# Patient Record
Sex: FEMALE | Race: WHITE | NOT HISPANIC OR LATINO
[De-identification: names, ages, dates, MRNs, and addresses within clinical notes are randomized per-mention and may not be internally consistent; named-entity substitution may affect disease eponyms.]

---

## 2018-04-02 PROBLEM — Z00.00 ENCOUNTER FOR PREVENTIVE HEALTH EXAMINATION: Status: ACTIVE | Noted: 2018-04-02

## 2018-04-27 ENCOUNTER — APPOINTMENT (OUTPATIENT)
Dept: OPHTHALMOLOGY | Facility: CLINIC | Age: 83
End: 2018-04-27

## 2018-06-25 ENCOUNTER — APPOINTMENT (OUTPATIENT)
Dept: OTOLARYNGOLOGY | Facility: CLINIC | Age: 83
End: 2018-06-25
Payer: MEDICARE

## 2018-06-25 VITALS — BODY MASS INDEX: 19.63 KG/M2 | WEIGHT: 100 LBS | HEIGHT: 60 IN

## 2018-06-25 DIAGNOSIS — H65.23 CHRONIC SEROUS OTITIS MEDIA, BILATERAL: ICD-10-CM

## 2018-06-25 PROCEDURE — 92567 TYMPANOMETRY: CPT

## 2018-06-25 PROCEDURE — 99204 OFFICE O/P NEW MOD 45 MIN: CPT

## 2018-07-02 ENCOUNTER — TRANSCRIPTION ENCOUNTER (OUTPATIENT)
Age: 83
End: 2018-07-02

## 2019-05-03 ENCOUNTER — TRANSCRIPTION ENCOUNTER (OUTPATIENT)
Age: 84
End: 2019-05-03

## 2019-12-09 ENCOUNTER — APPOINTMENT (OUTPATIENT)
Dept: UROGYNECOLOGY | Facility: CLINIC | Age: 84
End: 2019-12-09

## 2020-01-15 ENCOUNTER — OUTPATIENT (OUTPATIENT)
Dept: OUTPATIENT SERVICES | Facility: HOSPITAL | Age: 85
LOS: 1 days | Discharge: HOME | End: 2020-01-15

## 2020-04-02 ENCOUNTER — APPOINTMENT (OUTPATIENT)
Dept: OPHTHALMOLOGY | Facility: CLINIC | Age: 85
End: 2020-04-02

## 2020-08-20 ENCOUNTER — APPOINTMENT (OUTPATIENT)
Dept: OPHTHALMOLOGY | Facility: CLINIC | Age: 85
End: 2020-08-20
Payer: MEDICARE

## 2020-08-20 ENCOUNTER — NON-APPOINTMENT (OUTPATIENT)
Age: 85
End: 2020-08-20

## 2020-08-20 PROCEDURE — 92004 COMPRE OPH EXAM NEW PT 1/>: CPT

## 2020-12-16 ENCOUNTER — OUTPATIENT (OUTPATIENT)
Dept: OUTPATIENT SERVICES | Facility: HOSPITAL | Age: 85
LOS: 1 days | Discharge: HOME | End: 2020-12-16

## 2020-12-16 DIAGNOSIS — D50.9 IRON DEFICIENCY ANEMIA, UNSPECIFIED: ICD-10-CM

## 2020-12-17 ENCOUNTER — APPOINTMENT (OUTPATIENT)
Dept: HEMATOLOGY ONCOLOGY | Facility: CLINIC | Age: 85
End: 2020-12-17
Payer: MEDICARE

## 2020-12-17 PROCEDURE — 99205 OFFICE O/P NEW HI 60 MIN: CPT | Mod: 95

## 2020-12-17 RX ORDER — PREDNISONE 10 MG/1
10 TABLET ORAL TWICE DAILY
Qty: 15 | Refills: 1 | Status: DISCONTINUED | COMMUNITY
Start: 2018-06-25 | End: 2020-12-17

## 2020-12-17 NOTE — CONSULT LETTER
[Dear  ___] : Dear  [unfilled], [Consult Letter:] : I had the pleasure of evaluating your patient, [unfilled]. [Please see my note below.] : Please see my note below. [Consult Closing:] : Thank you very much for allowing me to participate in the care of this patient.  If you have any questions, please do not hesitate to contact me. [Sincerely,] : Sincerely, [FreeTextEntry2] : Colin Bell M.D. [FreeTextEntry3] : Yohan Canela M.D., [facility]\par

## 2020-12-17 NOTE — REVIEW OF SYSTEMS
[Fever] : no fever [Chills] : no chills [Night Sweats] : no night sweats [Fatigue] : fatigue [Recent Change In Weight] : ~T no recent weight change [Dysphagia] : no dysphagia [Loss of Hearing] : loss of hearing [Odynophagia] : no odynophagia [Shortness Of Breath] : no shortness of breath [Wheezing] : no wheezing [Cough] : no cough [SOB on Exertion] : shortness of breath during exertion [Easy Bleeding] : no tendency for easy bleeding [Easy Bruising] : no tendency for easy bruising [Swollen Glands] : no swollen glands [Negative] : Allergic/Immunologic [FreeTextEntry3] : h/o bilat cataract surgery [de-identified] : as above

## 2020-12-17 NOTE — HISTORY OF PRESENT ILLNESS
[Disease:__________________________] : Disease: [unfilled] [Home] : at home, [unfilled] , at the time of the visit. [Other Location: e.g. Home (Enter Location, City,State)___] : at [unfilled] [Family Member] : family member [Verbal consent obtained from patient] : the patient, [unfilled] [de-identified] : Ms. Culver is a 89 yo woman with CLL referred for evaluation of anemia.  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  I don't have those records.  She was observed \par and did well until recently, when her Hgb started to fall from 11.4 (12/19) to 8.7 (11/20/20).  WBC has ranged between 39.7 and 48.5, plt count and RBC indices have been normal.  Renal and liver function have been normal.\par She complains of significant fatigue and out of the hemoglobin has fallen she has developed more in the way of exertional dyspnea.  She has had no fevers, night sweats or weight loss.  She has no significant GI complaints other than those related to longstanding gastroparesis and has no complaints to suggest GI blood loss.  By history, she did have GI bleeding in 2016 while in [place] which led to a transfusion of 3 units of packed cells and at least an upper endoscopy.  I do not have these results.  At that time, she also was treated with steroids for indications which are not clear to me.\par She has yet to require transfusional support at this time.  Neither she or her daughter will call whether or not there is past imaging that may be reviewed.\par There is a past history of C. difficile infection, osteoporosis and bilateral hearing loss.  Lately, her balance has become more unsteady and she uses a walker.  She has had no falls.\par She takes no medications.\par  \par had blood in stool, gastroparesis, no colonoscopy \par balance, walker, no fallsinsomina\par h/o polio at age 28 yo recovered no sequelae\par  [de-identified] : ? 0 when dx'd [de-identified] : initial visit

## 2020-12-17 NOTE — ASSESSMENT
[FreeTextEntry1] : 89-year-old woman with history of CLL not requiring treatment to date.  I do not have records to review at this time including initial path or flow results.  I do not have information regarding prognostic factors.  Her hemoglobin has dropped nearly 3 g over the course of almost a year without complaints to suggest GI blood loss.  She does have a past history of GI bleeding 3 years ago while in [place] which required transfusional support and regarding which I have no further details.\par I will try to obtain records as available.  For now, baseline blood work will be obtained to include CMP, LDH, beta-2 microglobulin, immunoprotein studies, anemia work-up, retic count, CLL FISH panel, IGVH hypermutation assay and viral serologies.\par Based on the available blood work it can be determined whether or not it is likely that progressive CLL is the cause of the anemia.  He does not have a rapid WBC doubling time, her platelet count is normal and her ANC has been preserved.  She has not had frequent respiratory infections.\par Once this information is obtained, we will decide what direction to go next.  This may include imaging, particularly to assess for liver and spleen size as well as possibly a bone marrow aspirate and biopsy.\par We will be in touch again as soon as the initial tests are available for review [Palliative Care Plan] : not applicable at this time

## 2020-12-19 ENCOUNTER — LABORATORY RESULT (OUTPATIENT)
Age: 85
End: 2020-12-19

## 2020-12-21 LAB
B2 MICROGLOB SERPL-MCNC: 6 MG/L
FERRITIN SERPL-MCNC: 14 NG/ML
HAPTOGLOB SERPL-MCNC: 215 MG/DL
HBV CORE IGG+IGM SER QL: NONREACTIVE
HBV SURFACE AB SER QL: NONREACTIVE
HBV SURFACE AG SER QL: NONREACTIVE
HCV AB SER QL: NONREACTIVE
HCV S/CO RATIO: 0.07 S/CO
HIV1+2 AB SPEC QL IA.RAPID: NONREACTIVE
IRON SATN MFR SERPL: 9 %
IRON SERPL-MCNC: 29 UG/DL
TIBC SERPL-MCNC: 313 UG/DL
UIBC SERPL-MCNC: 284 UG/DL
VIT B12 SERPL-MCNC: 321 PG/ML

## 2021-01-02 ENCOUNTER — LABORATORY RESULT (OUTPATIENT)
Age: 86
End: 2021-01-02

## 2021-01-02 ENCOUNTER — NON-APPOINTMENT (OUTPATIENT)
Age: 86
End: 2021-01-02

## 2021-01-13 ENCOUNTER — APPOINTMENT (OUTPATIENT)
Dept: INFUSION THERAPY | Facility: HOSPITAL | Age: 86
End: 2021-01-13

## 2021-01-13 ENCOUNTER — LABORATORY RESULT (OUTPATIENT)
Age: 86
End: 2021-01-13

## 2021-01-14 ENCOUNTER — NON-APPOINTMENT (OUTPATIENT)
Age: 86
End: 2021-01-14

## 2021-01-15 ENCOUNTER — APPOINTMENT (OUTPATIENT)
Dept: INFUSION THERAPY | Facility: HOSPITAL | Age: 86
End: 2021-01-15

## 2021-01-15 ENCOUNTER — RESULT REVIEW (OUTPATIENT)
Age: 86
End: 2021-01-15

## 2021-01-15 ENCOUNTER — OUTPATIENT (OUTPATIENT)
Dept: OUTPATIENT SERVICES | Facility: HOSPITAL | Age: 86
LOS: 1 days | End: 2021-01-15
Payer: MEDICARE

## 2021-01-15 DIAGNOSIS — D72.829 ELEVATED WHITE BLOOD CELL COUNT, UNSPECIFIED: ICD-10-CM

## 2021-01-15 PROCEDURE — 86850 RBC ANTIBODY SCREEN: CPT

## 2021-01-15 PROCEDURE — 86923 COMPATIBILITY TEST ELECTRIC: CPT

## 2021-01-15 PROCEDURE — 86900 BLOOD TYPING SEROLOGIC ABO: CPT

## 2021-01-15 PROCEDURE — 86901 BLOOD TYPING SEROLOGIC RH(D): CPT

## 2021-01-16 ENCOUNTER — APPOINTMENT (OUTPATIENT)
Dept: INFUSION THERAPY | Facility: HOSPITAL | Age: 86
End: 2021-01-16

## 2021-01-19 ENCOUNTER — NON-APPOINTMENT (OUTPATIENT)
Age: 86
End: 2021-01-19

## 2021-01-19 DIAGNOSIS — Z51.89 ENCOUNTER FOR OTHER SPECIFIED AFTERCARE: ICD-10-CM

## 2021-03-03 ENCOUNTER — LABORATORY RESULT (OUTPATIENT)
Age: 86
End: 2021-03-03

## 2021-03-28 ENCOUNTER — OUTPATIENT (OUTPATIENT)
Dept: OUTPATIENT SERVICES | Facility: HOSPITAL | Age: 86
LOS: 1 days | Discharge: HOME | End: 2021-03-28

## 2021-03-28 DIAGNOSIS — D72.829 ELEVATED WHITE BLOOD CELL COUNT, UNSPECIFIED: ICD-10-CM

## 2021-04-01 ENCOUNTER — RESULT REVIEW (OUTPATIENT)
Age: 86
End: 2021-04-01

## 2021-04-01 ENCOUNTER — APPOINTMENT (OUTPATIENT)
Dept: HEMATOLOGY ONCOLOGY | Facility: CLINIC | Age: 86
End: 2021-04-01
Payer: MEDICARE

## 2021-04-01 VITALS
BODY MASS INDEX: 17.26 KG/M2 | HEIGHT: 60.24 IN | RESPIRATION RATE: 14 BRPM | WEIGHT: 89.07 LBS | SYSTOLIC BLOOD PRESSURE: 101 MMHG | DIASTOLIC BLOOD PRESSURE: 62 MMHG | HEART RATE: 84 BPM | TEMPERATURE: 97.3 F | OXYGEN SATURATION: 99 %

## 2021-04-01 DIAGNOSIS — Z63.4 DISAPPEARANCE AND DEATH OF FAMILY MEMBER: ICD-10-CM

## 2021-04-01 DIAGNOSIS — H90.3 SENSORINEURAL HEARING LOSS, BILATERAL: ICD-10-CM

## 2021-04-01 DIAGNOSIS — A80.9 ACUTE POLIOMYELITIS, UNSPECIFIED: ICD-10-CM

## 2021-04-01 DIAGNOSIS — Z86.19 PERSONAL HISTORY OF OTHER INFECTIOUS AND PARASITIC DISEASES: ICD-10-CM

## 2021-04-01 DIAGNOSIS — M81.0 AGE-RELATED OSTEOPOROSIS W/OUT CURRENT PATHOLOGICAL FRACTURE: ICD-10-CM

## 2021-04-01 DIAGNOSIS — Z78.9 OTHER SPECIFIED HEALTH STATUS: ICD-10-CM

## 2021-04-01 LAB
BASOPHILS # BLD AUTO: 0 K/UL — SIGNIFICANT CHANGE UP (ref 0–0.2)
BASOPHILS NFR BLD AUTO: 0 % — SIGNIFICANT CHANGE UP (ref 0–2)
EOSINOPHIL # BLD AUTO: 0.5 K/UL — SIGNIFICANT CHANGE UP (ref 0–0.5)
EOSINOPHIL NFR BLD AUTO: 1 % — SIGNIFICANT CHANGE UP (ref 0–6)
HCT VFR BLD CALC: 35.1 % — SIGNIFICANT CHANGE UP (ref 34.5–45)
HGB BLD-MCNC: 10.7 G/DL — LOW (ref 11.5–15.5)
LYMPHOCYTES # BLD AUTO: 41.43 K/UL — HIGH (ref 1–3.3)
LYMPHOCYTES # BLD AUTO: 83 % — HIGH (ref 13–44)
MCHC RBC-ENTMCNC: 28.8 PG — SIGNIFICANT CHANGE UP (ref 27–34)
MCHC RBC-ENTMCNC: 30.5 G/DL — LOW (ref 32–36)
MCV RBC AUTO: 94.4 FL — SIGNIFICANT CHANGE UP (ref 80–100)
MONOCYTES # BLD AUTO: 1.5 K/UL — HIGH (ref 0–0.9)
MONOCYTES NFR BLD AUTO: 3 % — SIGNIFICANT CHANGE UP (ref 2–14)
NEUTROPHILS # BLD AUTO: 6.49 K/UL — SIGNIFICANT CHANGE UP (ref 1.8–7.4)
NEUTROPHILS NFR BLD AUTO: 13 % — LOW (ref 43–77)
NRBC # BLD: 0 /100 — SIGNIFICANT CHANGE UP (ref 0–0)
NRBC # BLD: SIGNIFICANT CHANGE UP /100 WBCS (ref 0–0)
PLAT MORPH BLD: NORMAL — SIGNIFICANT CHANGE UP
PLATELET # BLD AUTO: 286 K/UL — SIGNIFICANT CHANGE UP (ref 150–400)
RBC # BLD: 3.72 M/UL — LOW (ref 3.8–5.2)
RBC # FLD: 18.6 % — HIGH (ref 10.3–14.5)
RBC BLD AUTO: SIGNIFICANT CHANGE UP
SMUDGE CELLS # BLD: PRESENT — SIGNIFICANT CHANGE UP
WBC # BLD: 49.92 K/UL — CRITICAL HIGH (ref 3.8–10.5)
WBC # FLD AUTO: 49.92 K/UL — CRITICAL HIGH (ref 3.8–10.5)

## 2021-04-01 PROCEDURE — 99214 OFFICE O/P EST MOD 30 MIN: CPT

## 2021-04-01 SDOH — SOCIAL STABILITY - SOCIAL INSECURITY: DISSAPEARANCE AND DEATH OF FAMILY MEMBER: Z63.4

## 2021-04-04 PROBLEM — Z86.19 HISTORY OF CLOSTRIDIUM DIFFICILE INFECTION: Status: RESOLVED | Noted: 2021-04-04 | Resolved: 2021-04-04

## 2021-04-04 PROBLEM — H90.3 SENSORINEURAL HEARING LOSS (SNHL) OF BOTH EARS: Status: ACTIVE | Noted: 2018-06-25

## 2021-04-04 PROBLEM — M81.0 OSTEOPOROSIS, POST-MENOPAUSAL: Status: ACTIVE | Noted: 2021-04-04

## 2021-04-04 PROBLEM — A80.9 POLIO: Status: RESOLVED | Noted: 2021-04-04 | Resolved: 2021-04-04

## 2021-04-04 NOTE — CONSULT LETTER
[Please see my note below.] : Please see my note below. [Sincerely,] : Sincerely, [Dear  ___] : Dear  [unfilled], [Courtesy Letter:] : I had the pleasure of seeing your patient, [unfilled], in my office today. [FreeTextEntry2] : Colin Bell M.D. [FreeTextEntry3] : Yohan Canela M.D., [facility]\par

## 2021-04-04 NOTE — PHYSICAL EXAM
[Restricted in physically strenuous activity but ambulatory and able to carry out work of a light or sedentary nature] : Status 1- Restricted in physically strenuous activity but ambulatory and able to carry out work of a light or sedentary nature, e.g., light house work, office work [Normal] : normal appearance, no rash, nodules, vesicles, ulcers, erythema [de-identified] : no CVAT

## 2021-04-04 NOTE — REVIEW OF SYSTEMS
[Fatigue] : fatigue [Loss of Hearing] : loss of hearing [SOB on Exertion] : shortness of breath during exertion [Negative] : Allergic/Immunologic [Fever] : no fever [Chills] : no chills [Night Sweats] : no night sweats [Recent Change In Weight] : ~T no recent weight change [Dysphagia] : no dysphagia [Odynophagia] : no odynophagia [Shortness Of Breath] : no shortness of breath [Wheezing] : no wheezing [Cough] : no cough [Easy Bleeding] : no tendency for easy bleeding [Easy Bruising] : no tendency for easy bruising [Swollen Glands] : no swollen glands [FreeTextEntry3] : h/o bilat cataract surgery [de-identified] : as above

## 2021-04-04 NOTE — ASSESSMENT
[Palliative Care Plan] : not applicable at this time [FreeTextEntry1] : 89-year-old woman with history of CLL not requiring treatment to date.   Her hemoglobin dropped nearly 3 g over the course of almost a year without complaints to suggest GI blood loss.  She does have a past history of GI bleeding 3 years ago while in [place] which required transfusional support and regarding which I have no further details.\par She has not had recurrence of GI bleeding.\par Flow cytometry showed typical CLL phenotype except for dim expression of CD20 and dim/partial FMC–7.  CD38 was negative.  IGVH was unmutated.  FISH showed del(13q).\par CBC today shows a rise in the Hgb to 10.7.  WBC was 42.73, ANC 4.41\par and ALC 36.98.  PLT was 279K.  Total IgG was normal while IgA and IgM were low.  Vitamin B12 was low normal.  Immunoelectrophoresis was negative.  SANDEE was negative.  LDH was normal.  Beta-2 microglobulin was 6.0.  Pretransfusion/iron infusion ferritin was 14 and transferrin saturation was 9%.  Viral serologies were negative.  Uric acid was at the upper limit of normal.\par WBC is in the same general range as in 10/2020.\par He was started on ferrous sulfate 325 mg p.o. twice daily with vitamin C with each dose.  \par She is to continue this.\par Iron studies will be repeated today along with CMP.\par Pending results, she should return in follow-up in about 3 months.\par If the hemoglobin does not entirely normalized especially if the WBC rises significantly over a short period of time, consideration will be given to initiating treatment of the CLL with a BTK inhibitor. [Palliative] : Goals of care discussed with patient: Palliative

## 2021-04-04 NOTE — HISTORY OF PRESENT ILLNESS
[Disease:__________________________] : Disease: [unfilled] [Other Location: e.g. Home (Enter Location, City,State)___] : at [unfilled] [Family Member] : family member [de-identified] : Ms. Culver is a 89 yo woman with CLL referred for evaluation of anemia.  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  I don't have those records.  She was observed \par and did well until recently, when her Hgb started to fall from 11.4 (12/19) to 8.7 (11/20/20).  WBC has ranged between 39.7 and 48.5, plt count and RBC indices have been normal.  Renal and liver function have been normal.\par She complains of significant fatigue and out of the hemoglobin has fallen she has developed more in the way of exertional dyspnea.  She has had no fevers, night sweats or weight loss.  She has no significant GI complaints other than those related to longstanding gastroparesis and has no complaints to suggest GI blood loss.  By history, she did have GI bleeding in 2016 while in [place] which led to a transfusion of 3 units of packed cells and at least an upper endoscopy.  I do not have these results.  At that time, she also was treated with steroids for indications which are not clear to me.\par She has yet to require transfusional support at this time.  Neither she or her daughter will call whether or not there is past imaging that may be reviewed.\par There is a past history of C. difficile infection, osteoporosis and bilateral hearing loss.  Lately, her balance has become more unsteady and she uses a walker.  She has had no falls.\par Ms. Culver is a 89 yo woman with CLL referred for evaluation of anemia. 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 I don't have those records. She was observed \par and did well until recently, when her Hgb started to fall from 11.4 (12/19) to 8.7 (11/20/20). WBC has ranged between 39.7 and 48.5, plt count and RBC indices have been normal. Renal and liver function have been normal.\par She complains of significant fatigue and out of the hemoglobin has fallen she has developed more in the way of exertional dyspnea. She has had no fevers, night sweats or weight loss. She has no significant GI complaints other than those related to longstanding gastroparesis and has no complaints to suggest GI blood loss. By history, she did have GI bleeding in 2016 while in [place] which led to a transfusion of 3 units of packed cells and at least an upper endoscopy. I do not have these results. At that time, she also was treated with steroids for indications which are not clear to me.\par She has yet to require transfusional support at this time. Neither she or her daughter will call whether or not there is past imaging that may be reviewed.\par There is a past history of C. difficile infection, osteoporosis and bilateral hearing loss. Lately, her balance has become more unsteady and she uses a walker. She has had no falls.\par \par  \par  \par \par  \par \par \par  [de-identified] : ? 0 when dx'd [de-identified] : After initial visit via [employer] [facility] [place] to receive one unit of PRBC and one 510 mg infusion of ferraheme -\par Marcel. 15, 16 2021.\par Felt better post rx, but still significantly tired, does have periodic PENA athough not physically active.\par Walks unsteadily with walker.\par No recent falls.\par No fevers, other constitutional c/o, resp. infections.

## 2021-05-13 ENCOUNTER — LABORATORY RESULT (OUTPATIENT)
Age: 86
End: 2021-05-13

## 2021-05-14 LAB
ALBUMIN SERPL ELPH-MCNC: 3.8 G/DL
ALP BLD-CCNC: 119 U/L
ALT SERPL-CCNC: 7 U/L
ANION GAP SERPL CALC-SCNC: 14 MMOL/L
AST SERPL-CCNC: 16 U/L
B2 MICROGLOB SERPL-MCNC: 6.7 MG/L
BASOPHILS # BLD AUTO: 0.1 K/UL
BASOPHILS NFR BLD AUTO: 0.2 %
BILIRUB SERPL-MCNC: 0.4 MG/DL
BUN SERPL-MCNC: 34 MG/DL
CALCIUM SERPL-MCNC: 9.8 MG/DL
CHLORIDE SERPL-SCNC: 102 MMOL/L
CO2 SERPL-SCNC: 25 MMOL/L
CREAT SERPL-MCNC: 1.07 MG/DL
DEPRECATED KAPPA LC FREE/LAMBDA SER: 3.15 RATIO
EOSINOPHIL # BLD AUTO: 0.2 K/UL
EOSINOPHIL NFR BLD AUTO: 0.4 %
FERRITIN SERPL-MCNC: 123 NG/ML
GLUCOSE SERPL-MCNC: 108 MG/DL
HCT VFR BLD CALC: 32.2 %
HGB BLD-MCNC: 9.4 G/DL
IGA SER QL IEP: 36 MG/DL
IGG SER QL IEP: 1275 MG/DL
IGM SER QL IEP: 31 MG/DL
IMM GRANULOCYTES NFR BLD AUTO: 0.2 %
IRON SATN MFR SERPL: 14 %
IRON SERPL-MCNC: 36 UG/DL
KAPPA LC CSF-MCNC: 2.18 MG/DL
KAPPA LC SERPL-MCNC: 6.87 MG/DL
LDH SERPL-CCNC: 185 U/L
LYMPHOCYTES # BLD AUTO: 38.08 K/UL
LYMPHOCYTES NFR BLD AUTO: 84.7 %
MAN DIFF?: NORMAL
MCHC RBC-ENTMCNC: 28.1 PG
MCHC RBC-ENTMCNC: 29.2 GM/DL
MCV RBC AUTO: 96.1 FL
MONOCYTES # BLD AUTO: 2.08 K/UL
MONOCYTES NFR BLD AUTO: 4.6 %
NEUTROPHILS # BLD AUTO: 4.38 K/UL
NEUTROPHILS NFR BLD AUTO: 9.9 %
PLATELET # BLD AUTO: 285 K/UL
POTASSIUM SERPL-SCNC: 4.7 MMOL/L
PROT SERPL-MCNC: 6.5 G/DL
RBC # BLD: 3.35 M/UL
RBC # FLD: 16.1 %
SODIUM SERPL-SCNC: 140 MMOL/L
TIBC SERPL-MCNC: 264 UG/DL
UIBC SERPL-MCNC: 228 UG/DL
URATE SERPL-MCNC: 6 MG/DL
WBC # FLD AUTO: 44.94 K/UL

## 2021-06-28 ENCOUNTER — OUTPATIENT (OUTPATIENT)
Dept: OUTPATIENT SERVICES | Facility: HOSPITAL | Age: 86
LOS: 1 days | Discharge: HOME | End: 2021-06-28

## 2021-06-28 DIAGNOSIS — D72.829 ELEVATED WHITE BLOOD CELL COUNT, UNSPECIFIED: ICD-10-CM

## 2021-07-01 ENCOUNTER — RESULT REVIEW (OUTPATIENT)
Age: 86
End: 2021-07-01

## 2021-07-01 ENCOUNTER — APPOINTMENT (OUTPATIENT)
Dept: HEMATOLOGY ONCOLOGY | Facility: CLINIC | Age: 86
End: 2021-07-01
Payer: MEDICARE

## 2021-07-01 VITALS
BODY MASS INDEX: 16.74 KG/M2 | RESPIRATION RATE: 16 BRPM | HEIGHT: 60.67 IN | DIASTOLIC BLOOD PRESSURE: 63 MMHG | WEIGHT: 87.5 LBS | TEMPERATURE: 97.6 F | SYSTOLIC BLOOD PRESSURE: 108 MMHG | OXYGEN SATURATION: 98 % | HEART RATE: 57 BPM

## 2021-07-01 DIAGNOSIS — Z87.19 PERSONAL HISTORY OF OTHER DISEASES OF THE DIGESTIVE SYSTEM: ICD-10-CM

## 2021-07-01 LAB
ANISOCYTOSIS BLD QL: SLIGHT — SIGNIFICANT CHANGE UP
BASOPHILS # BLD AUTO: 0 K/UL — SIGNIFICANT CHANGE UP (ref 0–0.2)
BASOPHILS NFR BLD AUTO: 0 % — SIGNIFICANT CHANGE UP (ref 0–2)
DACRYOCYTES BLD QL SMEAR: SLIGHT — SIGNIFICANT CHANGE UP
ELLIPTOCYTES BLD QL SMEAR: SLIGHT — SIGNIFICANT CHANGE UP
EOSINOPHIL # BLD AUTO: 0 K/UL — SIGNIFICANT CHANGE UP (ref 0–0.5)
EOSINOPHIL NFR BLD AUTO: 0 % — SIGNIFICANT CHANGE UP (ref 0–6)
HCT VFR BLD CALC: 31.3 % — LOW (ref 34.5–45)
HGB BLD-MCNC: 9.3 G/DL — LOW (ref 11.5–15.5)
LYMPHOCYTES # BLD AUTO: 39.12 K/UL — HIGH (ref 1–3.3)
LYMPHOCYTES # BLD AUTO: 86 % — HIGH (ref 13–44)
LYMPHOCYTES # SPEC AUTO: 3 % — HIGH (ref 0–0)
MCHC RBC-ENTMCNC: 27.4 PG — SIGNIFICANT CHANGE UP (ref 27–34)
MCHC RBC-ENTMCNC: 29.7 G/DL — LOW (ref 32–36)
MCV RBC AUTO: 92.1 FL — SIGNIFICANT CHANGE UP (ref 80–100)
MONOCYTES # BLD AUTO: 1.36 K/UL — HIGH (ref 0–0.9)
MONOCYTES NFR BLD AUTO: 3 % — SIGNIFICANT CHANGE UP (ref 2–14)
NEUTROPHILS # BLD AUTO: 3.57 K/UL — SIGNIFICANT CHANGE UP (ref 1.8–7.4)
NEUTROPHILS NFR BLD AUTO: 8 % — LOW (ref 43–77)
NRBC # BLD: 0 /100 — SIGNIFICANT CHANGE UP (ref 0–0)
NRBC # BLD: SIGNIFICANT CHANGE UP /100 WBCS (ref 0–0)
PLAT MORPH BLD: NORMAL — SIGNIFICANT CHANGE UP
PLATELET # BLD AUTO: 284 K/UL — SIGNIFICANT CHANGE UP (ref 150–400)
POIKILOCYTOSIS BLD QL AUTO: SLIGHT — SIGNIFICANT CHANGE UP
RBC # BLD: 3.4 M/UL — LOW (ref 3.8–5.2)
RBC # FLD: 16.2 % — HIGH (ref 10.3–14.5)
RBC BLD AUTO: ABNORMAL
SMUDGE CELLS # BLD: PRESENT — SIGNIFICANT CHANGE UP
WBC # BLD: 45.49 K/UL — CRITICAL HIGH (ref 3.8–10.5)
WBC # FLD AUTO: 45.49 K/UL — CRITICAL HIGH (ref 3.8–10.5)

## 2021-07-01 PROCEDURE — 99214 OFFICE O/P EST MOD 30 MIN: CPT

## 2021-07-01 RX ORDER — CHLORHEXIDINE GLUCONATE 4 %
325 (65 FE) LIQUID (ML) TOPICAL
Qty: 180 | Refills: 1 | Status: DISCONTINUED | COMMUNITY
Start: 2021-04-04 | End: 2021-07-01

## 2021-07-01 NOTE — CONSULT LETTER
[Dear  ___] : Dear  [unfilled], [Courtesy Letter:] : I had the pleasure of seeing your patient, [unfilled], in my office today. [Please see my note below.] : Please see my note below. [Sincerely,] : Sincerely, [FreeTextEntry2] : Colin Bell M.D. [FreeTextEntry3] : Yohan Canela M.D., [facility]\par

## 2021-07-01 NOTE — PHYSICAL EXAM
[Restricted in physically strenuous activity but ambulatory and able to carry out work of a light or sedentary nature] : Status 1- Restricted in physically strenuous activity but ambulatory and able to carry out work of a light or sedentary nature, e.g., light house work, office work [Normal] : affect appropriate [Thin] : thin [de-identified] : no CVAT

## 2021-07-01 NOTE — HISTORY OF PRESENT ILLNESS
[Disease:__________________________] : Disease: [unfilled] [Other Location: e.g. Home (Enter Location, City,State)___] : at [unfilled] [Family Member] : family member [de-identified] : Ms. Culver is a 89 yo woman with CLL referred for evaluation of anemia.  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  I don't have those records.  She was observed \par and did well until recently, when her Hgb started to fall from 11.4 (12/19) to 8.7 (11/20/20).  WBC has ranged between 39.7 and 48.5, plt count and RBC indices have been normal.  Renal and liver function have been normal.\par She complains of significant fatigue and out of the hemoglobin has fallen she has developed more in the way of exertional dyspnea.  She has had no fevers, night sweats or weight loss.  She has no significant GI complaints other than those related to longstanding gastroparesis and has no complaints to suggest GI blood loss.  By history, she did have GI bleeding in 2016 while in [place] which led to a transfusion of 3 units of packed cells and at least an upper endoscopy.  I do not have these results.  At that time, she also was treated with steroids for indications which are not clear to me.\par She has yet to require transfusional support at this time.  Neither she or her daughter will call whether or not there is past imaging that may be reviewed.\par There is a past history of C. difficile infection, osteoporosis and bilateral hearing loss.  Lately, her balance has become more unsteady and she uses a walker.  She has had no falls.\par Ms. Culver is a 89 yo woman with CLL referred for evaluation of anemia. 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 I don't have those records. She was observed \par and did well until recently, when her Hgb started to fall from 11.4 (12/19) to 8.7 (11/20/20). WBC has ranged between 39.7 and 48.5, plt count and RBC indices have been normal. Renal and liver function have been normal.\par She complains of significant fatigue and out of the hemoglobin has fallen she has developed more in the way of exertional dyspnea. She has had no fevers, night sweats or weight loss. She has no significant GI complaints other than those related to longstanding gastroparesis and has no complaints to suggest GI blood loss. By history, she did have GI bleeding in 2016 while in [place] which led to a transfusion of 3 units of packed cells and at least an upper endoscopy. I do not have these results. At that time, she also was treated with steroids for indications which are not clear to me.\par She has yet to require transfusional support at this time. Neither she or her daughter will call whether or not there is past imaging that may be reviewed.\par There is a past history of C. difficile infection, osteoporosis and bilateral hearing loss. Lately, her balance has become more unsteady and she uses a walker. She has had no falls.\par \par  \par  \par \par  \par \par \par  [de-identified] : ? 0 when dx'd [de-identified] : CLL, adverse risk features, anemia\par  halfway on successive days to receive one unit of PRBC and one 510 mg infusion of ferraheme -\par Marcel. 15, 16 2021.\par Felt better post rx, but still significantly tired, does have periodic PENA athough not physically active.\par Walks unsteadily with walker.\par No recent falls.\par No fevers, other constitutional c/o, resp. infections.\par Ambulatory

## 2021-07-01 NOTE — REVIEW OF SYSTEMS
[Fatigue] : fatigue [Loss of Hearing] : loss of hearing [SOB on Exertion] : shortness of breath during exertion [Negative] : Allergic/Immunologic [Fever] : no fever [Chills] : no chills [Night Sweats] : no night sweats [Recent Change In Weight] : ~T no recent weight change [Dysphagia] : no dysphagia [Odynophagia] : no odynophagia [Shortness Of Breath] : no shortness of breath [Wheezing] : no wheezing [Cough] : no cough [Easy Bleeding] : no tendency for easy bleeding [Easy Bruising] : no tendency for easy bruising [Swollen Glands] : no swollen glands [FreeTextEntry3] : h/o bilat cataract surgery [de-identified] : as above

## 2021-07-01 NOTE — ASSESSMENT
[Palliative] : Goals of care discussed with patient: Palliative [Palliative Care Plan] : not applicable at this time [FreeTextEntry1] : 89-year-old woman with history of CLL not requiring treatment to date.   \par She has not had recurrence of GI bleeding.\par Flow cytometry showed typical CLL phenotype except for dim expression of CD20 and dim/partial FMC–7.  CD38 was negative.  IGVH was unmutated.  FISH showed del(11q) and del(13q)\par CBC results reviewed and d/w pt\par WBC 44.94, Hgb 9.4, Plt 285K, ANC 4.38, MCV 96.1\par Ferritin, transferrin sat low pre PRBC tx and ferraheme.  Sat still low at 13% 5/13/21 though ferritin 80\par intolerant of oral Fe\par    \par Iron studies will be repeated today along with CMP.\par Pending results, she should return in follow-up in about 3 months.\par Recheck CBC in local lab since Hgb dropped in about 6 wks  - they will call for results\par d/w pt and daughter that if Hgb doesn't increase so that is sustained in the 11+ range and edith in WBC cont to rise significantly  over short time pd, need to consider starting treatment with a BTK inhib, most likely acalabrutinib.

## 2021-07-02 LAB
MANUAL DIF COMMENT BLD-IMP: SIGNIFICANT CHANGE UP

## 2021-07-12 ENCOUNTER — APPOINTMENT (OUTPATIENT)
Dept: INFUSION THERAPY | Facility: HOSPITAL | Age: 86
End: 2021-07-12

## 2021-07-12 DIAGNOSIS — Z51.11 ENCOUNTER FOR ANTINEOPLASTIC CHEMOTHERAPY: ICD-10-CM

## 2021-07-12 DIAGNOSIS — R11.2 NAUSEA WITH VOMITING, UNSPECIFIED: ICD-10-CM

## 2021-07-19 ENCOUNTER — APPOINTMENT (OUTPATIENT)
Dept: INFUSION THERAPY | Facility: HOSPITAL | Age: 86
End: 2021-07-19

## 2021-08-16 LAB
ALBUMIN SERPL ELPH-MCNC: 3.8 G/DL
ALP BLD-CCNC: 114 U/L
ALT SERPL-CCNC: 7 U/L
ANION GAP SERPL CALC-SCNC: 14 MMOL/L
AST SERPL-CCNC: 19 U/L
B2 MICROGLOB SERPL-MCNC: 6.8 MG/L
BILIRUB SERPL-MCNC: 0.4 MG/DL
BUN SERPL-MCNC: 30 MG/DL
CALCIUM SERPL-MCNC: 9.6 MG/DL
CHLORIDE SERPL-SCNC: 105 MMOL/L
CO2 SERPL-SCNC: 22 MMOL/L
CREAT SERPL-MCNC: 1.08 MG/DL
DEPRECATED KAPPA LC FREE/LAMBDA SER: 3.41 RATIO
FERRITIN SERPL-MCNC: 60 NG/ML
GLUCOSE SERPL-MCNC: 101 MG/DL
IGA SER QL IEP: 36 MG/DL
IGG SER QL IEP: 1207 MG/DL
IGM SER QL IEP: 28 MG/DL
IRON SATN MFR SERPL: 11 %
IRON SERPL-MCNC: 29 UG/DL
KAPPA LC CSF-MCNC: 1.89 MG/DL
KAPPA LC SERPL-MCNC: 6.45 MG/DL
LDH SERPL-CCNC: 232 U/L
POTASSIUM SERPL-SCNC: 5.2 MMOL/L
PROT SERPL-MCNC: 6.4 G/DL
SODIUM SERPL-SCNC: 140 MMOL/L
TIBC SERPL-MCNC: 255 UG/DL
UIBC SERPL-MCNC: 227 UG/DL
URATE SERPL-MCNC: 6.3 MG/DL

## 2021-08-17 ENCOUNTER — LABORATORY RESULT (OUTPATIENT)
Age: 86
End: 2021-08-17

## 2021-08-20 ENCOUNTER — APPOINTMENT (OUTPATIENT)
Dept: OPHTHALMOLOGY | Facility: CLINIC | Age: 86
End: 2021-08-20

## 2021-08-29 ENCOUNTER — OUTPATIENT (OUTPATIENT)
Dept: OUTPATIENT SERVICES | Facility: HOSPITAL | Age: 86
LOS: 1 days | Discharge: HOME | End: 2021-08-29

## 2021-08-29 DIAGNOSIS — D72.829 ELEVATED WHITE BLOOD CELL COUNT, UNSPECIFIED: ICD-10-CM

## 2021-08-31 ENCOUNTER — APPOINTMENT (OUTPATIENT)
Dept: INFUSION THERAPY | Facility: HOSPITAL | Age: 86
End: 2021-08-31

## 2021-09-08 ENCOUNTER — APPOINTMENT (OUTPATIENT)
Dept: INFUSION THERAPY | Facility: HOSPITAL | Age: 86
End: 2021-09-08

## 2021-09-30 ENCOUNTER — OUTPATIENT (OUTPATIENT)
Dept: OUTPATIENT SERVICES | Facility: HOSPITAL | Age: 86
LOS: 1 days | Discharge: HOME | End: 2021-09-30

## 2021-09-30 DIAGNOSIS — D72.829 ELEVATED WHITE BLOOD CELL COUNT, UNSPECIFIED: ICD-10-CM

## 2021-10-04 ENCOUNTER — RESULT REVIEW (OUTPATIENT)
Age: 86
End: 2021-10-04

## 2021-10-04 ENCOUNTER — LABORATORY RESULT (OUTPATIENT)
Age: 86
End: 2021-10-04

## 2021-10-04 ENCOUNTER — APPOINTMENT (OUTPATIENT)
Dept: HEMATOLOGY ONCOLOGY | Facility: CLINIC | Age: 86
End: 2021-10-04
Payer: MEDICARE

## 2021-10-04 VITALS
TEMPERATURE: 97.2 F | DIASTOLIC BLOOD PRESSURE: 62 MMHG | BODY MASS INDEX: 16.89 KG/M2 | WEIGHT: 83.77 LBS | OXYGEN SATURATION: 97 % | RESPIRATION RATE: 16 BRPM | HEIGHT: 59.13 IN | HEART RATE: 62 BPM | SYSTOLIC BLOOD PRESSURE: 118 MMHG

## 2021-10-04 DIAGNOSIS — Z80.3 FAMILY HISTORY OF MALIGNANT NEOPLASM OF BREAST: ICD-10-CM

## 2021-10-04 DIAGNOSIS — E61.1 IRON DEFICIENCY: ICD-10-CM

## 2021-10-04 LAB
ANISOCYTOSIS BLD QL: SLIGHT — SIGNIFICANT CHANGE UP
BASOPHILS # BLD AUTO: 0 K/UL — SIGNIFICANT CHANGE UP (ref 0–0.2)
BASOPHILS NFR BLD AUTO: 0 % — SIGNIFICANT CHANGE UP (ref 0–2)
DACRYOCYTES BLD QL SMEAR: SLIGHT — SIGNIFICANT CHANGE UP
ELLIPTOCYTES BLD QL SMEAR: SLIGHT — SIGNIFICANT CHANGE UP
EOSINOPHIL # BLD AUTO: 0.46 K/UL — SIGNIFICANT CHANGE UP (ref 0–0.5)
EOSINOPHIL NFR BLD AUTO: 1 % — SIGNIFICANT CHANGE UP (ref 0–6)
HCT VFR BLD CALC: 30.1 % — LOW (ref 34.5–45)
HGB BLD-MCNC: 9.1 G/DL — LOW (ref 11.5–15.5)
LYMPHOCYTES # BLD AUTO: 37.08 K/UL — HIGH (ref 1–3.3)
LYMPHOCYTES # BLD AUTO: 80 % — HIGH (ref 13–44)
LYMPHOCYTES # SPEC AUTO: 1 % — HIGH (ref 0–0)
MCHC RBC-ENTMCNC: 29 PG — SIGNIFICANT CHANGE UP (ref 27–34)
MCHC RBC-ENTMCNC: 30.2 G/DL — LOW (ref 32–36)
MCV RBC AUTO: 95.9 FL — SIGNIFICANT CHANGE UP (ref 80–100)
MONOCYTES # BLD AUTO: 0.46 K/UL — SIGNIFICANT CHANGE UP (ref 0–0.9)
MONOCYTES NFR BLD AUTO: 1 % — LOW (ref 2–14)
NEUTROPHILS # BLD AUTO: 7.88 K/UL — HIGH (ref 1.8–7.4)
NEUTROPHILS NFR BLD AUTO: 17 % — LOW (ref 43–77)
NRBC # BLD: 0 /100 — SIGNIFICANT CHANGE UP (ref 0–0)
NRBC # BLD: SIGNIFICANT CHANGE UP /100 WBCS (ref 0–0)
PLAT MORPH BLD: NORMAL — SIGNIFICANT CHANGE UP
PLATELET # BLD AUTO: 255 K/UL — SIGNIFICANT CHANGE UP (ref 150–400)
POIKILOCYTOSIS BLD QL AUTO: SLIGHT — SIGNIFICANT CHANGE UP
RBC # BLD: 3.14 M/UL — LOW (ref 3.8–5.2)
RBC # FLD: 17.5 % — HIGH (ref 10.3–14.5)
RBC BLD AUTO: ABNORMAL
RETICS #: 58.6 K/UL — SIGNIFICANT CHANGE UP (ref 25–125)
RETICS/RBC NFR: 1.8 % — SIGNIFICANT CHANGE UP (ref 0.5–2.5)
SMUDGE CELLS # BLD: PRESENT — SIGNIFICANT CHANGE UP
WBC # BLD: 46.35 K/UL — CRITICAL HIGH (ref 3.8–10.5)
WBC # FLD AUTO: 46.35 K/UL — CRITICAL HIGH (ref 3.8–10.5)

## 2021-10-04 PROCEDURE — 99214 OFFICE O/P EST MOD 30 MIN: CPT

## 2021-10-04 NOTE — CONSULT LETTER
[Dear  ___] : Dear  [unfilled], [Courtesy Letter:] : I had the pleasure of seeing your patient, [unfilled], in my office today. [Please see my note below.] : Please see my note below. [Sincerely,] : Sincerely, [FreeTextEntry2] : Colin Bell M.D. [FreeTextEntry3] : Yohan Canela M.D., [facility]\par

## 2021-10-04 NOTE — ASSESSMENT
[Palliative] : Goals of care discussed with patient: Palliative [Palliative Care Plan] : not applicable at this time [FreeTextEntry1] : 89-year-old woman with history of CLL not requiring treatment to date.   \par She has not had recurrence of GI bleeding.\par Flow cytometry showed typical CLL phenotype except for dim expression of CD20 and dim/partial FMC–7.  CD38 was negative.  IGVH was unmutated.  FISH showed del(11q) and del(13q)\par CBC results reviewed and d/w pt\par WBC 46.35, Hgb 9.1, Plt 255K, ANC 7.88, ALC 37.08  \par Ferritin, transferrin sat low pre PRBC tx and ferraheme.  \par Sat still low at 13% 5/13/21 though ferritin 80\par Repeat Fe/TIBC, ferritin cont to rise; that and Hgb previously > 10, plans for repeat ferritin canceled\par intolerant of oral Fe\par    \par Iron studies will be repeated today along with CMP.\par Pending results, she should return in follow-up in about 3 months.\par Recheck CBC in local lab since Hgb dropped in about 6 wks  - they will call for results\par again d/w pt and daughter that if Hgb doesn't increase so that is sustained in the 11+ range and edith if WBC cont to rise significantly over short time pd, need to consider starting treatment with a BTK inhib, most likely acalabrutinib.   Again  reviewed rationale, risks, benefits of BTK therapy.

## 2021-10-04 NOTE — REVIEW OF SYSTEMS
[Fatigue] : fatigue [Loss of Hearing] : loss of hearing [SOB on Exertion] : shortness of breath during exertion [Negative] : Allergic/Immunologic [Fever] : no fever [Chills] : no chills [Night Sweats] : no night sweats [Recent Change In Weight] : ~T no recent weight change [Dysphagia] : no dysphagia [Odynophagia] : no odynophagia [Shortness Of Breath] : no shortness of breath [Wheezing] : no wheezing [Cough] : no cough [Easy Bleeding] : no tendency for easy bleeding [Easy Bruising] : no tendency for easy bruising [Swollen Glands] : no swollen glands [FreeTextEntry3] : h/o bilat cataract surgery

## 2021-10-04 NOTE — HISTORY OF PRESENT ILLNESS
[Disease:__________________________] : Disease: [unfilled] [Other Location: e.g. Home (Enter Location, City,State)___] : at [unfilled] [Family Member] : family member [de-identified] : Ms. Culver is a 89 yo woman with CLL referred for evaluation of anemia.  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  I don't have those records.  She was observed \par and did well until recently, when her Hgb started to fall from 11.4 (12/19) to 8.7 (11/20/20).  WBC has ranged between 39.7 and 48.5, plt count and RBC indices have been normal.  Renal and liver function have been normal.\par She complains of significant fatigue and out of the hemoglobin has fallen she has developed more in the way of exertional dyspnea.  She has had no fevers, night sweats or weight loss.  She has no significant GI complaints other than those related to longstanding gastroparesis and has no complaints to suggest GI blood loss.  By history, she did have GI bleeding in 2016 while in [place] which led to a transfusion of 3 units of packed cells and at least an upper endoscopy.  I do not have these results.  At that time, she also was treated with steroids for indications which are not clear to me.\par She has yet to require transfusional support at this time.  Neither she or her daughter will call whether or not there is past imaging that may be reviewed.\par There is a past history of C. difficile infection, osteoporosis and bilateral hearing loss.  Lately, her balance has become more unsteady and she uses a walker.  She has had no falls.\par Ms. Culver is a 89 yo woman with CLL referred for evaluation of anemia. 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 I don't have those records. She was observed \par and did well until recently, when her Hgb started to fall from 11.4 (12/19) to 8.7 (11/20/20). WBC has ranged between 39.7 and 48.5, plt count and RBC indices have been normal. Renal and liver function have been normal.\par She complains of significant fatigue and out of the hemoglobin has fallen she has developed more in the way of exertional dyspnea. She has had no fevers, night sweats or weight loss. She has no significant GI complaints other than those related to longstanding gastroparesis and has no complaints to suggest GI blood loss. By history, she did have GI bleeding in 2016 while in [place] which led to a transfusion of 3 units of packed cells and at least an upper endoscopy. I do not have these results. At that time, she also was treated with steroids for indications which are not clear to me.\par She has yet to require transfusional support at this time. Neither she or her daughter will call whether or not there is past imaging that may be reviewed.\par There is a past history of C. difficile infection, osteoporosis and bilateral hearing loss. Lately, her balance has become more unsteady and she uses a walker. She has had no falls.\par \par  \par  \par \par  \par \par \par  [de-identified] : ? 0 when dx'd [de-identified] : CLL, adverse risk features, anemia\par came [place] [facility] [place] to receive one unit of PRBC and one 510 mg infusion of ferraheme -\par Marcel. 15, 16 2021.\par No longer c/o SOB\par c/o persistent fatigue\par No recent falls.\par No fevers, other constitutional c/o, resp. infections.\par Ambulatory

## 2021-10-08 LAB
ALBUMIN SERPL ELPH-MCNC: 3.5 G/DL
ALP BLD-CCNC: 104 U/L
ALT SERPL-CCNC: 6 U/L
ANION GAP SERPL CALC-SCNC: 13 MMOL/L
AST SERPL-CCNC: 13 U/L
B2 MICROGLOB SERPL-MCNC: 6.6 MG/L
BILIRUB SERPL-MCNC: 0.3 MG/DL
BUN SERPL-MCNC: 33 MG/DL
CALCIUM SERPL-MCNC: 9 MG/DL
CHLORIDE SERPL-SCNC: 104 MMOL/L
CO2 SERPL-SCNC: 24 MMOL/L
CREAT SERPL-MCNC: 1.03 MG/DL
DEPRECATED KAPPA LC FREE/LAMBDA SER: 2.69 RATIO
FERRITIN SERPL-MCNC: 192 NG/ML
GLUCOSE SERPL-MCNC: 95 MG/DL
IGA SER QL IEP: 39 MG/DL
IGG SER QL IEP: 1176 MG/DL
IGM SER QL IEP: 30 MG/DL
IRON SATN MFR SERPL: 22 %
IRON SERPL-MCNC: 45 UG/DL
KAPPA LC CSF-MCNC: 2.2 MG/DL
KAPPA LC SERPL-MCNC: 5.91 MG/DL
LDH SERPL-CCNC: 220 U/L
POTASSIUM SERPL-SCNC: 4.3 MMOL/L
PROT SERPL-MCNC: 6 G/DL
SODIUM SERPL-SCNC: 141 MMOL/L
TIBC SERPL-MCNC: 204 UG/DL
UIBC SERPL-MCNC: 159 UG/DL
URATE SERPL-MCNC: 6.5 MG/DL

## 2022-03-14 ENCOUNTER — APPOINTMENT (OUTPATIENT)
Dept: DERMATOLOGY | Facility: CLINIC | Age: 87
End: 2022-03-14

## 2022-07-18 ENCOUNTER — INPATIENT (INPATIENT)
Facility: HOSPITAL | Age: 87
LOS: 2 days | Discharge: HOME HEALTH | DRG: 193 | End: 2022-07-21
Attending: FAMILY MEDICINE | Admitting: HOSPITALIST
Payer: MEDICARE

## 2022-07-18 VITALS — WEIGHT: 70.11 LBS | HEIGHT: 59.84 IN

## 2022-07-18 DIAGNOSIS — J18.9 PNEUMONIA, UNSPECIFIED ORGANISM: ICD-10-CM

## 2022-07-18 LAB
ALBUMIN SERPL ELPH-MCNC: 2.6 G/DL — LOW (ref 3.3–5)
ALP SERPL-CCNC: 117 U/L — SIGNIFICANT CHANGE UP (ref 40–120)
ALT FLD-CCNC: 16 U/L — SIGNIFICANT CHANGE UP (ref 12–78)
ANION GAP SERPL CALC-SCNC: 3 MMOL/L — LOW (ref 5–17)
AST SERPL-CCNC: 23 U/L — SIGNIFICANT CHANGE UP (ref 15–37)
BASOPHILS # BLD AUTO: 0 K/UL — SIGNIFICANT CHANGE UP (ref 0–0.2)
BASOPHILS NFR BLD AUTO: 0 % — SIGNIFICANT CHANGE UP (ref 0–2)
BILIRUB SERPL-MCNC: 0.4 MG/DL — SIGNIFICANT CHANGE UP (ref 0.2–1.2)
BUN SERPL-MCNC: 30 MG/DL — HIGH (ref 7–23)
CALCIUM SERPL-MCNC: 9.4 MG/DL — SIGNIFICANT CHANGE UP (ref 8.5–10.1)
CHLORIDE SERPL-SCNC: 107 MMOL/L — SIGNIFICANT CHANGE UP (ref 96–108)
CO2 SERPL-SCNC: 29 MMOL/L — SIGNIFICANT CHANGE UP (ref 22–31)
CREAT SERPL-MCNC: 0.88 MG/DL — SIGNIFICANT CHANGE UP (ref 0.5–1.3)
EGFR: 60 ML/MIN/1.73M2 — SIGNIFICANT CHANGE UP
EOSINOPHIL # BLD AUTO: 0 K/UL — SIGNIFICANT CHANGE UP (ref 0–0.5)
EOSINOPHIL NFR BLD AUTO: 0 % — SIGNIFICANT CHANGE UP (ref 0–6)
GLUCOSE SERPL-MCNC: 119 MG/DL — HIGH (ref 70–99)
HCT VFR BLD CALC: 29 % — LOW (ref 34.5–45)
HGB BLD-MCNC: 8.6 G/DL — LOW (ref 11.5–15.5)
LACTATE SERPL-SCNC: 0.9 MMOL/L — SIGNIFICANT CHANGE UP (ref 0.7–2)
LYMPHOCYTES # BLD AUTO: 61.62 K/UL — HIGH (ref 1–3.3)
LYMPHOCYTES # BLD AUTO: 90 % — HIGH (ref 13–44)
MCHC RBC-ENTMCNC: 29.7 GM/DL — LOW (ref 32–36)
MCHC RBC-ENTMCNC: 30.4 PG — SIGNIFICANT CHANGE UP (ref 27–34)
MCV RBC AUTO: 102.5 FL — HIGH (ref 80–100)
MONOCYTES # BLD AUTO: 0 K/UL — SIGNIFICANT CHANGE UP (ref 0–0.9)
MONOCYTES NFR BLD AUTO: 0 % — LOW (ref 2–14)
NEUTROPHILS # BLD AUTO: 6.85 K/UL — SIGNIFICANT CHANGE UP (ref 1.8–7.4)
NEUTROPHILS NFR BLD AUTO: 9 % — LOW (ref 43–77)
NRBC # BLD: SIGNIFICANT CHANGE UP /100 WBCS (ref 0–0)
NT-PROBNP SERPL-SCNC: 1101 PG/ML — HIGH (ref 0–450)
PLATELET # BLD AUTO: 218 K/UL — SIGNIFICANT CHANGE UP (ref 150–400)
POTASSIUM SERPL-MCNC: 4.8 MMOL/L — SIGNIFICANT CHANGE UP (ref 3.5–5.3)
POTASSIUM SERPL-SCNC: 4.8 MMOL/L — SIGNIFICANT CHANGE UP (ref 3.5–5.3)
PROT SERPL-MCNC: 6.8 GM/DL — SIGNIFICANT CHANGE UP (ref 6–8.3)
RBC # BLD: 2.83 M/UL — LOW (ref 3.8–5.2)
RBC # FLD: 16.7 % — HIGH (ref 10.3–14.5)
SODIUM SERPL-SCNC: 139 MMOL/L — SIGNIFICANT CHANGE UP (ref 135–145)
TROPONIN I, HIGH SENSITIVITY RESULT: 57.18 NG/L — HIGH
TROPONIN I, HIGH SENSITIVITY RESULT: 65.31 NG/L — HIGH
WBC # BLD: 68.47 K/UL — CRITICAL HIGH (ref 3.8–10.5)
WBC # FLD AUTO: 68.47 K/UL — CRITICAL HIGH (ref 3.8–10.5)

## 2022-07-18 PROCEDURE — 71045 X-RAY EXAM CHEST 1 VIEW: CPT | Mod: 26

## 2022-07-18 PROCEDURE — 85014 HEMATOCRIT: CPT

## 2022-07-18 PROCEDURE — 86850 RBC ANTIBODY SCREEN: CPT

## 2022-07-18 PROCEDURE — 97530 THERAPEUTIC ACTIVITIES: CPT | Mod: GP

## 2022-07-18 PROCEDURE — 93005 ELECTROCARDIOGRAM TRACING: CPT

## 2022-07-18 PROCEDURE — 99285 EMERGENCY DEPT VISIT HI MDM: CPT | Mod: FS

## 2022-07-18 PROCEDURE — 93306 TTE W/DOPPLER COMPLETE: CPT

## 2022-07-18 PROCEDURE — 84481 FREE ASSAY (FT-3): CPT

## 2022-07-18 PROCEDURE — 99222 1ST HOSP IP/OBS MODERATE 55: CPT

## 2022-07-18 PROCEDURE — 85018 HEMOGLOBIN: CPT

## 2022-07-18 PROCEDURE — 86901 BLOOD TYPING SEROLOGIC RH(D): CPT

## 2022-07-18 PROCEDURE — 85025 COMPLETE CBC W/AUTO DIFF WBC: CPT

## 2022-07-18 PROCEDURE — 93010 ELECTROCARDIOGRAM REPORT: CPT

## 2022-07-18 PROCEDURE — 85610 PROTHROMBIN TIME: CPT

## 2022-07-18 PROCEDURE — 36415 COLL VENOUS BLD VENIPUNCTURE: CPT

## 2022-07-18 PROCEDURE — 84100 ASSAY OF PHOSPHORUS: CPT

## 2022-07-18 PROCEDURE — 71045 X-RAY EXAM CHEST 1 VIEW: CPT

## 2022-07-18 PROCEDURE — 80048 BASIC METABOLIC PNL TOTAL CA: CPT

## 2022-07-18 PROCEDURE — 80053 COMPREHEN METABOLIC PANEL: CPT

## 2022-07-18 PROCEDURE — 97162 PT EVAL MOD COMPLEX 30 MIN: CPT | Mod: GP

## 2022-07-18 PROCEDURE — 86900 BLOOD TYPING SEROLOGIC ABO: CPT

## 2022-07-18 PROCEDURE — 97116 GAIT TRAINING THERAPY: CPT | Mod: GP

## 2022-07-18 PROCEDURE — 83735 ASSAY OF MAGNESIUM: CPT

## 2022-07-18 PROCEDURE — 85027 COMPLETE CBC AUTOMATED: CPT

## 2022-07-18 PROCEDURE — 84443 ASSAY THYROID STIM HORMONE: CPT

## 2022-07-18 PROCEDURE — 84484 ASSAY OF TROPONIN QUANT: CPT

## 2022-07-18 RX ORDER — AZITHROMYCIN 500 MG/1
500 TABLET, FILM COATED ORAL ONCE
Refills: 0 | Status: COMPLETED | OUTPATIENT
Start: 2022-07-18 | End: 2022-07-18

## 2022-07-18 RX ORDER — SODIUM CHLORIDE 9 MG/ML
1000 INJECTION INTRAMUSCULAR; INTRAVENOUS; SUBCUTANEOUS ONCE
Refills: 0 | Status: COMPLETED | OUTPATIENT
Start: 2022-07-18 | End: 2022-07-18

## 2022-07-18 RX ORDER — CEFTRIAXONE 500 MG/1
1000 INJECTION, POWDER, FOR SOLUTION INTRAMUSCULAR; INTRAVENOUS ONCE
Refills: 0 | Status: COMPLETED | OUTPATIENT
Start: 2022-07-18 | End: 2022-07-18

## 2022-07-18 RX ORDER — ACETAMINOPHEN 500 MG
650 TABLET ORAL ONCE
Refills: 0 | Status: DISCONTINUED | OUTPATIENT
Start: 2022-07-18 | End: 2022-07-21

## 2022-07-18 RX ORDER — CHOLECALCIFEROL (VITAMIN D3) 125 MCG
1000 CAPSULE ORAL DAILY
Refills: 0 | Status: DISCONTINUED | OUTPATIENT
Start: 2022-07-18 | End: 2022-07-21

## 2022-07-18 RX ORDER — LANOLIN ALCOHOL/MO/W.PET/CERES
3 CREAM (GRAM) TOPICAL AT BEDTIME
Refills: 0 | Status: DISCONTINUED | OUTPATIENT
Start: 2022-07-18 | End: 2022-07-21

## 2022-07-18 RX ORDER — ONDANSETRON 8 MG/1
4 TABLET, FILM COATED ORAL EVERY 8 HOURS
Refills: 0 | Status: DISCONTINUED | OUTPATIENT
Start: 2022-07-18 | End: 2022-07-21

## 2022-07-18 RX ORDER — ASPIRIN/CALCIUM CARB/MAGNESIUM 324 MG
325 TABLET ORAL ONCE
Refills: 0 | Status: COMPLETED | OUTPATIENT
Start: 2022-07-18 | End: 2022-07-18

## 2022-07-18 RX ORDER — ALBUTEROL 90 UG/1
2 AEROSOL, METERED ORAL EVERY 6 HOURS
Refills: 0 | Status: DISCONTINUED | OUTPATIENT
Start: 2022-07-18 | End: 2022-07-21

## 2022-07-18 RX ADMIN — CEFTRIAXONE 100 MILLIGRAM(S): 500 INJECTION, POWDER, FOR SOLUTION INTRAMUSCULAR; INTRAVENOUS at 18:47

## 2022-07-18 RX ADMIN — Medication 325 MILLIGRAM(S): at 19:42

## 2022-07-18 RX ADMIN — AZITHROMYCIN 255 MILLIGRAM(S): 500 TABLET, FILM COATED ORAL at 19:37

## 2022-07-18 RX ADMIN — SODIUM CHLORIDE 1000 MILLILITER(S): 9 INJECTION INTRAMUSCULAR; INTRAVENOUS; SUBCUTANEOUS at 18:47

## 2022-07-18 NOTE — H&P ADULT - HISTORY OF PRESENT ILLNESS
89 year old female patient with a pertinent past medical history noted for CLL presented to the ED complaining of a one week history of a productive cough associated with shortness of breath. Of note, patient also with palpitations that are usually present on ambulation. Denies any chest pain or pre-syncope but endorsed swelling of lower extremity. No known sick contacts, fever or chills noted. Patient was seen and evaluated by her PCP and was later referred to the ED over concerns of a pneumonia.        In the ED patient with troponin of 57. A CXR revealed a likely right lower lobe infiltrate

## 2022-07-18 NOTE — H&P ADULT - CARDIOVASCULAR
normal/regular rate and rhythm/S1 S2 present/no gallops/no rub/no murmur/peripheral edema/pedal edema details…

## 2022-07-18 NOTE — ED PROVIDER NOTE - CLINICAL SUMMARY MEDICAL DECISION MAKING FREE TEXT BOX
89 F sent from PCP'd office over concern of pneumonia on CXR, initially went to office with palpitations. WIll r/o ACS, pneumonia. Plan for EKG, CXR, labs, reassess. 89 F with hx CLL sent from PCPs office over concern of pneumonia on CXR, initially went to office with palpitations. WIll r/o ACS, pneumonia. Plan for EKG, CXR, labs, reassess.

## 2022-07-18 NOTE — ED PROVIDER NOTE - NS ED ROS FT
GEN: Denies fever, chills, sick contacts, recent travel  HEENT: Denies dizziness, blurry vision, HA  Cardiac: +SOB Denies CP, palpitations  Lungs: +cough Denies wheezing  PV: + LE swelling  GI: Denies nausea, vomiting, diarrhea, constipation, flank pain  : Denies hematuria, dysuria, frequency, urgency  Skin: Denies rash, redness, open wound  MSK: Denies pain, decreased ROM  Neuro: Denies dizziness, difficulty speaking, difficulty swallowing, numbness/tingling in extremities, loss of bowel/bladder control, weakness in extremities

## 2022-07-18 NOTE — H&P ADULT - NSHPPHYSICALEXAM_GEN_ALL_CORE
Vital Signs Last 24 Hrs  T(C): 36.4 (18 Jul 2022 20:18), Max: 36.8 (18 Jul 2022 17:36)  T(F): 97.5 (18 Jul 2022 20:18), Max: 98.2 (18 Jul 2022 17:36)  HR: 66 (18 Jul 2022 20:18) (66 - 69)  BP: 130/74 (18 Jul 2022 20:18) (114/61 - 130/74)  BP(mean): 91 (18 Jul 2022 20:18) (91 - 91)  RR: 22 (18 Jul 2022 20:18) (22 - 22)  SpO2: 97% (18 Jul 2022 20:18) (97% - 97%)    Parameters below as of 18 Jul 2022 20:18  Patient On (Oxygen Delivery Method): room air

## 2022-07-18 NOTE — ED PROVIDER NOTE - PROGRESS NOTE DETAILS
Siobhan Ventura for attending Dr. Hernandez: 89 y/o F with PMH of CLL presents ot the ED c/o SOB in the setting of PNA x 1 week. Pt states she saw PCP today and was sent here for concern of PNA.    physical:  Lungs: rales on the R Siobhan Ventura for attending Dr. Hernandez: 89 y/o F with PMH of CLL presents ot the ED c/o SOB in the setting of PNA x 1 week. Pt states she saw PCP today and was sent here for concern of PNA.Pt In NAD. Lungs clear.    physical:  Lungs: rales on the R

## 2022-07-18 NOTE — PHARMACOTHERAPY INTERVENTION NOTE - COMMENTS
Medication history complete, reviewed medications with patient and daughter at bedside and confirmed with doctor first med hx.

## 2022-07-18 NOTE — ED PROVIDER NOTE - NS ED ATTENDING STATEMENT MOD
This was a shared visit with the GABE. I reviewed and verified the documentation and independently performed the documented:

## 2022-07-18 NOTE — ED ADULT NURSE NOTE - OBJECTIVE STATEMENT
Pt A&Ox4, presents to the ED for IV abx. Pt saw PCP today and had a chest x-ray revealing PNA. Pt was told to come to the ED for treatment. Pt endorses fatigue and SOB. Denies fevers. No distress noted. Daughter at the bedside.

## 2022-07-18 NOTE — ED PROVIDER NOTE - PHYSICAL EXAMINATION
Gen: Frail appearing. A&O x3.   HEENT: NC/AT. Dentition in good repair. No oropharyngeal erythema, tonsilar enlargement or exudates. Uvula midline. No submandibular or anterior cervical lymphadenopathy. TM well visualized bilaterally. Nares patent.  Cardiac: s1s2, RRR.  Lungs: CTAB, no chest wall tenderness.  Abdomen: NBS x4, soft, nontender. No CVAT.  MSK: No obvious deformity to extremities. No midline spinal tenderness or tenderness over bony landmarks on extremities. 5/5 upper and lower extremity strength. Full ROM in all extremities  Neuro: CN II-XII intact. Sensation intact to light touch in all extremities. 5/5 strength in all extremities.   PV: No LE edema or calf tenderness. Distal pulses 2+ in all extremities.

## 2022-07-18 NOTE — ED PROVIDER NOTE - CARE PLAN
1 Principal Discharge DX:	Right lower lobe pneumonia  Secondary Diagnosis:	ACS (acute coronary syndrome)

## 2022-07-18 NOTE — ED PROVIDER NOTE - OBJECTIVE STATEMENT
89 y/o F with PMH of CLL presents with concern for pneumonia on outpatient CXR. Pt went to see her PCP today, Dr. Rubin, for palpitations. Had labs and CXR performed, was sent to ED over concern for pneumonia. Pt denies fevers, nausea, vomiting. Reports non-productive cough, SOB worse with lying flat. Also notes swelling around bilateral ankles. +vaccinated for covid x4 and flu. No recent hospitalizations, rehab admission. Lives with family at home.

## 2022-07-18 NOTE — H&P ADULT - ASSESSMENT
89 year old female patient with acute respiratory failure and palpitations with positive troponins        -Monitor on Tele        # Acute Respiratory failure  -likely secondary to pneumonia  -give Levaquin  -tessalon prn  -albuterol prn    # Palpitation  -monitor on tele  -check mg, phos, tsh  -get morning echo  -routine Cardiology evaluation    # Elevated troponin  -likely secondary to demand ischemia  -will trend troponin  -serial EKGs  -get morning echo    # Hx of CLL  -currently in remission    # Anemia  -hb trending down  -check occult blood  -monitor hb    # Debility  -PT evaluation    # DVT ppx  -scds

## 2022-07-18 NOTE — ED ADULT NURSE NOTE - NSIMPLEMENTINTERV_GEN_ALL_ED
Implemented All Fall with Harm Risk Interventions:  Luck to call system. Call bell, personal items and telephone within reach. Instruct patient to call for assistance. Room bathroom lighting operational. Non-slip footwear when patient is off stretcher. Physically safe environment: no spills, clutter or unnecessary equipment. Stretcher in lowest position, wheels locked, appropriate side rails in place. Provide visual cue, wrist band, yellow gown, etc. Monitor gait and stability. Monitor for mental status changes and reorient to person, place, and time. Review medications for side effects contributing to fall risk. Reinforce activity limits and safety measures with patient and family. Provide visual clues: red socks.

## 2022-07-19 DIAGNOSIS — R00.2 PALPITATIONS: ICD-10-CM

## 2022-07-19 DIAGNOSIS — J18.9 PNEUMONIA, UNSPECIFIED ORGANISM: ICD-10-CM

## 2022-07-19 DIAGNOSIS — R77.8 OTHER SPECIFIED ABNORMALITIES OF PLASMA PROTEINS: ICD-10-CM

## 2022-07-19 LAB
ALBUMIN SERPL ELPH-MCNC: 2.1 G/DL — LOW (ref 3.3–5)
ALP SERPL-CCNC: 98 U/L — SIGNIFICANT CHANGE UP (ref 40–120)
ALT FLD-CCNC: 13 U/L — SIGNIFICANT CHANGE UP (ref 12–78)
ANION GAP SERPL CALC-SCNC: 5 MMOL/L — SIGNIFICANT CHANGE UP (ref 5–17)
AST SERPL-CCNC: 20 U/L — SIGNIFICANT CHANGE UP (ref 15–37)
BASOPHILS # BLD AUTO: 0.09 K/UL — SIGNIFICANT CHANGE UP (ref 0–0.2)
BASOPHILS NFR BLD AUTO: 0.2 % — SIGNIFICANT CHANGE UP (ref 0–2)
BILIRUB SERPL-MCNC: 0.3 MG/DL — SIGNIFICANT CHANGE UP (ref 0.2–1.2)
BUN SERPL-MCNC: 21 MG/DL — SIGNIFICANT CHANGE UP (ref 7–23)
CALCIUM SERPL-MCNC: 8.4 MG/DL — LOW (ref 8.5–10.1)
CHLORIDE SERPL-SCNC: 109 MMOL/L — HIGH (ref 96–108)
CO2 SERPL-SCNC: 24 MMOL/L — SIGNIFICANT CHANGE UP (ref 22–31)
CREAT SERPL-MCNC: 0.65 MG/DL — SIGNIFICANT CHANGE UP (ref 0.5–1.3)
EGFR: 81 ML/MIN/1.73M2 — SIGNIFICANT CHANGE UP
EOSINOPHIL # BLD AUTO: 0.07 K/UL — SIGNIFICANT CHANGE UP (ref 0–0.5)
EOSINOPHIL NFR BLD AUTO: 0.1 % — SIGNIFICANT CHANGE UP (ref 0–6)
GLUCOSE SERPL-MCNC: 81 MG/DL — SIGNIFICANT CHANGE UP (ref 70–99)
HCT VFR BLD CALC: 27.9 % — LOW (ref 34.5–45)
HGB BLD-MCNC: 8.1 G/DL — LOW (ref 11.5–15.5)
IMM GRANULOCYTES NFR BLD AUTO: 0.2 % — SIGNIFICANT CHANGE UP (ref 0–1.5)
INR BLD: 1.19 RATIO — HIGH (ref 0.88–1.16)
LYMPHOCYTES # BLD AUTO: 54.17 K/UL — HIGH (ref 1–3.3)
LYMPHOCYTES # BLD AUTO: 92.4 % — HIGH (ref 13–44)
MAGNESIUM SERPL-MCNC: 1.9 MG/DL — SIGNIFICANT CHANGE UP (ref 1.6–2.6)
MCHC RBC-ENTMCNC: 29 GM/DL — LOW (ref 32–36)
MCHC RBC-ENTMCNC: 30.1 PG — SIGNIFICANT CHANGE UP (ref 27–34)
MCV RBC AUTO: 103.7 FL — HIGH (ref 80–100)
MONOCYTES # BLD AUTO: 0.35 K/UL — SIGNIFICANT CHANGE UP (ref 0–0.9)
MONOCYTES NFR BLD AUTO: 0.6 % — LOW (ref 2–14)
NEUTROPHILS # BLD AUTO: 3.86 K/UL — SIGNIFICANT CHANGE UP (ref 1.8–7.4)
NEUTROPHILS NFR BLD AUTO: 6.5 % — LOW (ref 43–77)
PHOSPHATE SERPL-MCNC: 2.6 MG/DL — SIGNIFICANT CHANGE UP (ref 2.5–4.5)
PLATELET # BLD AUTO: 203 K/UL — SIGNIFICANT CHANGE UP (ref 150–400)
POTASSIUM SERPL-MCNC: 4.7 MMOL/L — SIGNIFICANT CHANGE UP (ref 3.5–5.3)
POTASSIUM SERPL-SCNC: 4.7 MMOL/L — SIGNIFICANT CHANGE UP (ref 3.5–5.3)
PROT SERPL-MCNC: 5.7 GM/DL — LOW (ref 6–8.3)
PROTHROM AB SERPL-ACNC: 13.8 SEC — HIGH (ref 10.5–13.4)
RBC # BLD: 2.69 M/UL — LOW (ref 3.8–5.2)
RBC # FLD: 17.1 % — HIGH (ref 10.3–14.5)
SODIUM SERPL-SCNC: 138 MMOL/L — SIGNIFICANT CHANGE UP (ref 135–145)
T3FREE SERPL-MCNC: 1.36 PG/ML — LOW (ref 1.8–4.6)
TROPONIN I, HIGH SENSITIVITY RESULT: 60.09 NG/L — HIGH
TSH SERPL-MCNC: 6.06 UU/ML — HIGH (ref 0.34–4.82)
WBC # BLD: 58.64 K/UL — CRITICAL HIGH (ref 3.8–10.5)
WBC # FLD AUTO: 58.64 K/UL — CRITICAL HIGH (ref 3.8–10.5)

## 2022-07-19 PROCEDURE — 93306 TTE W/DOPPLER COMPLETE: CPT | Mod: 26

## 2022-07-19 PROCEDURE — 99232 SBSQ HOSP IP/OBS MODERATE 35: CPT

## 2022-07-19 PROCEDURE — 99223 1ST HOSP IP/OBS HIGH 75: CPT

## 2022-07-19 PROCEDURE — 93010 ELECTROCARDIOGRAM REPORT: CPT

## 2022-07-19 RX ORDER — HEPARIN SODIUM 5000 [USP'U]/ML
5000 INJECTION INTRAVENOUS; SUBCUTANEOUS EVERY 12 HOURS
Refills: 0 | Status: DISCONTINUED | OUTPATIENT
Start: 2022-07-19 | End: 2022-07-21

## 2022-07-19 RX ORDER — CEFTRIAXONE 500 MG/1
1000 INJECTION, POWDER, FOR SOLUTION INTRAMUSCULAR; INTRAVENOUS EVERY 24 HOURS
Refills: 0 | Status: DISCONTINUED | OUTPATIENT
Start: 2022-07-19 | End: 2022-07-21

## 2022-07-19 RX ORDER — AZITHROMYCIN 500 MG/1
250 TABLET, FILM COATED ORAL DAILY
Refills: 0 | Status: DISCONTINUED | OUTPATIENT
Start: 2022-07-19 | End: 2022-07-21

## 2022-07-19 RX ADMIN — HEPARIN SODIUM 5000 UNIT(S): 5000 INJECTION INTRAVENOUS; SUBCUTANEOUS at 21:28

## 2022-07-19 RX ADMIN — Medication 1 TABLET(S): at 10:32

## 2022-07-19 RX ADMIN — Medication 1000 UNIT(S): at 10:33

## 2022-07-19 RX ADMIN — CEFTRIAXONE 100 MILLIGRAM(S): 500 INJECTION, POWDER, FOR SOLUTION INTRAMUSCULAR; INTRAVENOUS at 17:54

## 2022-07-19 RX ADMIN — AZITHROMYCIN 250 MILLIGRAM(S): 500 TABLET, FILM COATED ORAL at 17:54

## 2022-07-19 NOTE — PROGRESS NOTE ADULT - SUBJECTIVE AND OBJECTIVE BOX
89 year old female patient with a pertinent past medical history noted for CLL presented to the ED complaining of a one week history of a productive cough associated with shortness of breath. Of note, patient also with palpitations that are usually present on ambulation. Denies any chest pain or pre-syncope but endorsed swelling of lower extremity. No known sick contacts, fever or chills noted. Patient was seen and evaluated by her PCP and was later referred to the ED over concerns of a pneumonia.  In the ED patient with troponin of 57. A CXR revealed a likely right lower lobe infiltrate       07/19/22: Feels better today. Discussed with daughter at bed side regarding management and d/c plan.       Vital Signs Last 24 Hrs  T(C): 36.3 (19 Jul 2022 08:28), Max: 36.8 (18 Jul 2022 17:36)  T(F): 97.3 (19 Jul 2022 08:28), Max: 98.2 (18 Jul 2022 17:36)  HR: 60 (19 Jul 2022 08:28) (60 - 69)  BP: 105/55 (19 Jul 2022 08:28) (105/55 - 130/74)  BP(mean): 71 (19 Jul 2022 01:45) (71 - 91)  RR: 17 (19 Jul 2022 08:28) (17 - 22)  SpO2: 98% (19 Jul 2022 08:28) (95% - 98%)    Parameters below as of 19 Jul 2022 08:28  Patient On (Oxygen Delivery Method): room air          Physical Exam:  · Constitutional	well-groomed; no distress  · Eyes	PERRL; EOMI; conjunctiva clear  · Respiratory	no wheezes; no rales; no rhonchi; rhonchi  · Rhonchi	upper L; lower L; upper R; middle R; lower R  · Cardiovascular	regular rate and rhythm; S1 S2 present; no gallops; no rub; no murmur; peripheral edema; pedal edema  · Pedal Edema Severity	1+  · Pedal Edema Type	pitting  · Neurological	cranial nerves II-XII intact; sensation intact  · Skin	warm and dry; color normal; no rashes; no ulcers  · Lymphatic	No lymphadedenopathy  · Musculoskeletal	normal; normal gait; ROM intact; strength 5/5 bilateral upper extremities; strength 5/5 bilateral lower extremities  · Psychiatric	normal affect; alert and oriented x3; normal behavior                                8.1    58.64 )-----------( 203      ( 19 Jul 2022 07:30 )             27.9     19 Jul 2022 07:30    138    |  109    |  21     ----------------------------<  81     4.7     |  24     |  0.65     Ca    8.4        19 Jul 2022 07:30  Phos  2.6       19 Jul 2022 07:30  Mg     1.9       19 Jul 2022 07:30    TPro  5.7    /  Alb  2.1    /  TBili  0.3    /  DBili  x      /  AST  20     /  ALT  13     /  AlkPhos  98     19 Jul 2022 07:30    LIVER FUNCTIONS - ( 19 Jul 2022 07:30 )  Alb: 2.1 g/dL / Pro: 5.7 gm/dL / ALK PHOS: 98 U/L / ALT: 13 U/L / AST: 20 U/L / GGT: x           PT/INR - ( 19 Jul 2022 07:30 )   PT: 13.8 sec;   INR: 1.19 ratio           MEDICATIONS  (STANDING):  azithromycin   Tablet 250 milliGRAM(s) Oral daily  cefTRIAXone   IVPB 1000 milliGRAM(s) IV Intermittent every 24 hours  cholecalciferol 1000 Unit(s) Oral daily  heparin   Injectable 5000 Unit(s) SubCutaneous every 12 hours  multivitamin 1 Tablet(s) Oral daily    MEDICATIONS  (PRN):  acetaminophen     Tablet .. 650 milliGRAM(s) Oral once PRN Mild Pain (1 - 3)  ALBUTerol    90 MICROgram(s) HFA Inhaler 2 Puff(s) Inhalation every 6 hours PRN Shortness of Breath and/or Wheezing  aluminum hydroxide/magnesium hydroxide/simethicone Suspension 30 milliLiter(s) Oral every 4 hours PRN Dyspepsia  benzonatate 100 milliGRAM(s) Oral three times a day PRN Cough  melatonin 3 milliGRAM(s) Oral at bedtime PRN Insomnia  ondansetron Injectable 4 milliGRAM(s) IV Push every 8 hours PRN Nausea and/or Vomiting            Assessment and Plan:   · VTE Risk Assessment	VTE Assessment already completed for this visit    Assessment:  · Assessment	  89 year old female patient with acute respiratory failure and palpitations with positive troponins      # Pneumonia-CAP  Acute Respiratory failure ruled out  Change antibiotic to IV rocephin and po zithro  Follow blood cultures    # Palpitation  Cardiology eval appreciated  Monitor on tele for another 24 hour  Follow echo report    # Elevated troponin  -likely secondary to demand ischemia    # Hx of CLL  -currently in remission  -Oncology eval appreciated  Outpatient follow up    # Anemia  -monitor hb    # Debility  -PT evaluation    # DVT ppx

## 2022-07-19 NOTE — PHYSICAL THERAPY INITIAL EVALUATION ADULT - PERTINENT HX OF CURRENT PROBLEM, REHAB EVAL
89 year old female patient with a pertinent past medical history noted for CLL. Pt. s/p productive cough associated with SOB, inc Palpitations, Elevated troponin and s/p Pneumonia.

## 2022-07-19 NOTE — CONSULT NOTE ADULT - PROBLEM SELECTOR RECOMMENDATION 9
occasional exertional  palpitations  no evidence of  arrhythmia on monitor  , fragile thin built  probably due to symptomatic sinus tachycardia associated with activity ,  can not rule out SVT   , will ambulate and observe   follow up echocardiogram ,

## 2022-07-19 NOTE — PHYSICAL THERAPY INITIAL EVALUATION ADULT - GENERAL OBSERVATIONS, REHAB EVAL
Pt. received in bed supine + bed alarm agreeable to PT. Pt.'s dtrs at BS. Bed mob with Min A. Sit to stand with CG A, amb with RW CG A 20 ft.. Pt.'s R elbow skin scalp removed and started to bleeding. PT make RN notify. R elbow got cleaned and applied dressing by RN.

## 2022-07-19 NOTE — CONSULT NOTE ADULT - ASSESSMENT
89 year old female patient with a pertinent past medical history noted for CLL who is followed by my colleague Dr. Brian Donis outpatient, last seen 5/27/22 presented to the ED complaining of a one week history of a productive cough associated with shortness of breath with CXR showing PNA.    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 Donis had a discussion with the patient that given her weight loss this would prompt a workup for evaluation for underlying metastatic disease- given the patient's advanced age as well as discussions that they had with the patient as well as his daughters even if the patient were noted to have advanced disease we would not proceed with any systemic chemotherapy due to her frailty.   - At this time they requested that we prioritize her pain symptoms.   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  89 year old female patient with a pertinent past medical history noted for CLL who is followed by my colleague Dr. Brian Donis outpatient, last seen 5/27/22 presented to the ED complaining of a one week history of a productive cough associated with shortness of breath with CXR showing PNA.    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 Donis had a discussion with the patient that given her weight loss this would prompt a workup for evaluation for underlying metastatic disease- given the patient's advanced age as well as discussions that they had with the patient as well as his daughters even if the patient were noted to have advanced disease we would not proceed with any systemic chemotherapy due to her frailty.   - At this time they requested that we prioritize her pain symptoms.   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     dispo: pt lives with daughter- asking to go home today as she feels better  pt to f/u with Dr. Donis on discharge in clinic     Dr. Parish Kumar  cell: [phone]  Weekends and nights please call [phone] for MD on call  [facility]  Hematology/Oncology

## 2022-07-19 NOTE — CONSULT NOTE ADULT - PROBLEM SELECTOR RECOMMENDATION 2
borderline elevated troponin in setting of pneumonia ,without chest pain ,  possible demand related ischemia , follow up echo, repeat ekg

## 2022-07-19 NOTE — CONSULT NOTE ADULT - SUBJECTIVE AND OBJECTIVE BOX
CHIEF COMPLAINT:  was cough ,     HPI:  89 year old female patient with a pertinent past medical history noted for CLL presented to the ED complaining of a one week history of a productive cough associated with shortness of breath for last few days  Of note, patient also with palpitations that are usually present on ambulation which happens occasional , feels like heart rate is increased , regular , resolves with rest , no associated chest pain , which was present even before she started having pulmonary symptoms . Denies any chest pain or pre-syncope but endorsed swelling of lower extremity on prolonged sitting . No known sick contacts, fever or chills noted. Patient was seen and evaluated by her PCP and was later referred to the ED over concerns of a pneumonia.        In the ED patient with troponin of 57. A CXR revealed a likely right lower lobe infiltrate ?    blood work showed minimal elevated troponin , minimal elevated BNP .  ekg no significant ST T changes , denies any prior cardiac history ,       PAST MEDICAL & SURGICAL HISTORY:  CLL (chronic lymphocytic leukemia)          Allergies    cefdinir (Diarrhea)    Intolerances            Social History:  Denies smoking, alcohol or drug (18 Jul 2022 21:43)      FAMILY HISTORY:  Known health problems: none        MEDICATIONS:  MEDICATIONS  (STANDING):  cholecalciferol 1000 Unit(s) Oral daily  levoFLOXacin IVPB 500 milliGRAM(s) IV Intermittent every 24 hours  multivitamin 1 Tablet(s) Oral daily    MEDICATIONS  (PRN):  acetaminophen     Tablet .. 650 milliGRAM(s) Oral once PRN Mild Pain (1 - 3)  ALBUTerol    90 MICROgram(s) HFA Inhaler 2 Puff(s) Inhalation every 6 hours PRN Shortness of Breath and/or Wheezing  aluminum hydroxide/magnesium hydroxide/simethicone Suspension 30 milliLiter(s) Oral every 4 hours PRN Dyspepsia  benzonatate 100 milliGRAM(s) Oral three times a day PRN Cough  melatonin 3 milliGRAM(s) Oral at bedtime PRN Insomnia  ondansetron Injectable 4 milliGRAM(s) IV Push every 8 hours PRN Nausea and/or Vomiting      REVIEW OF SYSTEMS:    CONSTITUTIONAL: No weakness, fevers or chills  EYES/ENT: No visual changes;  No vertigo or throat pain   NECK: No pain or stiffness  RESPIRATORY: No cough, wheezing, hemoptysis; No shortness of breath  CARDIOVASCULAR: No chest pain   positive  palpitations on activity   GASTROINTESTINAL: No abdominal or epigastric pain. No nausea, vomiting, or hematemesis; No diarrhea or constipation. No melena or hematochezia.  GENITOURINARY: No dysuria, frequency or hematuria  NEUROLOGICAL: No numbness or weakness  SKIN: No itching, burning, rashes, or lesions   All other review of systems is negative unless indicated above    Vital Signs Last 24 Hrs  T(C): 36.5 (19 Jul 2022 04:34), Max: 36.8 (18 Jul 2022 17:36)  T(F): 97.7 (19 Jul 2022 04:34), Max: 98.2 (18 Jul 2022 17:36)  HR: 62 (19 Jul 2022 04:34) (60 - 69)  BP: 110/65 (19 Jul 2022 04:34) (110/65 - 130/74)  BP(mean): 71 (19 Jul 2022 01:45) (71 - 91)  RR: 18 (19 Jul 2022 04:34) (18 - 22)  SpO2: 95% (19 Jul 2022 04:34) (95% - 97%)    Parameters below as of 19 Jul 2022 01:45  Patient On (Oxygen Delivery Method): room air        I&O's Summary      PHYSICAL EXAM:    Constitutional: NAD, awake and alert, frail   HEENT: PERR, EOMI,  No oral cyananosis.  Neck:  supple,  No JVD  Respiratory: Breath sounds are clear bilaterally, No wheezing, rales or rhonchi  Cardiovascular: S1 and S2, regular rate and rhythm, no Murmurs, gallops or rubs  Gastrointestinal: Bowel Sounds present, soft, nontender.   Extremities: No peripheral edema. No clubbing or cyanosis.  Vascular: 2+ peripheral pulses  Neurological: A/O x 3, no focal deficits  Musculoskeletal: no calf tenderness.  Skin: No rashes.      LABS: All Labs Reviewed:                        8.1    58.64 )-----------( 203      ( 19 Jul 2022 07:30 )             27.9                         8.6    68.47 )-----------( 218      ( 18 Jul 2022 18:37 )             29.0     19 Jul 2022 07:30    138    |  109    |  21     ----------------------------<  81     4.7     |  24     |  0.65   18 Jul 2022 18:37    139    |  107    |  30     ----------------------------<  119    4.8     |  29     |  0.88     Ca    8.4        19 Jul 2022 07:30  Ca    9.4        18 Jul 2022 18:37  Phos  2.6       19 Jul 2022 07:30  Mg     1.9       19 Jul 2022 07:30    TPro  5.7    /  Alb  2.1    /  TBili  0.3    /  DBili  x      /  AST  20     /  ALT  13     /  AlkPhos  98     19 Jul 2022 07:30  TPro  6.8    /  Alb  2.6    /  TBili  0.4    /  DBili  x      /  AST  23     /  ALT  16     /  AlkPhos  117    18 Jul 2022 18:37    PT/INR - ( 19 Jul 2022 07:30 )   PT: 13.8 sec;   INR: 1.19 ratio                 Blood Culture:   07-18 @ 18:37  Pro Bnp 1101    07-19 @ 07:30  TSH: 6.06      RADIOLOGY/EKG:   sinus rhythm 70 non specific t changes v1 to v3 ? old     - TroponinI hsT: <-60.09, <-65.31, <-57.18    Monitor sinus rhythm       CXR reviewed RLL infiltrate

## 2022-07-19 NOTE — PHYSICAL THERAPY INITIAL EVALUATION ADULT - IMPAIRMENTS FOUND, PT EVAL
aerobic capacity/endurance/gait, locomotion, and balance/integumentary integrity/muscle strength/neuromotor development and sensory integration

## 2022-07-19 NOTE — CONSULT NOTE ADULT - SUBJECTIVE AND OBJECTIVE BOX
HPI:  89 year old female patient with a pertinent past medical history noted for CLL who is followed by my colleague Dr. Brian Donis outpatient, last seen 5/27/22 presented to the ED complaining of a one week history of a productive cough associated with shortness of breath.   Pt was diagnosed with CLL 5/2014 and has been on surveillance but was becoming more anemia in Jan 2021. She had reeived IV iron and a transfusion on 1/16/21. Again in July, she had been receiving IV iron. Last CBC in 5/23/22, WBC 81.1, Hb 10.2, plt 235k with ALC 70.1.     Of note, patient also with palpitations that are usually present on ambulation. Denies any chest pain or pre-syncope but endorsed swelling of lower extremity. No known sick contacts, fever or chills noted. Patient was seen and evaluated by her PCP and was later referred to the ED over concerns of a pneumonia.  In the ED patient with troponin of 57. A CXR revealed a likely right lower lobe infiltrate  (18 Jul 2022 21:43)      PAST MEDICAL & SURGICAL HISTORY:  CLL (chronic lymphocytic leukemia)          Allergies    cefdinir (Diarrhea)    Intolerances        MEDICATIONS  (STANDING):  cholecalciferol 1000 Unit(s) Oral daily  levoFLOXacin IVPB 500 milliGRAM(s) IV Intermittent every 24 hours  multivitamin 1 Tablet(s) Oral daily    MEDICATIONS  (PRN):  acetaminophen     Tablet .. 650 milliGRAM(s) Oral once PRN Mild Pain (1 - 3)  ALBUTerol    90 MICROgram(s) HFA Inhaler 2 Puff(s) Inhalation every 6 hours PRN Shortness of Breath and/or Wheezing  aluminum hydroxide/magnesium hydroxide/simethicone Suspension 30 milliLiter(s) Oral every 4 hours PRN Dyspepsia  benzonatate 100 milliGRAM(s) Oral three times a day PRN Cough  melatonin 3 milliGRAM(s) Oral at bedtime PRN Insomnia  ondansetron Injectable 4 milliGRAM(s) IV Push every 8 hours PRN Nausea and/or Vomiting      FAMILY HISTORY:  Known health problems: none        SOCIAL HISTORY: No EtOH, no tobacco    REVIEW OF SYSTEMS:    CONSTITUTIONAL: No weakness, fevers or chills  EYES/ENT: No visual changes;  No vertigo or throat pain   NECK: No pain or stiffness  RESPIRATORY: No cough, wheezing, hemoptysis; No shortness of breath  CARDIOVASCULAR: No chest pain or palpitations  GASTROINTESTINAL: No abdominal or epigastric pain. No nausea, vomiting, or hematemesis; No diarrhea or constipation. No melena or hematochezia.  GENITOURINARY: No dysuria, frequency or hematuria  NEUROLOGICAL: No numbness or weakness  SKIN: No itching, burning, rashes, or lesions   All other review of systems is negative unless indicated above.    Height (cm): 152 (07-18 @ 17:02)  Weight (kg): 33.1 (07-19 @ 04:24)  BMI (kg/m2): 14.3 (07-19 @ 04:24)  BSA (m2): 1.21 (07-19 @ 04:24)    T(F): 97.7 (07-19-22 @ 04:34), Max: 98.2 (07-18-22 @ 17:36)  HR: 62 (07-19-22 @ 04:34)  BP: 110/65 (07-19-22 @ 04:34)  RR: 18 (07-19-22 @ 04:34)  SpO2: 95% (07-19-22 @ 04:34)  Wt(kg): --    GENERAL: NAD, well-developed  HEAD:  Atraumatic, Normocephalic  EYES: EOMI, PERRLA, conjunctiva and sclera clear  NECK: Supple, No JVD  CHEST/LUNG: Clear to auscultation bilaterally; No wheeze  HEART: Regular rate and rhythm; No murmurs, rubs, or gallops  ABDOMEN: Soft, Nontender, Nondistended; Bowel sounds present  EXTREMITIES:  2+ Peripheral Pulses, No clubbing, cyanosis, or edema  NEUROLOGY: non-focal  SKIN: No rashes or lesions                          8.6    68.47 )-----------( 218      ( 18 Jul 2022 18:37 )             29.0       07-18    139  |  107  |  30<H>  ----------------------------<  119<H>  4.8   |  29  |  0.88    Ca    9.4      18 Jul 2022 18:37    TPro  6.8  /  Alb  2.6<L>  /  TBili  0.4  /  DBili  x   /  AST  23  /  ALT  16  /  AlkPhos  117  07-18               HPI:  89 year old female patient with a pertinent past medical history noted for CLL who is followed by my colleague Dr. Brian Donis outpatient, last seen 5/27/22 presented to the ED complaining of a one week history of a productive cough associated with shortness of breath.   Pt was diagnosed with CLL 5/2014 and has been on surveillance but was becoming more anemia in Jan 2021. She had reeived IV iron and a transfusion on 1/16/21. Again in July, she had been receiving IV iron. Last CBC in 5/23/22, WBC 81.1, Hb 10.2, plt 235k with ALC 70.1.     Of note, patient also with palpitations that are usually present on ambulation. Denies any chest pain or pre-syncope but endorsed swelling of lower extremity. No known sick contacts, fever or chills noted. Patient was seen and evaluated by her PCP and was later referred to the ED over concerns of a pneumonia.  In the ED patient with troponin of 57. A CXR revealed a likely right lower lobe infiltrate  (18 Jul 2022 21:43)      PAST MEDICAL & SURGICAL HISTORY:  CLL (chronic lymphocytic leukemia)          Allergies    cefdinir (Diarrhea)    Intolerances        MEDICATIONS  (STANDING):  cholecalciferol 1000 Unit(s) Oral daily  levoFLOXacin IVPB 500 milliGRAM(s) IV Intermittent every 24 hours  multivitamin 1 Tablet(s) Oral daily    MEDICATIONS  (PRN):  acetaminophen     Tablet .. 650 milliGRAM(s) Oral once PRN Mild Pain (1 - 3)  ALBUTerol    90 MICROgram(s) HFA Inhaler 2 Puff(s) Inhalation every 6 hours PRN Shortness of Breath and/or Wheezing  aluminum hydroxide/magnesium hydroxide/simethicone Suspension 30 milliLiter(s) Oral every 4 hours PRN Dyspepsia  benzonatate 100 milliGRAM(s) Oral three times a day PRN Cough  melatonin 3 milliGRAM(s) Oral at bedtime PRN Insomnia  ondansetron Injectable 4 milliGRAM(s) IV Push every 8 hours PRN Nausea and/or Vomiting      FAMILY HISTORY:  Known health problems: none        SOCIAL HISTORY: No EtOH, no tobacco    REVIEW OF SYSTEMS:    CONSTITUTIONAL: No weakness, fevers or chills  EYES/ENT: No visual changes;  No vertigo or throat pain   NECK: No pain or stiffness  RESPIRATORY: + cough, no wheezing, hemoptysis; +shortness of breath  CARDIOVASCULAR: No chest pain or palpitations  GASTROINTESTINAL: No abdominal or epigastric pain. No nausea, vomiting, or hematemesis; No diarrhea or constipation. No melena or hematochezia.  GENITOURINARY: No dysuria, frequency or hematuria  NEUROLOGICAL: No numbness or weakness  SKIN: No itching, burning, rashes, or lesions   All other review of systems is negative unless indicated above.    Height (cm): 152 (07-18 @ 17:02)  Weight (kg): 33.1 (07-19 @ 04:24)  BMI (kg/m2): 14.3 (07-19 @ 04:24)  BSA (m2): 1.21 (07-19 @ 04:24)    T(F): 97.7 (07-19-22 @ 04:34), Max: 98.2 (07-18-22 @ 17:36)  HR: 62 (07-19-22 @ 04:34)  BP: 110/65 (07-19-22 @ 04:34)  RR: 18 (07-19-22 @ 04:34)  SpO2: 95% (07-19-22 @ 04:34)  Wt(kg): --    GENERAL: NAD, frail, wants to go home  HEAD:  Atraumatic, Normocephalic  EYES: EOMI,   CHEST/LUNG: Clear to auscultation bilaterally; No wheeze  HEART: Regular rate   ABDOMEN: Soft, Nontender, Nondistended; Bowel sounds present  EXTREMITIES:  2+ Peripheral Pulses, No clubbing, cyanosis, or edema  NEUROLOGY: non-focal  SKIN: No rashes or lesions                          8.6    68.47 )-----------( 218      ( 18 Jul 2022 18:37 )             29.0       07-18    139  |  107  |  30<H>  ----------------------------<  119<H>  4.8   |  29  |  0.88    Ca    9.4      18 Jul 2022 18:37    TPro  6.8  /  Alb  2.6<L>  /  TBili  0.4  /  DBili  x   /  AST  23  /  ALT  16  /  AlkPhos  117  07-18

## 2022-07-19 NOTE — PATIENT PROFILE ADULT - FALL HARM RISK - RISK INTERVENTIONS
Assistance with ambulation/Communicate Fall Risk and Risk Factors to all staff, patient, and family/Reinforce activity limits and safety measures with patient and family/Visual Cue: Yellow wristband/Bed in lowest position, wheels locked, appropriate side rails in place/Call bell, personal items and telephone in reach/Instruct patient to call for assistance before getting out of bed or chair/Non-slip footwear when patient is out of bed/[place] to call system/Physically safe environment - no spills, clutter or unnecessary equipment/Purposeful Proactive Rounding/Room/bathroom lighting operational, light cord in reach

## 2022-07-19 NOTE — PHYSICAL THERAPY INITIAL EVALUATION ADULT - PLANNED THERAPY INTERVENTIONS, PT EVAL
balance training/bed mobility training/gait training/neuromuscular re-education/strengthening/stretching/transfer training

## 2022-07-20 ENCOUNTER — TRANSCRIPTION ENCOUNTER (OUTPATIENT)
Age: 87
End: 2022-07-20

## 2022-07-20 LAB
ANION GAP SERPL CALC-SCNC: 3 MMOL/L — LOW (ref 5–17)
BUN SERPL-MCNC: 20 MG/DL — SIGNIFICANT CHANGE UP (ref 7–23)
CALCIUM SERPL-MCNC: 8.5 MG/DL — SIGNIFICANT CHANGE UP (ref 8.5–10.1)
CHLORIDE SERPL-SCNC: 111 MMOL/L — HIGH (ref 96–108)
CO2 SERPL-SCNC: 26 MMOL/L — SIGNIFICANT CHANGE UP (ref 22–31)
CREAT SERPL-MCNC: 0.82 MG/DL — SIGNIFICANT CHANGE UP (ref 0.5–1.3)
EGFR: 66 ML/MIN/1.73M2 — SIGNIFICANT CHANGE UP
GLUCOSE SERPL-MCNC: 104 MG/DL — HIGH (ref 70–99)
HCT VFR BLD CALC: 28.7 % — LOW (ref 34.5–45)
HGB BLD-MCNC: 8.6 G/DL — LOW (ref 11.5–15.5)
MCHC RBC-ENTMCNC: 30 GM/DL — LOW (ref 32–36)
MCHC RBC-ENTMCNC: 30.8 PG — SIGNIFICANT CHANGE UP (ref 27–34)
MCV RBC AUTO: 102.9 FL — HIGH (ref 80–100)
PLATELET # BLD AUTO: 244 K/UL — SIGNIFICANT CHANGE UP (ref 150–400)
POTASSIUM SERPL-MCNC: 5.2 MMOL/L — SIGNIFICANT CHANGE UP (ref 3.5–5.3)
POTASSIUM SERPL-SCNC: 5.2 MMOL/L — SIGNIFICANT CHANGE UP (ref 3.5–5.3)
RBC # BLD: 2.79 M/UL — LOW (ref 3.8–5.2)
RBC # FLD: 17.3 % — HIGH (ref 10.3–14.5)
SODIUM SERPL-SCNC: 140 MMOL/L — SIGNIFICANT CHANGE UP (ref 135–145)
WBC # BLD: 76.17 K/UL — CRITICAL HIGH (ref 3.8–10.5)
WBC # FLD AUTO: 76.17 K/UL — CRITICAL HIGH (ref 3.8–10.5)

## 2022-07-20 PROCEDURE — 93010 ELECTROCARDIOGRAM REPORT: CPT

## 2022-07-20 PROCEDURE — 99233 SBSQ HOSP IP/OBS HIGH 50: CPT

## 2022-07-20 RX ORDER — POLYETHYLENE GLYCOL 3350 17 G/17G
17 POWDER, FOR SOLUTION ORAL DAILY
Refills: 0 | Status: DISCONTINUED | OUTPATIENT
Start: 2022-07-20 | End: 2022-07-21

## 2022-07-20 RX ADMIN — CEFTRIAXONE 100 MILLIGRAM(S): 500 INJECTION, POWDER, FOR SOLUTION INTRAMUSCULAR; INTRAVENOUS at 16:36

## 2022-07-20 RX ADMIN — Medication 1 TABLET(S): at 09:30

## 2022-07-20 RX ADMIN — HEPARIN SODIUM 5000 UNIT(S): 5000 INJECTION INTRAVENOUS; SUBCUTANEOUS at 09:30

## 2022-07-20 RX ADMIN — Medication 1000 UNIT(S): at 09:30

## 2022-07-20 RX ADMIN — Medication 3 MILLIGRAM(S): at 22:14

## 2022-07-20 RX ADMIN — HEPARIN SODIUM 5000 UNIT(S): 5000 INJECTION INTRAVENOUS; SUBCUTANEOUS at 22:21

## 2022-07-20 RX ADMIN — AZITHROMYCIN 250 MILLIGRAM(S): 500 TABLET, FILM COATED ORAL at 09:30

## 2022-07-20 RX ADMIN — POLYETHYLENE GLYCOL 3350 17 GRAM(S): 17 POWDER, FOR SOLUTION ORAL at 22:20

## 2022-07-20 NOTE — DISCHARGE NOTE NURSING/CASE MANAGEMENT/SOCIAL WORK - NSDCPEFALRISK_GEN_ALL_CORE
For information on Fall & Injury Prevention, visit: [email or web address] OR  [email or web address] OR  https://www.cdc.gov/steadi/patient.html

## 2022-07-20 NOTE — PROGRESS NOTE ADULT - ASSESSMENT
89 year old female patient with acute respiratory failure and palpitations with positive troponins      # Pneumonia-CAP  Acute Respiratory failure ruled out  Change antibiotic to IV rocephin and po zithro  Follow blood cultures    # Palpitation  Cardiology eval appreciated  Monitor on tele for another 24 hour  Follow echo report    # Elevated troponin  -likely secondary to demand ischemia    # Hx of CLL  -currently in remission  -Oncology eval appreciated  Outpatient follow up    # Anemia  -monitor hb    # Debility  -PT evaluation    # DVT ppx 89 year old female patient with  past medical history noted for CLL admitted for:       # Pneumonia-CAP  Acute Respiratory failure ruled out, was not noted to be hypoxic   CT chest with multifocal PNA  Monitor Pulse ox, NC PRN   C/w  rocephin and po zithro  Cough meds  blood cultures  Pulm eval appreciated     # Palpitation with excretion   C/w tele: SR , noted to have 6 beats of NSVT  check lytes   Will further D/w cardio       # Elevated troponin  -likely secondary to demand ischemia in settings of PNA   - ECHO: preserved EF, no RWMA  - Cardio eval appreciated      # Hx of CLL  -currently in remission, baseline WBCs around 80K   -Oncology eval appreciated  Outpatient follow up    # Anemia  -monitor hb, transfuse if HB <8  - no signs of bleeding     # Debility  -PT evaluation    # DVT ppx

## 2022-07-20 NOTE — CONSULT NOTE ADULT - SUBJECTIVE AND OBJECTIVE BOX
Patient is a 89y old  Female who presents with a chief complaint of Acute Respiratory Failure  Palpitations (20 Jul 2022 06:56)      HPI:  89 year old female patient with a pertinent past medical history noted for CLL presented to the ED complaining of a one week history of a productive cough associated with shortness of breath. Of note, patient also with palpitations that are usually present on ambulation. Denies any chest pain or pre-syncope but endorsed swelling of lower extremity. No known sick contacts, fever or chills noted. Patient was seen and evaluated by her PCP and was later referred to the ED over concerns of a pneumonia.        In the ED patient with troponin of 57. A CXR revealed a likely right lower lobe infiltrate  (18 Jul 2022 21:43)  pt is seen and examined in her room  no resp distress    PAST MEDICAL & SURGICAL HISTORY:  CLL (chronic lymphocytic leukemia)          PREVIOUS DIAGNOSTIC TESTING:      MEDICATIONS  (STANDING):  azithromycin   Tablet 250 milliGRAM(s) Oral daily  cefTRIAXone   IVPB 1000 milliGRAM(s) IV Intermittent every 24 hours  cholecalciferol 1000 Unit(s) Oral daily  heparin   Injectable 5000 Unit(s) SubCutaneous every 12 hours  multivitamin 1 Tablet(s) Oral daily    MEDICATIONS  (PRN):  acetaminophen     Tablet .. 650 milliGRAM(s) Oral once PRN Mild Pain (1 - 3)  ALBUTerol    90 MICROgram(s) HFA Inhaler 2 Puff(s) Inhalation every 6 hours PRN Shortness of Breath and/or Wheezing  aluminum hydroxide/magnesium hydroxide/simethicone Suspension 30 milliLiter(s) Oral every 4 hours PRN Dyspepsia  benzonatate 100 milliGRAM(s) Oral three times a day PRN Cough  melatonin 3 milliGRAM(s) Oral at bedtime PRN Insomnia  ondansetron Injectable 4 milliGRAM(s) IV Push every 8 hours PRN Nausea and/or Vomiting      FAMILY HISTORY:  Known health problems: none        SOCIAL HISTORY:  ***    REVIEW OF SYSTEM:  Pertinent items are noted in HPI.      Vital Signs Last 24 Hrs  T(C): 36.7 (19 Jul 2022 23:10), Max: 36.7 (19 Jul 2022 23:10)  T(F): 98.1 (19 Jul 2022 23:10), Max: 98.1 (19 Jul 2022 23:10)  HR: 74 (19 Jul 2022 23:10) (60 - 87)  BP: 110/61 (19 Jul 2022 23:10) (103/57 - 110/61)  BP(mean): --  RR: 17 (19 Jul 2022 16:18) (17 - 17)  SpO2: 92% (19 Jul 2022 23:10) (92% - 98%)    Parameters below as of 19 Jul 2022 23:10  Patient On (Oxygen Delivery Method): room air        I&O's Summary    PHYSICAL EXAM  General Appearance: cooperative, no acute distress,   HEENT: PERRL, conjunctiva clear, EOM's intact, non injected pharynx, no exudate, TM   normal  Neck: Supple, , no adenopathy, thyroid: not enlarged, no carotid bruit or JVD  Back: Symmetric, no  tenderness,no soft tissue tenderness  Lungs: RLL rhonchi, no wheeze  Heart: Regular rate and rhythm, S1, S2 normal, no murmur, rub or gallop  Abdomen: Soft, non-tender, bowel sounds active , no hepatosplenomegaly  Extremities: no cyanosis or edema, no joint swelling  Skin: Skin color, texture normal, no rashes   Neurologic: Alert and oriented X3 ,    ECG:    LABS:                          8.1    58.64 )-----------( 203      ( 19 Jul 2022 07:30 )             27.9     07-19    138  |  109<H>  |  21  ----------------------------<  81  4.7   |  24  |  0.65    Ca    8.4<L>      19 Jul 2022 07:30  Phos  2.6     07-19  Mg     1.9     07-19    TPro  5.7<L>  /  Alb  2.1<L>  /  TBili  0.3  /  DBili  x   /  AST  20  /  ALT  13  /  AlkPhos  98  07-19          Pro BNP  1101 07-18 @ 18:37  D Dimer  -- 07-18 @ 18:37    PT/INR - ( 19 Jul 2022 07:30 )   PT: 13.8 sec;   INR: 1.19 ratio                   RADIOLOGY & ADDITIONAL STUDIES:  < from: Xray Chest 1 View- PORTABLE-Urgent (Xray Chest 1 View- PORTABLE-Urgent .) (07.18.22 @ 18:59) >  INTERPRETATION:  Portable chest radiograph    CLINICAL INFORMATION: Dyspnea, shortness of breath.    TECHNIQUE:  Portable  AP chest radiograph.    COMPARISON: None. .    FINDINGS:  CATHETERS AND TUBES: None    PULMONARY: RIGHT basilar multifocal airspace consolidations . RIGHT upper   zone and LEFT lung parenchyma grossly clear.  No pneumothorax.    HEART/VASCULAR: The  heart is enlarged in transverse diameter.     BONES: Visualized osseous structures are intact.    IMPRESSION:   RIGHT basilar multifocal airspace consolidations.    < end of copied text >

## 2022-07-20 NOTE — CONSULT NOTE ADULT - ASSESSMENT
89 year old female patient with a pertinent past medical history noted for CLL presented to the ED complaining of a one week history of a productive cough associated with shortness of breath. Of note, patient also with palpitations that are usually present on ambulation. Denies any chest pain or pre-syncope but endorsed swelling of lower extremity. No known sick contacts, fever or chills noted. Patient was seen and evaluated by her PCP and was later referred to the ED over concerns of a pneumonia.        In the ED patient with troponin of 57. A CXR revealed a likely right lower lobe infiltrate  (18 Jul 2022 21:43)  pt is seen and examined in her room  no resp distress    Assessment / plan;  RLL consolidation / pneumonia  R/o aspiration  CLL history  PENA due to above  Mild Pulm Htn / Moderate MR  no sign of CHF  chronic anemia    agree with antibiotic coverage  check for aspiration  mobilize OOB  elevated WBC due to CLL / heme eval in progress  DVT prophylaxis  fu cxr needed

## 2022-07-20 NOTE — CONSULT NOTE ADULT - REASON FOR ADMISSION
Acute Respiratory Failure  Palpitations

## 2022-07-20 NOTE — PROGRESS NOTE ADULT - SUBJECTIVE AND OBJECTIVE BOX
INTERVAL HPI/OVERNIGHT EVENTS:  Patient S&E at bedside. No o/n events, afebrile, vss on RA - 92% while asleep  TTE report pending  PT recommending home PT     PAST MEDICAL & SURGICAL HISTORY:  CLL (chronic lymphocytic leukemia)          FAMILY HISTORY:  Known health problems: none        VITAL SIGNS:  T(F): 98.1 (07-19-22 @ 23:10)  HR: 74 (07-19-22 @ 23:10)  BP: 110/61 (07-19-22 @ 23:10)  RR: 17 (07-19-22 @ 16:18)  SpO2: 92% (07-19-22 @ 23:10)  Wt(kg): --    PHYSICAL EXAM:    GENERAL: NAD, frail, wants to go home  CHEST/LUNG: Clear to auscultation bilaterally; No wheeze  HEART: Regular rate   ABDOMEN: Soft, Nontender, Nondistended; Bowel sounds present  EXTREMITIES:  2+ Peripheral Pulses, No clubbing, cyanosis, or edema  NEUROLOGY: non-focal  SKIN: No rashes or lesions      MEDICATIONS  (STANDING):  azithromycin   Tablet 250 milliGRAM(s) Oral daily  cefTRIAXone   IVPB 1000 milliGRAM(s) IV Intermittent every 24 hours  cholecalciferol 1000 Unit(s) Oral daily  heparin   Injectable 5000 Unit(s) SubCutaneous every 12 hours  multivitamin 1 Tablet(s) Oral daily    MEDICATIONS  (PRN):  acetaminophen     Tablet .. 650 milliGRAM(s) Oral once PRN Mild Pain (1 - 3)  ALBUTerol    90 MICROgram(s) HFA Inhaler 2 Puff(s) Inhalation every 6 hours PRN Shortness of Breath and/or Wheezing  aluminum hydroxide/magnesium hydroxide/simethicone Suspension 30 milliLiter(s) Oral every 4 hours PRN Dyspepsia  benzonatate 100 milliGRAM(s) Oral three times a day PRN Cough  melatonin 3 milliGRAM(s) Oral at bedtime PRN Insomnia  ondansetron Injectable 4 milliGRAM(s) IV Push every 8 hours PRN Nausea and/or Vomiting      Allergies    cefdinir (Diarrhea)    Intolerances        LABS:                        8.1    58.64 )-----------( 203      ( 19 Jul 2022 07:30 )             27.9     07-19    138  |  109<H>  |  21  ----------------------------<  81  4.7   |  24  |  0.65    Ca    8.4<L>      19 Jul 2022 07:30  Phos  2.6     07-19  Mg     1.9     07-19    TPro  5.7<L>  /  Alb  2.1<L>  /  TBili  0.3  /  DBili  x   /  AST  20  /  ALT  13  /  AlkPhos  98  07-19    PT/INR - ( 19 Jul 2022 07:30 )   PT: 13.8 sec;   INR: 1.19 ratio               RADIOLOGY & ADDITIONAL TESTS:  Studies reviewed.   INTERVAL HPI/OVERNIGHT EVENTS:  Patient S&E at bedside. No o/n events, afebrile, vss on RA - 92% while asleep  TTE unremarkable EF 60-65%  PT recommending home PT   WBC 76.17, Hb 8.6, plt 244    PAST MEDICAL & SURGICAL HISTORY:  CLL (chronic lymphocytic leukemia)          FAMILY HISTORY:  Known health problems: none        VITAL SIGNS:  T(F): 98.1 (07-19-22 @ 23:10)  HR: 74 (07-19-22 @ 23:10)  BP: 110/61 (07-19-22 @ 23:10)  RR: 17 (07-19-22 @ 16:18)  SpO2: 92% (07-19-22 @ 23:10)  Wt(kg): --    PHYSICAL EXAM:    GENERAL: NAD, frail, wants to go home  CHEST/LUNG: Clear to auscultation bilaterally; No wheeze  HEART: Regular rate   ABDOMEN: Soft, Nontender, Nondistended; Bowel sounds present  EXTREMITIES:  2+ Peripheral Pulses, No clubbing, cyanosis, or edema  NEUROLOGY: non-focal  SKIN: No rashes or lesions      MEDICATIONS  (STANDING):  azithromycin   Tablet 250 milliGRAM(s) Oral daily  cefTRIAXone   IVPB 1000 milliGRAM(s) IV Intermittent every 24 hours  cholecalciferol 1000 Unit(s) Oral daily  heparin   Injectable 5000 Unit(s) SubCutaneous every 12 hours  multivitamin 1 Tablet(s) Oral daily    MEDICATIONS  (PRN):  acetaminophen     Tablet .. 650 milliGRAM(s) Oral once PRN Mild Pain (1 - 3)  ALBUTerol    90 MICROgram(s) HFA Inhaler 2 Puff(s) Inhalation every 6 hours PRN Shortness of Breath and/or Wheezing  aluminum hydroxide/magnesium hydroxide/simethicone Suspension 30 milliLiter(s) Oral every 4 hours PRN Dyspepsia  benzonatate 100 milliGRAM(s) Oral three times a day PRN Cough  melatonin 3 milliGRAM(s) Oral at bedtime PRN Insomnia  ondansetron Injectable 4 milliGRAM(s) IV Push every 8 hours PRN Nausea and/or Vomiting      Allergies    cefdinir (Diarrhea)    Intolerances        LABS:                        8.1    58.64 )-----------( 203      ( 19 Jul 2022 07:30 )             27.9     07-19    138  |  109<H>  |  21  ----------------------------<  81  4.7   |  24  |  0.65    Ca    8.4<L>      19 Jul 2022 07:30  Phos  2.6     07-19  Mg     1.9     07-19    TPro  5.7<L>  /  Alb  2.1<L>  /  TBili  0.3  /  DBili  x   /  AST  20  /  ALT  13  /  AlkPhos  98  07-19    PT/INR - ( 19 Jul 2022 07:30 )   PT: 13.8 sec;   INR: 1.19 ratio               RADIOLOGY & ADDITIONAL TESTS:  Studies reviewed.

## 2022-07-20 NOTE — PROGRESS NOTE ADULT - SUBJECTIVE AND OBJECTIVE BOX
CC: Acute Respiratory Failure  Palpitations    HPI: 89 year old female patient with a pertinent past medical history noted for CLL presented to the ED complaining of a one week history of a productive cough associated with shortness of breath. Of note, patient also with palpitations that are usually present on ambulation. Denies any chest pain or pre-syncope but endorsed swelling of lower extremity. No known sick contacts, fever or chills noted. Patient was seen and evaluated by her PCP and was later referred to the ED over concerns of a pneumonia.        In the ED patient with troponin of 57. A CXR revealed a likely right lower lobe infiltrate  (18 Jul 2022 21:43)    INTERVAL HPI/OVERNIGHT EVENTS:    Vital Signs Last 24 Hrs  T(C): 36.2 (20 Jul 2022 08:34), Max: 36.7 (19 Jul 2022 23:10)  T(F): 97.2 (20 Jul 2022 08:34), Max: 98.1 (19 Jul 2022 23:10)  HR: 83 (20 Jul 2022 08:34) (74 - 87)  BP: 118/63 (20 Jul 2022 08:34) (103/57 - 118/63)  BP(mean): --  RR: 18 (20 Jul 2022 08:34) (17 - 18)  SpO2: 92% (20 Jul 2022 08:34) (92% - 93%)    Parameters below as of 20 Jul 2022 08:34  Patient On (Oxygen Delivery Method): room air      I&O's Detail    REVIEW OF SYSTEMS:    CONSTITUTIONAL: No weakness, fevers or chills  EYES/ENT: No visual changes;  No vertigo or throat pain   NECK: No pain or stiffness  RESPIRATORY: No cough, wheezing, hemoptysis; No shortness of breath  CARDIOVASCULAR: No chest pain or palpitations  GASTROINTESTINAL: No abdominal or epigastric pain. No nausea, vomiting, or hematemesis; No diarrhea or constipation. No melena or hematochezia.  GENITOURINARY: No dysuria, frequency or hematuria  NEUROLOGICAL: No numbness or weakness  SKIN: No itching, burning, rashes, or lesions   All other review of systems is negative unless indicated above.  PHYSICAL EXAM:    General: Well developed; well nourished; in no acute distress  Eyes: PERRLA, EOMI; conjunctiva and sclera clear  Head: Normocephalic; atraumatic  ENMT: No nasal discharge; airway clear  Neck: Supple; non tender; no masses  Respiratory: No wheezes, rales or rhonchi  Cardiovascular: Regular rate and rhythm. S1 and S2 Normal; No murmurs, gallops or rubs  Gastrointestinal: Soft non-tender non-distended; Normal bowel sounds  Genitourinary: No  suprapubic  tenderness  Extremities: Normal range of motion, No clubbing, cyanosis or edema  Vascular: Peripheral pulses palpable 2+ bilaterally  Neurological: Alert and oriented x4  Skin: Warm and dry. No acute rash  Lymph Nodes: No acute cervical adenopathy  Musculoskeletal: Normal muscle tone, without deformities  Psychiatric: Cooperative and appropriate                            8.6    76.17 )-----------( 244      ( 20 Jul 2022 08:36 )             28.7     20 Jul 2022 08:36    140    |  111    |  20     ----------------------------<  104    5.2     |  26     |  0.82     Ca    8.5        20 Jul 2022 08:36  Phos  2.6       19 Jul 2022 07:30  Mg     1.9       19 Jul 2022 07:30    TPro  5.7    /  Alb  2.1    /  TBili  0.3    /  DBili  x      /  AST  20     /  ALT  13     /  AlkPhos  98     19 Jul 2022 07:30    PT/INR - ( 19 Jul 2022 07:30 )   PT: 13.8 sec;   INR: 1.19 ratio           CAPILLARY BLOOD GLUCOSE        LIVER FUNCTIONS - ( 19 Jul 2022 07:30 )  Alb: 2.1 g/dL / Pro: 5.7 gm/dL / ALK PHOS: 98 U/L / ALT: 13 U/L / AST: 20 U/L / GGT: x               MEDICATIONS  (STANDING):  azithromycin   Tablet 250 milliGRAM(s) Oral daily  cefTRIAXone   IVPB 1000 milliGRAM(s) IV Intermittent every 24 hours  cholecalciferol 1000 Unit(s) Oral daily  heparin   Injectable 5000 Unit(s) SubCutaneous every 12 hours  multivitamin 1 Tablet(s) Oral daily    MEDICATIONS  (PRN):  acetaminophen     Tablet .. 650 milliGRAM(s) Oral once PRN Mild Pain (1 - 3)  ALBUTerol    90 MICROgram(s) HFA Inhaler 2 Puff(s) Inhalation every 6 hours PRN Shortness of Breath and/or Wheezing  aluminum hydroxide/magnesium hydroxide/simethicone Suspension 30 milliLiter(s) Oral every 4 hours PRN Dyspepsia  benzonatate 100 milliGRAM(s) Oral three times a day PRN Cough  melatonin 3 milliGRAM(s) Oral at bedtime PRN Insomnia  ondansetron Injectable 4 milliGRAM(s) IV Push every 8 hours PRN Nausea and/or Vomiting      RADIOLOGY & ADDITIONAL TESTS:  < from: Xray Chest 1 View- PORTABLE-Urgent (Xray Chest 1 View- PORTABLE-Urgent .) (07.18.22 @ 18:59) >  ACC: [id number] EXAM:  XR CHEST PORTABLE URGENT 1V                          PROCEDURE DATE:  07/18/2022          INTERPRETATION:  Portable chest radiograph    CLINICAL INFORMATION: Dyspnea, shortness of breath.    TECHNIQUE:  Portable  AP chest radiograph.    COMPARISON: None. .    FINDINGS:  CATHETERS AND TUBES: None    PULMONARY: RIGHT basilar multifocal airspace consolidations . RIGHT upper   zone and LEFT lung parenchyma grossly clear.  No pneumothorax.    HEART/VASCULAR: The  heart is enlarged in transverse diameter.     BONES: Visualized osseous structures are intact.    IMPRESSION:   RIGHT basilar multifocal airspace consolidations.    < end of copied text >     CC: Acute Respiratory Failure  Palpitations    HPI: 89 year old female patient with a pertinent past medical history noted for CLL presented to the ED complaining of a one week history of a productive cough associated with shortness of breath. Of note, patient also with palpitations that are usually present on ambulation. Denies any chest pain or pre-syncope but endorsed swelling of lower extremity. No known sick contacts, fever or chills noted. Patient was seen and evaluated by her PCP and was later referred to the ED over concerns of a pneumonia.        In the ED patient with troponin of 57. A CXR revealed a likely right lower lobe infiltrate  (18 Jul 2022 21:43)    INTERVAL HPI/OVERNIGHT EVENTS: chart reviewed, Pt was seen and examined, feels better, less SOB and cough improving. Daughter at bedside, POC discussed       Vital Signs Last 24 Hrs  T(C): 36.2 (20 Jul 2022 08:34), Max: 36.7 (19 Jul 2022 23:10)  T(F): 97.2 (20 Jul 2022 08:34), Max: 98.1 (19 Jul 2022 23:10)  HR: 83 (20 Jul 2022 08:34) (74 - 87)  BP: 118/63 (20 Jul 2022 08:34) (103/57 - 118/63)  RR: 18 (20 Jul 2022 08:34) (17 - 18)  SpO2: 92% (20 Jul 2022 08:34) (92% - 93%)    Parameters below as of 20 Jul 2022 08:34  Patient On (Oxygen Delivery Method): room air        REVIEW OF SYSTEMS:  All other review of systems is negative unless indicated above.  PHYSICAL EXAM:  General: Well developed;  in no acute distress  Eyes: EOMI; conjunctiva and sclera clear  Head: Normocephalic; atraumatic  ENMT: No nasal discharge; airway clear  Neck: Supple; no JVD   Respiratory:  Decreased BS at bases No wheezes, rales or rhonchi  Cardiovascular: Regular rate and rhythm. S1 and S2 Normal; No murmurs, gallops or rubs  Gastrointestinal: Soft non-tender non-distended; Normal bowel sounds  Genitourinary: No  suprapubic  tenderness  Extremities: No  edema  Vascular: Peripheral pulses palpable 2+ bilaterally  Neurological: Alert and oriented x3 non focal   Skin: Warm and dry. No acute rash  Lymph Nodes: No acute cervical adenopathy  Musculoskeletal: Normal muscle tone, without deformities  Psychiatric: Cooperative and appropriate    LABS:                         8.6    76.17 )-----------( 244      ( 20 Jul 2022 08:36 )             28.7     20 Jul 2022 08:36    140    |  111    |  20     ----------------------------<  104    5.2     |  26     |  0.82     Ca    8.5        20 Jul 2022 08:36  Phos  2.6       19 Jul 2022 07:30  Mg     1.9       19 Jul 2022 07:30    TPro  5.7    /  Alb  2.1    /  TBili  0.3    /  DBili  x      /  AST  20     /  ALT  13     /  AlkPhos  98     19 Jul 2022 07:30    PT/INR - ( 19 Jul 2022 07:30 )   PT: 13.8 sec;   INR: 1.19 ratio           CAPILLARY BLOOD GLUCOSE        LIVER FUNCTIONS - ( 19 Jul 2022 07:30 )  Alb: 2.1 g/dL / Pro: 5.7 gm/dL / ALK PHOS: 98 U/L / ALT: 13 U/L / AST: 20 U/L / GGT: x               MEDICATIONS  (STANDING):  azithromycin   Tablet 250 milliGRAM(s) Oral daily  cefTRIAXone   IVPB 1000 milliGRAM(s) IV Intermittent every 24 hours  cholecalciferol 1000 Unit(s) Oral daily  heparin   Injectable 5000 Unit(s) SubCutaneous every 12 hours  multivitamin 1 Tablet(s) Oral daily    MEDICATIONS  (PRN):  acetaminophen     Tablet .. 650 milliGRAM(s) Oral once PRN Mild Pain (1 - 3)  ALBUTerol    90 MICROgram(s) HFA Inhaler 2 Puff(s) Inhalation every 6 hours PRN Shortness of Breath and/or Wheezing  aluminum hydroxide/magnesium hydroxide/simethicone Suspension 30 milliLiter(s) Oral every 4 hours PRN Dyspepsia  benzonatate 100 milliGRAM(s) Oral three times a day PRN Cough  melatonin 3 milliGRAM(s) Oral at bedtime PRN Insomnia  ondansetron Injectable 4 milliGRAM(s) IV Push every 8 hours PRN Nausea and/or Vomiting      RADIOLOGY & ADDITIONAL TESTS:  < from: Xray Chest 1 View- PORTABLE-Urgent (Xray Chest 1 View- PORTABLE-Urgent .) (07.18.22 @ 18:59) >  ACC: [id number] EXAM:  XR CHEST PORTABLE URGENT 1V                          PROCEDURE DATE:  07/18/2022          INTERPRETATION:  Portable chest radiograph    CLINICAL INFORMATION: Dyspnea, shortness of breath.    TECHNIQUE:  Portable  AP chest radiograph.    COMPARISON: None. .    FINDINGS:  CATHETERS AND TUBES: None    PULMONARY: RIGHT basilar multifocal airspace consolidations . RIGHT upper   zone and LEFT lung parenchyma grossly clear.  No pneumothorax.    HEART/VASCULAR: The  heart is enlarged in transverse diameter.     BONES: Visualized osseous structures are intact.    IMPRESSION:   RIGHT basilar multifocal airspace consolidations.    < end of copied text >

## 2022-07-20 NOTE — PROGRESS NOTE ADULT - ASSESSMENT
89 year old female patient with a pertinent past medical history noted for CLL who is followed by my colleague Dr. Brian Donis outpatient, last seen 5/27/22 presented to the ED complaining of a one week history of a productive cough associated with shortness of breath with CXR showing PNA.    # CLL with PNA  - CBC 5/23 with WBC 81.1, Hb 10.2, plt 235 and alc 70- currently on surveillance  - In hospital, WBC 68, Hb 8.6, plt 218, alc 61.62  - transfuse to keep Hb >8- trend cbc,   - VSS on RA   - pt on levaquin for possible RLL infiltrate- will continue with antibiotics on discharge  - cardio following for elevated troponin s/p TTE pending official report    # h/o stage III colon CA KRAS wild type left­sided  - originally diagnosed with braf wild type, kras wild stype stage IIIa disease s/p colectomy- discussion held regarding adjuvant chemotherapy but plan to hold off   - Dr. Donis had a discussion with the patient that given her weight loss this would prompt a workup for evaluation for underlying metastatic disease- given the patient's advanced age as well as discussions that they had with the patient as well as his daughters even if the patient were noted to have advanced disease we would not proceed with any systemic chemotherapy due to her frailty.   - At this time they requested that we prioritize her pain symptoms.   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   - pt to f/u with Dr. Donis on discharge    dispo: pt lives with daughter- asking to go home today as she feels better  PT recommends home PT  pt to f/u with Dr. Donis on discharge in clinic     Dr. Parish Kumar  cell: [phone]  Weekends and nights please call [phone] for MD on call  [facility]  Hematology/Oncology  89 year old female patient with a pertinent past medical history noted for CLL who is followed by my colleague Dr. Brian Donis outpatient, last seen 5/27/22 presented to the ED complaining of a one week history of a productive cough associated with shortness of breath with CXR showing PNA.    # CLL with PNA  - CBC 5/23 with WBC 81.1, Hb 10.2, plt 235 and alc 70- currently on surveillance  - In hospital, WBC 68, Hb 8.6, plt 218, alc 61.62  - transfuse to keep Hb >8- trend cbc- Hb 8.6 today   - VSS on RA   - pt on levaquin for possible RLL infiltrate- will continue with antibiotics on discharge  - cardio following for elevated troponin - TTE unremarkable with EF 60-65%  - pt to f/u outpatient and will send immunoglobulins to see if benefits from IVIG therapy    # h/o stage III colon CA KRAS wild type left­sided  - originally diagnosed with braf wild type, kras wild stype stage IIIa disease s/p colectomy- discussion held regarding adjuvant chemotherapy but plan to hold off   - Dr. Donis had a discussion with the patient that given her weight loss this would prompt a workup for evaluation for underlying metastatic disease- given the patient's advanced age as well as discussions that they had with the patient as well as his daughters even if the patient were noted to have advanced disease we would not proceed with any systemic chemotherapy due to her frailty.   - At this time they requested that we prioritize her pain symptoms.   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   - pt to f/u with Dr. Donis on discharge    dispo: pt lives with daughter- asking to go home today as she feels better  PT recommends home PT  pt to f/u with Dr. Donis on discharge in clinic     Dr. Parish Kumar  cell: [phone]  Weekends and nights please call [phone] for MD on call  [facility]  Hematology/Oncology

## 2022-07-20 NOTE — DISCHARGE NOTE NURSING/CASE MANAGEMENT/SOCIAL WORK - PATIENT PORTAL LINK FT
You can access the FollowMyHealth Patient Portal offered by [facility] by registering at the following website: [email or web address]. By joining [facility]’s FollowMyHealth portal, you will also be able to view your health information using other applications (apps) compatible with our system.

## 2022-07-21 ENCOUNTER — TRANSCRIPTION ENCOUNTER (OUTPATIENT)
Age: 87
End: 2022-07-21

## 2022-07-21 VITALS
TEMPERATURE: 98 F | OXYGEN SATURATION: 92 % | DIASTOLIC BLOOD PRESSURE: 50 MMHG | HEART RATE: 73 BPM | SYSTOLIC BLOOD PRESSURE: 110 MMHG | RESPIRATION RATE: 18 BRPM

## 2022-07-21 LAB
ANION GAP SERPL CALC-SCNC: 3 MMOL/L — LOW (ref 5–17)
BUN SERPL-MCNC: 21 MG/DL — SIGNIFICANT CHANGE UP (ref 7–23)
CALCIUM SERPL-MCNC: 8.6 MG/DL — SIGNIFICANT CHANGE UP (ref 8.5–10.1)
CHLORIDE SERPL-SCNC: 112 MMOL/L — HIGH (ref 96–108)
CO2 SERPL-SCNC: 26 MMOL/L — SIGNIFICANT CHANGE UP (ref 22–31)
CREAT SERPL-MCNC: 0.71 MG/DL — SIGNIFICANT CHANGE UP (ref 0.5–1.3)
EGFR: 78 ML/MIN/1.73M2 — SIGNIFICANT CHANGE UP
GLUCOSE SERPL-MCNC: 99 MG/DL — SIGNIFICANT CHANGE UP (ref 70–99)
HCT VFR BLD CALC: 25.9 % — LOW (ref 34.5–45)
HCT VFR BLD CALC: 30.1 % — LOW (ref 34.5–45)
HGB BLD-MCNC: 7.5 G/DL — LOW (ref 11.5–15.5)
HGB BLD-MCNC: 8.9 G/DL — LOW (ref 11.5–15.5)
MAGNESIUM SERPL-MCNC: 2.1 MG/DL — SIGNIFICANT CHANGE UP (ref 1.6–2.6)
MCHC RBC-ENTMCNC: 29 GM/DL — LOW (ref 32–36)
MCHC RBC-ENTMCNC: 29.8 PG — SIGNIFICANT CHANGE UP (ref 27–34)
MCV RBC AUTO: 102.8 FL — HIGH (ref 80–100)
PHOSPHATE SERPL-MCNC: 3.2 MG/DL — SIGNIFICANT CHANGE UP (ref 2.5–4.5)
PLATELET # BLD AUTO: 201 K/UL — SIGNIFICANT CHANGE UP (ref 150–400)
POTASSIUM SERPL-MCNC: 4.3 MMOL/L — SIGNIFICANT CHANGE UP (ref 3.5–5.3)
POTASSIUM SERPL-SCNC: 4.3 MMOL/L — SIGNIFICANT CHANGE UP (ref 3.5–5.3)
RBC # BLD: 2.52 M/UL — LOW (ref 3.8–5.2)
RBC # FLD: 17.5 % — HIGH (ref 10.3–14.5)
SODIUM SERPL-SCNC: 141 MMOL/L — SIGNIFICANT CHANGE UP (ref 135–145)
TSH SERPL-MCNC: 6.37 UU/ML — HIGH (ref 0.34–4.82)
WBC # BLD: 56.68 K/UL — CRITICAL HIGH (ref 3.8–10.5)
WBC # FLD AUTO: 56.68 K/UL — CRITICAL HIGH (ref 3.8–10.5)

## 2022-07-21 PROCEDURE — 71045 X-RAY EXAM CHEST 1 VIEW: CPT | Mod: 26

## 2022-07-21 PROCEDURE — 99239 HOSP IP/OBS DSCHRG MGMT >30: CPT

## 2022-07-21 RX ORDER — CEPHALEXIN 500 MG
10 CAPSULE ORAL
Qty: 60 | Refills: 0
Start: 2022-07-21 | End: 2022-07-23

## 2022-07-21 RX ORDER — CEFUROXIME AXETIL 250 MG
1 TABLET ORAL
Qty: 6 | Refills: 0
Start: 2022-07-21 | End: 2022-07-23

## 2022-07-21 RX ORDER — POLYETHYLENE GLYCOL 3350 17 G/17G
17 POWDER, FOR SOLUTION ORAL
Qty: 0 | Refills: 0 | DISCHARGE
Start: 2022-07-21

## 2022-07-21 RX ADMIN — Medication 1 TABLET(S): at 09:31

## 2022-07-21 RX ADMIN — AZITHROMYCIN 250 MILLIGRAM(S): 500 TABLET, FILM COATED ORAL at 09:31

## 2022-07-21 RX ADMIN — CEFTRIAXONE 100 MILLIGRAM(S): 500 INJECTION, POWDER, FOR SOLUTION INTRAMUSCULAR; INTRAVENOUS at 14:44

## 2022-07-21 RX ADMIN — HEPARIN SODIUM 5000 UNIT(S): 5000 INJECTION INTRAVENOUS; SUBCUTANEOUS at 09:31

## 2022-07-21 RX ADMIN — Medication 1000 UNIT(S): at 09:31

## 2022-07-21 RX ADMIN — POLYETHYLENE GLYCOL 3350 17 GRAM(S): 17 POWDER, FOR SOLUTION ORAL at 09:32

## 2022-07-21 NOTE — CDI QUERY NOTE - NSCDIOTHERTXTBX2_GEN_ALL_CORE_FT
Please specify type of multi-focal right lower lobe Pneumonia known or suspected/probable/likely:    A) Aspiration Pneumonia  B) Gram negative Pneumonia   C) Community acquired Pneumonia organism unknown  D) Other (please specify) ______________.   E) Unable to determine     Supporting Documentation and/or Clinical Evidence:    Xray Chest 1 View- PORTABLE-Urgent (Xray Chest 1 View- PORTABLE-Urgent .) (07.18.22 @ 18:59)    IMPRESSION:   RIGHT basilar multifocal airspace consolidations.    Adult-hospitalist progress note 7/20/2022  Pneumonia-CAP  CT chest with multifocal PNA  C/w  rocephin and po zithro    Pulmonary progress note 7/21/2022  RLL consolidation / pneumonia  check for aspiration  elevated WBC due to CLL / heme eval in progress

## 2022-07-21 NOTE — DISCHARGE NOTE PROVIDER - NSDCCPCAREPLAN_GEN_ALL_CORE_FT
PRINCIPAL DISCHARGE DIAGNOSIS  Diagnosis: Pneumonia  Assessment and Plan of Treatment: C/w oral abxs x 3 days   Take mucinex as needed for cough   F/u with PCP  within 1 week   F/u with Pulm         SECONDARY DISCHARGE DIAGNOSES  Diagnosis: Elevated troponin level  Assessment and Plan of Treatment: likely related to  PNA  F/u with CArdio for further evaluation and monitoring    Diagnosis: Palpitations  Assessment and Plan of Treatment: Likely du eto PNA  if symptoms recurre f/u with cardio might need to have event monitor    Diagnosis: Elevated TSH  Assessment and Plan of Treatment: F/u with PCP to recheck labs in few weeks    Diagnosis: CLL (chronic lymphocytic leukemia)  Assessment and Plan of Treatment: F/u with Dr Donis

## 2022-07-21 NOTE — PROGRESS NOTE ADULT - ASSESSMENT
89 year old female patient with a pertinent past medical history noted for CLL presented to the ED complaining of a one week history of a productive cough associated with shortness of breath. Of note, patient also with palpitations that are usually present on ambulation. Denies any chest pain or pre-syncope but endorsed swelling of lower extremity. No known sick contacts, fever or chills noted. Patient was seen and evaluated by her PCP and was later referred to the ED over concerns of a pneumonia.        In the ED patient with troponin of 57. A CXR revealed a likely right lower lobe infiltrate  (18 Jul 2022 21:43)  pt is seen and examined in her room  no resp distress    Assessment / plan;  RLL consolidation / pneumonia  R/o aspiration  CLL history  PENA due to above  Mild Pulm Htn / Moderate MR  no sign of CHF  chronic anemia    agree with antibiotic coverage  check for aspiration  mobilize OOB  elevated WBC due to CLL / heme eval in progress  DVT prophylaxis  fu cxr needed  7/21  appears very comfortable  Not using any supplemental o2  case discussed with outpt provider Dr John  primary team discussing discharge planning on oral antibiotics  given advanced age and CLL will get fu cxr today for further assessment and decision for switch to oral antibiotics  cxr requested

## 2022-07-21 NOTE — DISCHARGE NOTE PROVIDER - HOSPITAL COURSE
89 year old female patient with  PMH of CLL, colon CA  presented to the ED complaining of a one week history of a productive cough associated with shortness of breath. Of note, patient also with palpitations that are usually present on ambulation. Denies any chest pain or pre-syncope but endorsed swelling of lower extremity. No known sick contacts, fever or chills noted. Patient was seen and evaluated by her PCP and was later referred to the ED over concerns of a pneumonia.  In the ED patient with troponin of 57. A CXR + R lower lobe multifocal PNA. Pt was started on IV Abxs and cough meds. Pt was monitored on tele, had episode of NSVT x 1, had low mag. No other arrhythmias noted, Pt to follow with cardio outPt. ALso Min elevated trops, ECHO showed preserved EF, no RWMA, mod MR, evaluated by cardio, likely demand ischemia in settings of PNA,  no intervention, Pt to f/u outPt. Pt also had  leukocytosis, likely due to CLL. Chronic anemia, am HB down to 7.5 but repeat 8.9 which is consistent with last few days  trend. No signs of acute bleeding. D/w hem/onc, Pt to follow with Dr Donis outPt. Severe Pt calorie malnutrition, due to low  Po intake, as per jennifer not new, now appetite improved. Pt supplements recommended, will follow with PVP and hem onc outPt   Today Pt was seen and examined, OOb to chair, feels well, no SOB,  cough much improved. Ambulated with no  palpitations or SOB. Wants to go home. Results, meds and outPt f/u discussed     Vital Signs Last 24 Hrs  T(C): 36.5 (21 Jul 2022 08:59), Max: 36.8 (20 Jul 2022 16:39)  T(F): 97.7 (21 Jul 2022 08:59), Max: 98.2 (20 Jul 2022 16:39)  HR: 73 (21 Jul 2022 08:59) (73 - 79)  BP: 110/50 (21 Jul 2022 08:59) (110/50 - 122/55)  BP(mean): 73 (20 Jul 2022 16:39) (73 - 73)  RR: 18 (21 Jul 2022 08:59) (18 - 18)  SpO2: 92% (21 Jul 2022 08:59) (92% - 92%)    Parameters below as of 21 Jul 2022 08:59  Patient On (Oxygen Delivery Method): room air      PHYSICAL EXAM:  General: Well developed;  in no acute distress  Eyes: EOMI; conjunctiva and sclera clear  Head: Normocephalic; atraumatic  ENMT: No nasal discharge; airway clear  Neck: Supple; no JVD   Respiratory:  Decreased BS at bases No wheezes, rales or rhonchi  Cardiovascular: Regular rate and rhythm. S1 and S2 Normal; No murmurs, gallops or rubs  Gastrointestinal: Soft non-tender non-distended; Normal bowel sounds  Genitourinary: No  suprapubic  tenderness  Extremities: No  edema  Vascular: Peripheral pulses palpable 2+ bilaterally  Neurological: Alert and oriented x3 non focal   Skin: Warm and dry. No acute rash  Lymph Nodes: No acute cervical adenopathy  Musculoskeletal: Normal muscle tone, without deformities    A/P:  89 year old female patient with  past medical history noted for CLL admitted for:     # Community Acquired  Pneumonia, unknown organism   Acute Respiratory failure ruled out, was not noted to be hypoxic   CT chest with multifocal PNA  On  Rocephin and po Azithromycin, will c/w Oral Ceftin x 3 more days outPt   Cough meds PRN  blood cultures: NGTD   Repeat CXR with improvement   D/w Dr Davidson, agrees with dc, will follow outPt     # Palpitation with excretion, resolved   C/w tele: SR , noted to have 6 beats of NSVT x 1 episode, no  other arrhythmias noted   F/u with cardio       # Elevated troponin  -likely secondary to demand ischemia in settings of PNA   - ECHO: preserved EF, no RWMA  - Cardio eval appreciated , no further intervention     # Hx of CLL  -currently in remission, baseline WBCs around 80K   -Oncology eval appreciated  Outpatient follow up      # Elevated TSH   will need to f/u with PCP to repeat TFTs  in few weeks       # Chronic Anemia  - H/H lower today, but repeat HB  8.9   - no signs of bleeding   - Pt to f/u with Dr Donis     # Debility. Severe Protein calorie malnutrition   Encourage oral intake  Protein supplements      # DVT ppx      Dispp; stable  for d/c home with HC  Fax d/c summary to PCP  Total time 46 min

## 2022-07-21 NOTE — CDI QUERY NOTE - NSCDIOTHERTXTBX_GEN_ALL_CORE_HH
The patient is noted with a BMI of 13.8 and described as being frail. Is there a corresponding medical diagnosis for the low BMI?    A) Malnutrition (please specify severity and treatment being provided)_______________.    B) Underweight  C) BMI not clinically significant  D) Other (please specify) _______________.   E) Unable to determine     Clinical indicators:     Albumin, Serum: 2.1 g/dL (07.19.22 @ 07:30)   Albumin, Serum: 2.6 g/dL (07.18.22 @ 18:37)     Body Measurements:  · Dosing Weight (KILOGRAMS) 31.8 kg  · Dosing Weight  (POUNDS) 70.1 Pound(s)  · Height (CENTIMETERS) 152 Centimeter(s)  · BMI (kG/m2) 13.8  · Ideal Body Weight(kg) 45 kg    ED Provider note 7/18/2022  Physical Examination: Gen: Frail appearing.    Adult-Heme progress note 7/20/2022  Dr. Donis had a discussion with the patient that given her weight loss this would prompt a workup for evaluation

## 2022-07-21 NOTE — PROGRESS NOTE ADULT - REASON FOR ADMISSION
Acute Respiratory Failure  Palpitations

## 2022-07-21 NOTE — PROVIDER CONTACT NOTE (CRITICAL VALUE NOTIFICATION) - ACTION/TREATMENT ORDERED:
no new intervention at this time
Dr. Almanza made aware.
wbc trending down, no intervention at this time

## 2022-07-21 NOTE — PROVIDER CONTACT NOTE (CRITICAL VALUE NOTIFICATION) - BACKGROUND
patient has history of CLL
Family at bedside.
patient has history of CLL, bring followed by heme/onc

## 2022-07-21 NOTE — DISCHARGE NOTE PROVIDER - CARE PROVIDERS DIRECT ADDRESSES
,DirectAddress_Unknown,[email or web address],DirectAddress_Unknown,DirectAddress_Unknown

## 2022-07-21 NOTE — DISCHARGE NOTE PROVIDER - PROVIDER TOKENS
PROVIDER:[TOKEN:[[id number]],FOLLOWUP:[2 weeks]],PROVIDER:[TOKEN:[[id number]],FOLLOWUP:[2 weeks]],PROVIDER:[TOKEN:[[id number]],FOLLOWUP:[2 weeks]],PROVIDER:[TOKEN:[[id number]],FOLLOWUP:[1 week]]

## 2022-07-21 NOTE — DISCHARGE NOTE PROVIDER - NSDCMRMEDTOKEN_GEN_ALL_CORE_FT
cefuroxime 500 mg oral tablet: 1 tab(s) orally 2 times a day   Mucinex 600 mg oral tablet, extended release: 1 tab(s) orally 2 times a day, As Needed -for cough   Multiple Vitamins oral tablet, chewable: 1 tab(s) orally once a day  polyethylene glycol 3350 oral powder for reconstitution: 17 gram(s) orally once a day, As Needed  Tylenol 325 mg oral tablet: 2 tab(s) orally every 4 hours, As Needed  Vitamin D3 25 mcg (1000 intl units) oral tablet: 1 tab(s) orally once a day

## 2022-07-21 NOTE — PROVIDER CONTACT NOTE (CRITICAL VALUE NOTIFICATION) - NS PROVIDER READ BACK
yes
Bexarotene Pregnancy And Lactation Text: This medication is Pregnancy Category X and should not be given to women who are pregnant or may become pregnant. This medication should not be used if you are breast feeding.

## 2022-07-21 NOTE — DISCHARGE NOTE PROVIDER - CARE PROVIDER_API CALL
Brian Donis)  Internal Medicine  [address] [place], [place] [address]  Phone: [phone]  Fax: [phone]  Follow Up Time: 2 weeks    Palla, Venugopal R (MD)  Cardiovascular Disease; Internal Medicine  [address], [place] [id number]  Phone: [phone]  Fax: [phone]  Follow Up Time: 2 weeks    HORACIO Davidson ()  Pulmonary Disease; Sleep Medicine  [address] [place] [address]  Phone: [phone]  Fax: [phone]  Follow Up Time: 2 weeks    Bora John)  Family Medicine  [address] [place] [address]  Phone: [phone]  Fax: [phone]  Follow Up Time: 1 week

## 2022-07-21 NOTE — PROGRESS NOTE ADULT - SUBJECTIVE AND OBJECTIVE BOX
Patient is a 89y old  Female who presents with a chief complaint of Acute Respiratory Failure  Palpitations (20 Jul 2022 06:56)      HPI:  89 year old female patient with a pertinent past medical history noted for CLL presented to the ED complaining of a one week history of a productive cough associated with shortness of breath. Of note, patient also with palpitations that are usually present on ambulation. Denies any chest pain or pre-syncope but endorsed swelling of lower extremity. No known sick contacts, fever or chills noted. Patient was seen and evaluated by her PCP and was later referred to the ED over concerns of a pneumonia.        In the ED patient with troponin of 57. A CXR revealed a likely right lower lobe infiltrate  (18 Jul 2022 21:43)  pt is seen and examined in her room  no resp distress      7/21  appears very comfortable  Not using any supplemental o2  case discussed with outpt provider Dr John  primary team discussing discharge planning on oral antibiotics  given advanced age and CLL will get fu cxr today for further assessment and decision for switch to oral antibiotics  PAST MEDICAL & SURGICAL HISTORY:  CLL (chronic lymphocytic leukemia)          PREVIOUS DIAGNOSTIC TESTING:      MEDICATIONS  (STANDING):  azithromycin   Tablet 250 milliGRAM(s) Oral daily  cefTRIAXone   IVPB 1000 milliGRAM(s) IV Intermittent every 24 hours  cholecalciferol 1000 Unit(s) Oral daily  heparin   Injectable 5000 Unit(s) SubCutaneous every 12 hours  multivitamin 1 Tablet(s) Oral daily    MEDICATIONS  (PRN):  acetaminophen     Tablet .. 650 milliGRAM(s) Oral once PRN Mild Pain (1 - 3)  ALBUTerol    90 MICROgram(s) HFA Inhaler 2 Puff(s) Inhalation every 6 hours PRN Shortness of Breath and/or Wheezing  aluminum hydroxide/magnesium hydroxide/simethicone Suspension 30 milliLiter(s) Oral every 4 hours PRN Dyspepsia  benzonatate 100 milliGRAM(s) Oral three times a day PRN Cough  melatonin 3 milliGRAM(s) Oral at bedtime PRN Insomnia  ondansetron Injectable 4 milliGRAM(s) IV Push every 8 hours PRN Nausea and/or Vomiting      FAMILY HISTORY:  Known health problems: none        SOCIAL HISTORY:  ***    REVIEW OF SYSTEM:  Pertinent items are noted in HPI.      Vital Signs Last 24 Hrs  T(C): 36.8 (20 Jul 2022 16:39), Max: 36.8 (20 Jul 2022 16:39)  T(F): 98.2 (20 Jul 2022 16:39), Max: 98.2 (20 Jul 2022 16:39)  HR: 73 (20 Jul 2022 20:00) (73 - 83)  BP: 122/55 (20 Jul 2022 16:39) (118/63 - 122/55)  BP(mean): 73 (20 Jul 2022 16:39) (73 - 73)  RR: 18 (20 Jul 2022 16:39) (18 - 18)  SpO2: 92% (20 Jul 2022 16:39) (92% - 92%)    Parameters below as of 20 Jul 2022 16:39  Patient On (Oxygen Delivery Method): room air        Parameters below as of 19 Jul 2022 23:10  Patient On (Oxygen Delivery Method): room air        I&O's Summary    PHYSICAL EXAM  General Appearance: cooperative, no acute distress,   HEENT: PERRL, conjunctiva clear, EOM's intact, non injected pharynx, no exudate, TM   normal  Neck: Supple, , no adenopathy, thyroid: not enlarged, no carotid bruit or JVD  Back: Symmetric, no  tenderness,no soft tissue tenderness  Lungs: RLL rhonchi, no wheeze  Heart: Regular rate and rhythm, S1, S2 normal, no murmur, rub or gallop  Abdomen: Soft, non-tender, bowel sounds active , no hepatosplenomegaly  Extremities: no cyanosis or edema, no joint swelling  Skin: Skin color, texture normal, no rashes   Neurologic: Alert and oriented X3 ,    ECG:    LABS:                          8.1    58.64 )-----------( 203      ( 19 Jul 2022 07:30 )             27.9     07-19    138  |  109<H>  |  21  ----------------------------<  81  4.7   |  24  |  0.65    Ca    8.4<L>      19 Jul 2022 07:30  Phos  2.6     07-19  Mg     1.9     07-19    TPro  5.7<L>  /  Alb  2.1<L>  /  TBili  0.3  /  DBili  x   /  AST  20  /  ALT  13  /  AlkPhos  98  07-19          Pro BNP  1101 07-18 @ 18:37  D Dimer  -- 07-18 @ 18:37    PT/INR - ( 19 Jul 2022 07:30 )   PT: 13.8 sec;   INR: 1.19 ratio                   RADIOLOGY & ADDITIONAL STUDIES:  < from: Xray Chest 1 View- PORTABLE-Urgent (Xray Chest 1 View- PORTABLE-Urgent .) (07.18.22 @ 18:59) >  INTERPRETATION:  Portable chest radiograph    CLINICAL INFORMATION: Dyspnea, shortness of breath.    TECHNIQUE:  Portable  AP chest radiograph.    COMPARISON: None. .    FINDINGS:  CATHETERS AND TUBES: None    PULMONARY: RIGHT basilar multifocal airspace consolidations . RIGHT upper   zone and LEFT lung parenchyma grossly clear.  No pneumothorax.    HEART/VASCULAR: The  heart is enlarged in transverse diameter.     BONES: Visualized osseous structures are intact.    IMPRESSION:   RIGHT basilar multifocal airspace consolidations.    < end of copied text >

## 2022-07-22 PROBLEM — C91.10 CHRONIC LYMPHOCYTIC LEUKEMIA OF B-CELL TYPE NOT HAVING ACHIEVED REMISSION: Chronic | Status: ACTIVE | Noted: 2022-07-18

## 2022-07-22 RX ORDER — CEPHALEXIN 500 MG
10 CAPSULE ORAL
Qty: 60 | Refills: 0
Start: 2022-07-22 | End: 2022-07-26

## 2022-07-24 LAB
CULTURE RESULTS: SIGNIFICANT CHANGE UP
CULTURE RESULTS: SIGNIFICANT CHANGE UP
SPECIMEN SOURCE: SIGNIFICANT CHANGE UP
SPECIMEN SOURCE: SIGNIFICANT CHANGE UP

## 2022-07-27 ENCOUNTER — INPATIENT (INPATIENT)
Facility: HOSPITAL | Age: 87
LOS: 5 days | Discharge: HOME | DRG: 177 | End: 2022-08-02
Attending: FAMILY MEDICINE | Admitting: INTERNAL MEDICINE
Payer: MEDICARE

## 2022-07-27 VITALS — HEIGHT: 59.84 IN | WEIGHT: 70.11 LBS

## 2022-07-27 DIAGNOSIS — R06.00 DYSPNEA, UNSPECIFIED: ICD-10-CM

## 2022-07-27 LAB
ALBUMIN SERPL ELPH-MCNC: 2.8 G/DL — LOW (ref 3.3–5)
ALP SERPL-CCNC: 92 U/L — SIGNIFICANT CHANGE UP (ref 40–120)
ALT FLD-CCNC: 20 U/L — SIGNIFICANT CHANGE UP (ref 12–78)
ANION GAP SERPL CALC-SCNC: 5 MMOL/L — SIGNIFICANT CHANGE UP (ref 5–17)
APPEARANCE UR: ABNORMAL
APTT BLD: 26.3 SEC — LOW (ref 27.5–35.5)
AST SERPL-CCNC: 23 U/L — SIGNIFICANT CHANGE UP (ref 15–37)
BASOPHILS # BLD AUTO: 0 K/UL — SIGNIFICANT CHANGE UP (ref 0–0.2)
BASOPHILS NFR BLD AUTO: 0 % — SIGNIFICANT CHANGE UP (ref 0–2)
BILIRUB SERPL-MCNC: 0.2 MG/DL — SIGNIFICANT CHANGE UP (ref 0.2–1.2)
BILIRUB UR-MCNC: NEGATIVE — SIGNIFICANT CHANGE UP
BUN SERPL-MCNC: 68 MG/DL — HIGH (ref 7–23)
CALCIUM SERPL-MCNC: 9.1 MG/DL — SIGNIFICANT CHANGE UP (ref 8.5–10.1)
CHLORIDE SERPL-SCNC: 104 MMOL/L — SIGNIFICANT CHANGE UP (ref 96–108)
CO2 SERPL-SCNC: 27 MMOL/L — SIGNIFICANT CHANGE UP (ref 22–31)
COLOR SPEC: YELLOW — SIGNIFICANT CHANGE UP
CREAT SERPL-MCNC: 0.91 MG/DL — SIGNIFICANT CHANGE UP (ref 0.5–1.3)
D DIMER BLD IA.RAPID-MCNC: 729 NG/ML DDU — HIGH
DIFF PNL FLD: ABNORMAL
EGFR: 58 ML/MIN/1.73M2 — LOW
EOSINOPHIL # BLD AUTO: 0 K/UL — SIGNIFICANT CHANGE UP (ref 0–0.5)
EOSINOPHIL NFR BLD AUTO: 0 % — SIGNIFICANT CHANGE UP (ref 0–6)
GLUCOSE SERPL-MCNC: 132 MG/DL — HIGH (ref 70–99)
GLUCOSE UR QL: NEGATIVE — SIGNIFICANT CHANGE UP
HCT VFR BLD CALC: 19 % — CRITICAL LOW (ref 34.5–45)
HGB BLD-MCNC: 5.7 G/DL — CRITICAL LOW (ref 11.5–15.5)
INR BLD: 1.11 RATIO — SIGNIFICANT CHANGE UP (ref 0.88–1.16)
KETONES UR-MCNC: NEGATIVE — SIGNIFICANT CHANGE UP
LACTATE SERPL-SCNC: 1.1 MMOL/L — SIGNIFICANT CHANGE UP (ref 0.7–2)
LEUKOCYTE ESTERASE UR-ACNC: ABNORMAL
LIDOCAIN IGE QN: 262 U/L — SIGNIFICANT CHANGE UP (ref 73–393)
LYMPHOCYTES # BLD AUTO: 65.6 K/UL — HIGH (ref 1–3.3)
LYMPHOCYTES # BLD AUTO: 89 % — HIGH (ref 13–44)
MAGNESIUM SERPL-MCNC: 2.2 MG/DL — SIGNIFICANT CHANGE UP (ref 1.6–2.6)
MCHC RBC-ENTMCNC: 30 GM/DL — LOW (ref 32–36)
MCHC RBC-ENTMCNC: 31.8 PG — SIGNIFICANT CHANGE UP (ref 27–34)
MCV RBC AUTO: 106.1 FL — HIGH (ref 80–100)
MONOCYTES # BLD AUTO: 2.95 K/UL — HIGH (ref 0–0.9)
MONOCYTES NFR BLD AUTO: 4 % — SIGNIFICANT CHANGE UP (ref 2–14)
NEUTROPHILS # BLD AUTO: 5.16 K/UL — SIGNIFICANT CHANGE UP (ref 1.8–7.4)
NEUTROPHILS NFR BLD AUTO: 7 % — LOW (ref 43–77)
NITRITE UR-MCNC: NEGATIVE — SIGNIFICANT CHANGE UP
NRBC # BLD: SIGNIFICANT CHANGE UP /100 WBCS (ref 0–0)
NT-PROBNP SERPL-SCNC: 532 PG/ML — HIGH (ref 0–450)
PH UR: 5 — SIGNIFICANT CHANGE UP (ref 5–8)
PLATELET # BLD AUTO: 215 K/UL — SIGNIFICANT CHANGE UP (ref 150–400)
POTASSIUM SERPL-MCNC: 5 MMOL/L — SIGNIFICANT CHANGE UP (ref 3.5–5.3)
POTASSIUM SERPL-SCNC: 5 MMOL/L — SIGNIFICANT CHANGE UP (ref 3.5–5.3)
PROT SERPL-MCNC: 6.3 GM/DL — SIGNIFICANT CHANGE UP (ref 6–8.3)
PROT UR-MCNC: 15
PROTHROM AB SERPL-ACNC: 12.9 SEC — SIGNIFICANT CHANGE UP (ref 10.5–13.4)
RBC # BLD: 1.79 M/UL — LOW (ref 3.8–5.2)
RBC # FLD: 21.2 % — HIGH (ref 10.3–14.5)
SARS-COV-2 RNA SPEC QL NAA+PROBE: SIGNIFICANT CHANGE UP
SODIUM SERPL-SCNC: 136 MMOL/L — SIGNIFICANT CHANGE UP (ref 135–145)
SP GR SPEC: 1.01 — SIGNIFICANT CHANGE UP (ref 1.01–1.02)
TROPONIN I, HIGH SENSITIVITY RESULT: 67.62 NG/L — HIGH
TROPONIN I, HIGH SENSITIVITY RESULT: 70.56 NG/L — HIGH
UROBILINOGEN FLD QL: NEGATIVE — SIGNIFICANT CHANGE UP
WBC # BLD: 73.71 K/UL — CRITICAL HIGH (ref 3.8–10.5)
WBC # FLD AUTO: 73.71 K/UL — CRITICAL HIGH (ref 3.8–10.5)

## 2022-07-27 PROCEDURE — 85045 AUTOMATED RETICULOCYTE COUNT: CPT

## 2022-07-27 PROCEDURE — 94640 AIRWAY INHALATION TREATMENT: CPT

## 2022-07-27 PROCEDURE — 86923 COMPATIBILITY TEST ELECTRIC: CPT

## 2022-07-27 PROCEDURE — 71045 X-RAY EXAM CHEST 1 VIEW: CPT

## 2022-07-27 PROCEDURE — 93010 ELECTROCARDIOGRAM REPORT: CPT

## 2022-07-27 PROCEDURE — P9016: CPT

## 2022-07-27 PROCEDURE — 83550 IRON BINDING TEST: CPT

## 2022-07-27 PROCEDURE — 85018 HEMOGLOBIN: CPT

## 2022-07-27 PROCEDURE — 82550 ASSAY OF CK (CPK): CPT

## 2022-07-27 PROCEDURE — 86140 C-REACTIVE PROTEIN: CPT

## 2022-07-27 PROCEDURE — 82746 ASSAY OF FOLIC ACID SERUM: CPT

## 2022-07-27 PROCEDURE — 84100 ASSAY OF PHOSPHORUS: CPT

## 2022-07-27 PROCEDURE — 84443 ASSAY THYROID STIM HORMONE: CPT

## 2022-07-27 PROCEDURE — 85025 COMPLETE CBC W/AUTO DIFF WBC: CPT

## 2022-07-27 PROCEDURE — 84439 ASSAY OF FREE THYROXINE: CPT

## 2022-07-27 PROCEDURE — 83880 ASSAY OF NATRIURETIC PEPTIDE: CPT

## 2022-07-27 PROCEDURE — P9040: CPT

## 2022-07-27 PROCEDURE — 71045 X-RAY EXAM CHEST 1 VIEW: CPT | Mod: 26

## 2022-07-27 PROCEDURE — 83540 ASSAY OF IRON: CPT

## 2022-07-27 PROCEDURE — 85014 HEMATOCRIT: CPT

## 2022-07-27 PROCEDURE — 82272 OCCULT BLD FECES 1-3 TESTS: CPT

## 2022-07-27 PROCEDURE — 74176 CT ABD & PELVIS W/O CONTRAST: CPT

## 2022-07-27 PROCEDURE — 80053 COMPREHEN METABOLIC PANEL: CPT

## 2022-07-27 PROCEDURE — 99285 EMERGENCY DEPT VISIT HI MDM: CPT

## 2022-07-27 PROCEDURE — 84484 ASSAY OF TROPONIN QUANT: CPT

## 2022-07-27 PROCEDURE — 83735 ASSAY OF MAGNESIUM: CPT

## 2022-07-27 PROCEDURE — 97163 PT EVAL HIGH COMPLEX 45 MIN: CPT | Mod: GP

## 2022-07-27 PROCEDURE — 82607 VITAMIN B-12: CPT

## 2022-07-27 PROCEDURE — 36415 COLL VENOUS BLD VENIPUNCTURE: CPT

## 2022-07-27 PROCEDURE — 71275 CT ANGIOGRAPHY CHEST: CPT | Mod: 26,MA

## 2022-07-27 PROCEDURE — 36430 TRANSFUSION BLD/BLD COMPNT: CPT

## 2022-07-27 PROCEDURE — 82728 ASSAY OF FERRITIN: CPT

## 2022-07-27 PROCEDURE — 85027 COMPLETE CBC AUTOMATED: CPT

## 2022-07-27 PROCEDURE — 97116 GAIT TRAINING THERAPY: CPT | Mod: GP

## 2022-07-27 RX ORDER — AZITHROMYCIN 500 MG/1
500 TABLET, FILM COATED ORAL ONCE
Refills: 0 | Status: COMPLETED | OUTPATIENT
Start: 2022-07-27 | End: 2022-07-27

## 2022-07-27 RX ORDER — CEFTRIAXONE 500 MG/1
1000 INJECTION, POWDER, FOR SOLUTION INTRAMUSCULAR; INTRAVENOUS ONCE
Refills: 0 | Status: COMPLETED | OUTPATIENT
Start: 2022-07-27 | End: 2022-07-27

## 2022-07-27 RX ORDER — ACETAMINOPHEN 500 MG
2 TABLET ORAL
Qty: 0 | Refills: 0 | DISCHARGE

## 2022-07-27 NOTE — ED PROVIDER NOTE - PROGRESS NOTE DETAILS
RECTAl EXAM: pt is dark colored stool , neg guiac. Pt consents to blood transfusion for hgb 5.7. MD LAQUITA

## 2022-07-27 NOTE — ED ADULT NURSE NOTE - OBJECTIVE STATEMENT
Pt presents to c/o SOB. Pt sent in by PCP r/t low pulse ox in office, oxygen concentration of 93%. Pt recently finished 5 day course of abx. AO x 3 oriented to baseline

## 2022-07-27 NOTE — ED ADULT TRIAGE NOTE - CHIEF COMPLAINT QUOTE
sent  in by dr. nolasco for abdimission, patient finished 5 days course of antibiotics yesterday, follow up appt patient has low O2 sat and tachypnea, hypotension, O2 sat in triage 93% on RA, patient has history of CLL, baseline WBCs 50-70s)

## 2022-07-27 NOTE — ED PROVIDER NOTE - OBJECTIVE STATEMENT
89 yo F from home with PMHx of CLL, sent in by dr nolasco for concern for PE. pt with low BP and low pox in the office when there for follow up . pt recenlty admitted for pneumonia. 89 yo F from home with PMHx of CLL, sent in by dr nolasco for concern for PE. pt with low BP and low pox in the office when there for follow up . pt recenlty admitted  to  for pneumonia. MO'ed 7/22/22. Non smoker. No cad hx. denies cp or sob. No n/v/d. 89 yo F from home with PMHx of CLL, colon cancer s/p resection december 2021, sent in by dr nolasco for concern for PE. pt with low BP and low pox in the office when there for follow up . pt recenlty admitted  to  for pneumonia. SD'ed 7/22/22. Non smoker. No cad hx. denies cp or sob. No n/v/d.

## 2022-07-27 NOTE — PHARMACOTHERAPY INTERVENTION NOTE - COMMENTS
Medication reconciliation completed.  Reviewed Medication list and confirmed med allergies with patient; confirmed with Dr. First Medrandy.

## 2022-07-27 NOTE — CONSULT NOTE ADULT - ASSESSMENT
89 year old female patient with a pertinent past medical history noted for CLL  as well as Stage III LEFT sided COLON CA  braf wild type, kras wild stype  last seen 5/27/22  recently admitted for PNA now presenting after having been sent from pulmonary office for worsening shortness of breath and symptomatic anemia productive cough associated with shortness of breath with CXR showing PNA.    # CLL   - has not had CLL directed therapy -  - would check Quiggs   - may benefit from possible IVIG as oupatient     # Anemia   - check Iron studies   - macrocytosis - normal Bili   - discussed with ED with transfusion in ED    - Hb 5.7   - recommend two units   - check LDH and Hapto  - check FOBT   - Surgeon Femi Martin   - if Guaic + will likely need Scope to assess anastomatic site.     # h/o stage III colon CA KRAS wild type left­sided  - originally diagnosed with braf wild type, kras wild stype stage IIIa disease s/p colectomy- discussion held regarding adjuvant chemotherapy but plan to hold off        Brian Donis MD   Hematology/ Oncology   [facility]   [address] [place], [place]  P:[phone]  F:[phone]  and [phone]  [address] [place], [place]   P:[phone]  F:[phone]   89 year old female patient with a pertinent past medical history noted for CLL  as well as Stage III LEFT sided COLON CA  braf wild type, kras wild stype  last seen 5/27/22  recently admitted for PNA now presenting after having been sent from pulmonary office for worsening shortness of breath and symptomatic anemia productive cough associated with shortness of breath with CXR showing PNA.    # CLL   - has not had CLL directed therapy -  - would check Quiggs   - may benefit from possible IVIG as oupatient   - WBC 73 - baseline 40-60    # Anemia   - check Iron studies   - macrocytosis - normal Bili   - discussed with ED with transfusion in ED    - Hb 5.7   - recommend two units   - check LDH and Hapto  - check FOBT   - Surgeon Femi Martin   - if Guaic + will likely need Scope to assess anastomatic site.     # h/o stage III colon CA KRAS wild type left­sided  - originally diagnosed with braf wild type, kras wild stype stage IIIa disease s/p colectomy- discussion held regarding adjuvant chemotherapy but plan to hold off        Brian Donis MD   Hematology/ Oncology   [facility]   [address] [place], [place]  P:[phone]  F:[phone]  and [phone]  [address] [place], [place]   P:[phone]  F:[phone]

## 2022-07-27 NOTE — CONSULT NOTE ADULT - SUBJECTIVE AND OBJECTIVE BOX
HPI:    89 year old female patient with a pertinent past medical history noted for CLL  as well as Stage III LEFT sided COLON CA  braf wild type, kras wild stype  last seen 5/27/22  recently admitted for PNA now presenting after having been sent from pulmonary office for worsening shortness of breath and symptomatic anemia productive cough associated with shortness of breath with CXR showing PNA. Patient notes that she has been feeling weak, overall denies any new abdominal pain     PAST MEDICAL & SURGICAL HISTORY:  CLL (chronic lymphocytic leukemia)          Allergies    cefdinir (Diarrhea)    Intolerances        MEDICATIONS  (STANDING):    MEDICATIONS  (PRN):      FAMILY HISTORY:  Known health problems: none        SOCIAL HISTORY: No EtOH, no tobacco    REVIEW OF SYSTEMS:    CONSTITUTIONAL: No weakness, fevers or chills  EYES/ENT: No visual changes;  No vertigo or throat pain   NECK: No pain or stiffness  RESPIRATORY: No cough, wheezing, hemoptysis; No shortness of breath  CARDIOVASCULAR: No chest pain or palpitations  GASTROINTESTINAL: No abdominal or epigastric pain. No nausea, vomiting, or hematemesis; No diarrhea or constipation. No melena or hematochezia.  GENITOURINARY: No dysuria, frequency or hematuria  NEUROLOGICAL: No numbness or weakness  SKIN: No itching, burning, rashes, or lesions   All other review of systems is negative unless indicated above.    Height (cm): 152 (07-27 @ 16:59)  Weight (kg): 31.8 (07-27 @ 16:59)  BMI (kg/m2): 13.8 (07-27 @ 16:59)  BSA (m2): 1.19 (07-27 @ 16:59)    T(F): 97.9 (07-27-22 @ 22:40), Max: 97.9 (07-27-22 @ 22:40)  HR: 64 (07-27-22 @ 22:40)  BP: 106/56 (07-27-22 @ 22:40)  RR: 18 (07-27-22 @ 22:40)  SpO2: 97% (07-27-22 @ 22:40)  Wt(kg): --    GENERAL: NAD, well-developed  HEAD:  Atraumatic, Normocephalic  EYES: EOMI, PERRLA, conjunctiva and sclera clear  NECK: Supple, No JVD  CHEST/LUNG: Clear to auscultation bilaterally; No wheeze  HEART: Regular rate and rhythm; No murmurs, rubs, or gallops  ABDOMEN: Soft, Nontender, Nondistended; Bowel sounds present  EXTREMITIES:  2+ Peripheral Pulses, No clubbing, cyanosis, or edema  NEUROLOGY: non-focal  SKIN: No rashes or lesions                          5.7    73.71 )-----------( 215      ( 27 Jul 2022 17:39 )             19.0       07-27    136  |  104  |  68<H>  ----------------------------<  132<H>  5.0   |  27  |  0.91    Ca    9.1      27 Jul 2022 17:39  Mg     2.2     07-27    TPro  6.3  /  Alb  2.8<L>  /  TBili  0.2  /  DBili  x   /  AST  23  /  ALT  20  /  AlkPhos  92  07-27      Magnesium, Serum: 2.2 mg/dL (07-27 @ 17:39)      PT/INR - ( 27 Jul 2022 17:39 )   PT: 12.9 sec;   INR: 1.11 ratio         PTT - ( 27 Jul 2022 17:39 )  PTT:26.3 sec    .Blood None  07-18 @ 18:37   No Growth Final  --  --

## 2022-07-27 NOTE — ED ADULT TRIAGE NOTE - WEIGHT IN LBS
70.1 O-L Flap Text: The defect edges were debeveled with a #15 scalpel blade.  Given the location of the defect, shape of the defect and the proximity to free margins an O-L flap was deemed most appropriate.  Using a sterile surgical marker, an appropriate advancement flap was drawn incorporating the defect and placing the expected incisions within the relaxed skin tension lines where possible.    The area thus outlined was incised deep to adipose tissue with a #15 scalpel blade.  The skin margins were undermined to an appropriate distance in all directions utilizing iris scissors.

## 2022-07-27 NOTE — ED PROVIDER NOTE - PHYSICAL EXAMINATION
Constitutional: NAD AOx3 thin chornically ill appearing, pale  Eyes: PERRL EOMI  Head: Normocephalic atraumatic  Mouth: dry MM  Cardiac: regular rate and rhythm  Resp: Lungs CTAB  GI: Abd s/nd/nt  Neuro: CN2-12 grossly intact, DHILLON x 4  Skin: No visible rashes

## 2022-07-28 DIAGNOSIS — C18.9 MALIGNANT NEOPLASM OF COLON, UNSPECIFIED: ICD-10-CM

## 2022-07-28 DIAGNOSIS — C91.10 CHRONIC LYMPHOCYTIC LEUKEMIA OF B-CELL TYPE NOT HAVING ACHIEVED REMISSION: ICD-10-CM

## 2022-07-28 DIAGNOSIS — S22.49XA MULTIPLE FRACTURES OF RIBS, UNSPECIFIED SIDE, INITIAL ENCOUNTER FOR CLOSED FRACTURE: ICD-10-CM

## 2022-07-28 DIAGNOSIS — D64.9 ANEMIA, UNSPECIFIED: ICD-10-CM

## 2022-07-28 DIAGNOSIS — I95.9 HYPOTENSION, UNSPECIFIED: ICD-10-CM

## 2022-07-28 DIAGNOSIS — R62.7 ADULT FAILURE TO THRIVE: ICD-10-CM

## 2022-07-28 DIAGNOSIS — J15.6 PNEUMONIA DUE TO OTHER GRAM-NEGATIVE BACTERIA: ICD-10-CM

## 2022-07-28 LAB
CULTURE RESULTS: NO GROWTH — SIGNIFICANT CHANGE UP
HCT VFR BLD CALC: 24.5 % — LOW (ref 34.5–45)
HCT VFR BLD CALC: 28.5 % — LOW (ref 34.5–45)
HGB BLD-MCNC: 7.6 G/DL — LOW (ref 11.5–15.5)
HGB BLD-MCNC: 9 G/DL — LOW (ref 11.5–15.5)
MCHC RBC-ENTMCNC: 30.1 PG — SIGNIFICANT CHANGE UP (ref 27–34)
MCHC RBC-ENTMCNC: 31 GM/DL — LOW (ref 32–36)
MCHC RBC-ENTMCNC: 31 PG — SIGNIFICANT CHANGE UP (ref 27–34)
MCHC RBC-ENTMCNC: 31.6 GM/DL — LOW (ref 32–36)
MCV RBC AUTO: 100 FL — SIGNIFICANT CHANGE UP (ref 80–100)
MCV RBC AUTO: 95.3 FL — SIGNIFICANT CHANGE UP (ref 80–100)
PLATELET # BLD AUTO: 194 K/UL — SIGNIFICANT CHANGE UP (ref 150–400)
PLATELET # BLD AUTO: 195 K/UL — SIGNIFICANT CHANGE UP (ref 150–400)
RBC # BLD: 2.45 M/UL — LOW (ref 3.8–5.2)
RBC # BLD: 2.99 M/UL — LOW (ref 3.8–5.2)
RBC # FLD: 20.6 % — HIGH (ref 10.3–14.5)
RBC # FLD: 22.9 % — HIGH (ref 10.3–14.5)
SPECIMEN SOURCE: SIGNIFICANT CHANGE UP
WBC # BLD: 69.38 K/UL — CRITICAL HIGH (ref 3.8–10.5)
WBC # BLD: 74.35 K/UL — CRITICAL HIGH (ref 3.8–10.5)
WBC # FLD AUTO: 69.38 K/UL — CRITICAL HIGH (ref 3.8–10.5)
WBC # FLD AUTO: 74.35 K/UL — CRITICAL HIGH (ref 3.8–10.5)

## 2022-07-28 PROCEDURE — 99223 1ST HOSP IP/OBS HIGH 75: CPT

## 2022-07-28 PROCEDURE — 99221 1ST HOSP IP/OBS SF/LOW 40: CPT

## 2022-07-28 PROCEDURE — 74176 CT ABD & PELVIS W/O CONTRAST: CPT | Mod: 26

## 2022-07-28 RX ORDER — ONDANSETRON 8 MG/1
4 TABLET, FILM COATED ORAL EVERY 8 HOURS
Refills: 0 | Status: DISCONTINUED | OUTPATIENT
Start: 2022-07-28 | End: 2022-08-02

## 2022-07-28 RX ORDER — CEFEPIME 1 G/1
INJECTION, POWDER, FOR SOLUTION INTRAMUSCULAR; INTRAVENOUS
Refills: 0 | Status: DISCONTINUED | OUTPATIENT
Start: 2022-07-28 | End: 2022-07-28

## 2022-07-28 RX ORDER — LANOLIN ALCOHOL/MO/W.PET/CERES
3 CREAM (GRAM) TOPICAL AT BEDTIME
Refills: 0 | Status: DISCONTINUED | OUTPATIENT
Start: 2022-07-28 | End: 2022-08-02

## 2022-07-28 RX ORDER — CEFTRIAXONE 500 MG/1
1000 INJECTION, POWDER, FOR SOLUTION INTRAMUSCULAR; INTRAVENOUS EVERY 24 HOURS
Refills: 0 | Status: DISCONTINUED | OUTPATIENT
Start: 2022-07-28 | End: 2022-08-01

## 2022-07-28 RX ORDER — AZITHROMYCIN 500 MG/1
500 TABLET, FILM COATED ORAL EVERY 24 HOURS
Refills: 0 | Status: DISCONTINUED | OUTPATIENT
Start: 2022-07-29 | End: 2022-08-01

## 2022-07-28 RX ORDER — ACETAMINOPHEN 500 MG
650 TABLET ORAL EVERY 6 HOURS
Refills: 0 | Status: DISCONTINUED | OUTPATIENT
Start: 2022-07-28 | End: 2022-08-02

## 2022-07-28 RX ORDER — AZITHROMYCIN 500 MG/1
TABLET, FILM COATED ORAL
Refills: 0 | Status: DISCONTINUED | OUTPATIENT
Start: 2022-07-28 | End: 2022-08-01

## 2022-07-28 RX ORDER — AZITHROMYCIN 500 MG/1
500 TABLET, FILM COATED ORAL ONCE
Refills: 0 | Status: COMPLETED | OUTPATIENT
Start: 2022-07-28 | End: 2022-07-28

## 2022-07-28 RX ADMIN — AZITHROMYCIN 255 MILLIGRAM(S): 500 TABLET, FILM COATED ORAL at 03:02

## 2022-07-28 RX ADMIN — CEFTRIAXONE 100 MILLIGRAM(S): 500 INJECTION, POWDER, FOR SOLUTION INTRAMUSCULAR; INTRAVENOUS at 01:52

## 2022-07-28 RX ADMIN — AZITHROMYCIN 255 MILLIGRAM(S): 500 TABLET, FILM COATED ORAL at 11:42

## 2022-07-28 NOTE — CONSULT NOTE ADULT - SUBJECTIVE AND OBJECTIVE BOX
Patient is a 89y old  Female who presents with a chief complaint of SOB    HPI:  89 y/o elderly frail female with CLL, recent hospitalization for pneumonia was admitted on  for increased weakness, increased HRs / low Blood pressures and shortness of breath. Pt is had rercent admission to  with RLL pneumonia and was treated with course of antibiotics and discharged on oral Rx. In the ED -  patient was noted to be hypotensive. Workup suggested patient being very anemic with HH 5.7. Patient was transfused 2 units of PRBCs with great response to transfusions. She received ceftriaxone and azithromycin.       PMH: as above  PSH: as above  Meds: per reconciliation sheet, noted below  MEDICATIONS  (STANDING):  azithromycin  IVPB      cefTRIAXone   IVPB 1000 milliGRAM(s) IV Intermittent every 24 hours    MEDICATIONS  (PRN):  acetaminophen     Tablet .. 650 milliGRAM(s) Oral every 6 hours PRN Temp greater or equal to 38C (100.4F), Mild Pain (1 - 3)  aluminum hydroxide/magnesium hydroxide/simethicone Suspension 30 milliLiter(s) Oral every 4 hours PRN Dyspepsia  melatonin 3 milliGRAM(s) Oral at bedtime PRN Insomnia  ondansetron Injectable 4 milliGRAM(s) IV Push every 8 hours PRN Nausea and/or Vomiting    Allergies    cefdinir (Diarrhea)    Intolerances      Social: no smoking, no alcohol, no illegal drugs; no recent travel, no exposure to TB  FAMILY HISTORY:  Known health problems: none      no history of premature cardiovascular disease in first degree relatives    ROS: the patient denies fever, no chills, no HA, no seizures, no dizziness, no sore throat, no nasal congestion, no blurry vision, no CP, no palpitations, has SOB, no cough, no abdominal pain, no diarrhea, no N/V, no dysuria, no leg pain, no claudication, no rash, no joint aches, no rectal pain or bleeding, no night sweats; has increased weakness  All other systems reviewed and are negative    Vital Signs Last 24 Hrs  T(C): 36.7 (2022 16:45), Max: 36.7 (2022 23:55)  T(F): 98 (2022 16:45), Max: 98.1 (2022 23:55)  HR: 62 (2022 16:45) (59 - 85)  BP: 116/57 (2022 16:45) (105/52 - 116/59)  BP(mean): 81 (2022 10:47) (66 - 81)  RR: 17 (2022 16:45) (16 - 19)  SpO2: 97% (2022 16:45) (96% - 98%)    Parameters below as of 2022 16:45  Patient On (Oxygen Delivery Method): room air      Daily     Daily     PE:    Constitutional:  No acute distress  HEENT: NC/AT, EOMI, PERRLA, conjunctivae clear; ears and nose atraumatic; pharynx benign  Neck: supple; thyroid not palpable  Back: no tenderness  Respiratory: respiratory effort normal; crackles at bases  Cardiovascular: S1S2 regular, no murmurs  Abdomen: soft, not tender, not distended, positive BS; no liver or spleen organomegaly  Genitourinary: no suprapubic tenderness  Lymphatic: no LN palpable  Musculoskeletal: no muscle tenderness, no joint swelling or tenderness  Extremities: no pedal edema  Neurological/ Psychiatric: AxOx3, judgement and insight normal; moving all extremities  Skin: no rashes; no palpable lesions    Labs: all available labs reviewed                        7.6    69.38 )-----------( 194      ( 2022 12:45 )             24.5     07-27    136  |  104  |  68<H>  ----------------------------<  132<H>  5.0   |  27  |  0.91    Ca    9.1      2022 17:39  Mg     2.2     -    TPro  6.3  /  Alb  2.8<L>  /  TBili  0.2  /  DBili  x   /  AST  23  /  ALT  20  /  AlkPhos  92  -     LIVER FUNCTIONS - ( 2022 17:39 )  Alb: 2.8 g/dL / Pro: 6.3 gm/dL / ALK PHOS: 92 U/L / ALT: 20 U/L / AST: 23 U/L / GGT: x           Urinalysis Basic - ( 2022 17:56 )    Color: Yellow / Appearance: Slightly Turbid / S.010 / pH: x  Gluc: x / Ketone: Negative  / Bili: Negative / Urobili: Negative   Blood: x / Protein: 15 / Nitrite: Negative   Leuk Esterase: Trace / RBC: 0-2 /HPF / WBC 0-2   Sq Epi: x / Non Sq Epi: Many / Bacteria: Moderate      COVID-19 PCR: NotDetec (22 @ 17:39)    Radiology: all available radiological tests reviewed    < from: CT Abdomen and Pelvis No Cont (22 @ 09:58) >  No retroperitoneal hemorrhage.  Bibasilar bronchiectasis and bibasilar opacities which could be on the   basis of chronic lung disease and fibrosis versus acute pneumonia.  < end of copied text >      Advanced directives addressed: full resuscitation

## 2022-07-28 NOTE — CONSULT NOTE ADULT - SUBJECTIVE AND OBJECTIVE BOX
HPI: Pt is a 89y old Female with hx of       PAIN: ( )Yes   ( )No  Level:  Location:  Intensity:    /10  Quality:  Aggravating Factors:  Alleviating Factors:  Radiation:  Duration/Timing:  Impact on ADLs:    DYSPNEA: ( ) Yes  ( ) No  Level:    PAST MEDICAL & SURGICAL HISTORY:  CLL (chronic lymphocytic leukemia)          SOCIAL HX:    Hx opiate tolerance ( )YES  ( )NO    Baseline ADLs  (Prior to Admission)  ( ) Independent   ( )Dependent    FAMILY HISTORY:  Known health problems: none        Review of Systems:    Anxiety-  Depression-  Physical Discomfort-  Dyspnea-  Constipation-  Diarrhea-  Nausea-  Vomiting-  Anorexia-  Weight Loss-   Cough-  Secretions-  Fatigue-  Weakness-  Delirium-    All other systems reviewed and negative  Unable to obtain/Limited due to:      PHYSICAL EXAM:    Vital Signs Last 24 Hrs  T(C): 36.6 (28 Jul 2022 15:06), Max: 36.7 (27 Jul 2022 23:55)  T(F): 97.8 (28 Jul 2022 15:06), Max: 98.1 (27 Jul 2022 23:55)  HR: 64 (28 Jul 2022 15:06) (59 - 85)  BP: 109/54 (28 Jul 2022 15:06) (97/60 - 116/59)  BP(mean): 81 (28 Jul 2022 10:47) (66 - 81)  RR: 18 (28 Jul 2022 15:06) (16 - 19)  SpO2: 98% (28 Jul 2022 15:06) (96% - 98%)    Parameters below as of 28 Jul 2022 15:06  Patient On (Oxygen Delivery Method): room air      Daily Height in cm: 152 (27 Jul 2022 16:59)    Daily     PPSV2:   %  FAST:    General:  Mental Status:  HEENT:  Lungs:  Cardiac:  GI:  :  Ext:  Neuro:      LABS:                        7.6    69.38 )-----------( 194      ( 28 Jul 2022 12:45 )             24.5     07-27    136  |  104  |  68<H>  ----------------------------<  132<H>  5.0   |  27  |  0.91    Ca    9.1      27 Jul 2022 17:39  Mg     2.2     07-27    TPro  6.3  /  Alb  2.8<L>  /  TBili  0.2  /  DBili  x   /  AST  23  /  ALT  20  /  AlkPhos  92  07-27    PT/INR - ( 27 Jul 2022 17:39 )   PT: 12.9 sec;   INR: 1.11 ratio         PTT - ( 27 Jul 2022 17:39 )  PTT:26.3 sec  Albumin: Albumin, Serum: 2.8 g/dL (07-27 @ 17:39)      Allergies    cefdinir (Diarrhea)    Intolerances      MEDICATIONS  (STANDING):  azithromycin  IVPB      cefTRIAXone   IVPB 1000 milliGRAM(s) IV Intermittent every 24 hours    MEDICATIONS  (PRN):  acetaminophen     Tablet .. 650 milliGRAM(s) Oral every 6 hours PRN Temp greater or equal to 38C (100.4F), Mild Pain (1 - 3)  aluminum hydroxide/magnesium hydroxide/simethicone Suspension 30 milliLiter(s) Oral every 4 hours PRN Dyspepsia  melatonin 3 milliGRAM(s) Oral at bedtime PRN Insomnia  ondansetron Injectable 4 milliGRAM(s) IV Push every 8 hours PRN Nausea and/or Vomiting      RADIOLOGY/ADDITIONAL STUDIES: HPI: Pt is a 89y old Female with hx of CLL and Stage III colon cancer  that was surgically removed - admitted in the hospital with increased HRs /  low Blood pressures and shortness of breath. Pt is s/p recent admission to  with RLL pneumonia. She was treated with course of antibiotics and discharged on oral Rx. In the ED -  patient was noted to be hypotensive. Patient went to pulmonologist office and was sent to ED for Workup which  suggested patient being very anemic with HH 5.7. Patient was transfused 2 units of PRBCs with great response to transfusions. Palliative medicine consulted to help establish GOC and advance care planning       PAIN: ( )Yes   (x )No  DYSPNEA: ( ) Yes  (x ) No  Level:    PAST MEDICAL & SURGICAL HISTORY:  CLL (chronic lymphocytic leukemia)    SOCIAL HX:    Hx opiate tolerance ( )YES  (x )NO    Baseline ADLs  (Prior to Admission)  ( ) Independent   (x )Dependent    FAMILY HISTORY:  Known health problems: none    Review of Systems:    Anorexia- yes  Weight Loss-   Fatigue- yes  Weakness- yes    All other systems reviewed and negative    PHYSICAL EXAM:    Vital Signs Last 24 Hrs  T(C): 36.6 (28 Jul 2022 15:06), Max: 36.7 (27 Jul 2022 23:55)  T(F): 97.8 (28 Jul 2022 15:06), Max: 98.1 (27 Jul 2022 23:55)  HR: 64 (28 Jul 2022 15:06) (59 - 85)  BP: 109/54 (28 Jul 2022 15:06) (97/60 - 116/59)  BP(mean): 81 (28 Jul 2022 10:47) (66 - 81)  RR: 18 (28 Jul 2022 15:06) (16 - 19)  SpO2: 98% (28 Jul 2022 15:06) (96% - 98%)    Parameters below as of 28 Jul 2022 15:06  Patient On (Oxygen Delivery Method): room air  Daily Height in cm: 152 (27 Jul 2022 16:59)    Daily     PPSV2: 30  %    General: elderly frail pleasant female in bed, NAD   Mental Status: alert and oriented but forgetful at times   HEENT: mmm, +temporal wasting   Lungs: diminished breath sounds b/l   Cardiac: s1s2 +   GI: non tender, non distended, +BS   : no suprapubic tenderness   Ext: multiple ecchymotic areas and skin tears, +1 edema b/l ankles, + moves all extremities   Neuro: intact       LABS:                        7.6    69.38 )-----------( 194      ( 28 Jul 2022 12:45 )             24.5     07-27    136  |  104  |  68<H>  ----------------------------<  132<H>  5.0   |  27  |  0.91    Ca    9.1      27 Jul 2022 17:39  Mg     2.2     07-27    TPro  6.3  /  Alb  2.8<L>  /  TBili  0.2  /  DBili  x   /  AST  23  /  ALT  20  /  AlkPhos  92  07-27    PT/INR - ( 27 Jul 2022 17:39 )   PT: 12.9 sec;   INR: 1.11 ratio         PTT - ( 27 Jul 2022 17:39 )  PTT:26.3 sec  Albumin: Albumin, Serum: 2.8 g/dL (07-27 @ 17:39)      Allergies    cefdinir (Diarrhea)    Intolerances      MEDICATIONS  (STANDING):  azithromycin  IVPB      cefTRIAXone   IVPB 1000 milliGRAM(s) IV Intermittent every 24 hours    MEDICATIONS  (PRN):  acetaminophen     Tablet .. 650 milliGRAM(s) Oral every 6 hours PRN Temp greater or equal to 38C (100.4F), Mild Pain (1 - 3)  aluminum hydroxide/magnesium hydroxide/simethicone Suspension 30 milliLiter(s) Oral every 4 hours PRN Dyspepsia  melatonin 3 milliGRAM(s) Oral at bedtime PRN Insomnia  ondansetron Injectable 4 milliGRAM(s) IV Push every 8 hours PRN Nausea and/or Vomiting      RADIOLOGY/ADDITIONAL STUDIES:    ACC: [id number] EXAM:  CT ABDOMEN AND PELVIS                        PROCEDURE DATE:  07/28/2022    INTERPRETATION:  CLINICAL INFORMATION: Rule out retroperitoneal bleed  COMPARISON: None.  CONTRAST/COMPLICATIONS:  IV Contrast: NONE  Oral Contrast: NONE  Complications: None reported at time of study completion  PROCEDURE:  CT of the Abdomen and Pelvis was performed.  Sagittal and coronal reformats were performed.    FINDINGS:  LOWER CHEST: Coronary artery calcifications. Small right and trace left   pleural effusions. Bronchiectasis, mucoid impacted bronchi and irregular   airspace opacities in the right middle lobe, lower lobe and lingula.    LIVER: Within normal limits.  BILE DUCTS: Normal caliber.  GALLBLADDER: Within normal limits.  SPLEEN: Within normal limits.  PANCREAS: Within normal limits.  ADRENALS: Within normal limits.  KIDNEYS/URETERS: Within normal limits.  BLADDER: Within normal limits.  REPRODUCTIVE ORGANS: Uterus and adnexa within normal limits.  BOWEL: No bowel obstruction. Right lower quadrant bowel anastomosis noted.  PERITONEUM: No ascites.  VESSELS: Atherosclerotic changes.  RETROPERITONEUM/LYMPH NODES: No lymphadenopathy or hemorrhage.  ABDOMINAL WALL: Within normal limits.  BONES: No acute findings. Generalized bony demineralization. Severe T10   and T12 compression fractures unchanged since chest CT July 27, 2022.    IMPRESSION:  No retroperitoneal hemorrhage.  Bibasilar bronchiectasis and bibasilar opacities which could be on the   basis of chronic lung disease and fibrosis versus acute pneumonia.      ACC: [id number] EXAM:  CT ANGIO CHEST PULM [facility]                        PROCEDURE DATE:  07/27/2022    INTERPRETATION:  CTA CHEST  INDICATION: Sinus of breath. Chest pain. Evaluate for pulmonary embolus.  TECHNIQUE: Enhanced helical images were obtained of the chest. Coronal   and sagittal images were reconstructed.  Images were obtained after the   uneventful administration of 90 cc of nonionic intravenous contrast   (Omnipaque 350). 10 cc of nonionic intravenous contrast (Omnipaque 350)   was discarded. Maximum intensity projection images were generated.  COMPARISON: Radiograph 7/21/2022.  FINDINGS:  Pulmonary Artery:  There is no main, central, lobar, segmental, or   subsegmental pulmonary embolus.  Tubes/Lines: None.  Lungs, airways and pleura: There are bilateral, predominantly peripheral   patchy foci of consolidation and branching opacities. Bilateral   bronchiectasis best shown in the right middle lobe, lingula, and right   lower lobe.  There is opacification of the distal segmental airways, best shown in the   right middle lobe, lingula and right lower lobe.  The pleura is normal.  Mediastinum: The thyroid gland is normal. The esophagus is unremarkable.   There are no enlarged chest lymph nodes.  Left atrium is enlarged. The remainder of the heart is normal in size.   Mitral annular calcification. Aortic valve calcification. Left-sided   aortic arch and left-sided descending thoracic aorta. Aortic   calcifications. The aorta is tortuous.  Upper Abdomen: The hypodense right lobe hepatic lesion is statistically   benign and likely represents a cyst.  Bones And Soft Tissues: Spondylosis of the thoracic spine.  The bones are   qualitatively osteopenic. Compression deformities of the T10 and T12   vertebral bodies involving greater than 50% of the vertebral body height.   L1 vertebral body hemangioma.  Fractures of the left anterior eleventh and twelfth ribs. Healing left   anterior tenth rib fracture.      IMPRESSION:  1.  No pulmonary embolus.  2.  Bilateral patchy foci consolidation, bronchiectasis, and branching   opacities.  3.  Fractures of the left eleventh and twelfth ribs.  4.  Healing left anterior tenth rib fracture.        ACC: [id number] EXAM:  XR CHEST PORTABLE URGENT 1V                        PROCEDURE DATE:  07/27/2022    INTERPRETATION:  AP erect chest on July 27, 2022 at 6:21 PM. Patient has   chest pain.  Heart possibly enlarged.  Significant right base infiltrate again noted roughly similar to July 21.  COPD hyperexpansion is suspect.  Chronic right apical thickening again seen.    Chest similar to prior.    IMPRESSION: Persistent sizable right base infiltrate and COPD.

## 2022-07-28 NOTE — CONSULT NOTE ADULT - ASSESSMENT
pt is s/p rercent admission to  with RLL pneumonia  she was treated with course of antibiotics and discharged on oral Rx  now seen by Dr Ramires as outpt and sent back to ED for hypotension  found to be anemic and has underlying CLL  s/p heme eval  also hx colon ca  Rib fx seen on ct scan  films reviewed    Assessment / plan:  Multilobar pneumonia  Hypotension likely due to anemia  r/o sepsis  CLL hx  Adequate oxygenation / no resp distress  Hx rib fx without significant pleural effusions  no PE on CTA  pre renal azotemia    IV cefepime  transfuse PRBC and fu HCT  GI eval and consider ct abd/pelvis for further evaluation  admit for further workup  will discuss with hospitalist

## 2022-07-28 NOTE — H&P ADULT - PROBLEM SELECTOR PLAN 1
- this is secondary to anemia  - after the transfusion - responded to transfusion well with stable vital signs currently  - Close monitoring of HH  - CT abdomen /  pelvis r/o retroperitoneal bleed

## 2022-07-28 NOTE — H&P ADULT - HISTORY OF PRESENT ILLNESS
Pt is an elderly frail female with extensive past medical admitted in the hospital with increased HRs /  low Blood pressures and shortness of breath. Pt is s/p rercent admission to  with RLL pneumonia. she was treated with course of antibiotics and discharged on oral Rx. In the ED -  patient was noted to be hypotensive. Workup suggested patient being very anemic with HH 5.7. Patient was transfused 2 units of PRBCs with great response to transfusions.     Patient currently stable -  pending Anemia workup.    Pt is 89 y/o elderly frail female with extensive past medical admitted in the hospital with increased HRs /  low Blood pressures and shortness of breath. Pt is s/p rercent admission to  with RLL pneumonia. she was treated with course of antibiotics and discharged on oral Rx. In the ED -  patient was noted to be hypotensive. Workup suggested patient being very anemic with HH 5.7. Patient was transfused 2 units of PRBCs with great response to transfusions.     Patient currently stable -  pending Anemia workup.    Pt is 89 y/o elderly frail female with extensive past medical admitted in the hospital with increased HRs /  low Blood pressures and shortness of breath. Pt is s/p rercent admission to  with RLL pneumonia. she was treated with course of antibiotics and discharged on oral Rx. In the ED -  patient was noted to be hypotensive. Workup suggested patient being very anemic with HH 5.7. Patient was transfused 2 units of PRBCs with great response to transfusions.     Patient currently stable -  pending Anemia workup.     Addendum: Patient's HH is pending - since this am, as patient had low HH - I checked multiple times /  still pendign HH. I ordered repeat HH.

## 2022-07-28 NOTE — H&P ADULT - NSHPPHYSICALEXAM_GEN_ALL_CORE
Physical Exam:   GENERAL APPEARANCE:  elderly, frail, deconditioned, cachectic  T(C): 36.5 (07-28-22 @ 01:29), Max: 36.7 (07-27-22 @ 23:55)  HR: 65 (07-28-22 @ 07:23) (64 - 85)  BP: 110/62 (07-28-22 @ 07:23) (97/60 - 116/59)  RR: 18 (07-28-22 @ 07:23) (16 - 18)  SpO2: 98% (07-28-22 @ 07:23) (97% - 98%)  HEENT:  temporal muscle wasting  Skin:  thin and dry multiple   NECK:  Supple without lymphadenopathy.   HEART:  Regular rate and rhythm. normal S1 and S2, No M/R/G  LUNGS:  Good ins/exp effort, no W/R/R/C  ABDOMEN:  Soft, nontender, nondistended with good bowel sounds heard  EXTREMITIES:  Without cyanosis, clubbing or edema.   NEUROLOGICAL:  Gross nonfocal Physical Exam:   GENERAL APPEARANCE:  elderly, frail, deconditioned, cachectic  T(C): 36.5 (07-28-22 @ 01:29), Max: 36.7 (07-27-22 @ 23:55)  HR: 65 (07-28-22 @ 07:23) (64 - 85)  BP: 110/62 (07-28-22 @ 07:23) (97/60 - 116/59)  RR: 18 (07-28-22 @ 07:23) (16 - 18)  SpO2: 98% (07-28-22 @ 07:23) (97% - 98%)  HEENT:  temporal muscle wasting  Skin:  thin and dry multiple   NECK:  Supple without lymphadenopathy.   HEART:  Regular rate and rhythm. normal S1 and S2, No M/R/G  LUNGS:  (+) rhonchi  ABDOMEN:  Soft, nontender, nondistended with good bowel sounds heard  EXTREMITIES:  Without cyanosis, clubbing or edema.   NEUROLOGICAL:  Gross nonfocal

## 2022-07-28 NOTE — H&P ADULT - CONVERSATION DETAILS
Patient lost a lot of weight lost couple of months -  s/p colon resection in december.    Really deconditioned after patient started having back pains -  now resolved

## 2022-07-28 NOTE — H&P ADULT - NSHPLABSRESULTS_GEN_ALL_CORE
CBC Full  -  ( 2022 17:39 )  WBC Count : 73.71 K/uL  RBC Count : 1.79 M/uL  Hemoglobin : 5.7 g/dL  Hematocrit : 19.0 %  Platelet Count - Automated : 215 K/uL  Mean Cell Volume : 106.1 fl  Mean Cell Hemoglobin : 31.8 pg  Mean Cell Hemoglobin Concentration : 30.0 gm/dL  Auto Neutrophil # : 5.16 K/uL  Auto Lymphocyte # : 65.60 K/uL  Auto Monocyte # : 2.95 K/uL  Auto Eosinophil # : 0.00 K/uL  Auto Basophil # : 0.00 K/uL  Auto Neutrophil % : 7.0 %  Auto Lymphocyte % : 89.0 %  Auto Monocyte % : 4.0 %  Auto Eosinophil % : 0.0 %  Auto Basophil % : 0.0 %    PT/INR - ( 2022 17:39 )   PT: 12.9 sec;   INR: 1.11 ratio         PTT - ( 2022 17:39 )  PTT:26.3 sec  Urinalysis Basic - ( 2022 17:56 )    Color: Yellow / Appearance: Slightly Turbid / S.010 / pH: x  Gluc: x / Ketone: Negative  / Bili: Negative / Urobili: Negative   Blood: x / Protein: 15 / Nitrite: Negative   Leuk Esterase: Trace / RBC: 0-2 /HPF / WBC 0-2   Sq Epi: x / Non Sq Epi: Many / Bacteria: Moderate          136  |  104  |  68<H>  ----------------------------<  132<H>  5.0   |  27  |  0.91    Ca    9.1      2022 17:39  Mg     2.2         TPro  6.3  /  Alb  2.8<L>  /  TBili  0.2  /  DBili  x   /  AST  23  /  ALT  20  /  AlkPhos  92   CBC Full  -  ( 2022 17:39 )  WBC Count : 73.71 K/uL  RBC Count : 1.79 M/uL  Hemoglobin : 5.7 g/dL  Hematocrit : 19.0 %  Platelet Count - Automated : 215 K/uL    PT/INR - ( 2022 17:39 )   PT: 12.9 sec;   INR: 1.11 ratio      PTT - ( 2022 17:39 )  PTT:26.3 sec  Urinalysis Basic - ( 2022 17:56 )    Color: Yellow / Appearance: Slightly Turbid / S.010 / pH: x  Gluc: x / Ketone: Negative  / Bili: Negative / Urobili: Negative   Blood: x / Protein: 15 / Nitrite: Negative   Leuk Esterase: Trace / RBC: 0-2 /HPF / WBC 0-2   Sq Epi: x / Non Sq Epi: Many / Bacteria: Moderate    136  |  104  |  68<H>  ----------------------------<  132<H>  5.0   |  27  |  0.91    Ca    9.1      2022 17:39  Mg     2.2         TPro  6.3  /  Alb  2.8<L>  /  TBili  0.2  /  DBili  x   /  AST  23  /  ALT  20  /  AlkPhos  92

## 2022-07-28 NOTE — H&P ADULT - PROBLEM SELECTOR PLAN 3
# Acute on chronic anemia - no signs of bleeding  - check Iron studies   - Hb 5.7   - 2 units of PRBCs  - check LDH and Hapto  - check FOBT   - Surgeon Femi Martin   - Monitor HH

## 2022-07-28 NOTE — H&P ADULT - PROBLEM SELECTOR PLAN 4
- has not had CLL directed therapy  - would check [facility]   - may benefit from possible IVIG as oupatient   - WBC 73 - baseline 40-60

## 2022-07-28 NOTE — H&P ADULT - PROBLEM SELECTOR PLAN 6
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

## 2022-07-28 NOTE — H&P ADULT - NSHPREVIEWOFSYSTEMS_GEN_ALL_CORE
REVIEW OF SYSTEMS:  General: NAD, hemodynamically stable, + weakness  HEENT:  Eyes:  No visual loss, blurred vision, double vision or yellow sclerae. Ears, Nose, Throat:  No hearing loss, sneezing, congestion, runny nose or sore throat.  SKIN:  No rash or itching.  CARDIOVASCULAR:  No chest pain   RESPIRATORY:  No shortness of breath  GASTROINTESTINAL:  No anorexia, nausea, vomiting or diarrhea. No abdominal pain or blood.  NEUROLOGICAL:  No headache, dizziness, syncope, paralysis, ataxia, numbness or tingling in the extremities. No change in bowel or bladder control.  MUSCULOSKELETAL:  No muscle, back pain, joint pain or stiffness.  HEMATOLOGIC:  No anemia, bleeding or bruising.  LYMPHATICS:  No enlarged nodes. No history of splenectomy.  ENDOCRINOLOGIC:  No reports of sweating, cold or heat intolerance. No polyuria or polydipsia.  ALLERGIES:  No history of asthma, hives, eczema or rhinitis. REVIEW OF SYSTEMS:  General: NAD, hemodynamically stable, + weakness  HEENT:  Eyes:  No visual loss, blurred vision, double vision or yellow sclerae. Ears, Nose, Throat:  No hearing loss, sneezing, congestion, runny nose or sore throat.  SKIN:  No rash or itching.  CARDIOVASCULAR:  No chest pain   RESPIRATORY:  No shortness of breath  GASTROINTESTINAL:  No anorexia, nausea, vomiting or diarrhea. No abdominal pain or blood.  NEUROLOGICAL:  No headache, dizziness, syncope, paralysis, ataxia, numbness or tingling in the extremities. No change in bowel or bladder control.  MUSCULOSKELETAL:  No muscle, back pain, joint pain or stiffness.  HEMATOLOGIC:  Hx of anemia  LYMPHATICS:  No enlarged nodes. No history of splenectomy.  ENDOCRINOLOGIC:  No reports of sweating, cold or heat intolerance. No polyuria or polydipsia.  ALLERGIES:  No history of asthma, hives, eczema or rhinitis.

## 2022-07-28 NOTE — CONSULT NOTE ADULT - SUBJECTIVE AND OBJECTIVE BOX
Patient is a 89y old  Female who presents with a chief complaint of     HPI:pt is s/p rercent admission to  with RLL pneumonia  she was treated with course of antibiotics and discharged on oral Rx  now seen by Dr Ramires as outpt and sent back to ED for hypotension  found to be anemic and has underlying CLL  s/p heme eval  also hx colon ca  Rib fx seen on ct scan  films reviewed        PAST MEDICAL & SURGICAL HISTORY:  CLL (chronic lymphocytic leukemia)  colon ca hx  s/p RLL pneumonia        Home Medications:  Multiple Vitamins oral tablet, chewable: 1 tab(s) orally once a day (2022 19:30)  polyethylene glycol 3350 oral powder for reconstitution: 17 gram(s) orally once a day, As Needed (2022 19:30)  Probiotic Formula oral capsule: 1 cap(s) orally once a day, As Needed when on Antibiotic (2022 23:53)  Vitamin D3 25 mcg (1000 intl units) oral tablet: 1 tab(s) orally once a day (2022 19:30)      MEDICATIONS  (STANDING):    PREVIOUS DIAGNOSTIC TESTING:      MEDICATIONS  (STANDING):    MEDICATIONS  (PRN):      FAMILY HISTORY:  Known health problems: none    SOCIAL HISTORY:  ***    REVIEW OF SYSTEM:  Pertinent items are noted in HPI.    Vital Signs Last 24 Hrs  T(C): 36.5 (2022 01:29), Max: 36.7 (2022 23:55)  T(F): 97.7 (2022 01:29), Max: 98.1 (2022 23:55)  HR: 65 (2022 07:23) (64 - 85)  BP: 110/62 (2022 07:23) (97/60 - 116/59)  BP(mean): 76 (2022 01:29) (66 - 76)  RR: 18 (2022 07:23) (16 - 18)  SpO2: 98% (2022 07:23) (97% - 98%)    Parameters below as of 2022 04:15  Patient On (Oxygen Delivery Method): room air        I&O's Summary    PHYSICAL EXAM  elderly female resting in bed / no distress  General Appearance: cooperative, no acute distress,   HEENT: PERRL, conjunctiva clear, EOM's intact,   Neck: Supple, , no adenopathy  Lungs: bilateral lower lobe rhonchi R>L  Heart: Regular rate and rhythm, S1, S2 normal  Abdomen: Soft, non-tender, bowel sounds active  Extremities: no cyanosis or edema, no joint swelling  Neurologic: Alert and oriented X3     ECG:    LABS:                          5.7    73.71 )-----------( 215      ( 2022 17:39 )             19.0         136  |  104  |  68<H>  ----------------------------<  132<H>  5.0   |  27  |  0.91    Ca    9.1      2022 17:39  Mg     2.2         TPro  6.3  /  Alb  2.8<L>  /  TBili  0.2  /  DBili  x   /  AST  23  /  ALT  20  /  AlkPhos  92  27          Pro BNP  532  @ 17:39  D Dimer  729  @ 17:39    PT/INR - ( 2022 17:39 )   PT: 12.9 sec;   INR: 1.11 ratio         PTT - ( 2022 17:39 )  PTT:26.3 sec  Urinalysis Basic - ( 2022 17:56 )    Color: Yellow / Appearance: Slightly Turbid / S.010 / pH: x  Gluc: x / Ketone: Negative  / Bili: Negative / Urobili: Negative   Blood: x / Protein: 15 / Nitrite: Negative   Leuk Esterase: Trace / RBC: 0-2 /HPF / WBC 0-2   Sq Epi: x / Non Sq Epi: Many / Bacteria: Moderate      < from: CT Angio Chest PE Protocol w/ IV Cont (22 @ 18:32) >  COMPARISON: Radiograph 2022.    FINDINGS:    Pulmonary Artery:  There is no main, central, lobar, segmental, or   subsegmental pulmonary embolus.    Tubes/Lines: None.    Lungs, airways and pleura: There are bilateral, predominantly peripheral   patchy foci of consolidation and branching opacities. Bilateral   bronchiectasis best shown in the right middle lobe, lingula, and right   lower lobe.    There is opacification of the distal segmental airways, best shown in the   right middle lobe, lingula and right lower lobe.    The pleura is normal.    Mediastinum: The thyroid gland is normal. The esophagus is unremarkable.   There are no enlarged chest lymph nodes.    Left atrium is enlarged. The remainder of the heart is normal in size.   Mitral annular calcification. Aortic valve calcification. Left-sided   aortic arch and left-sided descending thoracic aorta. Aortic   calcifications. The aorta is tortuous.    Upper Abdomen: The hypodense right lobe hepatic lesion is statistically   benign and likely represents a cyst.    Bones And Soft Tissues: Spondylosis of the thoracic spine.  The bones are   qualitatively osteopenic. Compression deformities of the T10 and T12   vertebral bodies involving greater than 50% of the vertebral body height.   L1 vertebral body hemangioma.    Fractures of the left anterior eleventh and twelfth ribs. Healing left   anterior tenth rib fracture.      IMPRESSION:    1.  No pulmonary embolus.  2.  Bilateral patchy foci consolidation, bronchiectasis, and branching   opacities.  3.  Fractures of the left eleventh and twelfth ribs.  4.  Healing left anterior tenth rib fracture.    < end of copied text >  < from: Xray Chest 1 View- PORTABLE-Urgent (Xray Chest 1 View- PORTABLE-Urgent .) (22 @ 18:59) >  COMPARISON: None. .    FINDINGS:  CATHETERS AND TUBES: None    PULMONARY: RIGHT basilar multifocal airspace consolidations . RIGHT upper   zone and LEFT lung parenchyma grossly clear.  No pneumothorax.    HEART/VASCULAR: The  heart is enlarged in transverse diameter.     BONES: Visualized osseous structures are intact.    IMPRESSION:   RIGHT basilar multifocal airspace consolidations.    < end of copied text >        RADIOLOGY & ADDITIONAL STUDIES:

## 2022-07-28 NOTE — CONSULT NOTE ADULT - ASSESSMENT
Pt is a 89y old Female with hx of CLL and colon mass that was surgically removed - admitted in the hospital with increased HRs /  low Blood pressures and shortness of breath. Pt is s/p recent admission to  with RLL pneumonia. She was treated with course of antibiotics and discharged on oral Rx. In the ED -  patient was noted to be hypotensive. Workup suggested patient being very anemic with HH 5.7. Patient was transfused 2 units of PRBCs. Palliative medicine consulted to help establish GOC and advance care planning     1) CLL and Stage 3 colon cancer  - follow by Dr. Donis   - onc consult appreciated   - has not had any treatment   - monitor labs  - surgeon Dr. Martin - s/p colectomy    2) Anemia   - hem/onc consult appreciated   - s/p 2 units   - monitor labs     3) PNA   - ID consult appreciated   - c/w ceftriaxone and azithromycin  - monitor temps   - pulmonology consult appreciated   - monitor O2     4) severe protein calorie malnutrition  - registered dietician consult    - pressure ulcer   - encourage PO intake     Process of Care  --Reviewed dx/treatment problems and alignment with Goals of Care    Physical Aspects of Care  --Pain  patient denies at this time  c/w current management    --Bowel Regimen  denies constipation  risk for constipation d/t immobility  daily dulcolax    --Dyspnea  No SOB at this time  comfortable and in NAD    --Nausea Vomiting  denies    --Weakness  PT as tolerated     Psychological and Psychiatric Aspects of Care:   --Greif/Bereavment: emotional support provided  --Hx of psychiatric dx: none  -Pastoral Care Available PRN     Social Aspects of Care  -SW involved     Cultural Aspects  -Primary Language: English    Goals of Care:     We discussed Palliative Care team being a supportive team when a patient has ongoing illnesses.  We also discussed that it is not an end of life care service, but can help navigate symptoms and emotional support through out their hospital stay here.    Ethical and Legal Aspects:   NA    Capacity- patient appears to have capacity but defers to her daughters     HCP/Surrogate: HCP placed in chart Nata named as HCP with alternate Ellen     Code Status- DNR/I   MOLST- on chart   Dispo Plan-     Discussed With:    Case coordinated with attending and SW and RN

## 2022-07-28 NOTE — PATIENT PROFILE ADULT - FALL HARM RISK - RISK INTERVENTIONS
Assistance with ambulation/Communicate Fall Risk and Risk Factors to all staff, patient, and family/Reinforce activity limits and safety measures with patient and family/Visual Cue: Yellow wristband/Bed in lowest position, wheels locked, appropriate side rails in place/Call bell, personal items and telephone in reach/Instruct patient to call for assistance before getting out of bed or chair/Non-slip footwear when patient is out of bed/Odin to call system/Physically safe environment - no spills, clutter or unnecessary equipment/Purposeful Proactive Rounding/Room/bathroom lighting operational, light cord in reach

## 2022-07-28 NOTE — CONSULT NOTE ADULT - ASSESSMENT
89 y/o elderly frail female with CLL, recent hospitalization for pneumonia was admitted on 7/27 for increased weakness, increased HRs / low Blood pressures and shortness of breath. Pt is had rercent admission to  with RLL pneumonia and was treated with course of antibiotics and discharged on oral Rx. In the ED -  patient was noted to be hypotensive. Workup suggested patient being very anemic with HH 5.7. Patient was transfused 2 units of PRBCs with great response to transfusions. She received ceftriaxone and azithromycin.     1. Hypotension. Possible b/l pneumonia vs pulmonary fibrosis. CLL. Immunocompromised host.   -obtain BC x 2, urine c/s  -agree with ceftriaxone 1 gm IV qd and azithromycin 500 mg IV qd  -reason for abx use and side effects reviewed with patient; monitor BMP   -respiratory care  -old chart reviewed to assess prior cultures  -monitor temps  -f/u CBC  -supportive care  2. Other issues:   -care per medicine

## 2022-07-29 LAB
HCT VFR BLD CALC: 26 % — LOW (ref 34.5–45)
HCT VFR BLD CALC: 26.4 % — LOW (ref 34.5–45)
HGB BLD-MCNC: 8.3 G/DL — LOW (ref 11.5–15.5)
HGB BLD-MCNC: 8.6 G/DL — LOW (ref 11.5–15.5)
MCHC RBC-ENTMCNC: 30.3 PG — SIGNIFICANT CHANGE UP (ref 27–34)
MCHC RBC-ENTMCNC: 30.9 PG — SIGNIFICANT CHANGE UP (ref 27–34)
MCHC RBC-ENTMCNC: 31.9 GM/DL — LOW (ref 32–36)
MCHC RBC-ENTMCNC: 32.6 GM/DL — SIGNIFICANT CHANGE UP (ref 32–36)
MCV RBC AUTO: 94.9 FL — SIGNIFICANT CHANGE UP (ref 80–100)
MCV RBC AUTO: 95 FL — SIGNIFICANT CHANGE UP (ref 80–100)
PLATELET # BLD AUTO: 172 K/UL — SIGNIFICANT CHANGE UP (ref 150–400)
PLATELET # BLD AUTO: 176 K/UL — SIGNIFICANT CHANGE UP (ref 150–400)
RBC # BLD: 2.74 M/UL — LOW (ref 3.8–5.2)
RBC # BLD: 2.78 M/UL — LOW (ref 3.8–5.2)
RBC # FLD: 21.1 % — HIGH (ref 10.3–14.5)
RBC # FLD: 21.3 % — HIGH (ref 10.3–14.5)
WBC # BLD: 57.1 K/UL — CRITICAL HIGH (ref 3.8–10.5)
WBC # BLD: 65.92 K/UL — CRITICAL HIGH (ref 3.8–10.5)
WBC # FLD AUTO: 57.1 K/UL — CRITICAL HIGH (ref 3.8–10.5)
WBC # FLD AUTO: 65.92 K/UL — CRITICAL HIGH (ref 3.8–10.5)

## 2022-07-29 PROCEDURE — 99232 SBSQ HOSP IP/OBS MODERATE 35: CPT

## 2022-07-29 PROCEDURE — 99233 SBSQ HOSP IP/OBS HIGH 50: CPT

## 2022-07-29 PROCEDURE — 99497 ADVNCD CARE PLAN 30 MIN: CPT

## 2022-07-29 RX ORDER — POLYETHYLENE GLYCOL 3350 17 G/17G
17 POWDER, FOR SOLUTION ORAL DAILY
Refills: 0 | Status: DISCONTINUED | OUTPATIENT
Start: 2022-07-29 | End: 2022-08-02

## 2022-07-29 RX ADMIN — CEFTRIAXONE 100 MILLIGRAM(S): 500 INJECTION, POWDER, FOR SOLUTION INTRAMUSCULAR; INTRAVENOUS at 23:01

## 2022-07-29 RX ADMIN — CEFTRIAXONE 100 MILLIGRAM(S): 500 INJECTION, POWDER, FOR SOLUTION INTRAMUSCULAR; INTRAVENOUS at 00:13

## 2022-07-29 RX ADMIN — AZITHROMYCIN 255 MILLIGRAM(S): 500 TABLET, FILM COATED ORAL at 11:09

## 2022-07-29 RX ADMIN — POLYETHYLENE GLYCOL 3350 17 GRAM(S): 17 POWDER, FOR SOLUTION ORAL at 23:00

## 2022-07-29 NOTE — DIETITIAN INITIAL EVALUATION ADULT - OTHER INFO
89yo female with PMH significant for CLL p/w increased HR's and low blood pressure, SOB.  admitted with hypotension, PNA, anemia, colon CA and multiple Rib Fx.    *wt hx per EMR: 77.8# (7/21/22).  current wt per bedscale on 7/29/22 of 71#.  wt loss of 6.8# in 8 days (8.7%), clinically significant.  NFPE significant for severe muscle/fat wasting.  pt reports improved appetite, suspect is inaccurate given wt loss.  pt agrees to oral supplement.  given poor PO intake and wt loss, DASH restricted diet is inappropriate at this time.  pt meets criteria for severe malnutrition.

## 2022-07-29 NOTE — PROGRESS NOTE ADULT - ASSESSMENT
Pt is 89 y/o elderly frail female with extensive past medical admitted in the hospital with increased HRs /  low Blood pressures and shortness of breath. Pt is s/p rercent admission to  with RLL pneumonia. she was treated with course of antibiotics and discharged on oral Rx. In the ED -  patient was noted to be hypotensive. Workup suggested patient being very anemic with HH 5.7. Patient was transfused 2 units of PRBCs with great response to transfusions.   Assessment and Plan:   Problem/Plan - 1:  ·  Problem: Hypotension.   ·  Plan: - this is secondary to anemia  - after the transfusion - responded to transfusion well with stable vital signs currently  - Close monitoring of HH  - CT abdomen /  pelvis r/o retroperitoneal bleed; neg    Problem/Plan - 2:  ·  Problem: Gram-negative pneumonia.   ·  Plan: - continue with IV cefepime  - currently not on O2  - patient is being follow by Dr. Hawkins.    Problem/Plan - 3:  ·  Problem: Anemia.   ·  Plan: # Acute on chronic anemia - no signs of bleeding  - check Iron studies   - Hb 5.7   - 2 units of PRBCs  - check LDH and Hapto  - check FOBT   - Surgeon Femi Martin   - Monitor HH.    Problem/Plan - 4:  ·  Problem: CLL (chronic lymphocytic leukemia).   ·  Plan: - has not had CLL directed therapy  - would check Quiggs   - may benefit from possible IVIG as out patient   - WBC 73 - baseline 40-60.    Problem/Plan - 5:  ·  Problem: Adult failure to thrive.   ·  Plan: - supportive care - palliative care evaluation.    Problem/Plan - 6:  ·  Problem: Colonic cancer.   ·  Plan: # h/o stage III colon CA KRAS wild type left­sided  - originally diagnosed with braf wild type, kras wild stype stage IIIa disease s/p colectomy- discussion held regarding adjuvant chemotherapy but plan to hold off.    Problem/Plan - 7:  ·  Problem: Multiple rib fractures.   ·  Plan: - Fractures of the left eleventh and twelfth ribs.    Problem/Plan - 8:  Abn TRops: cardio eval  likely demand ischemia    poc discussed with pt, her daughter, Kat, team

## 2022-07-29 NOTE — PROGRESS NOTE ADULT - SUBJECTIVE AND OBJECTIVE BOX
Date of service: 22 @ 07:50    Lying in bed in NAD  No SOB at rest  Feels better    ROS: no fever or chills; denies dizziness, no HA, no abdominal pain, no diarrhea or constipation; no dysuria, no legs pain, no rashes    MEDICATIONS  (STANDING):  azithromycin  IVPB      azithromycin  IVPB 500 milliGRAM(s) IV Intermittent every 24 hours  cefTRIAXone   IVPB 1000 milliGRAM(s) IV Intermittent every 24 hours    Vital Signs Last 24 Hrs  T(C): 36.4 (2022 21:05), Max: 36.7 (2022 16:45)  T(F): 97.5 (2022 21:05), Max: 98 (2022 16:45)  HR: 64 (2022 21:05) (59 - 73)  BP: 114/57 (2022 21:05) (106/70 - 116/57)  BP(mean): 81 (2022 10:47) (81 - 81)  RR: 18 (2022 21:05) (17 - 19)  SpO2: 93% (2022 21:05) (93% - 98%)    Parameters below as of 2022 21:05  Patient On (Oxygen Delivery Method): room air         Physical exam:    Constitutional:  No acute distress  HEENT: NC/AT, EOMI, PERRLA, conjunctivae clear; ears and nose atraumatic  Neck: supple; thyroid not palpable  Back: no tenderness  Respiratory: respiratory effort normal; crackles at bases  Cardiovascular: S1S2 regular, no murmurs  Abdomen: soft, not tender, not distended, positive BS  Genitourinary: no suprapubic tenderness  Lymphatic: no LN palpable  Musculoskeletal: no muscle tenderness, no joint swelling or tenderness  Extremities: no pedal edema  Neurological/ Psychiatric: AxOx3, moving all extremities  Skin: no rashes; no palpable lesions    Labs: all available labs reviewed                        7.6    69.38 )-----------( 194      ( 2022 12:45 )             24.5     07    136  |  104  |  68<H>  ----------------------------<  132<H>  5.0   |  27  |  0.91    Ca    9.1      2022 17:39  Mg     2.2     -    TPro  6.3  /  Alb  2.8<L>  /  TBili  0.2  /  DBili  x   /  AST  23  /  ALT  20  /  AlkPhos  92  -     LIVER FUNCTIONS - ( 2022 17:39 )  Alb: 2.8 g/dL / Pro: 6.3 gm/dL / ALK PHOS: 92 U/L / ALT: 20 U/L / AST: 23 U/L / GGT: x           Urinalysis Basic - ( 2022 17:56 )    Color: Yellow / Appearance: Slightly Turbid / S.010 / pH: x  Gluc: x / Ketone: Negative  / Bili: Negative / Urobili: Negative   Blood: x / Protein: 15 / Nitrite: Negative   Leuk Esterase: Trace / RBC: 0-2 /HPF / WBC 0-2   Sq Epi: x / Non Sq Epi: Many / Bacteria: Moderate      Culture - Urine (collected 2022 17:56)  Source: Clean Catch Clean Catch (Midstream)  Final Report (2022 23:08):    No growth    Culture - Blood (collected 2022 17:39)  Source: .Blood None  Preliminary Report (2022 01:02):    No growth to date.    Culture - Blood (collected 2022 17:39)  Source: .Blood None  Preliminary Report (2022 01:02):    No growth to date.    COVID-19 PCR: NotDetec (22 @ 17:39)    Radiology: all available radiological tests reviewed    < from: CT Abdomen and Pelvis No Cont (22 @ 09:58) >  No retroperitoneal hemorrhage.  Bibasilar bronchiectasis and bibasilar opacities which could be on the   basis of chronic lung disease and fibrosis versus acute pneumonia.  < end of copied text >    Advanced directives addressed: full resuscitation

## 2022-07-29 NOTE — DIETITIAN NUTRITION RISK NOTIFICATION - TREATMENT: THE FOLLOWING DIET HAS BEEN RECOMMENDED
Diet, Regular:   Supplement Feeding Modality:  Oral  Ensure Enlive Cans or Servings Per Day:  1       Frequency:  Three Times a day (07-29-22 @ 09:44) [Pending Verification By Attending]  Diet, DASH/TLC:   Sodium & Cholesterol Restricted (07-27-22 @ 18:31) [Active]

## 2022-07-29 NOTE — PHYSICAL THERAPY INITIAL EVALUATION ADULT - MODALITIES TREATMENT COMMENTS
pt left seated in chair post Eval; chair alarm donned; daughters present; callbell in reach; pt instructed not to get up alone; call nursing for assist; lisa well; denied pain; RN Celia made aware pt OOB

## 2022-07-29 NOTE — PROGRESS NOTE ADULT - SUBJECTIVE AND OBJECTIVE BOX
: no new complaints      PHYSICAL EXAM:    Daily     Daily     Vital Signs Last 24 Hrs  T(C): 36.6 (2022 09:34), Max: 36.7 (2022 16:45)  T(F): 97.8 (2022 09:34), Max: 98 (2022 16:45)  HR: 75 (2022 09:34) (62 - 75)  BP: 106/56 (2022 09:34) (106/56 - 116/57)  BP(mean): --  RR: 18 (2022 09:34) (17 - 18)  SpO2: 94% (2022 09:34) (93% - 98%)    Constitutional: Weak and ill appearing  HEENT: Atraumatic, SHAYY,   Respiratory: Breath Sounds normal, no rhonchi/wheeze  Cardiovascular: N S1S2;   Gastrointestinal: Abdomen soft, non tender, Bowel Sounds present  Extremities: No edema, peripheral pulses present  Neurological: AAO x 3, no gross focal motor deficits  Skin: Non cellulitic, no rash, ulcers  Lymph Nodes: No lymphadenopathy noted  Back: No CVA tenderness   Musculoskeletal: non tender  Breasts: Deferred  Genitourinary: deferred  Rectal: Deferred    All Labs/EKG/Radiology/Meds reviewed by me                          8.6    57.10 )-----------( 172      ( 2022 07:01 )             26.4       CBC Full  -  ( 2022 07:01 )  WBC Count : 57.10 K/uL  RBC Count : 2.78 M/uL  Hemoglobin : 8.6 g/dL  Hematocrit : 26.4 %  Platelet Count - Automated : 172 K/uL  Mean Cell Volume : 95.0 fl  Mean Cell Hemoglobin : 30.9 pg  Mean Cell Hemoglobin Concentration : 32.6 gm/dL  Auto Neutrophil # : x  Auto Lymphocyte # : x  Auto Monocyte # : x  Auto Eosinophil # : x  Auto Basophil # : x  Auto Neutrophil % : x  Auto Lymphocyte % : x  Auto Monocyte % : x  Auto Eosinophil % : x  Auto Basophil % : x          136  |  104  |  68<H>  ----------------------------<  132<H>  5.0   |  27  |  0.91    Ca    9.1      2022 17:39  Mg     2.2         TPro  6.3  /  Alb  2.8<L>  /  TBili  0.2  /  DBili  x   /  AST  23  /  ALT  20  /  AlkPhos  92        LIVER FUNCTIONS - ( 2022 17:39 )  Alb: 2.8 g/dL / Pro: 6.3 gm/dL / ALK PHOS: 92 U/L / ALT: 20 U/L / AST: 23 U/L / GGT: x             PT/INR - ( 2022 17:39 )   PT: 12.9 sec;   INR: 1.11 ratio         PTT - ( 2022 17:39 )  PTT:26.3 sec          Urinalysis Basic - ( 2022 17:56 )    Color: Yellow / Appearance: Slightly Turbid / S.010 / pH: x  Gluc: x / Ketone: Negative  / Bili: Negative / Urobili: Negative   Blood: x / Protein: 15 / Nitrite: Negative   Leuk Esterase: Trace / RBC: 0-2 /HPF / WBC 0-2   Sq Epi: x / Non Sq Epi: Many / Bacteria: Moderate            MEDICATIONS  (STANDING):  azithromycin  IVPB      azithromycin  IVPB 500 milliGRAM(s) IV Intermittent every 24 hours  cefTRIAXone   IVPB 1000 milliGRAM(s) IV Intermittent every 24 hours    MEDICATIONS  (PRN):  acetaminophen     Tablet .. 650 milliGRAM(s) Oral every 6 hours PRN Temp greater or equal to 38C (100.4F), Mild Pain (1 - 3)  aluminum hydroxide/magnesium hydroxide/simethicone Suspension 30 milliLiter(s) Oral every 4 hours PRN Dyspepsia  melatonin 3 milliGRAM(s) Oral at bedtime PRN Insomnia  ondansetron Injectable 4 milliGRAM(s) IV Push every 8 hours PRN Nausea and/or Vomiting

## 2022-07-29 NOTE — DIETITIAN INITIAL EVALUATION ADULT - PERTINENT MEDS FT
MEDICATIONS  (STANDING):  azithromycin  IVPB      azithromycin  IVPB 500 milliGRAM(s) IV Intermittent every 24 hours  cefTRIAXone   IVPB 1000 milliGRAM(s) IV Intermittent every 24 hours    MEDICATIONS  (PRN):  acetaminophen     Tablet .. 650 milliGRAM(s) Oral every 6 hours PRN Temp greater or equal to 38C (100.4F), Mild Pain (1 - 3)  aluminum hydroxide/magnesium hydroxide/simethicone Suspension 30 milliLiter(s) Oral every 4 hours PRN Dyspepsia  melatonin 3 milliGRAM(s) Oral at bedtime PRN Insomnia  ondansetron Injectable 4 milliGRAM(s) IV Push every 8 hours PRN Nausea and/or Vomiting

## 2022-07-29 NOTE — PROGRESS NOTE ADULT - ASSESSMENT
pt is s/p rercent admission to  with RLL pneumonia  she was treated with course of antibiotics and discharged on oral Rx  now seen by Dr Ramires as outpt and sent back to ED for hypotension  found to be anemic and has underlying CLL  s/p heme eval  also hx colon ca  Rib fx seen on ct scan  films reviewed    Assessment / plan:  Multilobar pneumonia  Hypotension likely due to anemia  r/o sepsis  CLL hx  Adequate oxygenation / no resp distress  Hx rib fx without significant pleural effusions  no PE on CTA  pre renal azotemia    IV antibiotics as per ID  transfuse PRBC and fu HCT, pending  GI eval and consider ct abd/pelvis for further evaluation  admit for further workup  Dr Ramires is covering the weekend

## 2022-07-29 NOTE — DIETITIAN NUTRITION RISK NOTIFICATION - ADDITIONAL COMMENTS/DIETITIAN RECOMMENDATIONS
d1) liberalize diet regular and add ensure enlive TID 2) consider checking vitamin D 25OH level and supplement prn 3) monitor BM: if > 3 days without BM, add bowel regimen 4) in v/o malnutrition, add 200mg thiamine 5) daily wt checks to track/trend changes 6) add MVI with minerals daily to ensure 100% RDI met

## 2022-07-29 NOTE — DIETITIAN INITIAL EVALUATION ADULT - ADD RECOMMEND
1) liberalize diet regular and add ensure enlive TID 2) consider checking vitamin D 25OH level and supplement prn 3) monitor BM: if > 3 days without BM, add bowel regimen 4) in v/o malnutrition, add 200mg thiamine 5) daily wt checks to track/trend changes 6) add MVI with minerals daily to ensure 100% RDI met

## 2022-07-29 NOTE — PROGRESS NOTE ADULT - SUBJECTIVE AND OBJECTIVE BOX
HPI:      PAIN: ( )Yes   ( )No  Level:  Location:  Intensity:    /10  Quality:  Aggravating Factors:  Alleviating Factors:  Radiation:  Duration/Timing:  Impact on ADLs:    DYSPNEA: ( ) Yes  ( ) No  Level:        Review of Systems:    Anxiety-  Depression-  Physical Discomfort-  Dyspnea-  Constipation-  Diarrhea-  Nausea-  Vomiting-  Anorexia-  Weight Loss-   Cough-  Secretions-  Fatigue-  Weakness-  Delirium-    All other systems reviewed and negative  Unable to obtain/Limited due to:        PHYSICAL EXAM:    Vital Signs Last 24 Hrs  T(C): 36.4 (28 Jul 2022 21:05), Max: 36.7 (28 Jul 2022 16:45)  T(F): 97.5 (28 Jul 2022 21:05), Max: 98 (28 Jul 2022 16:45)  HR: 64 (28 Jul 2022 21:05) (59 - 73)  BP: 114/57 (28 Jul 2022 21:05) (106/70 - 116/57)  BP(mean): 81 (28 Jul 2022 10:47) (81 - 81)  RR: 18 (28 Jul 2022 21:05) (17 - 19)  SpO2: 93% (28 Jul 2022 21:05) (93% - 98%)    Parameters below as of 28 Jul 2022 21:05  Patient On (Oxygen Delivery Method): room air      Daily     Daily     PPSV2:   %  FAST:    General:  HEENT:  Lungs:  Cardiac:  GI:  :  Ext:  Neuro:      LABS:                        8.6    57.10 )-----------( 172      ( 29 Jul 2022 07:01 )             26.4     07-27    136  |  104  |  68<H>  ----------------------------<  132<H>  5.0   |  27  |  0.91    Ca    9.1      27 Jul 2022 17:39  Mg     2.2     07-27    TPro  6.3  /  Alb  2.8<L>  /  TBili  0.2  /  DBili  x   /  AST  23  /  ALT  20  /  AlkPhos  92  07-27    PT/INR - ( 27 Jul 2022 17:39 )   PT: 12.9 sec;   INR: 1.11 ratio         PTT - ( 27 Jul 2022 17:39 )  PTT:26.3 sec  Albumin: Albumin, Serum: 2.8 g/dL (07-27 @ 17:39)      Allergies    cefdinir (Diarrhea)    Intolerances      MEDICATIONS  (STANDING):  azithromycin  IVPB      azithromycin  IVPB 500 milliGRAM(s) IV Intermittent every 24 hours  cefTRIAXone   IVPB 1000 milliGRAM(s) IV Intermittent every 24 hours    MEDICATIONS  (PRN):  acetaminophen     Tablet .. 650 milliGRAM(s) Oral every 6 hours PRN Temp greater or equal to 38C (100.4F), Mild Pain (1 - 3)  aluminum hydroxide/magnesium hydroxide/simethicone Suspension 30 milliLiter(s) Oral every 4 hours PRN Dyspepsia  melatonin 3 milliGRAM(s) Oral at bedtime PRN Insomnia  ondansetron Injectable 4 milliGRAM(s) IV Push every 8 hours PRN Nausea and/or Vomiting      RADIOLOGY: HPI: patient seen and examines stating that she feels good today, patient denies any complaints, [facility] meeting scheduled for today at 1PM     PAIN: ( )Yes   (x )No  DYSPNEA: ( ) Yes  (x ) No  Level:    Review of Systems:    Anorexia- yes  Weight Loss-   Fatigue- yes  Weakness- yes    All other systems reviewed and negative    PHYSICAL EXAM:    Vital Signs Last 24 Hrs  T(C): 36.4 (28 Jul 2022 21:05), Max: 36.7 (28 Jul 2022 16:45)  T(F): 97.5 (28 Jul 2022 21:05), Max: 98 (28 Jul 2022 16:45)  HR: 64 (28 Jul 2022 21:05) (59 - 73)  BP: 114/57 (28 Jul 2022 21:05) (106/70 - 116/57)  BP(mean): 81 (28 Jul 2022 10:47) (81 - 81)  RR: 18 (28 Jul 2022 21:05) (17 - 19)  SpO2: 93% (28 Jul 2022 21:05) (93% - 98%)    Parameters below as of 28 Jul 2022 21:05  Patient On (Oxygen Delivery Method): room air      PPSV2: 30  %    General: elderly frail pleasant female in bed, NAD   Mental Status: alert and oriented but forgetful at times   HEENT: mmm, +temporal wasting   Lungs: diminished breath sounds b/l   Cardiac: s1s2 +   GI: non tender, non distended, +BS   : no suprapubic tenderness   Ext: multiple ecchymotic areas and skin tears, +1 edema b/l ankles, + moves all extremities   Neuro: intact       LABS:                        8.6    57.10 )-----------( 172      ( 29 Jul 2022 07:01 )             26.4     07-27    136  |  104  |  68<H>  ----------------------------<  132<H>  5.0   |  27  |  0.91    Ca    9.1      27 Jul 2022 17:39  Mg     2.2     07-27    TPro  6.3  /  Alb  2.8<L>  /  TBili  0.2  /  DBili  x   /  AST  23  /  ALT  20  /  AlkPhos  92  07-27    PT/INR - ( 27 Jul 2022 17:39 )   PT: 12.9 sec;   INR: 1.11 ratio         PTT - ( 27 Jul 2022 17:39 )  PTT:26.3 sec  Albumin: Albumin, Serum: 2.8 g/dL (07-27 @ 17:39)      Allergies    cefdinir (Diarrhea)    Intolerances      MEDICATIONS  (STANDING):  azithromycin  IVPB      azithromycin  IVPB 500 milliGRAM(s) IV Intermittent every 24 hours  cefTRIAXone   IVPB 1000 milliGRAM(s) IV Intermittent every 24 hours    MEDICATIONS  (PRN):  acetaminophen     Tablet .. 650 milliGRAM(s) Oral every 6 hours PRN Temp greater or equal to 38C (100.4F), Mild Pain (1 - 3)  aluminum hydroxide/magnesium hydroxide/simethicone Suspension 30 milliLiter(s) Oral every 4 hours PRN Dyspepsia  melatonin 3 milliGRAM(s) Oral at bedtime PRN Insomnia  ondansetron Injectable 4 milliGRAM(s) IV Push every 8 hours PRN Nausea and/or Vomiting

## 2022-07-29 NOTE — PROGRESS NOTE ADULT - SUBJECTIVE AND OBJECTIVE BOX
Patient is a 89y old  Female who presents with a chief complaint of     HPI:pt is s/p rercent admission to  with RLL pneumonia  she was treated with course of antibiotics and discharged on oral Rx  now seen by Dr Ramires as outpt and sent back to ED for hypotension  found to be anemic and has underlying CLL  s/p heme eval  also hx colon ca  Rib fx seen on ct scan  films reviewed      pt is seen and examined while in bed  no resp distress  feels ok  blood test results pending    PAST MEDICAL & SURGICAL HISTORY:  CLL (chronic lymphocytic leukemia)  colon ca hx  s/p RLL pneumonia      MEDICATIONS  (STANDING):  azithromycin  IVPB      azithromycin  IVPB 500 milliGRAM(s) IV Intermittent every 24 hours  cefTRIAXone   IVPB 1000 milliGRAM(s) IV Intermittent every 24 hours    Home Medications:  Multiple Vitamins oral tablet, chewable: 1 tab(s) orally once a day (2022 19:30)  polyethylene glycol 3350 oral powder for reconstitution: 17 gram(s) orally once a day, As Needed (2022 19:30)  Probiotic Formula oral capsule: 1 cap(s) orally once a day, As Needed when on Antibiotic (2022 23:53)  Vitamin D3 25 mcg (1000 intl units) oral tablet: 1 tab(s) orally once a day (2022 19:30)      MEDICATIONS  (STANDING):    PREVIOUS DIAGNOSTIC TESTING:      MEDICATIONS  (STANDING):    MEDICATIONS  (PRN):      FAMILY HISTORY:  Known health problems: none    SOCIAL HISTORY:  ***  Vital Signs Last 24 Hrs  T(C): 36.4 (2022 21:05), Max: 36.7 (2022 16:45)  T(F): 97.5 (2022 21:05), Max: 98 (2022 16:45)  HR: 64 (2022 21:05) (59 - 73)  BP: 114/57 (2022 21:05) (106/70 - 116/57)  BP(mean): 81 (2022 10:47) (81 - 81)  RR: 18 (2022 21:05) (17 - 19)  SpO2: 93% (2022 21:05) (93% - 98%)    Parameters below as of 2022 21:05  Patient On (Oxygen Delivery Method): room air      I&O's Summary    PHYSICAL EXAM  elderly female resting in bed / no distress  General Appearance: cooperative, no acute distress,   HEENT: PERRL, conjunctiva clear, EOM's intact,   Neck: Supple, , no adenopathy  Lungs: bilateral lower lobe rhonchi R>L  Heart: Regular rate and rhythm, S1, S2 normal  Abdomen: Soft, non-tender, bowel sounds active  Extremities: no cyanosis or edema, no joint swelling  Neurologic: Alert and oriented X3     ECG:    LABS:                        8.6    x     )-----------( 172      ( 2022 07:01 )             26.4   07-27    136  |  104  |  68<H>  ----------------------------<  132<H>  5.0   |  27  |  0.91    Ca    9.1      2022 17:39  Mg     2.2     07-27    TPro  6.3  /  Alb  2.8<L>  /  TBili  0.2  /  DBili  x   /  AST  23  /  ALT  20  /  AlkPhos  92  07-27                          5.7    73.71 )-----------( 215      ( 2022 17:39 )             19.0     07-27    136  |  104  |  68<H>  ----------------------------<  132<H>  5.0   |  27  |  0.91    Ca    9.1      2022 17:39  Mg     2.2     07-27    TPro  6.3  /  Alb  2.8<L>  /  TBili  0.2  /  DBili  x   /  AST  23  /  ALT  20  /  AlkPhos  92  07-27          Pro BNP  532 07-27 @ 17:39  D Dimer  729 07-27 @ 17:39    PT/INR - ( 2022 17:39 )   PT: 12.9 sec;   INR: 1.11 ratio         PTT - ( 2022 17:39 )  PTT:26.3 sec  Urinalysis Basic - ( 2022 17:56 )    Color: Yellow / Appearance: Slightly Turbid / S.010 / pH: x  Gluc: x / Ketone: Negative  / Bili: Negative / Urobili: Negative   Blood: x / Protein: 15 / Nitrite: Negative   Leuk Esterase: Trace / RBC: 0-2 /HPF / WBC 0-2   Sq Epi: x / Non Sq Epi: Many / Bacteria: Moderate      < from: CT Angio Chest PE Protocol w/ IV Cont (22 @ 18:32) >  COMPARISON: Radiograph 2022.    FINDINGS:    Pulmonary Artery:  There is no main, central, lobar, segmental, or   subsegmental pulmonary embolus.    Tubes/Lines: None.    Lungs, airways and pleura: There are bilateral, predominantly peripheral   patchy foci of consolidation and branching opacities. Bilateral   bronchiectasis best shown in the right middle lobe, lingula, and right   lower lobe.    There is opacification of the distal segmental airways, best shown in the   right middle lobe, lingula and right lower lobe.    The pleura is normal.    Mediastinum: The thyroid gland is normal. The esophagus is unremarkable.   There are no enlarged chest lymph nodes.    Left atrium is enlarged. The remainder of the heart is normal in size.   Mitral annular calcification. Aortic valve calcification. Left-sided   aortic arch and left-sided descending thoracic aorta. Aortic   calcifications. The aorta is tortuous.    Upper Abdomen: The hypodense right lobe hepatic lesion is statistically   benign and likely represents a cyst.    Bones And Soft Tissues: Spondylosis of the thoracic spine.  The bones are   qualitatively osteopenic. Compression deformities of the T10 and T12   vertebral bodies involving greater than 50% of the vertebral body height.   L1 vertebral body hemangioma.    Fractures of the left anterior eleventh and twelfth ribs. Healing left   anterior tenth rib fracture.      IMPRESSION:    1.  No pulmonary embolus.  2.  Bilateral patchy foci consolidation, bronchiectasis, and branching   opacities.  3.  Fractures of the left eleventh and twelfth ribs.  4.  Healing left anterior tenth rib fracture.    < end of copied text >  < from: Xray Chest 1 View- PORTABLE-Urgent (Xray Chest 1 View- PORTABLE-Urgent .) (22 @ 18:59) >  COMPARISON: None. .    FINDINGS:  CATHETERS AND TUBES: None    PULMONARY: RIGHT basilar multifocal airspace consolidations . RIGHT upper   zone and LEFT lung parenchyma grossly clear.  No pneumothorax.    HEART/VASCULAR: The  heart is enlarged in transverse diameter.     BONES: Visualized osseous structures are intact.    IMPRESSION:   RIGHT basilar multifocal airspace consolidations.    < end of copied text >        RADIOLOGY & ADDITIONAL STUDIES:

## 2022-07-29 NOTE — DIETITIAN INITIAL EVALUATION ADULT - CALCULATED FROM (G/KG)
HISTORY AND PHYSICAL    Chief Complaint:No chief complaint on file.      HPI  Vivek Mckeon is a 58 year old male here for colonoscopy    ROS:  A complete 14-point review of systems negative except as noted in the HPI above      Current Outpatient Medications   Medication Sig   • doxycycline hyclate (VIBRAMYCIN) 100 MG capsule Take 1 capsule by mouth 2 times daily for 14 days.   • cholecalciferol (VITAMIN D3) 1000 UNITS tablet Take 1,000 Units by mouth daily.     Current Facility-Administered Medications   Medication   • sodium chloride 0.9% infusion       VITALS  Visit Vitals  BP (!) 143/81   Pulse 60   Temp 99 °F (37.2 °C) (Oral)   Resp 16   Ht 5' 9\" (1.753 m)   Wt 95.9 kg (211 lb 6.7 oz)   SpO2 96%   BMI 31.22 kg/m²        PHYSICAL EXAM  Physical Exam   Constitutional: Appears well-developed and well-nourished.   Eyes: Negative.   Neck: Negative.  Cardiovascular: Normal rate and regular rhythm.   Pulmonary/Chest: Effort normal and breath sounds normal.   Abdominal: Soft. Bowel sounds are normal. No distension and no mass. There is no tenderness. No hepatomegaly. No splenomegaly.    Musculoskeletal: Negative.  Neurological: Negative.  Skin: Negative.    LABS    Lab Results   Component Value Date    WBC 4.3 05/07/2020    RBC 4.99 05/07/2020    HGB 14.6 05/07/2020    HCT 45.9 05/07/2020    MCV 92.0 05/07/2020    MCH 29.3 05/07/2020    MCHC 31.8 (L) 05/07/2020     05/07/2020    TLYMPH 52 05/07/2020    PMON 7 05/07/2020    PEOS 2 05/07/2020    PBASO 1 05/07/2020    ANEUT 1.6 (L) 05/07/2020    ALYMS 2.3 05/07/2020    RENATA 0.3 05/07/2020    AEOS 0.1 05/07/2020    ABASO 0.0 05/07/2020    TDIF AUTOMATED DIFFERENTIAL 05/07/2020    NRBCRE 0 05/07/2020    PIMGR 0 05/07/2020    AIMGR 0.0 05/07/2020       Lab Results   Component Value Date    SODIUM 143 12/21/2020    POTASSIUM 4.3 12/21/2020    CHLORIDE 109 (H) 12/21/2020    CO2 27 12/21/2020    BUN 12 12/21/2020    CREATININE 1.10 12/21/2020    CALCIUM 9.3 12/21/2020     ALBUMIN 4.0 05/07/2020    BILIRUBIN 0.5 05/07/2020    ALKPT 58 05/07/2020    GPT 18 05/07/2020    AST 16 05/07/2020    GLUCOSE 77 12/21/2020       Lab Results   Component Value Date    CHOLESTEROL 236 (H) 05/07/2020    CHOLESTEROL 267 (H) 09/27/2019    CHOLESTEROL 234 (H) 06/20/2019    TRIGLYCERIDE 144 05/07/2020    TRIGLYCERIDE 142 09/27/2019    TRIGLYCERIDE 105 06/20/2019    HDL 60 05/07/2020    HDL 63 09/27/2019    HDL 69 06/20/2019    CALCLDL 147 (H) 05/07/2020    CALCLDL 176 (H) 09/27/2019    CALCLDL 144 (H) 06/20/2019       Lab Results   Component Value Date    ALKPT 58 05/07/2020    BILIRUBIN 0.5 05/07/2020    ALBUMIN 4.0 05/07/2020    GPT 18 05/07/2020    AST 16 05/07/2020       Lab Results   Component Value Date    HGBA1C 5.6 05/07/2020    HGBA1C 6.3 (H) 04/17/2017       Lab Results   Component Value Date    SEPT NONE SEEN 07/11/2018    ERYT NONE SEEN 07/11/2018    LEUK 1 to 5 07/11/2018    UBACTR NONE SEEN 07/11/2018    HCAST NONE SEEN 07/11/2018       Lab Results   Component Value Date    TSH 1.352 05/07/2020       Lab Results   Component Value Date    VITD25 42.5 09/27/2019       No results found for: VB12    Lab Results   Component Value Date    COL YELLOW 07/11/2018    UAPP CLEAR 07/11/2018    USPG 1.019 07/11/2018    UPH 5.0 07/11/2018    UPROT NEGATIVE 07/11/2018    UGLU NEGATIVE 07/11/2018    UKET NEGATIVE 07/11/2018    UROB 0.2 07/11/2018    UBILI NEGATIVE 07/11/2018    UNITR NEGATIVE 07/11/2018    SEPT NONE SEEN 07/11/2018    ERYT NONE SEEN 07/11/2018    LEUK 1 to 5 07/11/2018    UBACTR NONE SEEN 07/11/2018    HCAST NONE SEEN 07/11/2018    SPTYU URINE, CLEAN CATCH/MIDSTREAM 07/11/2018         ASSESSMENT:     Vivek is a 58 year old male with:    1. Polyps 5yrs ago      Plan    1. Surveillance cscopy    No follow-ups on file.    The plan which includes procedure(s) and/or treatment was discussed with the patient. Risks, benefits, alternatives, and all questions/concerns were answered. Patient  expressed their understanding and is in agreement with the plan.  Yo Kemp MD    06/14/21 7:32 AM            51.2

## 2022-07-29 NOTE — PROGRESS NOTE ADULT - ASSESSMENT
89 y/o elderly frail female with CLL, recent hospitalization for pneumonia was admitted on 7/27 for increased weakness, increased HRs / low Blood pressures and shortness of breath. Pt is had rercent admission to  with RLL pneumonia and was treated with course of antibiotics and discharged on oral Rx. In the ED -  patient was noted to be hypotensive. Workup suggested patient being very anemic with HH 5.7. Patient was transfused 2 units of PRBCs with great response to transfusions. She received ceftriaxone and azithromycin.     1. Possible b/l pneumonia vs pulmonary fibrosis. CLL. Immunocompromised host.   -respiratory improved  -BC x 2, urine c/s noted   -on ceftriaxone 1 gm IV qd and azithromycin 500 mg IV qd # 2  -tolerating abx well so far; no side effects noted  -respiratory care  -continue abx coverage; plan to change to PO soon  -monitor temps  -f/u CBC  -supportive care  2. Other issues:   -care per medicine

## 2022-07-29 NOTE — PROGRESS NOTE ADULT - ASSESSMENT
Pt is a 89y old Female with hx of CLL and colon mass that was surgically removed - admitted in the hospital with increased HRs /  low Blood pressures and shortness of breath. Pt is s/p recent admission to  with RLL pneumonia. She was treated with course of antibiotics and discharged on oral Rx. In the ED -  patient was noted to be hypotensive. Workup suggested patient being very anemic with HH 5.7. Patient was transfused 2 units of PRBCs. Palliative medicine consulted to help establish GOC and advance care planning     1) CLL and Stage 3 colon cancer  - follow by Dr. Donis   - onc consult appreciated   - has not had any treatment   - monitor labs  - surgeon Dr. Martin - s/p colectomy    2) Anemia   - hem/onc consult appreciated   - s/p 2 units   - monitor labs     3) PNA   - ID consult appreciated   - c/w ceftriaxone and azithromycin  - monitor temps   - pulmonology consult appreciated   - monitor O2     4) severe protein calorie malnutrition  - registered dietician consult    - pressure ulcer   - encourage PO intake     Process of Care  --Reviewed dx/treatment problems and alignment with Goals of Care    Physical Aspects of Care  --Pain  patient denies at this time  c/w current management    --Bowel Regimen  denies constipation  risk for constipation d/t immobility  daily dulcolax    --Dyspnea  No SOB at this time  comfortable and in NAD    --Nausea Vomiting  denies    --Weakness  PT as tolerated     Psychological and Psychiatric Aspects of Care:   --Greif/Bereavment: emotional support provided  --Hx of psychiatric dx: none  -Pastoral Care Available PRN     Social Aspects of Care  -SW involved     Cultural Aspects  -Primary Language: English    Goals of Care: today 729 at 1 PM     We discussed Palliative Care team being a supportive team when a patient has ongoing illnesses.  We also discussed that it is not an end of life care service, but can help navigate symptoms and emotional support through out their hospital stay here.    Ethical and Legal Aspects:   NA    Capacity- patient appears to have capacity but defers to her daughters     HCP/Surrogate: HCP placed in chart Nata named as HCP with alternate Ellen     Code Status- DNR/I   MOLST- on chart   Dispo Plan-     Discussed With:    Case coordinated with attending and SW and RN

## 2022-07-30 PROCEDURE — 99232 SBSQ HOSP IP/OBS MODERATE 35: CPT

## 2022-07-30 RX ORDER — ALBUTEROL 90 UG/1
2 AEROSOL, METERED ORAL EVERY 8 HOURS
Refills: 0 | Status: DISCONTINUED | OUTPATIENT
Start: 2022-07-30 | End: 2022-08-02

## 2022-07-30 RX ORDER — TIOTROPIUM BROMIDE 18 UG/1
1 CAPSULE ORAL; RESPIRATORY (INHALATION) DAILY
Refills: 0 | Status: DISCONTINUED | OUTPATIENT
Start: 2022-07-30 | End: 2022-08-02

## 2022-07-30 RX ORDER — MAGNESIUM HYDROXIDE 400 MG/1
30 TABLET, CHEWABLE ORAL DAILY
Refills: 0 | Status: DISCONTINUED | OUTPATIENT
Start: 2022-07-30 | End: 2022-08-02

## 2022-07-30 RX ADMIN — AZITHROMYCIN 255 MILLIGRAM(S): 500 TABLET, FILM COATED ORAL at 08:56

## 2022-07-30 RX ADMIN — POLYETHYLENE GLYCOL 3350 17 GRAM(S): 17 POWDER, FOR SOLUTION ORAL at 08:56

## 2022-07-30 RX ADMIN — CEFTRIAXONE 100 MILLIGRAM(S): 500 INJECTION, POWDER, FOR SOLUTION INTRAMUSCULAR; INTRAVENOUS at 23:15

## 2022-07-30 RX ADMIN — MAGNESIUM HYDROXIDE 30 MILLILITER(S): 400 TABLET, CHEWABLE ORAL at 23:20

## 2022-07-30 NOTE — PROGRESS NOTE ADULT - ASSESSMENT
89 year old female patient with a pertinent past medical history noted for CLL  as well as Stage III LEFT sided COLON CA  braf wild type, kras wild stype  last seen 5/27/22  recently admitted for PNA now presenting after having been sent from pulmonary office for worsening shortness of breath and symptomatic anemia productive cough associated with shortness of breath with CXR showing PNA.    # CLL   - has not had CLL directed therapy -  - would check Quiggs   - may benefit from possible IVIG as oupatient   - WBC 73 - baseline 40-60    # Anemia   - check Iron studies   - macrocytosis - normal Bili   - discussed with ED with transfusion in ED    - Hb 5.7   - recommend two units   - check LDH and Hapto  - check FOBT   - Surgeon Femi Martin   - if Guaic + will likely need Scope to assess anastomatic site.     # h/o stage III colon CA KRAS wild type left­sided  - originally diagnosed with braf wild type, kras wild stype stage IIIa disease s/p colectomy- discussion held regarding adjuvant chemotherapy but plan to hold off        Brian Donis MD   Hematology/ Oncology   [facility]   [address], [place]  P:[phone]  F:[phone]  and [phone]  [address] [place], [place]   P:[phone]  F:[phone]   89 year old female patient with a pertinent past medical history noted for CLL  as well as Stage III LEFT sided COLON CA  braf wild type, kras wild stype  last seen 5/27/22  recently admitted for PNA now presenting after having been sent from pulmonary office for worsening shortness of breath and symptomatic anemia productive cough associated with shortness of breath with CXR showing PNA.    # CLL   - has not had CLL directed therapy -  WBC has improved     # Anemia   - IRON STUDIES   - Surgeon Femi Martin   - if [place] + will likely need Scope to assess anastomatic site.     # h/o stage III colon CA KRAS wild type left­sided  - originally diagnosed with braf wild type, kras wild stype stage IIIa disease s/p colectomy- discussion held regarding adjuvant chemotherapy but plan to hold off        Brian Donis MD   Hematology/ Oncology   [facility]   [address] [place], [place]  P:[phone]  F:[phone]  and [phone]  [address] [place], [place]   P:[phone]  F:[phone]

## 2022-07-30 NOTE — PROGRESS NOTE ADULT - ASSESSMENT
Pt is 89 y/o elderly frail female with extensive past medical admitted in the hospital with increased HRs /  low Blood pressures and shortness of breath. Pt is s/p rercent admission to  with RLL pneumonia. she was treated with course of antibiotics and discharged on oral Rx. In the ED -  patient was noted to be hypotensive. Workup suggested patient being very anemic with HH 5.7. Patient was transfused 2 units of PRBCs with great response to transfusions.   Assessment and Plan:   Problem/Plan - 1:  ·  Problem: Hypotension.   ·  Plan: - this is secondary to anemia  - after the transfusion - responded to transfusion well   - Close monitoring of HH  - CT abdomen /  pelvis r/o retroperitoneal bleed; neg    Problem/Plan - 2:  ·  Problem: Gram-negative pneumonia.   ·  Plan: - continue with IV cefepime  - currently not on O2  - patient is being follow by Dr. Hawkins.    Problem/Plan - 3:  ·  Problem: Anemia.   ·  Plan: # Acute on chronic anemia - no signs of bleeding  - check Iron studies   - Hb 5.7   - 2 units of PRBCs  - check LDH and Hapto  - check FOBT   - Surgeon Femi Martin   - Monitor HH.    Problem/Plan - 4:  ·  Problem: CLL (chronic lymphocytic leukemia).   ·  Plan: - has not had CLL directed therapy  - would check Quiggs   - may benefit from possible IVIG as out patient   - WBC 73 - baseline 40-60.    Problem/Plan - 5:  ·  Problem: Adult failure to thrive.   ·  Plan: - supportive care - palliative care evaluation.    Problem/Plan - 6:  ·  Problem: Colonic cancer.   ·  Plan: # h/o stage III colon CA KRAS wild type left­sided  - originally diagnosed with braf wild type, kras wild stype stage IIIa disease s/p colectomy- discussion held regarding adjuvant chemotherapy but plan to hold off.    Problem/Plan - 7:  ·  Problem: Multiple rib fractures.   ·  Plan: - Fractures of the left eleventh and twelfth ribs.    Problem/Plan - 8:  Abn TRops: cardio eval  likely demand ischemia    poc discussed with pt, her daughter, Kat, team

## 2022-07-30 NOTE — PROGRESS NOTE ADULT - SUBJECTIVE AND OBJECTIVE BOX
Date of service: 22 @ 08:40    OOB to chair  Has dry cough  SOB is improved    ROS: no fever or chills; denies dizziness, no HA, no abdominal pain, no diarrhea or constipation; no dysuria, no legs pain, no rashes    MEDICATIONS  (STANDING):  azithromycin  IVPB      azithromycin  IVPB 500 milliGRAM(s) IV Intermittent every 24 hours  cefTRIAXone   IVPB 1000 milliGRAM(s) IV Intermittent every 24 hours  polyethylene glycol 3350 17 Gram(s) Oral daily    Vital Signs Last 24 Hrs  T(C): 36.6 (2022 21:20), Max: 36.6 (2022 09:34)  T(F): 97.9 (2022 21:20), Max: 97.9 (2022 21:20)  HR: 65 (2022 21:20) (65 - 87)  BP: 97/50 (2022 21:20) (97/50 - 106/56)  BP(mean): --  RR: 18 (2022 21:20) (18 - 18)  SpO2: 94% (2022 21:20) (93% - 96%)    Parameters below as of 2022 21:20  Patient On (Oxygen Delivery Method): room air         Physical exam:    Constitutional:  No acute distress  HEENT: NC/AT, EOMI, PERRLA, conjunctivae clear; ears and nose atraumatic  Neck: supple; thyroid not palpable  Back: no tenderness  Respiratory: respiratory effort normal; crackles at bases  Cardiovascular: S1S2 regular, no murmurs  Abdomen: soft, not tender, not distended, positive BS  Genitourinary: no suprapubic tenderness  Lymphatic: no LN palpable  Musculoskeletal: no muscle tenderness, no joint swelling or tenderness  Extremities: no pedal edema  Neurological/ Psychiatric: AxOx3, moving all extremities  Skin: no rashes; no palpable lesions    Labs: reviewed                        8.6    57.10 )-----------( 172      ( 2022 07:01 )             26.4     D-Dimer Assay, Quantitative: 729 ng/mL DDU (22 @ 17:39)                        7.6    69.38 )-----------( 194      ( 2022 12:45 )             24.5         136  |  104  |  68<H>  ----------------------------<  132<H>  5.0   |  27  |  0.91    Ca    9.1      2022 17:39  Mg     2.2         TPro  6.3  /  Alb  2.8<L>  /  TBili  0.2  /  DBili  x   /  AST  23  /  ALT  20  /  AlkPhos  92       LIVER FUNCTIONS - ( 2022 17:39 )  Alb: 2.8 g/dL / Pro: 6.3 gm/dL / ALK PHOS: 92 U/L / ALT: 20 U/L / AST: 23 U/L / GGT: x           Urinalysis Basic - ( 2022 17:56 )    Color: Yellow / Appearance: Slightly Turbid / S.010 / pH: x  Gluc: x / Ketone: Negative  / Bili: Negative / Urobili: Negative   Blood: x / Protein: 15 / Nitrite: Negative   Leuk Esterase: Trace / RBC: 0-2 /HPF / WBC 0-2   Sq Epi: x / Non Sq Epi: Many / Bacteria: Moderate      Culture - Urine (collected 2022 17:56)  Source: Clean Catch Clean Catch (Midstream)  Final Report (2022 23:08):    No growth    Culture - Blood (collected 2022 17:39)  Source: .Blood None  Preliminary Report (2022 01:02):    No growth to date.    Culture - Blood (collected 2022 17:39)  Source: .Blood None  Preliminary Report (2022 01:02):    No growth to date.    COVID-19 PCR: NotDetec (22 @ 17:39)    Radiology: all available radiological tests reviewed    < from: CT Abdomen and Pelvis No Cont (22 @ 09:58) >  No retroperitoneal hemorrhage.  Bibasilar bronchiectasis and bibasilar opacities which could be on the   basis of chronic lung disease and fibrosis versus acute pneumonia.  < end of copied text >    Advanced directives addressed: full resuscitation

## 2022-07-30 NOTE — INPATIENT CERTIFICATION FOR MEDICARE PATIENTS - NS ICMP TWO DAYS INPATIENT
Atypical urothelial cells were found so this will be further evaluated by fluorescent in situ studies Yes

## 2022-07-30 NOTE — PROGRESS NOTE ADULT - ASSESSMENT
89 y/o elderly frail female with CLL, recent hospitalization for pneumonia was admitted on 7/27 for increased weakness, increased HRs / low Blood pressures and shortness of breath. Pt is had rercent admission to  with RLL pneumonia and was treated with course of antibiotics and discharged on oral Rx. In the ED -  patient was noted to be hypotensive. Workup suggested patient being very anemic with HH 5.7. Patient was transfused 2 units of PRBCs with great response to transfusions. She received ceftriaxone and azithromycin.     1. Possible b/l pneumonia vs pulmonary fibrosis. CLL. Immunocompromised host.   -respiratory improved  -BC x 2, urine c/s noted   -on ceftriaxone 1 gm IV qd and azithromycin 500 mg IV qd # 3  -tolerating abx well so far; no side effects noted  -respiratory care  -continue abx coverage; plan to change to PO soon  -monitor temps  -f/u CBC  -supportive care  2. Other issues:   -care per medicine

## 2022-07-30 NOTE — PROGRESS NOTE ADULT - SUBJECTIVE AND OBJECTIVE BOX
Patient is a 89y old  Female who presents with a chief complaint of     HPI:pt is s/p rercent admission to  with RLL pneumonia  she was treated with course of antibiotics and discharged on oral Rx  now seen by Dr Ramires as outpt and sent back to ED for hypotension  found to be anemic and has underlying CLL  s/p heme eval  also hx colon ca  Rib fx seen on ct scan  films reviewed      No acute pulmonary events occurred overnight  Discussed with daughter     PAST MEDICAL & SURGICAL HISTORY:  CLL (chronic lymphocytic leukemia)  colon ca hx  s/p RLL pneumonia      MEDICATIONS  (STANDING):  ALBUTerol    90 MICROgram(s) HFA Inhaler 2 Puff(s) Inhalation every 8 hours  azithromycin  IVPB      azithromycin  IVPB 500 milliGRAM(s) IV Intermittent every 24 hours  cefTRIAXone   IVPB 1000 milliGRAM(s) IV Intermittent every 24 hours  polyethylene glycol 3350 17 Gram(s) Oral daily  tiotropium 18 MICROgram(s) Capsule 1 Capsule(s) Inhalation daily    MEDICATIONS  (PRN):  acetaminophen     Tablet .. 650 milliGRAM(s) Oral every 6 hours PRN Temp greater or equal to 38C (100.4F), Mild Pain (1 - 3)  aluminum hydroxide/magnesium hydroxide/simethicone Suspension 30 milliLiter(s) Oral every 4 hours PRN Dyspepsia  melatonin 3 milliGRAM(s) Oral at bedtime PRN Insomnia  ondansetron Injectable 4 milliGRAM(s) IV Push every 8 hours PRN Nausea and/or Vomiting      MEDICATIONS  (STANDING):    PREVIOUS DIAGNOSTIC TESTING:      MEDICATIONS  (STANDING):    MEDICATIONS  (PRN):      FAMILY HISTORY:  Known health problems: none    Vital Signs Last 24 Hrs  T(C): 36.5 (2022 09:13), Max: 36.6 (2022 14:57)  T(F): 97.7 (2022 09:13), Max: 97.9 (2022 21:20)  HR: 92 (2022 09:13) (65 - 92)  BP: 114/67 (2022 09:13) (97/50 - 114/67)  BP(mean): --  RR: 18 (2022 09:13) (18 - 18)  SpO2: 94% (2022 09:13) (93% - 96%)    Parameters below as of 2022 09:13  Patient On (Oxygen Delivery Method): room air      Parameters below as of 2022 21:05  Patient On (Oxygen Delivery Method): room air      I&O's Summary    PHYSICAL EXAM  elderly female resting in bed / no distress  General Appearance: cooperative, no acute distress,   HEENT: PERRL, conjunctiva clear, EOM's intact,   Neck: Supple, , no adenopathy  Lungs: bilateral lower lobe rhonchi R>L  Heart: Regular rate and rhythm, S1, S2 normal  Abdomen: Soft, non-tender, bowel sounds active  Extremities: no cyanosis or edema, no joint swelling  Neurologic: Alert and oriented X3     ECG:    LABS:                        8.6    x     )-----------( 172      ( 2022 07:01 )             26.4   07-27    136  |  104  |  68<H>  ----------------------------<  132<H>  5.0   |  27  |  0.91    Ca    9.1      2022 17:39  Mg     2.2     07-    TPro  6.3  /  Alb  2.8<L>  /  TBili  0.2  /  DBili  x   /  AST  23  /  ALT  20  /  AlkPhos  92  07-27                          5.7    73.71 )-----------( 215      ( 2022 17:39 )             19.0     07-27    136  |  104  |  68<H>  ----------------------------<  132<H>  5.0   |  27  |  0.91    Ca    9.1      2022 17:39  Mg     2.2     07-27    TPro  6.3  /  Alb  2.8<L>  /  TBili  0.2  /  DBili  x   /  AST  23  /  ALT  20  /  AlkPhos  92  07-27          Pro BNP  532 07-27 @ 17:39  D Dimer  729 -27 @ 17:39    PT/INR - ( 2022 17:39 )   PT: 12.9 sec;   INR: 1.11 ratio         PTT - ( 2022 17:39 )  PTT:26.3 sec  Urinalysis Basic - ( 2022 17:56 )    Color: Yellow / Appearance: Slightly Turbid / S.010 / pH: x  Gluc: x / Ketone: Negative  / Bili: Negative / Urobili: Negative   Blood: x / Protein: 15 / Nitrite: Negative   Leuk Esterase: Trace / RBC: 0-2 /HPF / WBC 0-2   Sq Epi: x / Non Sq Epi: Many / Bacteria: Moderate      < from: CT Angio Chest PE Protocol w/ IV Cont (22 @ 18:32) >  COMPARISON: Radiograph 2022.    FINDINGS:    Pulmonary Artery:  There is no main, central, lobar, segmental, or   subsegmental pulmonary embolus.    Tubes/Lines: None.    Lungs, airways and pleura: There are bilateral, predominantly peripheral   patchy foci of consolidation and branching opacities. Bilateral   bronchiectasis best shown in the right middle lobe, lingula, and right   lower lobe.    There is opacification of the distal segmental airways, best shown in the   right middle lobe, lingula and right lower lobe.    The pleura is normal.    Mediastinum: The thyroid gland is normal. The esophagus is unremarkable.   There are no enlarged chest lymph nodes.    Left atrium is enlarged. The remainder of the heart is normal in size.   Mitral annular calcification. Aortic valve calcification. Left-sided   aortic arch and left-sided descending thoracic aorta. Aortic   calcifications. The aorta is tortuous.    Upper Abdomen: The hypodense right lobe hepatic lesion is statistically   benign and likely represents a cyst.    Bones And Soft Tissues: Spondylosis of the thoracic spine.  The bones are   qualitatively osteopenic. Compression deformities of the T10 and T12   vertebral bodies involving greater than 50% of the vertebral body height.   L1 vertebral body hemangioma.    Fractures of the left anterior eleventh and twelfth ribs. Healing left   anterior tenth rib fracture.      IMPRESSION:    1.  No pulmonary embolus.  2.  Bilateral patchy foci consolidation, bronchiectasis, and branching   opacities.  3.  Fractures of the left eleventh and twelfth ribs.  4.  Healing left anterior tenth rib fracture.    < end of copied text >  < from: Xray Chest 1 View- PORTABLE-Urgent (Xray Chest 1 View- PORTABLE-Urgent .) (22 @ 18:59) >  COMPARISON: None. .    FINDINGS:  CATHETERS AND TUBES: None    PULMONARY: RIGHT basilar multifocal airspace consolidations . RIGHT upper   zone and LEFT lung parenchyma grossly clear.  No pneumothorax.    HEART/VASCULAR: The  heart is enlarged in transverse diameter.     BONES: Visualized osseous structures are intact.    IMPRESSION:   RIGHT basilar multifocal airspace consolidations.    < end of copied text >        RADIOLOGY & ADDITIONAL STUDIES:

## 2022-07-30 NOTE — PROGRESS NOTE ADULT - SUBJECTIVE AND OBJECTIVE BOX
INTERVAL HPI/OVERNIGHT EVENTS:  Patient S&E at bedside. No o/n events, patient resting comfortably.     VITAL SIGNS:  T(F): 97.7 (07-30-22 @ 09:13)  HR: 92 (07-30-22 @ 09:13)  BP: 114/67 (07-30-22 @ 09:13)  RR: 18 (07-30-22 @ 09:13)  SpO2: 94% (07-30-22 @ 09:13)  Wt(kg): --    PHYSICAL EXAM:    General: No acute distress. Awake and conversant.  Eyes: Normal conjunctiva, anicteric. Round symmetric pupils.  ENT: Hearing grossly intact. No nasal discharge.  Respiratory: Respirations are non-labored. No wheezing.  Skin: Warm. No rashes or ulcers.  Psych: Alert and oriented. Cooperative, Appropriate mood and affect, Normal judgment.  CV: No lower extremity edema.  MSK: Normal ambulation. No clubbing or cyanosis.        MEDICATIONS  (STANDING):  ALBUTerol    90 MICROgram(s) HFA Inhaler 2 Puff(s) Inhalation every 8 hours  azithromycin  IVPB      azithromycin  IVPB 500 milliGRAM(s) IV Intermittent every 24 hours  cefTRIAXone   IVPB 1000 milliGRAM(s) IV Intermittent every 24 hours  polyethylene glycol 3350 17 Gram(s) Oral daily  tiotropium 18 MICROgram(s) Capsule 1 Capsule(s) Inhalation daily    MEDICATIONS  (PRN):  acetaminophen     Tablet .. 650 milliGRAM(s) Oral every 6 hours PRN Temp greater or equal to 38C (100.4F), Mild Pain (1 - 3)  aluminum hydroxide/magnesium hydroxide/simethicone Suspension 30 milliLiter(s) Oral every 4 hours PRN Dyspepsia  melatonin 3 milliGRAM(s) Oral at bedtime PRN Insomnia  ondansetron Injectable 4 milliGRAM(s) IV Push every 8 hours PRN Nausea and/or Vomiting      Allergies    cefdinir (Diarrhea)    Intolerances        LABS:                        8.6    57.10 )-----------( 172      ( 29 Jul 2022 07:01 )             26.4           RADIOLOGY & ADDITIONAL TESTS:  Studies reviewed.    ASSESSMENT & PLAN:  INTERVAL HPI/OVERNIGHT EVENTS:  Patient S&E at bedside. No o/n events, patient resting comfortably.     VITAL SIGNS:  T(F): 97.7 (07-30-22 @ 09:13)  HR: 92 (07-30-22 @ 09:13)  BP: 114/67 (07-30-22 @ 09:13)  RR: 18 (07-30-22 @ 09:13)  SpO2: 94% (07-30-22 @ 09:13)  Wt(kg): --    PHYSICAL EXAM:    General: No acute distress. Awake and conversant.  Eyes: Normal conjunctiva, anicteric. Round symmetric pupils.  ENT: Hearing grossly intact.   Respiratory: Respirations are non-labored. No wheezing. no crackles or rhonchi  Skin: Warm. No rashes or ulcers. senile purpura on upper extremtiies   Psych: Alert and oriented. Cooperative, Appropriate mood and affect, Normal judgment.  CV: No lower extremity edema.  MSK: . No clubbing or cyanosis.        MEDICATIONS  (STANDING):  ALBUTerol    90 MICROgram(s) HFA Inhaler 2 Puff(s) Inhalation every 8 hours  azithromycin  IVPB      azithromycin  IVPB 500 milliGRAM(s) IV Intermittent every 24 hours  cefTRIAXone   IVPB 1000 milliGRAM(s) IV Intermittent every 24 hours  polyethylene glycol 3350 17 Gram(s) Oral daily  tiotropium 18 MICROgram(s) Capsule 1 Capsule(s) Inhalation daily    MEDICATIONS  (PRN):  acetaminophen     Tablet .. 650 milliGRAM(s) Oral every 6 hours PRN Temp greater or equal to 38C (100.4F), Mild Pain (1 - 3)  aluminum hydroxide/magnesium hydroxide/simethicone Suspension 30 milliLiter(s) Oral every 4 hours PRN Dyspepsia  melatonin 3 milliGRAM(s) Oral at bedtime PRN Insomnia  ondansetron Injectable 4 milliGRAM(s) IV Push every 8 hours PRN Nausea and/or Vomiting      Allergies    cefdinir (Diarrhea)    Intolerances        LABS:                        8.6    57.10 )-----------( 172      ( 29 Jul 2022 07:01 )             26.4           RADIOLOGY & ADDITIONAL TESTS:  Studies reviewed.    ASSESSMENT & PLAN:

## 2022-07-30 NOTE — PROGRESS NOTE ADULT - SUBJECTIVE AND OBJECTIVE BOX
: no new complaints      PHYSICAL EXAM:    Vital Signs Last 24 Hrs  T(C): 36.5 (2022 09:13), Max: 36.6 (2022 14:57)  T(F): 97.7 (2022 09:13), Max: 97.9 (2022 21:20)  HR: 92 (2022 09:13) (65 - 92)  BP: 114/67 (2022 09:13) (97/50 - 114/67)  BP(mean): --  RR: 18 (2022 09:13) (18 - 18)  SpO2: 94% (2022 09:13) (93% - 96%)    Parameters below as of 2022 09:13  Patient On (Oxygen Delivery Method): room air        Constitutional: Weak  appearing  HEENT: Atraumatic, SHAYY,   Respiratory: Breath Sounds normal, no rhonchi/wheeze  Cardiovascular: N S1S2;   Gastrointestinal: Abdomen soft, non tender, Bowel Sounds present  Extremities: No edema, peripheral pulses present  Neurological: AAO x 3, no gross focal motor deficits  Skin: Non cellulitic, no rash, ulcers  Lymph Nodes: No lymphadenopathy noted  Back: No CVA tenderness   Musculoskeletal: non tender  Breasts: Deferred  Genitourinary: deferred  Rectal: Deferred    All Labs/EKG/Radiology/Meds reviewed by me                          8.6    57.10 )-----------( 172      ( 2022 07:01 )             26.4       CBC Full  -  ( 2022 07:01 )  WBC Count : 57.10 K/uL  RBC Count : 2.78 M/uL  Hemoglobin : 8.6 g/dL  Hematocrit : 26.4 %  Platelet Count - Automated : 172 K/uL  Mean Cell Volume : 95.0 fl  Mean Cell Hemoglobin : 30.9 pg  Mean Cell Hemoglobin Concentration : 32.6 gm/dL  Auto Neutrophil # : x  Auto Lymphocyte # : x  Auto Monocyte # : x  Auto Eosinophil # : x  Auto Basophil # : x  Auto Neutrophil % : x  Auto Lymphocyte % : x  Auto Monocyte % : x  Auto Eosinophil % : x  Auto Basophil % : x      07-27    136  |  104  |  68<H>  ----------------------------<  132<H>  5.0   |  27  |  0.91    Ca    9.1      2022 17:39  Mg     2.2         TPro  6.3  /  Alb  2.8<L>  /  TBili  0.2  /  DBili  x   /  AST  23  /  ALT  20  /  AlkPhos  92        LIVER FUNCTIONS - ( 2022 17:39 )  Alb: 2.8 g/dL / Pro: 6.3 gm/dL / ALK PHOS: 92 U/L / ALT: 20 U/L / AST: 23 U/L / GGT: x             PT/INR - ( 2022 17:39 )   PT: 12.9 sec;   INR: 1.11 ratio         PTT - ( 2022 17:39 )  PTT:26.3 sec          Urinalysis Basic - ( 2022 17:56 )    Color: Yellow / Appearance: Slightly Turbid / S.010 / pH: x  Gluc: x / Ketone: Negative  / Bili: Negative / Urobili: Negative   Blood: x / Protein: 15 / Nitrite: Negative   Leuk Esterase: Trace / RBC: 0-2 /HPF / WBC 0-2   Sq Epi: x / Non Sq Epi: Many / Bacteria: Moderate            MEDICATIONS  (STANDING):  azithromycin  IVPB      azithromycin  IVPB 500 milliGRAM(s) IV Intermittent every 24 hours  cefTRIAXone   IVPB 1000 milliGRAM(s) IV Intermittent every 24 hours    MEDICATIONS  (PRN):  acetaminophen     Tablet .. 650 milliGRAM(s) Oral every 6 hours PRN Temp greater or equal to 38C (100.4F), Mild Pain (1 - 3)  aluminum hydroxide/magnesium hydroxide/simethicone Suspension 30 milliLiter(s) Oral every 4 hours PRN Dyspepsia  melatonin 3 milliGRAM(s) Oral at bedtime PRN Insomnia  ondansetron Injectable 4 milliGRAM(s) IV Push every 8 hours PRN Nausea and/or Vomiting     : no new complaints    : c/o weakness  Pt and daughter not in favor of rehab    PHYSICAL EXAM:    Vital Signs Last 24 Hrs  T(C): 36.5 (2022 09:13), Max: 36.6 (2022 14:57)  T(F): 97.7 (2022 09:13), Max: 97.9 (2022 21:20)  HR: 92 (2022 09:13) (65 - 92)  BP: 114/67 (2022 09:13) (97/50 - 114/67)  BP(mean): --  RR: 18 (2022 09:13) (18 - 18)  SpO2: 94% (2022 09:13) (93% - 96%)    Parameters below as of 2022 09:13  Patient On (Oxygen Delivery Method): room air        Constitutional: Weak  appearing  HEENT: Atraumatic, SHAYY,   Respiratory: Breath Sounds normal, no rhonchi/wheeze  Cardiovascular: N S1S2;   Gastrointestinal: Abdomen soft, non tender, Bowel Sounds present  Extremities: No edema, peripheral pulses present  Neurological: AAO x 3, no gross focal motor deficits  Skin: Non cellulitic, no rash, ulcers  Lymph Nodes: No lymphadenopathy noted  Back: No CVA tenderness   Musculoskeletal: non tender  Breasts: Deferred  Genitourinary: deferred  Rectal: Deferred    All Labs/EKG/Radiology/Meds reviewed by me                          8.6    57.10 )-----------( 172      ( 2022 07:01 )             26.4       CBC Full  -  ( 2022 07:01 )  WBC Count : 57.10 K/uL  RBC Count : 2.78 M/uL  Hemoglobin : 8.6 g/dL  Hematocrit : 26.4 %  Platelet Count - Automated : 172 K/uL  Mean Cell Volume : 95.0 fl  Mean Cell Hemoglobin : 30.9 pg  Mean Cell Hemoglobin Concentration : 32.6 gm/dL  Auto Neutrophil # : x  Auto Lymphocyte # : x  Auto Monocyte # : x  Auto Eosinophil # : x  Auto Basophil # : x  Auto Neutrophil % : x  Auto Lymphocyte % : x  Auto Monocyte % : x  Auto Eosinophil % : x  Auto Basophil % : x      07-27    136  |  104  |  68<H>  ----------------------------<  132<H>  5.0   |  27  |  0.91    Ca    9.1      2022 17:39  Mg     2.2         TPro  6.3  /  Alb  2.8<L>  /  TBili  0.2  /  DBili  x   /  AST  23  /  ALT  20  /  AlkPhos  92        LIVER FUNCTIONS - ( 2022 17:39 )  Alb: 2.8 g/dL / Pro: 6.3 gm/dL / ALK PHOS: 92 U/L / ALT: 20 U/L / AST: 23 U/L / GGT: x             PT/INR - ( 2022 17:39 )   PT: 12.9 sec;   INR: 1.11 ratio         PTT - ( 2022 17:39 )  PTT:26.3 sec          Urinalysis Basic - ( 2022 17:56 )    Color: Yellow / Appearance: Slightly Turbid / S.010 / pH: x  Gluc: x / Ketone: Negative  / Bili: Negative / Urobili: Negative   Blood: x / Protein: 15 / Nitrite: Negative   Leuk Esterase: Trace / RBC: 0-2 /HPF / WBC 0-2   Sq Epi: x / Non Sq Epi: Many / Bacteria: Moderate            MEDICATIONS  (STANDING):  azithromycin  IVPB      azithromycin  IVPB 500 milliGRAM(s) IV Intermittent every 24 hours  cefTRIAXone   IVPB 1000 milliGRAM(s) IV Intermittent every 24 hours    MEDICATIONS  (PRN):  acetaminophen     Tablet .. 650 milliGRAM(s) Oral every 6 hours PRN Temp greater or equal to 38C (100.4F), Mild Pain (1 - 3)  aluminum hydroxide/magnesium hydroxide/simethicone Suspension 30 milliLiter(s) Oral every 4 hours PRN Dyspepsia  melatonin 3 milliGRAM(s) Oral at bedtime PRN Insomnia  ondansetron Injectable 4 milliGRAM(s) IV Push every 8 hours PRN Nausea and/or Vomiting

## 2022-07-30 NOTE — PROGRESS NOTE ADULT - ASSESSMENT
7/30/2022  Covering for Dr Davidson   89 yo F, history of CLL and Colon cancer  Admitted due to hypotension, weakness, Hgb 5.7, s/p transfusion  found to be anemic and has underlying CLL  CT Chest reviewed from 7/27-   There are bilateral, predominantly peripheral   patchy foci of consolidation and branching opacities. Bilateral   bronchiectasis best shown in the right middle lobe, lingula, and right   lower lobe. There is opacification of the distal segmental airways, best shown in the   right middle lobe, lingula and right lower lobe.  Currently on Ceftriaxone and Azithromycin  Discussed with daughter  Will continue to monitor

## 2022-07-31 LAB
HCT VFR BLD CALC: 28.4 % — LOW (ref 34.5–45)
HGB BLD-MCNC: 9 G/DL — LOW (ref 11.5–15.5)
MCHC RBC-ENTMCNC: 31.7 GM/DL — LOW (ref 32–36)
MCHC RBC-ENTMCNC: 31.8 PG — SIGNIFICANT CHANGE UP (ref 27–34)
MCV RBC AUTO: 100.4 FL — HIGH (ref 80–100)
OB PNL STL: POSITIVE
PLATELET # BLD AUTO: 168 K/UL — SIGNIFICANT CHANGE UP (ref 150–400)
RBC # BLD: 2.83 M/UL — LOW (ref 3.8–5.2)
RBC # FLD: 21.2 % — HIGH (ref 10.3–14.5)
WBC # BLD: 54.11 K/UL — CRITICAL HIGH (ref 3.8–10.5)
WBC # FLD AUTO: 54.11 K/UL — CRITICAL HIGH (ref 3.8–10.5)

## 2022-07-31 PROCEDURE — 99223 1ST HOSP IP/OBS HIGH 75: CPT

## 2022-07-31 PROCEDURE — 99232 SBSQ HOSP IP/OBS MODERATE 35: CPT

## 2022-07-31 RX ADMIN — AZITHROMYCIN 255 MILLIGRAM(S): 500 TABLET, FILM COATED ORAL at 09:17

## 2022-07-31 RX ADMIN — POLYETHYLENE GLYCOL 3350 17 GRAM(S): 17 POWDER, FOR SOLUTION ORAL at 09:18

## 2022-07-31 RX ADMIN — CEFTRIAXONE 100 MILLIGRAM(S): 500 INJECTION, POWDER, FOR SOLUTION INTRAMUSCULAR; INTRAVENOUS at 23:47

## 2022-07-31 NOTE — PROGRESS NOTE ADULT - ASSESSMENT
7/31/2022  Covering for Dr Davidson   89 yo F, history of CLL and Colon cancer  Admitted due to hypotension, weakness, Hgb 5.7, s/p transfusion  found to be anemic and has underlying CLL as well as colon cancer,   CT Chest reviewed from 7/27-   There are bilateral, predominantly peripheral   patchy foci of consolidation and branching opacities. Bilateral   bronchiectasis best shown in the right middle lobe, lingula, and right   lower lobe. There is opacification of the distal segmental airways, best shown in the   right middle lobe, lingula and right lower lobe.  Currently on Ceftriaxone and Azithromycin  Emphysematous changes/hyperinflation noted and she was started on Spiriva  Discussed with daughter extensively  Will continue to monitor   Dr Davidson to resume coverage 8/1

## 2022-07-31 NOTE — PROGRESS NOTE ADULT - SUBJECTIVE AND OBJECTIVE BOX
7/29: no new complaints    7/30: c/o weakness  Pt and daughter not in favor of rehab    7/31: Had BM  Hb stable  FOBT +  looks better today, no complaints    PHYSICAL EXAM:    Vital Signs Last 24 Hrs  T(C): 36.3 (31 Jul 2022 08:43), Max: 36.8 (30 Jul 2022 15:50)  T(F): 97.4 (31 Jul 2022 08:43), Max: 98.2 (30 Jul 2022 15:50)  HR: 66 (31 Jul 2022 08:43) (66 - 87)  BP: 90/49 (31 Jul 2022 08:43) (90/49 - 111/54)  BP(mean): --  RR: 18 (31 Jul 2022 08:43) (18 - 18)  SpO2: 96% (31 Jul 2022 08:43) (96% - 97%)    Parameters below as of 31 Jul 2022 08:43  Patient On (Oxygen Delivery Method): room air            Constitutional: Well  appearing  HEENT: Atraumatic, SHAYY,   Respiratory: Breath Sounds normal, no rhonchi/wheeze  Cardiovascular: N S1S2;   Gastrointestinal: Abdomen soft, non tender, Bowel Sounds present  Extremities: No edema, peripheral pulses present  Neurological: AAO x 3, no gross focal motor deficits  Skin: Non cellulitic, no rash, ulcers  Lymph Nodes: No lymphadenopathy noted  Back: No CVA tenderness   Musculoskeletal: non tender  Breasts: Deferred  Genitourinary: deferred  Rectal: Deferred    All Labs/EKG/Radiology/Meds reviewed by me                                   9.0    54.11 )-----------( 168      ( 31 Jul 2022 04:08 )             28.4                8.6    57.10 )-----------( 172      ( 29 Jul 2022 07:01 )             26.4       CBC Full  -  ( 29 Jul 2022 07:01 )  WBC Count : 57.10 K/uL  RBC Count : 2.78 M/uL  Hemoglobin : 8.6 g/dL  Hematocrit : 26.4 %  Platelet Count - Automated : 172 K/uL  Mean Cell Volume : 95.0 fl  Mean Cell Hemoglobin : 30.9 pg  Mean Cell Hemoglobin Concentration : 32.6 gm/dL  Auto Neutrophil # : x  Auto Lymphocyte # : x  Auto Monocyte # : x  Auto Eosinophil # : x  Auto Basophil # : x  Auto Neutrophil % : x  Auto Lymphocyte % : x  Auto Monocyte % : x  Auto Eosinophil % : x  Auto Basophil % : x      07-27    136  |  104  |  68<H>  ----------------------------<  132<H>  5.0   |  27  |  0.91    Ca    9.1      27 Jul 2022 17:39  Mg     2.2     07-27    TPro  6.3  /  Alb  2.8<L>  /  TBili  0.2  /  DBili  x   /  AST  23  /  ALT  20  /  AlkPhos  92  07-27      LIVER FUNCTIONS - ( 27 Jul 2022 17:39 )  Alb: 2.8 g/dL / Pro: 6.3 gm/dL / ALK PHOS: 92 U/L / ALT: 20 U/L / AST: 23 U/L / GGT: x             PT/INR - ( 27 Jul 2022 17:39 )   PT: 12.9 sec;   INR: 1.11 ratio         PTT - ( 27 Jul 2022 17:39 )  PTT:26.3 sec      MEDICATIONS  (STANDING):  ALBUTerol    90 MICROgram(s) HFA Inhaler 2 Puff(s) Inhalation every 8 hours  azithromycin  IVPB      azithromycin  IVPB 500 milliGRAM(s) IV Intermittent every 24 hours  cefTRIAXone   IVPB 1000 milliGRAM(s) IV Intermittent every 24 hours  polyethylene glycol 3350 17 Gram(s) Oral daily  tiotropium 18 MICROgram(s) Capsule 1 Capsule(s) Inhalation daily    MEDICATIONS  (PRN):  acetaminophen     Tablet .. 650 milliGRAM(s) Oral every 6 hours PRN Temp greater or equal to 38C (100.4F), Mild Pain (1 - 3)  aluminum hydroxide/magnesium hydroxide/simethicone Suspension 30 milliLiter(s) Oral every 4 hours PRN Dyspepsia  magnesium hydroxide Suspension 30 milliLiter(s) Oral daily PRN Constipation  melatonin 3 milliGRAM(s) Oral at bedtime PRN Insomnia  ondansetron Injectable 4 milliGRAM(s) IV Push every 8 hours PRN Nausea and/or Vomiting   7/29: no new complaints    7/30: c/o weakness  Pt and daughter not in favor of rehab    7/31: Had BM  Hb stable  FOBT +; CRS consult with Dr. Martin  looks better today, no complaints    PHYSICAL EXAM:    Vital Signs Last 24 Hrs  T(C): 36.3 (31 Jul 2022 08:43), Max: 36.8 (30 Jul 2022 15:50)  T(F): 97.4 (31 Jul 2022 08:43), Max: 98.2 (30 Jul 2022 15:50)  HR: 66 (31 Jul 2022 08:43) (66 - 87)  BP: 90/49 (31 Jul 2022 08:43) (90/49 - 111/54)  BP(mean): --  RR: 18 (31 Jul 2022 08:43) (18 - 18)  SpO2: 96% (31 Jul 2022 08:43) (96% - 97%)    Parameters below as of 31 Jul 2022 08:43  Patient On (Oxygen Delivery Method): room air            Constitutional: Well  appearing  HEENT: Atraumatic, SHAYY,   Respiratory: Breath Sounds normal, no rhonchi/wheeze  Cardiovascular: N S1S2;   Gastrointestinal: Abdomen soft, non tender, Bowel Sounds present  Extremities: No edema, peripheral pulses present  Neurological: AAO x 3, no gross focal motor deficits  Skin: Non cellulitic, no rash, ulcers  Lymph Nodes: No lymphadenopathy noted  Back: No CVA tenderness   Musculoskeletal: non tender  Breasts: Deferred  Genitourinary: deferred  Rectal: Deferred    All Labs/EKG/Radiology/Meds reviewed by me                                   9.0    54.11 )-----------( 168      ( 31 Jul 2022 04:08 )             28.4                8.6    57.10 )-----------( 172      ( 29 Jul 2022 07:01 )             26.4       CBC Full  -  ( 29 Jul 2022 07:01 )  WBC Count : 57.10 K/uL  RBC Count : 2.78 M/uL  Hemoglobin : 8.6 g/dL  Hematocrit : 26.4 %  Platelet Count - Automated : 172 K/uL  Mean Cell Volume : 95.0 fl  Mean Cell Hemoglobin : 30.9 pg  Mean Cell Hemoglobin Concentration : 32.6 gm/dL  Auto Neutrophil # : x  Auto Lymphocyte # : x  Auto Monocyte # : x  Auto Eosinophil # : x  Auto Basophil # : x  Auto Neutrophil % : x  Auto Lymphocyte % : x  Auto Monocyte % : x  Auto Eosinophil % : x  Auto Basophil % : x      07-27    136  |  104  |  68<H>  ----------------------------<  132<H>  5.0   |  27  |  0.91    Ca    9.1      27 Jul 2022 17:39  Mg     2.2     07-27    TPro  6.3  /  Alb  2.8<L>  /  TBili  0.2  /  DBili  x   /  AST  23  /  ALT  20  /  AlkPhos  92  07-27      LIVER FUNCTIONS - ( 27 Jul 2022 17:39 )  Alb: 2.8 g/dL / Pro: 6.3 gm/dL / ALK PHOS: 92 U/L / ALT: 20 U/L / AST: 23 U/L / GGT: x             PT/INR - ( 27 Jul 2022 17:39 )   PT: 12.9 sec;   INR: 1.11 ratio         PTT - ( 27 Jul 2022 17:39 )  PTT:26.3 sec      MEDICATIONS  (STANDING):  ALBUTerol    90 MICROgram(s) HFA Inhaler 2 Puff(s) Inhalation every 8 hours  azithromycin  IVPB      azithromycin  IVPB 500 milliGRAM(s) IV Intermittent every 24 hours  cefTRIAXone   IVPB 1000 milliGRAM(s) IV Intermittent every 24 hours  polyethylene glycol 3350 17 Gram(s) Oral daily  tiotropium 18 MICROgram(s) Capsule 1 Capsule(s) Inhalation daily    MEDICATIONS  (PRN):  acetaminophen     Tablet .. 650 milliGRAM(s) Oral every 6 hours PRN Temp greater or equal to 38C (100.4F), Mild Pain (1 - 3)  aluminum hydroxide/magnesium hydroxide/simethicone Suspension 30 milliLiter(s) Oral every 4 hours PRN Dyspepsia  magnesium hydroxide Suspension 30 milliLiter(s) Oral daily PRN Constipation  melatonin 3 milliGRAM(s) Oral at bedtime PRN Insomnia  ondansetron Injectable 4 milliGRAM(s) IV Push every 8 hours PRN Nausea and/or Vomiting   7/29: no new complaints    7/30: c/o weakness  Pt and daughter not in favor of rehab    7/31: Had BM  Hb stable  FOBT +; CRS consult with Dr. Martin; spoke with covering doc; suggested GI eval   looks better today, no complaints    PHYSICAL EXAM:    Vital Signs Last 24 Hrs  T(C): 36.3 (31 Jul 2022 08:43), Max: 36.8 (30 Jul 2022 15:50)  T(F): 97.4 (31 Jul 2022 08:43), Max: 98.2 (30 Jul 2022 15:50)  HR: 66 (31 Jul 2022 08:43) (66 - 87)  BP: 90/49 (31 Jul 2022 08:43) (90/49 - 111/54)  BP(mean): --  RR: 18 (31 Jul 2022 08:43) (18 - 18)  SpO2: 96% (31 Jul 2022 08:43) (96% - 97%)    Parameters below as of 31 Jul 2022 08:43  Patient On (Oxygen Delivery Method): room air            Constitutional: Well  appearing  HEENT: Atraumatic, SHAYY,   Respiratory: Breath Sounds normal, no rhonchi/wheeze  Cardiovascular: N S1S2;   Gastrointestinal: Abdomen soft, non tender, Bowel Sounds present  Extremities: No edema, peripheral pulses present  Neurological: AAO x 3, no gross focal motor deficits  Skin: Non cellulitic, no rash, ulcers  Lymph Nodes: No lymphadenopathy noted  Back: No CVA tenderness   Musculoskeletal: non tender  Breasts: Deferred  Genitourinary: deferred  Rectal: Deferred    All Labs/EKG/Radiology/Meds reviewed by me                                   9.0    54.11 )-----------( 168      ( 31 Jul 2022 04:08 )             28.4                8.6    57.10 )-----------( 172      ( 29 Jul 2022 07:01 )             26.4       CBC Full  -  ( 29 Jul 2022 07:01 )  WBC Count : 57.10 K/uL  RBC Count : 2.78 M/uL  Hemoglobin : 8.6 g/dL  Hematocrit : 26.4 %  Platelet Count - Automated : 172 K/uL  Mean Cell Volume : 95.0 fl  Mean Cell Hemoglobin : 30.9 pg  Mean Cell Hemoglobin Concentration : 32.6 gm/dL  Auto Neutrophil # : x  Auto Lymphocyte # : x  Auto Monocyte # : x  Auto Eosinophil # : x  Auto Basophil # : x  Auto Neutrophil % : x  Auto Lymphocyte % : x  Auto Monocyte % : x  Auto Eosinophil % : x  Auto Basophil % : x      07-27    136  |  104  |  68<H>  ----------------------------<  132<H>  5.0   |  27  |  0.91    Ca    9.1      27 Jul 2022 17:39  Mg     2.2     07-27    TPro  6.3  /  Alb  2.8<L>  /  TBili  0.2  /  DBili  x   /  AST  23  /  ALT  20  /  AlkPhos  92  07-27      LIVER FUNCTIONS - ( 27 Jul 2022 17:39 )  Alb: 2.8 g/dL / Pro: 6.3 gm/dL / ALK PHOS: 92 U/L / ALT: 20 U/L / AST: 23 U/L / GGT: x             PT/INR - ( 27 Jul 2022 17:39 )   PT: 12.9 sec;   INR: 1.11 ratio         PTT - ( 27 Jul 2022 17:39 )  PTT:26.3 sec      MEDICATIONS  (STANDING):  ALBUTerol    90 MICROgram(s) HFA Inhaler 2 Puff(s) Inhalation every 8 hours  azithromycin  IVPB      azithromycin  IVPB 500 milliGRAM(s) IV Intermittent every 24 hours  cefTRIAXone   IVPB 1000 milliGRAM(s) IV Intermittent every 24 hours  polyethylene glycol 3350 17 Gram(s) Oral daily  tiotropium 18 MICROgram(s) Capsule 1 Capsule(s) Inhalation daily    MEDICATIONS  (PRN):  acetaminophen     Tablet .. 650 milliGRAM(s) Oral every 6 hours PRN Temp greater or equal to 38C (100.4F), Mild Pain (1 - 3)  aluminum hydroxide/magnesium hydroxide/simethicone Suspension 30 milliLiter(s) Oral every 4 hours PRN Dyspepsia  magnesium hydroxide Suspension 30 milliLiter(s) Oral daily PRN Constipation  melatonin 3 milliGRAM(s) Oral at bedtime PRN Insomnia  ondansetron Injectable 4 milliGRAM(s) IV Push every 8 hours PRN Nausea and/or Vomiting

## 2022-07-31 NOTE — PROGRESS NOTE ADULT - SUBJECTIVE AND OBJECTIVE BOX
Date of service: 22 @ 08:52    OOB to chair  Has dry cough  Denies pain  Appetite fair    ROS: no fever or chills; denies dizziness, no HA, no abdominal pain, no diarrhea or constipation; no dysuria, no legs pain, no rashes    MEDICATIONS  (STANDING):  ALBUTerol    90 MICROgram(s) HFA Inhaler 2 Puff(s) Inhalation every 8 hours  azithromycin  IVPB      azithromycin  IVPB 500 milliGRAM(s) IV Intermittent every 24 hours  cefTRIAXone   IVPB 1000 milliGRAM(s) IV Intermittent every 24 hours  polyethylene glycol 3350 17 Gram(s) Oral daily  tiotropium 18 MICROgram(s) Capsule 1 Capsule(s) Inhalation daily    Vital Signs Last 24 Hrs  T(C): 36.3 (2022 08:43), Max: 36.8 (2022 15:50)  T(F): 97.4 (2022 08:43), Max: 98.2 (2022 15:50)  HR: 66 (2022 08:43) (66 - 92)  BP: 90/49 (2022 08:43) (90/49 - 114/67)  BP(mean): --  RR: 18 (2022 08:43) (18 - 18)  SpO2: 96% (2022 08:43) (94% - 97%)    Parameters below as of 2022 08:43  Patient On (Oxygen Delivery Method): room air         Physical exam:    Constitutional:  No acute distress  HEENT: NC/AT, EOMI, PERRLA, conjunctivae clear; ears and nose atraumatic  Neck: supple; thyroid not palpable  Back: no tenderness  Respiratory: respiratory effort normal; crackles at bases  Cardiovascular: S1S2 regular, no murmurs  Abdomen: soft, not tender, not distended, positive BS  Genitourinary: no suprapubic tenderness  Lymphatic: no LN palpable  Musculoskeletal: no muscle tenderness, no joint swelling or tenderness  Extremities: no pedal edema  Neurological/ Psychiatric: AxOx3, moving all extremities  Skin: no rashes; no palpable lesions    Labs: reviewed                        9.0    54.11 )-----------( 168      ( 2022 04:08 )             28.4     D-Dimer Assay, Quantitative: 729 ng/mL DDU (22 @ 17:39)                        8.6    57.10 )-----------( 172      ( 2022 07:01 )             26.4     D-Dimer Assay, Quantitative: 729 ng/mL DDU (22 @ 17:39)                        7.6    69.38 )-----------( 194      ( 2022 12:45 )             24.5         136  |  104  |  68<H>  ----------------------------<  132<H>  5.0   |  27  |  0.91    Ca    9.1      2022 17:39  Mg     2.2         TPro  6.3  /  Alb  2.8<L>  /  TBili  0.2  /  DBili  x   /  AST  23  /  ALT  20  /  AlkPhos  92       LIVER FUNCTIONS - ( 2022 17:39 )  Alb: 2.8 g/dL / Pro: 6.3 gm/dL / ALK PHOS: 92 U/L / ALT: 20 U/L / AST: 23 U/L / GGT: x           Urinalysis Basic - ( 2022 17:56 )    Color: Yellow / Appearance: Slightly Turbid / S.010 / pH: x  Gluc: x / Ketone: Negative  / Bili: Negative / Urobili: Negative   Blood: x / Protein: 15 / Nitrite: Negative   Leuk Esterase: Trace / RBC: 0-2 /HPF / WBC 0-2   Sq Epi: x / Non Sq Epi: Many / Bacteria: Moderate      Culture - Urine (collected 2022 17:56)  Source: Clean Catch Clean Catch (Midstream)  Final Report (2022 23:08):    No growth    Culture - Blood (collected 2022 17:39)  Source: .Blood None  Preliminary Report (2022 01:02):    No growth to date.    Culture - Blood (collected 2022 17:39)  Source: .Blood None  Preliminary Report (2022 01:02):    No growth to date.    COVID-19 PCR: NotDetec (22 @ 17:39)    Radiology: all available radiological tests reviewed    < from: CT Abdomen and Pelvis No Cont (22 @ 09:58) >  No retroperitoneal hemorrhage.  Bibasilar bronchiectasis and bibasilar opacities which could be on the   basis of chronic lung disease and fibrosis versus acute pneumonia.  < end of copied text >    Advanced directives addressed: full resuscitation

## 2022-07-31 NOTE — CONSULT NOTE ADULT - ASSESSMENT
89yo female with anemia, hx partial colectomy for colon cancer    No evidence of significant active GI bleeding  While she tolerated her surgery very well, she is more frail and debilitated than at time of surgery  She has lost at least 10 pounds since then, and had recent pneumonia  as such, I feel that the potential risks outweigh any potential benefits of endoscopic evaluation    I have spoken with her daughter and Dr. Martin who are in agreement with our plan    d/c planning with close outpt f/u with Radha Donis and Veronica

## 2022-07-31 NOTE — CONSULT NOTE ADULT - SUBJECTIVE AND OBJECTIVE BOX
Patient is a 89y old  Female who presents with a chief complaint of SOB (31 Jul 2022 08:52)      HPI:  Pt is 89 y/o elderly frail female with extensive past medical admitted in the hospital with hypotension and shortness of breath. Pt is s/p recent admission to  with RLL pneumonia. Shew was found or have hgb 5.7 and transfused with current hgb 9  No melena or rectal bleeding, hemoccult positive stool  She is feeling well and wishes to go home    PAST MEDICAL & SURGICAL HISTORY:  CLL (chronic lymphocytic leukemia)    MEDICATIONS  (STANDING):  ALBUTerol    90 MICROgram(s) HFA Inhaler 2 Puff(s) Inhalation every 8 hours  azithromycin  IVPB      azithromycin  IVPB 500 milliGRAM(s) IV Intermittent every 24 hours  cefTRIAXone   IVPB 1000 milliGRAM(s) IV Intermittent every 24 hours  polyethylene glycol 3350 17 Gram(s) Oral daily  tiotropium 18 MICROgram(s) Capsule 1 Capsule(s) Inhalation daily    MEDICATIONS  (PRN):  acetaminophen     Tablet .. 650 milliGRAM(s) Oral every 6 hours PRN Temp greater or equal to 38C (100.4F), Mild Pain (1 - 3)  aluminum hydroxide/magnesium hydroxide/simethicone Suspension 30 milliLiter(s) Oral every 4 hours PRN Dyspepsia  magnesium hydroxide Suspension 30 milliLiter(s) Oral daily PRN Constipation  melatonin 3 milliGRAM(s) Oral at bedtime PRN Insomnia  ondansetron Injectable 4 milliGRAM(s) IV Push every 8 hours PRN Nausea and/or Vomiting      Allergies    cefdinir (Diarrhea)    Intolerances    SOCIAL HISTORY:  no etoh  FAMILY HISTORY:  Known health problems: none    REVIEW OF SYSTEMS:    CONSTITUTIONAL: No weakness, fevers or chills  EYES/ENT: No visual changes;  No vertigo or throat pain   NECK: No pain or stiffness  RESPIRATORY: No cough, wheezing, hemoptysis; SOB improved, she denies currently  CARDIOVASCULAR: No chest pain or palpitations  GASTROINTESTINAL: No abdominal or epigastric pain. No nausea, vomiting, or hematemesis; No diarrhea or constipation. No melena or hematochezia.  GENITOURINARY: No dysuria, frequency or hematuria  NEUROLOGICAL: No numbness or weakness  SKIN: No itching, burning, rashes, or lesions   PSYCH: Normal mood and affect  All other review of systems is negative unless indicated above.    Vital Signs Last 24 Hrs  T(C): 37 (31 Jul 2022 21:17), Max: 37 (31 Jul 2022 21:17)  T(F): 98.6 (31 Jul 2022 21:17), Max: 98.6 (31 Jul 2022 21:17)  HR: 68 (31 Jul 2022 21:17) (66 - 72)  BP: 101/56 (31 Jul 2022 21:17) (90/49 - 111/54)  BP(mean): --  RR: 18 (31 Jul 2022 21:17) (18 - 18)  SpO2: 96% (31 Jul 2022 21:17) (96% - 97%)    Parameters below as of 31 Jul 2022 21:17  Patient On (Oxygen Delivery Method): room air    PHYSICAL EXAM:  Constitutional: very thin female NAD  HEENT: MMM  Neck: No LAD  Respiratory: CTA  Cardiovascular: S1 and S2  Gastrointestinal: BS+, soft, NT/ND  Extremities: No peripheral edema  Neurological: A/O x 3, no focal deficits  Psychiatric: Normal mood, normal affect  Skin: No rashes    LABS:                        9.0    54.11 )-----------( 168      ( 31 Jul 2022 04:08 )             28.4                 RADIOLOGY & ADDITIONAL STUDIES:

## 2022-07-31 NOTE — PROGRESS NOTE ADULT - SUBJECTIVE AND OBJECTIVE BOX
Patient is a 89y old  Female who presents with a chief complaint of     HPI:pt is s/p rercent admission to  with RLL pneumonia  she was treated with course of antibiotics and discharged on oral Rx  now seen by Dr Ramires as outpt and sent back to ED for hypotension  found to be anemic and has underlying CLL  s/p heme eval  also hx colon ca  Rib fx seen on ct scan  films reviewed      No acute pulmonary events occurred overnight  Discussed with daughter     PAST MEDICAL & SURGICAL HISTORY:  CLL (chronic lymphocytic leukemia)  colon ca hx  s/p RLL pneumonia      MEDICATIONS  (STANDING):  ALBUTerol    90 MICROgram(s) HFA Inhaler 2 Puff(s) Inhalation every 8 hours  azithromycin  IVPB      azithromycin  IVPB 500 milliGRAM(s) IV Intermittent every 24 hours  cefTRIAXone   IVPB 1000 milliGRAM(s) IV Intermittent every 24 hours  polyethylene glycol 3350 17 Gram(s) Oral daily  tiotropium 18 MICROgram(s) Capsule 1 Capsule(s) Inhalation daily    MEDICATIONS  (PRN):  acetaminophen     Tablet .. 650 milliGRAM(s) Oral every 6 hours PRN Temp greater or equal to 38C (100.4F), Mild Pain (1 - 3)  aluminum hydroxide/magnesium hydroxide/simethicone Suspension 30 milliLiter(s) Oral every 4 hours PRN Dyspepsia  magnesium hydroxide Suspension 30 milliLiter(s) Oral daily PRN Constipation  melatonin 3 milliGRAM(s) Oral at bedtime PRN Insomnia  ondansetron Injectable 4 milliGRAM(s) IV Push every 8 hours PRN Nausea and/or Vomiting        MEDICATIONS  (STANDING):    PREVIOUS DIAGNOSTIC TESTING:      MEDICATIONS  (STANDING):    MEDICATIONS  (PRN):      FAMILY HISTORY:  Known health problems: none    Vital Signs Last 24 Hrs  T(C): 36.3 (2022 08:43), Max: 36.8 (2022 15:50)  T(F): 97.4 (2022 08:43), Max: 98.2 (2022 15:50)  HR: 66 (2022 08:43) (66 - 87)  BP: 90/49 (2022 08:43) (90/49 - 111/54)  BP(mean): --  RR: 18 (2022 08:43) (18 - 18)  SpO2: 96% (2022 08:43) (96% - 97%)    Parameters below as of 2022 08:43  Patient On (Oxygen Delivery Method): room air      I&O's Summary    PHYSICAL EXAM  elderly female resting in bed / no distress  General Appearance: cooperative, no acute distress,   HEENT: PERRL, conjunctiva clear, EOM's intact,   Neck: Supple, , no adenopathy  Lungs: bilateral lower lobe rhonchi R>L  Heart: Regular rate and rhythm, S1, S2 normal  Abdomen: Soft, non-tender, bowel sounds active  Extremities: no cyanosis or edema, no joint swelling  Neurologic: Alert and oriented X3     ECG:    LABS:                        8.6    x     )-----------( 172      ( 2022 07:01 )             26.4   07-27    136  |  104  |  68<H>  ----------------------------<  132<H>  5.0   |  27  |  0.91    Ca    9.1      2022 17:39  Mg     2.2     07-27    TPro  6.3  /  Alb  2.8<L>  /  TBili  0.2  /  DBili  x   /  AST  23  /  ALT  20  /  AlkPhos  92  07-27                          5.7    73.71 )-----------( 215      ( 2022 17:39 )             19.0     07-27    136  |  104  |  68<H>  ----------------------------<  132<H>  5.0   |  27  |  0.91    Ca    9.1      2022 17:39  Mg     2.2     07-27    TPro  6.3  /  Alb  2.8<L>  /  TBili  0.2  /  DBili  x   /  AST  23  /  ALT  20  /  AlkPhos  92  07-27          Pro BNP  532 07-27 @ 17:39  D Dimer  729 -27 @ 17:39    PT/INR - ( 2022 17:39 )   PT: 12.9 sec;   INR: 1.11 ratio         PTT - ( 2022 17:39 )  PTT:26.3 sec  Urinalysis Basic - ( 2022 17:56 )    Color: Yellow / Appearance: Slightly Turbid / S.010 / pH: x  Gluc: x / Ketone: Negative  / Bili: Negative / Urobili: Negative   Blood: x / Protein: 15 / Nitrite: Negative   Leuk Esterase: Trace / RBC: 0-2 /HPF / WBC 0-2   Sq Epi: x / Non Sq Epi: Many / Bacteria: Moderate      < from: CT Angio Chest PE Protocol w/ IV Cont (22 @ 18:32) >  COMPARISON: Radiograph 2022.    FINDINGS:    Pulmonary Artery:  There is no main, central, lobar, segmental, or   subsegmental pulmonary embolus.    Tubes/Lines: None.    Lungs, airways and pleura: There are bilateral, predominantly peripheral   patchy foci of consolidation and branching opacities. Bilateral   bronchiectasis best shown in the right middle lobe, lingula, and right   lower lobe.    There is opacification of the distal segmental airways, best shown in the   right middle lobe, lingula and right lower lobe.    The pleura is normal.    Mediastinum: The thyroid gland is normal. The esophagus is unremarkable.   There are no enlarged chest lymph nodes.    Left atrium is enlarged. The remainder of the heart is normal in size.   Mitral annular calcification. Aortic valve calcification. Left-sided   aortic arch and left-sided descending thoracic aorta. Aortic   calcifications. The aorta is tortuous.    Upper Abdomen: The hypodense right lobe hepatic lesion is statistically   benign and likely represents a cyst.    Bones And Soft Tissues: Spondylosis of the thoracic spine.  The bones are   qualitatively osteopenic. Compression deformities of the T10 and T12   vertebral bodies involving greater than 50% of the vertebral body height.   L1 vertebral body hemangioma.    Fractures of the left anterior eleventh and twelfth ribs. Healing left   anterior tenth rib fracture.      IMPRESSION:    1.  No pulmonary embolus.  2.  Bilateral patchy foci consolidation, bronchiectasis, and branching   opacities.  3.  Fractures of the left eleventh and twelfth ribs.  4.  Healing left anterior tenth rib fracture.    < end of copied text >  < from: Xray Chest 1 View- PORTABLE-Urgent (Xray Chest 1 View- PORTABLE-Urgent .) (22 @ 18:59) >  COMPARISON: None. .    FINDINGS:  CATHETERS AND TUBES: None    PULMONARY: RIGHT basilar multifocal airspace consolidations . RIGHT upper   zone and LEFT lung parenchyma grossly clear.  No pneumothorax.    HEART/VASCULAR: The  heart is enlarged in transverse diameter.     BONES: Visualized osseous structures are intact.    IMPRESSION:   RIGHT basilar multifocal airspace consolidations.    < end of copied text >        RADIOLOGY & ADDITIONAL STUDIES:

## 2022-07-31 NOTE — PROGRESS NOTE ADULT - ASSESSMENT
Pt is 89 y/o elderly frail female with extensive past medical admitted in the hospital with increased HRs /  low Blood pressures and shortness of breath. Pt is s/p rercent admission to  with RLL pneumonia. she was treated with course of antibiotics and discharged on oral Rx. In the ED -  patient was noted to be hypotensive. Workup suggested patient being very anemic with HH 5.7. Patient was transfused 2 units of PRBCs with great response to transfusions.   Assessment and Plan:   Problem/Plan - 1:  ·  Problem: Hypotension.   ·  Plan: - this is secondary to anemia  - after the transfusion - responded to transfusion well , SBP still around 90 but she is asymptomatic  - Close monitoring of HH  - CT abdomen /  pelvis r/o retroperitoneal bleed; neg    Problem/Plan - 2:  ·  Problem: Gram-negative pneumonia.   ·  Plan: - continue with IV cefepime; may switch to oral on 8/1  - currently not on O2  - patient is being follow by Dr. Davidson.    Problem/Plan - 3:  ·  Problem: Anemia.   ·  Plan: # Acute on chronic anemia    - Hb was 5.7   - 2 units of PRBCs; now 9  -  FOBT +  - Surgeon Femi Martin   - Monitor .    Problem/Plan - 4:  ·  Problem: CLL (chronic lymphocytic leukemia).   ·  Plan: - has not had CLL directed therapy  - would check Qu[facility]   - may benefit from possible IVIG as out patient   - WBC 73 - baseline 40-60.    Problem/Plan - 5:  ·  Problem: Adult failure to thrive.   ·  Plan: - supportive care - palliative care evaluation.    Problem/Plan - 6:  ·  Problem: Colonic cancer.   ·  Plan: # h/o stage III colon CA KRAS wild type left­sided  - originally diagnosed with braf wild type, kras wild stype stage IIIa disease s/p colectomy- discussion held regarding adjuvant chemotherapy but plan to hold off.    Problem/Plan - 7:  ·  Problem: Multiple rib fractures.   ·  Plan: - Fractures of the left eleventh and twelfth ribs.    Problem/Plan - 8:  Abn TRops: cardio eval  likely demand ischemia    poc discussed with pt, her daughter, Kat, team     DISPO: f/u H/H in am; if BP and H/H  stable and feeling OK, she would like to go home.    Pt is 89 y/o elderly frail female with extensive past medical admitted in the hospital with increased HRs /  low Blood pressures and shortness of breath. Pt is s/p rercent admission to  with RLL pneumonia. she was treated with course of antibiotics and discharged on oral Rx. In the ED -  patient was noted to be hypotensive. Workup suggested patient being very anemic with HH 5.7. Patient was transfused 2 units of PRBCs with great response to transfusions.   Assessment and Plan:   Problem/Plan - 1:  ·  Problem: Hypotension.   ·  Plan: - this is secondary to anemia  - after the transfusion - responded to transfusion well , SBP still around 90 but she is asymptomatic  - Close monitoring of HH  - CT abdomen /  pelvis r/o retroperitoneal bleed; neg    Problem/Plan - 2:  ·  Problem: Gram-negative pneumonia.   ·  Plan: - continue with IV cefepime; may switch to oral on 8/1  - currently not on O2  - patient is being follow by Dr. Davidson.    Problem/Plan - 3:  ·  Problem: Anemia.   ·  Plan: # Acute on chronic anemia    - Hb was 5.7   - 2 units of PRBCs; now 9  -  FOBT +  - Surgeon Femi Martin   - Monitor HH.  f/u CRSx consult with Dr. Martin    Problem/Plan - 4:  ·  Problem: CLL (chronic lymphocytic leukemia).   ·  Plan: - has not had CLL directed therapy  - would check [place]   - may benefit from possible IVIG as out patient   - WBC 73 - baseline 40-60.    Problem/Plan - 5:  ·  Problem: Adult failure to thrive.   ·  Plan: - supportive care - palliative care evaluation.    Problem/Plan - 6:  ·  Problem: Colonic cancer.   ·  Plan: # h/o stage III colon CA KRAS wild type left­sided  - originally diagnosed with braf wild type, kras wild stype stage IIIa disease s/p colectomy- discussion held regarding adjuvant chemotherapy but plan to hold off.    Problem/Plan - 7:  ·  Problem: Multiple rib fractures.   ·  Plan: - Fractures of the left eleventh and twelfth ribs.    Problem/Plan - 8:  Abn TRops: cardio eval  likely demand ischemia    poc discussed with pt, her daughter, Kat, team     DISPO: f/u H/H in am; f/u CRSx consult with Dr. Martin; if BP and H/H  stable and feeling OK, she would like to go home.    Pt is 89 y/o elderly frail female with extensive past medical admitted in the hospital with increased HRs /  low Blood pressures and shortness of breath. Pt is s/p rercent admission to  with RLL pneumonia. she was treated with course of antibiotics and discharged on oral Rx. In the ED -  patient was noted to be hypotensive. Workup suggested patient being very anemic with HH 5.7. Patient was transfused 2 units of PRBCs with great response to transfusions.   Assessment and Plan:   Problem/Plan - 1:  ·  Problem: Hypotension.   ·  Plan: - this is secondary to anemia  - after the transfusion - responded to transfusion well , SBP still around 90 but she is asymptomatic  - Close monitoring of HH  - CT abdomen /  pelvis r/o retroperitoneal bleed; neg    Problem/Plan - 2:  ·  Problem: Gram-negative pneumonia.   ·  Plan: - continue with IV cefepime; may switch to oral on 8/1  - currently not on O2  - patient is being follow by Dr. Davidson.    Problem/Plan - 3:  ·  Problem: Anemia.   ·  Plan: # Acute on chronic anemia    - Hb was 5.7   - 2 units of PRBCs; now 9  -  FOBT +  - Surgeon Femi Martin   - Monitor .  f/u CRSx consult with Dr. Martin    Problem/Plan - 4:  ·  Problem: CLL (chronic lymphocytic leukemia).   ·  Plan: - has not had CLL directed therapy  - would check [place]   - may benefit from possible IVIG as out patient   - WBC 73 - baseline 40-60.    Problem/Plan - 5:  ·  Problem: Adult failure to thrive.   ·  Plan: - supportive care - palliative care evaluation.    Problem/Plan - 6:  ·  Problem: Colonic cancer.   ·  Plan: # h/o stage III colon CA KRAS wild type left­sided  - originally diagnosed with braf wild type, kras wild stype stage IIIa disease s/p colectomy- discussion held regarding adjuvant chemotherapy but plan to hold off.    Problem/Plan - 7:  ·  Problem: Multiple rib fractures.   ·  Plan: - Fractures of the left eleventh and twelfth ribs.    Problem/Plan - 8:  Abn TRops: cardio eval  likely demand ischemia    poc discussed with pt, her daughter, Kat, team     DISPO: f/u H/H in am; f/u CRSx consult with Dr. Martin; GI consult;

## 2022-07-31 NOTE — PROGRESS NOTE ADULT - ASSESSMENT
89 y/o elderly frail female with CLL, recent hospitalization for pneumonia was admitted on 7/27 for increased weakness, increased HRs / low Blood pressures and shortness of breath. Pt is had rercent admission to  with RLL pneumonia and was treated with course of antibiotics and discharged on oral Rx. In the ED -  patient was noted to be hypotensive. Workup suggested patient being very anemic with HH 5.7. Patient was transfused 2 units of PRBCs with great response to transfusions. She received ceftriaxone and azithromycin.     1. Possible b/l pneumonia vs pulmonary fibrosis. CLL. Immunocompromised host.   -respiratory improved  -BC x 2, urine c/s noted   -on ceftriaxone 1 gm IV qd and azithromycin 500 mg IV qd # 4  -tolerating abx well so far; no side effects noted  -respiratory care  -continue abx coverage; plan to change to PO in AM  -monitor temps  -f/u CBC  -supportive care  2. Other issues:   -care per medicine

## 2022-08-01 ENCOUNTER — TRANSCRIPTION ENCOUNTER (OUTPATIENT)
Age: 87
End: 2022-08-01

## 2022-08-01 DIAGNOSIS — J18.9 PNEUMONIA, UNSPECIFIED ORGANISM: ICD-10-CM

## 2022-08-01 DIAGNOSIS — R77.8 OTHER SPECIFIED ABNORMALITIES OF PLASMA PROTEINS: ICD-10-CM

## 2022-08-01 DIAGNOSIS — R53.81 OTHER MALAISE: ICD-10-CM

## 2022-08-01 DIAGNOSIS — D64.9 ANEMIA, UNSPECIFIED: ICD-10-CM

## 2022-08-01 DIAGNOSIS — I27.20 PULMONARY HYPERTENSION, UNSPECIFIED: ICD-10-CM

## 2022-08-01 DIAGNOSIS — Z95.2 PRESENCE OF PROSTHETIC HEART VALVE: ICD-10-CM

## 2022-08-01 DIAGNOSIS — Z85.038 PERSONAL HISTORY OF OTHER MALIGNANT NEOPLASM OF LARGE INTESTINE: ICD-10-CM

## 2022-08-01 DIAGNOSIS — I24.8 OTHER FORMS OF ACUTE ISCHEMIC HEART DISEASE: ICD-10-CM

## 2022-08-01 DIAGNOSIS — C91.10 CHRONIC LYMPHOCYTIC LEUKEMIA OF B-CELL TYPE NOT HAVING ACHIEVED REMISSION: ICD-10-CM

## 2022-08-01 DIAGNOSIS — C91.11 CHRONIC LYMPHOCYTIC LEUKEMIA OF B-CELL TYPE IN REMISSION: ICD-10-CM

## 2022-08-01 DIAGNOSIS — E43 UNSPECIFIED SEVERE PROTEIN-CALORIE MALNUTRITION: ICD-10-CM

## 2022-08-01 LAB
HCT VFR BLD CALC: 28.1 % — LOW (ref 34.5–45)
HGB BLD-MCNC: 8.6 G/DL — LOW (ref 11.5–15.5)

## 2022-08-01 PROCEDURE — 99232 SBSQ HOSP IP/OBS MODERATE 35: CPT

## 2022-08-01 RX ORDER — LACTOBACILLUS ACIDOPHILUS 100MM CELL
1 CAPSULE ORAL
Refills: 0 | Status: DISCONTINUED | OUTPATIENT
Start: 2022-08-01 | End: 2022-08-02

## 2022-08-01 RX ORDER — SODIUM CHLORIDE 9 MG/ML
250 INJECTION INTRAMUSCULAR; INTRAVENOUS; SUBCUTANEOUS ONCE
Refills: 0 | Status: COMPLETED | OUTPATIENT
Start: 2022-08-01 | End: 2022-08-01

## 2022-08-01 RX ORDER — CEFUROXIME AXETIL 250 MG
500 TABLET ORAL EVERY 12 HOURS
Refills: 0 | Status: DISCONTINUED | OUTPATIENT
Start: 2022-08-01 | End: 2022-08-02

## 2022-08-01 RX ADMIN — ALBUTEROL 2 PUFF(S): 90 AEROSOL, METERED ORAL at 14:08

## 2022-08-01 RX ADMIN — Medication 500 MILLIGRAM(S): at 21:50

## 2022-08-01 RX ADMIN — Medication 1 TABLET(S): at 18:11

## 2022-08-01 RX ADMIN — TIOTROPIUM BROMIDE 1 CAPSULE(S): 18 CAPSULE ORAL; RESPIRATORY (INHALATION) at 14:08

## 2022-08-01 RX ADMIN — SODIUM CHLORIDE 250 MILLILITER(S): 9 INJECTION INTRAMUSCULAR; INTRAVENOUS; SUBCUTANEOUS at 10:22

## 2022-08-01 NOTE — DISCHARGE NOTE NURSING/CASE MANAGEMENT/SOCIAL WORK - PATIENT PORTAL LINK FT
You can access the FollowMyHealth Patient Portal offered by [facility] by registering at the following website: [email or web address]. By joining [facility]’s FollowMyHealth portal, you will also be able to view your health information using other applications (apps) compatible with our system.

## 2022-08-01 NOTE — PROGRESS NOTE ADULT - SUBJECTIVE AND OBJECTIVE BOX
Patient is a 89y old  Female who presents with a chief complaint of     HPI:pt is s/p rercent admission to  with RLL pneumonia  she was treated with course of antibiotics and discharged on oral Rx  now seen by Dr Ramires as outpt and sent back to ED for hypotension  found to be anemic and has underlying CLL  s/p heme eval  also hx colon ca  Rib fx seen on ct scan  films reviewed      No acute pulmonary events occurred overnight  Discussed with daughter     asks about going home  sitting OOB in chair  PAST MEDICAL & SURGICAL HISTORY:  CLL (chronic lymphocytic leukemia)  colon ca hx  s/p RLL pneumonia      MEDICATIONS  (STANDING):  ALBUTerol    90 MICROgram(s) HFA Inhaler 2 Puff(s) Inhalation every 8 hours  azithromycin  IVPB      azithromycin  IVPB 500 milliGRAM(s) IV Intermittent every 24 hours  cefTRIAXone   IVPB 1000 milliGRAM(s) IV Intermittent every 24 hours  polyethylene glycol 3350 17 Gram(s) Oral daily  tiotropium 18 MICROgram(s) Capsule 1 Capsule(s) Inhalation daily    MEDICATIONS  (PRN):  acetaminophen     Tablet .. 650 milliGRAM(s) Oral every 6 hours PRN Temp greater or equal to 38C (100.4F), Mild Pain (1 - 3)  aluminum hydroxide/magnesium hydroxide/simethicone Suspension 30 milliLiter(s) Oral every 4 hours PRN Dyspepsia  magnesium hydroxide Suspension 30 milliLiter(s) Oral daily PRN Constipation  melatonin 3 milliGRAM(s) Oral at bedtime PRN Insomnia  ondansetron Injectable 4 milliGRAM(s) IV Push every 8 hours PRN Nausea and/or Vomiting        MEDICATIONS  (STANDING):    PREVIOUS DIAGNOSTIC TESTING:      MEDICATIONS  (STANDING):    MEDICATIONS  (PRN):      FAMILY HISTORY:  Known health problems: none  Vital Signs Last 24 Hrs  T(C): 37 (2022 21:17), Max: 37 (2022 21:17)  T(F): 98.6 (2022 21:17), Max: 98.6 (2022 21:17)  HR: 68 (2022 21:17) (66 - 72)  BP: 101/56 (2022 21:17) (90/49 - 101/56)  BP(mean): --  RR: 18 (2022 21:17) (18 - 18)  SpO2: 96% (2022 21:17) (96% - 97%)    Parameters below as of 2022 21:17  Patient On (Oxygen Delivery Method): room air        I&O's Summary    PHYSICAL EXAM  elderly female resting in bed / no distress  General Appearance: cooperative, no acute distress,   HEENT: PERRL, conjunctiva clear, EOM's intact,   Neck: Supple, , no adenopathy  Lungs: bilateral lower lobe rhonchi R>L  Heart: Regular rate and rhythm, S1, S2 normal  Abdomen: Soft, non-tender, bowel sounds active  Extremities: no cyanosis or edema, no joint swelling  Neurologic: Alert and oriented X3     ECG:    LABS:                          8.6    x     )-----------( x        ( 01 Aug 2022 04:53 )             28.1                           8.6    x     )-----------( 172      ( 2022 07:01 )             26.4   07-27    136  |  104  |  68<H>  ----------------------------<  132<H>  5.0   |  27  |  0.91    Ca    9.1      2022 17:39  Mg     2.2     07-27    TPro  6.3  /  Alb  2.8<L>  /  TBili  0.2  /  DBili  x   /  AST  23  /  ALT  20  /  AlkPhos  92  07-27                          5.7    73.71 )-----------( 215      ( 2022 17:39 )             19.0     07-27    136  |  104  |  68<H>  ----------------------------<  132<H>  5.0   |  27  |  0.91    Ca    9.1      2022 17:39  Mg     2.2     07-27    TPro  6.3  /  Alb  2.8<L>  /  TBili  0.2  /  DBili  x   /  AST  23  /  ALT  20  /  AlkPhos  92  07-27          Pro BNP  532 07- @ 17:39  D Dimer  729 07-27 @ 17:39    PT/INR - ( 2022 17:39 )   PT: 12.9 sec;   INR: 1.11 ratio         PTT - ( 2022 17:39 )  PTT:26.3 sec  Urinalysis Basic - ( 2022 17:56 )    Color: Yellow / Appearance: Slightly Turbid / S.010 / pH: x  Gluc: x / Ketone: Negative  / Bili: Negative / Urobili: Negative   Blood: x / Protein: 15 / Nitrite: Negative   Leuk Esterase: Trace / RBC: 0-2 /HPF / WBC 0-2   Sq Epi: x / Non Sq Epi: Many / Bacteria: Moderate      < from: CT Angio Chest PE Protocol w/ IV Cont (22 @ 18:32) >  COMPARISON: Radiograph 2022.    FINDINGS:    Pulmonary Artery:  There is no main, central, lobar, segmental, or   subsegmental pulmonary embolus.    Tubes/Lines: None.    Lungs, airways and pleura: There are bilateral, predominantly peripheral   patchy foci of consolidation and branching opacities. Bilateral   bronchiectasis best shown in the right middle lobe, lingula, and right   lower lobe.    There is opacification of the distal segmental airways, best shown in the   right middle lobe, lingula and right lower lobe.    The pleura is normal.    Mediastinum: The thyroid gland is normal. The esophagus is unremarkable.   There are no enlarged chest lymph nodes.    Left atrium is enlarged. The remainder of the heart is normal in size.   Mitral annular calcification. Aortic valve calcification. Left-sided   aortic arch and left-sided descending thoracic aorta. Aortic   calcifications. The aorta is tortuous.    Upper Abdomen: The hypodense right lobe hepatic lesion is statistically   benign and likely represents a cyst.    Bones And Soft Tissues: Spondylosis of the thoracic spine.  The bones are   qualitatively osteopenic. Compression deformities of the T10 and T12   vertebral bodies involving greater than 50% of the vertebral body height.   L1 vertebral body hemangioma.    Fractures of the left anterior eleventh and twelfth ribs. Healing left   anterior tenth rib fracture.      IMPRESSION:    1.  No pulmonary embolus.  2.  Bilateral patchy foci consolidation, bronchiectasis, and branching   opacities.  3.  Fractures of the left eleventh and twelfth ribs.  4.  Healing left anterior tenth rib fracture.    < end of copied text >  < from: Xray Chest 1 View- PORTABLE-Urgent (Xray Chest 1 View- PORTABLE-Urgent .) (22 @ 18:59) >  COMPARISON: None. .    FINDINGS:  CATHETERS AND TUBES: None    PULMONARY: RIGHT basilar multifocal airspace consolidations . RIGHT upper   zone and LEFT lung parenchyma grossly clear.  No pneumothorax.    HEART/VASCULAR: The  heart is enlarged in transverse diameter.     BONES: Visualized osseous structures are intact.    < from: CT Abdomen and Pelvis No Cont (22 @ 09:58) >  IMPRESSION:  No retroperitoneal hemorrhage.    Bibasilar bronchiectasis and bibasilar opacities which could be on the   basis of chronic lung disease and fibrosis versus acute pneumonia.    < end of copied text >      IMPRESSION:   RIGHT basilar multifocal airspace consolidations.    < end of copied text >        RADIOLOGY & ADDITIONAL STUDIES:

## 2022-08-01 NOTE — PROGRESS NOTE ADULT - ASSESSMENT
89 y/o elderly frail female with CLL, recent hospitalization for pneumonia was admitted on 7/27 for increased weakness, increased HRs / low Blood pressures and shortness of breath. Pt is had rercent admission to  with RLL pneumonia and was treated with course of antibiotics and discharged on oral Rx. In the ED -  patient was noted to be hypotensive. Workup suggested patient being very anemic with HH 5.7. Patient was transfused 2 units of PRBCs with great response to transfusions. She received ceftriaxone and azithromycin.     1. Possible b/l pneumonia vs pulmonary fibrosis. CLL. Immunocompromised host.   -leukocytosis  -respiratory improved  -BC x 2, urine c/s noted   -on ceftriaxone 1 gm IV qd and azithromycin 500 mg IV qd # 5  -tolerating abx well so far; no side effects noted  -respiratory care  -d/c azithromycin  -change ceftriaxone to ceftin 500 mg PO q12h for 7 more days  -monitor temps  -f/u CBC  -supportive care  2. Other issues:   -care per medicine

## 2022-08-01 NOTE — PROGRESS NOTE ADULT - ASSESSMENT
89 yo F, history of CLL and Colon cancer  Admitted due to hypotension, weakness, Hgb 5.7, s/p transfusion  found to be anemic and has underlying CLL as well as colon cancer,   CT Chest reviewed from 7/27-   There are bilateral, predominantly peripheral   patchy foci of consolidation and branching opacities. Bilateral   bronchiectasis best shown in the right middle lobe, lingula, and right   lower lobe. There is opacification of the distal segmental airways, best shown in the   right middle lobe, lingula and right lower lobe.  Currently on Ceftriaxone and Azithromycin / will complete course  Emphysematous changes/hyperinflation noted and she was started on Spiriva  Will continue to monitor   Dr Ramires discussed all with pt's dtr yesterday  ct abd/plevis without retroperitoneal hematoma  will need fu as outpt with Dr DE GUZMAN

## 2022-08-01 NOTE — PROGRESS NOTE ADULT - SUBJECTIVE AND OBJECTIVE BOX
7/29: no new complaints    7/30: c/o weakness  Pt and daughter not in favor of rehab    7/31: Had BM  Hb stable  FOBT +; CRS consult with Dr. Martin; spoke with covering doc; suggested GI eval   looks better today, no complaints    PHYSICAL EXAM:    Vital Signs Last 24 Hrs  T(C): 36.3 (31 Jul 2022 08:43), Max: 36.8 (30 Jul 2022 15:50)  T(F): 97.4 (31 Jul 2022 08:43), Max: 98.2 (30 Jul 2022 15:50)  HR: 66 (31 Jul 2022 08:43) (66 - 87)  BP: 90/49 (31 Jul 2022 08:43) (90/49 - 111/54)  BP(mean): --  RR: 18 (31 Jul 2022 08:43) (18 - 18)  SpO2: 96% (31 Jul 2022 08:43) (96% - 97%)    Parameters below as of 31 Jul 2022 08:43  Patient On (Oxygen Delivery Method): room air            Constitutional: Well  appearing  HEENT: Atraumatic, SHAYY,   Respiratory: Breath Sounds normal, no rhonchi/wheeze  Cardiovascular: N S1S2;   Gastrointestinal: Abdomen soft, non tender, Bowel Sounds present  Extremities: No edema, peripheral pulses present  Neurological: AAO x 3, no gross focal motor deficits  Skin: Non cellulitic, no rash, ulcers  Lymph Nodes: No lymphadenopathy noted  Back: No CVA tenderness   Musculoskeletal: non tender  Breasts: Deferred  Genitourinary: deferred  Rectal: Deferred    All Labs/EKG/Radiology/Meds reviewed by me                                   9.0    54.11 )-----------( 168      ( 31 Jul 2022 04:08 )             28.4                8.6    57.10 )-----------( 172      ( 29 Jul 2022 07:01 )             26.4       CBC Full  -  ( 29 Jul 2022 07:01 )  WBC Count : 57.10 K/uL  RBC Count : 2.78 M/uL  Hemoglobin : 8.6 g/dL  Hematocrit : 26.4 %  Platelet Count - Automated : 172 K/uL  Mean Cell Volume : 95.0 fl  Mean Cell Hemoglobin : 30.9 pg  Mean Cell Hemoglobin Concentration : 32.6 gm/dL  Auto Neutrophil # : x  Auto Lymphocyte # : x  Auto Monocyte # : x  Auto Eosinophil # : x  Auto Basophil # : x  Auto Neutrophil % : x  Auto Lymphocyte % : x  Auto Monocyte % : x  Auto Eosinophil % : x  Auto Basophil % : x      07-27    136  |  104  |  68<H>  ----------------------------<  132<H>  5.0   |  27  |  0.91    Ca    9.1      27 Jul 2022 17:39  Mg     2.2     07-27    TPro  6.3  /  Alb  2.8<L>  /  TBili  0.2  /  DBili  x   /  AST  23  /  ALT  20  /  AlkPhos  92  07-27      LIVER FUNCTIONS - ( 27 Jul 2022 17:39 )  Alb: 2.8 g/dL / Pro: 6.3 gm/dL / ALK PHOS: 92 U/L / ALT: 20 U/L / AST: 23 U/L / GGT: x             PT/INR - ( 27 Jul 2022 17:39 )   PT: 12.9 sec;   INR: 1.11 ratio         PTT - ( 27 Jul 2022 17:39 )  PTT:26.3 sec      MEDICATIONS  (STANDING):  ALBUTerol    90 MICROgram(s) HFA Inhaler 2 Puff(s) Inhalation every 8 hours  azithromycin  IVPB      azithromycin  IVPB 500 milliGRAM(s) IV Intermittent every 24 hours  cefTRIAXone   IVPB 1000 milliGRAM(s) IV Intermittent every 24 hours  polyethylene glycol 3350 17 Gram(s) Oral daily  tiotropium 18 MICROgram(s) Capsule 1 Capsule(s) Inhalation daily    MEDICATIONS  (PRN):  acetaminophen     Tablet .. 650 milliGRAM(s) Oral every 6 hours PRN Temp greater or equal to 38C (100.4F), Mild Pain (1 - 3)  aluminum hydroxide/magnesium hydroxide/simethicone Suspension 30 milliLiter(s) Oral every 4 hours PRN Dyspepsia  magnesium hydroxide Suspension 30 milliLiter(s) Oral daily PRN Constipation  melatonin 3 milliGRAM(s) Oral at bedtime PRN Insomnia  ondansetron Injectable 4 milliGRAM(s) IV Push every 8 hours PRN Nausea and/or Vomiting   cc - dyspnea , low BP        89 y/o elderly frail female with  h/o CLL,  colon cancer , recent admission for PNA  admitted on 7/27/22  with increased HRs, low Blood pressures and shortness of breath. Pt is s/p rercent admission to  with RLL pneumonia. she was treated with course of antibiotics and discharged on oral Rx. In the ED -  patient was noted to be hypotensive. Workup suggested patient being very anemic with HH 5.7. Patient was transfused 2 units of PRBCs with great response to transfusions.     8/1/22 - pt seen and examined, chart reviewed, noted low BP in am, improved after small IV NS bolus and po hydration, denies cp, + occasional cough, dyspnea    Review of system- Rest of the review of system are negative except mentioned in HPI    Vitals reviewed for last 24 hours  T(C): 36.7 (08-01-22 @ 21:02), Max: 36.7 (08-01-22 @ 21:02)  T(F): 98.1 (08-01-22 @ 21:02), Max: 98.1 (08-01-22 @ 21:02)  HR: 67 (08-01-22 @ 21:02) (67 - 80)  BP: 96/48 (08-01-22 @ 21:02) (88/51 - 108/60)  RR: 18 (08-01-22 @ 21:02) (17 - 18)  SpO2: 97% (08-01-22 @ 21:02) (97% - 99%)  Wt(kg): --    PHYSICAL EXAM:    Constitutional: Well  appearing  HEENT: Atraumatic, SHAYY,   Respiratory: Breath Sounds normal, no rhonchi/wheeze  Cardiovascular: N S1S2;   Gastrointestinal: Abdomen soft, non tender, Bowel Sounds present  Extremities: No edema, peripheral pulses present  Neurological: AAO x 3, no gross focal motor deficits  Skin: Non cellulitic, no rash, ulcers  Lymph Nodes: No lymphadenopathy noted  Back: No CVA tenderness   Musculoskeletal: non tender  Breasts: Deferred  Genitourinary: deferred  Rectal: Deferred    All Labs/EKG/Radiology/Meds reviewed                         8.6    x     )-----------( x        ( 01 Aug 2022 04:53 )             28.1                                    9.0    54.11 )-----------( 168      ( 31 Jul 2022 04:08 )             28.4                8.6    57.10 )-----------( 172      ( 29 Jul 2022 07:01 )             26.4   Ca    9.1      27 Jul 2022 17:39  Mg     2.2     07-27    TPro  6.3  /  Alb  2.8<L>  /  TBili  0.2  /  DBili  x   /  AST  23  /  ALT  20  /  AlkPhos  92  07-27      LIVER FUNCTIONS - ( 27 Jul 2022 17:39 )  Alb: 2.8 g/dL / Pro: 6.3 gm/dL / ALK PHOS: 92 U/L / ALT: 20 U/L / AST: 23 U/L / GGT: x             PT/INR - ( 27 Jul 2022 17:39 )   PT: 12.9 sec;   INR: 1.11 ratio         PTT - ( 27 Jul 2022 17:39 )  PTT:26.3 sec     CT Abdomen and Pelvis No Cont (07.28.22 @ 09:58) >  FINDINGS:  LOWER CHEST: Coronary artery calcifications. Small right and trace left   pleural effusions. Bronchiectasis, mucoid impacted bronchi and irregular   airspace opacities in the right middle lobe, lower lobe and lingula.    LIVER: Within normal limits.  BILE DUCTS: Normal caliber.  GALLBLADDER: Within normal limits.  SPLEEN: Within normal limits.  PANCREAS: Within normal limits.  ADRENALS: Within normal limits.  KIDNEYS/URETERS: Within normal limits.    BLADDER: Within normal limits.  REPRODUCTIVE ORGANS: Uterus and adnexa within normal limits.    BOWEL: No bowel obstruction. Right lower quadrant bowel anastomosis noted.  PERITONEUM: No ascites.  VESSELS: Atherosclerotic changes.  RETROPERITONEUM/LYMPH NODES: No lymphadenopathy or hemorrhage.  ABDOMINAL WALL: Within normal limits.  BONES: No acute findings. Generalized bony demineralization. Severe T10   and T12 compression fractures unchanged since chest CT July 27, 2022.    IMPRESSION:  No retroperitoneal hemorrhage.    Bibasilar bronchiectasis and bibasilar opacities which could be on the   basis of chronic lung disease and fibrosis versus acute pneumonia.    12 Lead ECG (07.27.22 @ 17:24) >  Ventricular Rate 69 BPM  QTC Calculation(Bazett) 450 ms  Diagnosis Line Normal sinus rhythm with sinus arrhythmia  Left posterior fascicular block  Cannot rule out Inferior infarct , age undetermined  Abnormal ECG  When compared with ECG of 20-JUL-2022 07:42,  Left posterior fascicular block is now Present  T wave inversion now evident in Lateral leads       Xray Chest 1 View- PORTABLE-Urgent (07.27.22 @ 18:30) >    IMPRESSION: Persistent sizable right base infiltrate and COPD.        MEDICATIONS  (STANDING):  ALBUTerol    90 MICROgram(s) HFA Inhaler 2 Puff(s) Inhalation every 8 hours  cefuroxime   Tablet 500 milliGRAM(s) Oral every 12 hours  lactobacillus acidophilus 1 Tablet(s) Oral three times a day with meals  polyethylene glycol 3350 17 Gram(s) Oral daily  tiotropium 18 MICROgram(s) Capsule 1 Capsule(s) Inhalation daily    MEDICATIONS  (PRN):  acetaminophen     Tablet .. 650 milliGRAM(s) Oral every 6 hours PRN Temp greater or equal to 38C (100.4F), Mild Pain (1 - 3)  aluminum hydroxide/magnesium hydroxide/simethicone Suspension 30 milliLiter(s) Oral every 4 hours PRN Dyspepsia  magnesium hydroxide Suspension 30 milliLiter(s) Oral daily PRN Constipation  melatonin 3 milliGRAM(s) Oral at bedtime PRN Insomnia  ondansetron Injectable 4 milliGRAM(s) IV Push every 8 hours PRN Nausea and/or Vomiting

## 2022-08-01 NOTE — DISCHARGE NOTE NURSING/CASE MANAGEMENT/SOCIAL WORK - NSDCPEFALRISK_GEN_ALL_CORE
For information on Fall & Injury Prevention, visit: [email or web address] OR  [email or web address] OR  https://www.cdc.gov/steadi/patient.html

## 2022-08-01 NOTE — PROGRESS NOTE ADULT - SUBJECTIVE AND OBJECTIVE BOX
Date of service: 22 @ 10:55    OOB to chair  Has dry cough  Has low BP this AM  No SOB at rest    ROS: no fever or chills; denies dizziness, no HA, no abdominal pain, no diarrhea or constipation; no dysuria, no legs pain, no rashes    MEDICATIONS  (STANDING):  ALBUTerol    90 MICROgram(s) HFA Inhaler 2 Puff(s) Inhalation every 8 hours  cefTRIAXone   IVPB 1000 milliGRAM(s) IV Intermittent every 24 hours  polyethylene glycol 3350 17 Gram(s) Oral daily  tiotropium 18 MICROgram(s) Capsule 1 Capsule(s) Inhalation daily    Vital Signs Last 24 Hrs  T(C): 36.4 (01 Aug 2022 09:17), Max: 37 (2022 21:17)  T(F): 97.6 (01 Aug 2022 09:), Max: 98.6 (2022 21:17)  HR: 80 (01 Aug 2022 09:) (68 - 80)  BP: 88/51 (01 Aug 2022 09:17) (88/51 - 101/56)  BP(mean): --  RR: 17 (01 Aug 2022 09:17) (17 - 18)  SpO2: 99% (01 Aug 2022 09:17) (96% - 99%)    Parameters below as of 01 Aug 2022 09:17  Patient On (Oxygen Delivery Method): room air    Physical exam:    Constitutional:  No acute distress  HEENT: NC/AT, EOMI, PERRLA, conjunctivae clear; ears and nose atraumatic  Neck: supple; thyroid not palpable  Back: no tenderness  Respiratory: respiratory effort normal; few crackles at bases  Cardiovascular: S1S2 regular, no murmurs  Abdomen: soft, not tender, not distended, positive BS  Genitourinary: no suprapubic tenderness  Lymphatic: no LN palpable  Musculoskeletal: no muscle tenderness, no joint swelling or tenderness  Extremities: no pedal edema  Neurological/ Psychiatric: AxOx3, moving all extremities  Skin: no rashes; no palpable lesions    Labs: reviewed                        8.6    x     )-----------( x        ( 01 Aug 2022 04:53 )             28.1       D-Dimer Assay, Quantitative: 729 ng/mL DDU (22 @ 17:39)                        9.0    54.11 )-----------( 168      ( 2022 04:08 )             28.4     D-Dimer Assay, Quantitative: 729 ng/mL DDU (22 @ 17:39)                        8.6    57.10 )-----------( 172      ( 2022 07:01 )             26.4     D-Dimer Assay, Quantitative: 729 ng/mL DDU (22 @ 17:39)                        7.6    69.38 )-----------( 194      ( 2022 12:45 )             24.5         136  |  104  |  68<H>  ----------------------------<  132<H>  5.0   |  27  |  0.91    Ca    9.1      2022 17:39  Mg     2.2         TPro  6.3  /  Alb  2.8<L>  /  TBili  0.2  /  DBili  x   /  AST  23  /  ALT  20  /  AlkPhos  92       LIVER FUNCTIONS - ( 2022 17:39 )  Alb: 2.8 g/dL / Pro: 6.3 gm/dL / ALK PHOS: 92 U/L / ALT: 20 U/L / AST: 23 U/L / GGT: x           Urinalysis Basic - ( 2022 17:56 )    Color: Yellow / Appearance: Slightly Turbid / S.010 / pH: x  Gluc: x / Ketone: Negative  / Bili: Negative / Urobili: Negative   Blood: x / Protein: 15 / Nitrite: Negative   Leuk Esterase: Trace / RBC: 0-2 /HPF / WBC 0-2   Sq Epi: x / Non Sq Epi: Many / Bacteria: Moderate      Culture - Urine (collected 2022 17:56)  Source: Clean Catch Clean Catch (Midstream)  Final Report (2022 23:08):    No growth    Culture - Blood (collected 2022 17:39)  Source: .Blood None  Preliminary Report (2022 01:02):    No growth to date.    Culture - Blood (collected 2022 17:39)  Source: .Blood None  Preliminary Report (2022 01:02):    No growth to date.    COVID-19 PCR: NotDetec (22 @ 17:39)    Radiology: all available radiological tests reviewed    < from: CT Abdomen and Pelvis No Cont (22 @ 09:58) >  No retroperitoneal hemorrhage.  Bibasilar bronchiectasis and bibasilar opacities which could be on the   basis of chronic lung disease and fibrosis versus acute pneumonia.  < end of copied text >    Advanced directives addressed: full resuscitation

## 2022-08-01 NOTE — PROGRESS NOTE ADULT - NUTRITIONAL ASSESSMENT
This patient has been assessed with a concern for Malnutrition and has been determined to have a diagnosis/diagnoses of Severe protein-calorie malnutrition and Underweight (BMI < 19).    This patient is being managed with:   Diet DASH/TLC-  Sodium & Cholesterol Restricted  Entered: Jul 27 2022  6:30PM    The following pending diet order is being considered for treatment of Severe protein-calorie malnutrition and Underweight (BMI < 19):  Diet Regular-  Supplement Feeding Modality:  Oral  Ensure Enlive Cans or Servings Per Day:  1       Frequency:  Three Times a day  Entered: Jul 29 2022  9:44AM  

## 2022-08-01 NOTE — PROGRESS NOTE ADULT - ASSESSMENT
Pt is 89 y/o elderly frail female with extensive past medical admitted in the hospital with increased HRs /  low Blood pressures and shortness of breath. Pt is s/p rercent admission to  with RLL pneumonia. she was treated with course of antibiotics and discharged on oral Rx. In the ED -  patient was noted to be hypotensive. Workup suggested patient being very anemic with HH 5.7. Patient was transfused 2 units of PRBCs with great response to transfusions.   Assessment and Plan:   Problem/Plan - 1:  ·  Problem: Hypotension.   ·  Plan: - this is secondary to anemia  - after the transfusion - responded to transfusion well , SBP still around 90 but she is asymptomatic  - Close monitoring of HH  - CT abdomen /  pelvis r/o retroperitoneal bleed; neg    Problem/Plan - 2:  ·  Problem: Gram-negative pneumonia.   ·  Plan: - continue with IV cefepime; may switch to oral on 8/1  - currently not on O2  - patient is being follow by Dr. Davidson.    Problem/Plan - 3:  ·  Problem: Anemia.   ·  Plan: # Acute on chronic anemia    - Hb was 5.7   - 2 units of PRBCs; now 9  -  FOBT +  - Surgeon Femi Martin   - Monitor .  f/u CRSx consult with Dr. Martin    Problem/Plan - 4:  ·  Problem: CLL (chronic lymphocytic leukemia).   ·  Plan: - has not had CLL directed therapy  - would check [place]   - may benefit from possible IVIG as out patient   - WBC 73 - baseline 40-60.    Problem/Plan - 5:  ·  Problem: Adult failure to thrive.   ·  Plan: - supportive care - palliative care evaluation.    Problem/Plan - 6:  ·  Problem: Colonic cancer.   ·  Plan: # h/o stage III colon CA KRAS wild type left­sided  - originally diagnosed with braf wild type, kras wild stype stage IIIa disease s/p colectomy- discussion held regarding adjuvant chemotherapy but plan to hold off.    Problem/Plan - 7:  ·  Problem: Multiple rib fractures.   ·  Plan: - Fractures of the left eleventh and twelfth ribs.    Problem/Plan - 8:  Abn TRops: cardio eval  likely demand ischemia    poc discussed with pt, her daughter, Kat, team     DISPO: f/u H/H in am; f/u CRSx consult with Dr. Martin; GI consult;     89 y/o elderly frail female with  h/o CLL,  colon cancer , recent admission for PNA  admitted on 7/27/22  with increased HRs, low Blood pressures and shortness of breath. Pt is s/p rercent admission to  with RLL pneumonia. she was treated with course of antibiotics and discharged on oral Rx. In the ED -  patient was noted to be hypotensive. Workup suggested patient being very anemic with HH 5.7. Patient was transfused 2 units of PRBCs with great response to transfusions.     Hypotension.   - secondary to anemia, poor po liquids intake  - after the transfusion - responded to transfusion well , SBP still around 90 but she is asymptomatic  - Close monitoring of HH  - CT abdomen /  pelvis r/o retroperitoneal bleed; neg  - check orthostatics, cortisol level in am    Gram-negative pneumonia.    - continue with IV cefepime;  switch to oral ceftin   - currently not on O2, urine cx - neg   - patient is being follow by Dr. Davidson.    Acute on chronic anemia  due to GI bleed, colon cancer   - Hb was 5.7   - s/p 2 units of PRBCs; now 9  -  FOBT +  - Surgeon Femi Martin   - Monitor HH.  - f/u CRSx consult with Dr. Martin - no plans for endoscopic evaluation    Leukocytosis due to  CLL (chronic lymphocytic leukemia). h/o stage III colon CA KRAS wild type left­sided   - has not had CLL directed therapy  - WBC 73 - baseline 40-60.  - originally diagnosed with braf wild type, kras wild stype stage IIIa disease s/p colectomy  - discussion held regarding adjuvant chemotherapy but plan to hold off.    Multiple rib fractures.   - Fractures of the left eleventh and twelfth ribs.  - osteopenia  - c/ vit D , fall precautions     Elevated troponin due to demand ischemia in setting of anemia  -  cardio eval Dr. Palla on prior admission       poc discussed with pt, her daughter, Kat, team     DISPO: f/u H/H in am;  BP trend, d/c planning jody

## 2022-08-02 ENCOUNTER — TRANSCRIPTION ENCOUNTER (OUTPATIENT)
Age: 87
End: 2022-08-02

## 2022-08-02 VITALS
OXYGEN SATURATION: 97 % | RESPIRATION RATE: 16 BRPM | DIASTOLIC BLOOD PRESSURE: 47 MMHG | SYSTOLIC BLOOD PRESSURE: 93 MMHG | HEART RATE: 81 BPM | TEMPERATURE: 98 F

## 2022-08-02 LAB
ADD ON TEST-SPECIMEN IN LAB: SIGNIFICANT CHANGE UP
ALBUMIN SERPL ELPH-MCNC: 2.4 G/DL — LOW (ref 3.3–5)
ALP SERPL-CCNC: 89 U/L — SIGNIFICANT CHANGE UP (ref 40–120)
ALT FLD-CCNC: 22 U/L — SIGNIFICANT CHANGE UP (ref 12–78)
ANION GAP SERPL CALC-SCNC: 4 MMOL/L — LOW (ref 5–17)
AST SERPL-CCNC: 26 U/L — SIGNIFICANT CHANGE UP (ref 15–37)
BASOPHILS # BLD AUTO: 0 K/UL — SIGNIFICANT CHANGE UP (ref 0–0.2)
BASOPHILS NFR BLD AUTO: 0 % — SIGNIFICANT CHANGE UP (ref 0–2)
BILIRUB SERPL-MCNC: 0.4 MG/DL — SIGNIFICANT CHANGE UP (ref 0.2–1.2)
BUN SERPL-MCNC: 32 MG/DL — HIGH (ref 7–23)
CALCIUM SERPL-MCNC: 8.7 MG/DL — SIGNIFICANT CHANGE UP (ref 8.5–10.1)
CHLORIDE SERPL-SCNC: 108 MMOL/L — SIGNIFICANT CHANGE UP (ref 96–108)
CK SERPL-CCNC: 24 U/L — LOW (ref 26–192)
CO2 SERPL-SCNC: 28 MMOL/L — SIGNIFICANT CHANGE UP (ref 22–31)
CREAT SERPL-MCNC: 0.8 MG/DL — SIGNIFICANT CHANGE UP (ref 0.5–1.3)
CRP SERPL-MCNC: 6 MG/L — HIGH
CULTURE RESULTS: SIGNIFICANT CHANGE UP
CULTURE RESULTS: SIGNIFICANT CHANGE UP
EGFR: 68 ML/MIN/1.73M2 — SIGNIFICANT CHANGE UP
EOSINOPHIL # BLD AUTO: 0 K/UL — SIGNIFICANT CHANGE UP (ref 0–0.5)
EOSINOPHIL NFR BLD AUTO: 0 % — SIGNIFICANT CHANGE UP (ref 0–6)
FERRITIN SERPL-MCNC: 734 NG/ML — HIGH (ref 15–150)
FOLATE SERPL-MCNC: 12.5 NG/ML — SIGNIFICANT CHANGE UP
GLUCOSE SERPL-MCNC: 84 MG/DL — SIGNIFICANT CHANGE UP (ref 70–99)
HCT VFR BLD CALC: 30.2 % — LOW (ref 34.5–45)
HGB BLD-MCNC: 9.3 G/DL — LOW (ref 11.5–15.5)
IRON SATN MFR SERPL: 33 % — SIGNIFICANT CHANGE UP (ref 14–50)
IRON SATN MFR SERPL: 75 UG/DL — SIGNIFICANT CHANGE UP (ref 30–160)
LYMPHOCYTES # BLD AUTO: 34.07 K/UL — HIGH (ref 1–3.3)
LYMPHOCYTES # BLD AUTO: 97 % — HIGH (ref 13–44)
MAGNESIUM SERPL-MCNC: 2.6 MG/DL — SIGNIFICANT CHANGE UP (ref 1.6–2.6)
MCHC RBC-ENTMCNC: 30.8 GM/DL — LOW (ref 32–36)
MCHC RBC-ENTMCNC: 31.2 PG — SIGNIFICANT CHANGE UP (ref 27–34)
MCV RBC AUTO: 101.3 FL — HIGH (ref 80–100)
MONOCYTES # BLD AUTO: 0 K/UL — SIGNIFICANT CHANGE UP (ref 0–0.9)
MONOCYTES NFR BLD AUTO: 0 % — LOW (ref 2–14)
NEUTROPHILS # BLD AUTO: 0.35 K/UL — LOW (ref 1.8–7.4)
NEUTROPHILS NFR BLD AUTO: 1 % — LOW (ref 43–77)
NRBC # BLD: SIGNIFICANT CHANGE UP /100 WBCS (ref 0–0)
NT-PROBNP SERPL-SCNC: 374 PG/ML — SIGNIFICANT CHANGE UP (ref 0–450)
PHOSPHATE SERPL-MCNC: 3.3 MG/DL — SIGNIFICANT CHANGE UP (ref 2.5–4.5)
PLATELET # BLD AUTO: 124 K/UL — LOW (ref 150–400)
POTASSIUM SERPL-MCNC: 5 MMOL/L — SIGNIFICANT CHANGE UP (ref 3.5–5.3)
POTASSIUM SERPL-SCNC: 5 MMOL/L — SIGNIFICANT CHANGE UP (ref 3.5–5.3)
PROT SERPL-MCNC: 5.8 GM/DL — LOW (ref 6–8.3)
RBC # BLD: 2.98 M/UL — LOW (ref 3.8–5.2)
RBC # BLD: 2.98 M/UL — LOW (ref 3.8–5.2)
RBC # FLD: 20.8 % — HIGH (ref 10.3–14.5)
RETICS #: 174 K/UL — HIGH (ref 25–125)
RETICS/RBC NFR: 5.8 % — HIGH (ref 0.5–2.5)
SODIUM SERPL-SCNC: 140 MMOL/L — SIGNIFICANT CHANGE UP (ref 135–145)
SPECIMEN SOURCE: SIGNIFICANT CHANGE UP
SPECIMEN SOURCE: SIGNIFICANT CHANGE UP
TIBC SERPL-MCNC: 230 UG/DL — SIGNIFICANT CHANGE UP (ref 220–430)
TROPONIN I, HIGH SENSITIVITY RESULT: 69.12 NG/L — HIGH
TSH SERPL-MCNC: 12.7 UU/ML — HIGH (ref 0.34–4.82)
UIBC SERPL-MCNC: 155 UG/DL — SIGNIFICANT CHANGE UP (ref 110–370)
VIT B12 SERPL-MCNC: 319 PG/ML — SIGNIFICANT CHANGE UP (ref 232–1245)
WBC # BLD: 35.12 K/UL — HIGH (ref 3.8–10.5)
WBC # FLD AUTO: 35.12 K/UL — HIGH (ref 3.8–10.5)

## 2022-08-02 PROCEDURE — 71045 X-RAY EXAM CHEST 1 VIEW: CPT | Mod: 26

## 2022-08-02 PROCEDURE — 99239 HOSP IP/OBS DSCHRG MGMT >30: CPT

## 2022-08-02 RX ORDER — LEVOTHYROXINE SODIUM 125 MCG
1 TABLET ORAL
Qty: 30 | Refills: 0
Start: 2022-08-02 | End: 2022-08-31

## 2022-08-02 RX ORDER — TIOTROPIUM BROMIDE 18 UG/1
1 CAPSULE ORAL; RESPIRATORY (INHALATION)
Qty: 30 | Refills: 0
Start: 2022-08-02 | End: 2022-08-31

## 2022-08-02 RX ORDER — CEPHALEXIN 500 MG
10 CAPSULE ORAL
Qty: 120 | Refills: 0
Start: 2022-08-02 | End: 2022-08-07

## 2022-08-02 RX ORDER — CEPHALEXIN 500 MG
500 CAPSULE ORAL EVERY 12 HOURS
Refills: 0 | Status: DISCONTINUED | OUTPATIENT
Start: 2022-08-02 | End: 2022-08-02

## 2022-08-02 RX ORDER — UMECLIDINIUM 62.5 UG/1
1 AEROSOL, POWDER ORAL
Qty: 30 | Refills: 0
Start: 2022-08-02 | End: 2022-08-31

## 2022-08-02 RX ORDER — CEPHALEXIN 500 MG
10 CAPSULE ORAL
Qty: 0 | Refills: 0 | DISCHARGE
Start: 2022-08-02

## 2022-08-02 RX ORDER — LEVOTHYROXINE SODIUM 125 MCG
25 TABLET ORAL DAILY
Refills: 0 | Status: DISCONTINUED | OUTPATIENT
Start: 2022-08-02 | End: 2022-08-02

## 2022-08-02 RX ORDER — MIDODRINE HYDROCHLORIDE 2.5 MG/1
2.5 TABLET ORAL THREE TIMES A DAY
Refills: 0 | Status: DISCONTINUED | OUTPATIENT
Start: 2022-08-02 | End: 2022-08-02

## 2022-08-02 RX ORDER — ALBUTEROL 90 UG/1
2 AEROSOL, METERED ORAL
Qty: 1 | Refills: 0
Start: 2022-08-02 | End: 2022-08-31

## 2022-08-02 RX ADMIN — Medication 500 MILLIGRAM(S): at 12:55

## 2022-08-02 RX ADMIN — ALBUTEROL 2 PUFF(S): 90 AEROSOL, METERED ORAL at 09:06

## 2022-08-02 RX ADMIN — TIOTROPIUM BROMIDE 1 CAPSULE(S): 18 CAPSULE ORAL; RESPIRATORY (INHALATION) at 09:12

## 2022-08-02 RX ADMIN — Medication 25 MICROGRAM(S): at 12:55

## 2022-08-02 NOTE — DISCHARGE NOTE PROVIDER - HOSPITAL COURSE
cc - dyspnea , low BP        89 y/o elderly frail female with  h/o CLL,  colon cancer , recent admission for PNA  admitted on 7/27/22  with increased HRs, low Blood pressures and shortness of breath. Pt is s/p rercent admission to  with RLL pneumonia. she was treated with course of antibiotics and discharged on oral Rx. In the ED -  patient was noted to be hypotensive. Workup suggested patient being very anemic with HH 5.7. Patient was transfused 2 units of PRBCs with great response to transfusions.     8/1/22 - pt seen and examined, chart reviewed, noted low BP in am, improved after small IV NS bolus and po hydration, denies cp, + occasional cough, dyspnea  8/2 /22- pt seen and examined, no events overnight, afebrile, denies cp, dyspnea, abdominal pain , plan discussed with daughter  Review of system- Rest of the review of system are negative except mentioned in HPI    Vitals reviewed for last 24 hours  T(C): 36.5 (08-02-22 @ 09:06), Max: 36.7 (08-01-22 @ 21:02)  T(F): 97.7 (08-02-22 @ 09:06), Max: 98.1 (08-01-22 @ 21:02)  HR: 81 (08-02-22 @ 09:06) (60 - 81)  BP: 93/47 (08-02-22 @ 09:06) (93/47 - 112/55)  RR: 16 (08-02-22 @ 09:06) (16 - 18)  SpO2: 97% (08-02-22 @ 09:06) (97% - 98%)  Wt(kg): --    PHYSICAL EXAM:    Constitutional: Well  appearing  HEENT: Atraumatic, SHAYY,   Respiratory: Breath Sounds normal, no rhonchi/wheeze  Cardiovascular: N S1S2;   Gastrointestinal: Abdomen soft, non tender, Bowel Sounds present  Extremities: No edema, peripheral pulses present  Neurological: AAO x 3, no gross focal motor deficits  Skin: Non cellulitic, no rash, ulcers  Lymph Nodes: No lymphadenopathy noted  Back: No CVA tenderness   Musculoskeletal: non tender  Breasts: DeferredGenitourinary: deferred  Rectal: Deferred    All Labs/EKG/Radiology/Meds reviewed   · Assessment    89 y/o elderly frail female with  h/o CLL,  colon cancer , recent admission for PNA  admitted on 7/27/22  with increased HRs, low Blood pressures and shortness of breath. Pt is s/p rercent admission to  with RLL pneumonia. she was treated with course of antibiotics and discharged on oral Rx. In the ED -  patient was noted to be hypotensive. Workup suggested patient being very anemic with HH 5.7. Patient was transfused 2 units of PRBCs with great response to transfusions.     Hypotension, resolved   Hypothyroidism with TSH of 12  - secondary to anemia, poor po liquids intake  - after the transfusion - responded to transfusion well , SBP still around 90 but she is asymptomatic  - Close monitoring of HH  - CT abdomen /  pelvis r/o retroperitoneal bleed; neg  - check orthostatics, cortisol level in am - pending   - start levothyroxine 25 , repeat TSH in 4 weeks, o/p work-up   Gram-negative pneumonia.    - continue with IV cefepime;  switch to oral ceftin   - currently not on O2, urine cx - neg   - patient is being follow by Dr. Davidson.  Acute on chronic anemia  due to GI bleed, colon cancer   - Hb was 5.7 ,  s/p 2 units of PRBCs; now 9  -  FOBT +  - Surgeon Femi Martin   - Monitor HH.stable   - f/u CRSx consult with Dr. Martin - no plans for endoscopic evaluation  Leukocytosis due to  CLL (chronic lymphocytic leukemia). h/o stage III colon CA KRAS wild type left­sided  - has not had CLL directed therapy  - WBC 73 - baseline 40-60.  - originally diagnosed with braf wild type, kras wild stype stage IIIa disease s/p colectomy  - discussion held regarding adjuvant chemotherapy but plan to hold off.  Multiple rib fractures.   - Fractures of the left eleventh and twelfth ribs.  - osteopenia  - c/ vit D , fall precautions   Elevated troponin due to demand ischemia in setting of anemia  -  cardio eval Dr. Palla on prior admission   poc discussed with pt, her daughter, Kat, team   Final diagnosis, treatment plan, and follow-up recommendations were discussed and explained to the patient.   The patient was given an opportunity to ask questions concerning the diagnosis and treatment plan.   The patient acknowledged understanding of the diagnosis, treatment, and follow-up recommendations.   The patient was advised to seek urgent care upon discharge if worsening symptoms develop prior to scheduled follow-up.   Time spent on discharge included time with the patient, and also coordinating discharge care as outlined below.  Discharge note faxed to PCP with my contact information to call me back   PCP Dr. Rubin  Total time spent: 50 min

## 2022-08-02 NOTE — DISCHARGE NOTE PROVIDER - PROVIDER TOKENS
PROVIDER:[TOKEN:[[id number]],FOLLOWUP:[1 week]],PROVIDER:[TOKEN:[[id number]],FOLLOWUP:[1 week]],PROVIDER:[TOKEN:[[id number]],FOLLOWUP:[1 week]],PROVIDER:[TOKEN:[[id number]],FOLLOWUP:[1 week]]

## 2022-08-02 NOTE — PROGRESS NOTE ADULT - SUBJECTIVE AND OBJECTIVE BOX
Patient is a 89y old  Female who presents with a chief complaint of     HPI:pt is s/p rercent admission to  with RLL pneumonia  she was treated with course of antibiotics and discharged on oral Rx  now seen by Dr Ramires as outpt and sent back to ED for hypotension  found to be anemic and has underlying CLL  s/p heme eval  also hx colon ca  Rib fx seen on ct scan  films reviewed      No acute pulmonary events occurred overnight  Discussed with daughter     asks about going home  sitting OOB in chair    pt is seen and examined while in bed  asks about going home  no distress  low BP with recent IVF infusion  still with RLL rhonchi on exam  repeat cxr is pending for today    PAST MEDICAL & SURGICAL HISTORY:  CLL (chronic lymphocytic leukemia)  colon ca hx  s/p RLL pneumonia    MEDICATIONS  (STANDING):  ALBUTerol    90 MICROgram(s) HFA Inhaler 2 Puff(s) Inhalation every 8 hours  cefuroxime   Tablet 500 milliGRAM(s) Oral every 12 hours  lactobacillus acidophilus 1 Tablet(s) Oral three times a day with meals  polyethylene glycol 3350 17 Gram(s) Oral daily  tiotropium 18 MICROgram(s) Capsule 1 Capsule(s) Inhalation daily      MEDICATIONS  (PRN):  acetaminophen     Tablet .. 650 milliGRAM(s) Oral every 6 hours PRN Temp greater or equal to 38C (100.4F), Mild Pain (1 - 3)  aluminum hydroxide/magnesium hydroxide/simethicone Suspension 30 milliLiter(s) Oral every 4 hours PRN Dyspepsia  magnesium hydroxide Suspension 30 milliLiter(s) Oral daily PRN Constipation  melatonin 3 milliGRAM(s) Oral at bedtime PRN Insomnia  ondansetron Injectable 4 milliGRAM(s) IV Push every 8 hours PRN Nausea and/or Vomiting        MEDICATIONS  (STANDING):    PREVIOUS DIAGNOSTIC TESTING:      MEDICATIONS  (STANDING):    MEDICATIONS  (PRN):      Vital Signs Last 24 Hrs  T(C): 36.3 (02 Aug 2022 05:44), Max: 36.7 (01 Aug 2022 21:02)  T(F): 97.4 (02 Aug 2022 05:44), Max: 98.1 (01 Aug 2022 21:02)  HR: 60 (02 Aug 2022 05:44) (60 - 80)  BP: 112/55 (02 Aug 2022 05:44) (88/51 - 112/55)  BP(mean): --  RR: 16 (02 Aug 2022 05:44) (16 - 18)  SpO2: 97% (01 Aug 2022 21:02) (97% - 99%)    Parameters below as of 02 Aug 2022 05:44    O2 Flow (L/min): 99      Parameters below as of 2022 21:17  Patient On (Oxygen Delivery Method): room air        I&O's Summary    PHYSICAL EXAM  elderly female resting in bed / no distress  General Appearance: cooperative, no acute distress,   HEENT: PERRL, conjunctiva clear, EOM's intact,   Neck: Supple, , no adenopathy  Lungs: bilateral lower lobe rhonchi R>L  Heart: Regular rate and rhythm, S1, S2 normal  Abdomen: Soft, non-tender, bowel sounds active  Extremities: no cyanosis or edema, no joint swelling  Neurologic: Alert and oriented X3     ECG:    LABS:                        8.6    x     )-----------( x        ( 01 Aug 2022 04:53 )             28.1                           8.6    x     )-----------( x        ( 01 Aug 2022 04:53 )             28.1                           8.6    x     )-----------( 172      ( 2022 07:01 )             26.4   07-27    136  |  104  |  68<H>  ----------------------------<  132<H>  5.0   |  27  |  0.91    Ca    9.1      2022 17:39  Mg     2.2     07-27    TPro  6.3  /  Alb  2.8<L>  /  TBili  0.2  /  DBili  x   /  AST  23  /  ALT  20  /  AlkPhos  92  07-27                          5.7    73.71 )-----------( 215      ( 2022 17:39 )             19.0     07-27    136  |  104  |  68<H>  ----------------------------<  132<H>  5.0   |  27  |  0.91    Ca    9.1      2022 17:39  Mg     2.2     07-27    TPro  6.3  /  Alb  2.8<L>  /  TBili  0.2  /  DBili  x   /  AST  23  /  ALT  20  /  AlkPhos  92  07-          Pro BNP  532  @ 17:39  D Dimer  729  @ 17:39    PT/INR - ( 2022 17:39 )   PT: 12.9 sec;   INR: 1.11 ratio         PTT - ( 2022 17:39 )  PTT:26.3 sec  Urinalysis Basic - ( 2022 17:56 )    Color: Yellow / Appearance: Slightly Turbid / S.010 / pH: x  Gluc: x / Ketone: Negative  / Bili: Negative / Urobili: Negative   Blood: x / Protein: 15 / Nitrite: Negative   Leuk Esterase: Trace / RBC: 0-2 /HPF / WBC 0-2   Sq Epi: x / Non Sq Epi: Many / Bacteria: Moderate      < from: CT Angio Chest PE Protocol w/ IV Cont (22 @ 18:32) >  COMPARISON: Radiograph 2022.    FINDINGS:    Pulmonary Artery:  There is no main, central, lobar, segmental, or   subsegmental pulmonary embolus.    Tubes/Lines: None.    Lungs, airways and pleura: There are bilateral, predominantly peripheral   patchy foci of consolidation and branching opacities. Bilateral   bronchiectasis best shown in the right middle lobe, lingula, and right   lower lobe.    There is opacification of the distal segmental airways, best shown in the   right middle lobe, lingula and right lower lobe.    The pleura is normal.    Mediastinum: The thyroid gland is normal. The esophagus is unremarkable.   There are no enlarged chest lymph nodes.    Left atrium is enlarged. The remainder of the heart is normal in size.   Mitral annular calcification. Aortic valve calcification. Left-sided   aortic arch and left-sided descending thoracic aorta. Aortic   calcifications. The aorta is tortuous.    Upper Abdomen: The hypodense right lobe hepatic lesion is statistically   benign and likely represents a cyst.    Bones And Soft Tissues: Spondylosis of the thoracic spine.  The bones are   qualitatively osteopenic. Compression deformities of the T10 and T12   vertebral bodies involving greater than 50% of the vertebral body height.   L1 vertebral body hemangioma.    Fractures of the left anterior eleventh and twelfth ribs. Healing left   anterior tenth rib fracture.      IMPRESSION:    1.  No pulmonary embolus.  2.  Bilateral patchy foci consolidation, bronchiectasis, and branching   opacities.  3.  Fractures of the left eleventh and twelfth ribs.  4.  Healing left anterior tenth rib fracture.    < end of copied text >  < from: Xray Chest 1 View- PORTABLE-Urgent (Xray Chest 1 View- PORTABLE-Urgent .) (22 @ 18:59) >  COMPARISON: None. .    FINDINGS:  CATHETERS AND TUBES: None    PULMONARY: RIGHT basilar multifocal airspace consolidations . RIGHT upper   zone and LEFT lung parenchyma grossly clear.  No pneumothorax.    HEART/VASCULAR: The  heart is enlarged in transverse diameter.     BONES: Visualized osseous structures are intact.    < from: CT Abdomen and Pelvis No Cont (22 @ 09:58) >  IMPRESSION:  No retroperitoneal hemorrhage.    Bibasilar bronchiectasis and bibasilar opacities which could be on the   basis of chronic lung disease and fibrosis versus acute pneumonia.    < end of copied text >      IMPRESSION:   RIGHT basilar multifocal airspace consolidations.    < end of copied text >        RADIOLOGY & ADDITIONAL STUDIES:

## 2022-08-02 NOTE — DISCHARGE NOTE PROVIDER - NSDCCPCAREPLAN_GEN_ALL_CORE_FT
PRINCIPAL DISCHARGE DIAGNOSIS  Diagnosis: Gram-negative pneumonia  Assessment and Plan of Treatment: complete antibiotics as prescribed, follow up with pulmonologist within 1 week  , repeat immmaging studies in 3-4 weeks to establish resolution  take probiotics OTC twice daily while on antibiotics and 1 week after      SECONDARY DISCHARGE DIAGNOSES  Diagnosis: Hypotension  Assessment and Plan of Treatment: drink at least 1 L of fluids daily, follow up with PCP within 1 weke for further monitoring    Diagnosis: Hypothyroidism  Assessment and Plan of Treatment: you TSH test 12 today, was 6 in 7/19/22   repeat TSH and free t4 in 4 week , consider outpatient US of the thyroid to ruled out nodules  start taking levothyroxine 25 mcg , take on empty stomach with glass of water, wait 30 min to 1 hours before you eat any food after taking this medications, do not combine this medicine with other meds    Diagnosis: Multiple rib fractures  Assessment and Plan of Treatment: deep breathing exercises, follow up with PCP for further monitoring and preventive care within 1 week , screening for osteoporosis    Diagnosis: CLL (chronic lymphocytic leukemia)  Assessment and Plan of Treatment: follow up with oncologist within 1week for further monitoring    Diagnosis: Elevated troponin  Assessment and Plan of Treatment: follow up [facility] Dr. Palla cardiologist as needed for further monitoring    Diagnosis: Anemia due to GI blood loss  Assessment and Plan of Treatment: repeat cbc in 1 week , eat food rich in iron

## 2022-08-02 NOTE — CHART NOTE - NSCHARTNOTEFT_GEN_A_CORE
This SW met with pt. at bedside to follow up and provide support. Pt. sitting comfortably in the chair. Pt. reports she believes she is going home today. PT. resides with her daughter and splits time between two daughters houses. As per  Amy pt. to go home with [facility]. Our team will sign off as goals of care are clear and pt. is not symptomatic.

## 2022-08-02 NOTE — DISCHARGE NOTE PROVIDER - NSDCFUSCHEDAPPT_GEN_ALL_CORE_FT
Palla, Venugopal R  [facility] Physician [facility]  CARDIOLOGY [address]  Scheduled Appointment: 08/16/2022

## 2022-08-02 NOTE — PROGRESS NOTE ADULT - PROVIDER SPECIALTY LIST ADULT
Hospitalist
Hospitalist
Pulmonology
Pulmonology
Heme/Onc
Palliative Care
Pulmonology
Hospitalist
Hospitalist
Infectious Disease
Pulmonology
Pulmonology
Infectious Disease

## 2022-08-02 NOTE — DISCHARGE NOTE PROVIDER - CARE PROVIDER_API CALL
Bora John)  Family Medicine  [address] [place] [address]  Phone: [phone]  Fax: [phone]  Follow Up Time: 1 week    HORACIO Davidson ()  Pulmonary Disease; Sleep Medicine  [address] [place] [address]  Phone: [phone]  Fax: [phone]  Follow Up Time: 1 week    Palla, Venugopal R (MD)  Cardiovascular Disease; Internal Medicine  [address] [place] [id number]  Phone: [phone]  Fax: [phone]  Follow Up Time: 1 week    Johnny Talbert)  Gastroenterology; Internal Medicine  [address] [place] [address]  Phone: [phone]  Fax: [phone]  Follow Up Time: 1 week

## 2022-08-02 NOTE — DISCHARGE NOTE PROVIDER - DETAILS OF MALNUTRITION DIAGNOSIS/DIAGNOSES
This patient has been assessed with a concern for Malnutrition and was treated during this hospitalization for the following Nutrition diagnosis/diagnoses:     -  07/29/2022: Severe protein-calorie malnutrition   -  07/29/2022: Underweight (BMI < 19)

## 2022-08-02 NOTE — DISCHARGE NOTE PROVIDER - CARE PROVIDERS DIRECT ADDRESSES
,DirectAddress_Unknown,DirectAddress_Unknown,[email or web address]

## 2022-08-02 NOTE — DISCHARGE NOTE PROVIDER - NSDCCPTREATMENT_GEN_ALL_CORE_FT
PRINCIPAL PROCEDURE  Procedure: CT abdomen  Findings and Treatment: ACC: [id number] EXAM:  CT ABDOMEN AND PELVIS                        PROCEDURE DATE:  07/28/2022    INTERPRETATION:  CLINICAL INFORMATION: Rule out retroperitoneal bleed  COMPARISON: None.  CONTRAST/COMPLICATIONS:  IV Contrast: NONE  Oral Contrast: NONE  Complications: None reported at time of study completion  PROCEDURE:  CT of the Abdomen and Pelvis was performed.  Sagittal and coronal reformats were performed.  FINDINGS:  LOWER CHEST: Coronary artery calcifications. Small right and trace left   pleural effusions. Bronchiectasis, mucoid impacted bronchi and irregular   airspace opacities in the right middle lobe, lower lobe and lingula.  LIVER: Within normal limits.  BILE DUCTS: Normal caliber.  GALLBLADDER: Within normal limits.  SPLEEN: Within normal limits.  PANCREAS: Within normal limits.  ADRENALS: Within normal limits.  KIDNEYS/URETERS: Within normal limits.  BLADDER: Within normal limits.  REPRODUCTIVE ORGANS: Uterus and adnexa within normal limits.  BOWEL: No bowel obstruction. Right lower quadrant bowel anastomosis noted.  PERITONEUM: No ascites.  VESSELS: Atherosclerotic changes.  RETROPERITONEUM/LYMPH NODES: No lymphadenopathy or hemorrhage.  ABDOMINAL WALL: Within normal limits.  BONES: No acute findings. Generalized bony demineralization. Severe T10   and T12 compression fractures unchanged since chest CT July 27, 2022.  IMPRESSION:  No retroperitoneal hemorrhage.  Bibasilar bronchiectasis and bibasilar opacities which could be on the   basis of chronic lung disease and fibrosis versus acute pneumonia.  PRATIK CORONADO JR, MD; Attending Radiologist  This document has been electronically signed. Jul 28 2022 10:42AM      SECONDARY PROCEDURE  Procedure: CT angiogram, cardiac  Findings and Treatment: ACC: [id number] [facility]  [facility]                        PROCEDURE DATE:  07/27/2022    INTERPRETATION:  CTA CHEST  COMPARISON: Radiograph 7/21/2022.  FINDINGS:  Pulmonary Artery:  There is no main, central, lobar, segmental, or   subsegmental pulmonary embolus.  Tubes/Lines: None.  Lungs, airways and pleura: There are bilateral, predominantly peripheral   patchy foci of consolidation and branching opacities. Bilateral   bronchiectasis best shown in the right middle lobe, lingula, and right   lower lobe.  There is opacification of the distal segmental airways, best shown in the   right middle lobe, lingula and right lower lobe.  The pleura is normal.  Mediastinum: The thyroid gland is normal. The esophagus is unremarkable.   There are no enlarged chest lymph nodes.  Left atrium is enlarged. The remainder of the heart is normal in size.   Mitral annular calcification. Aortic valve calcification. Left-sided   aortic arch and left-sided descending thoracic aorta. Aortic   calcifications. The aorta is tortuous.  Upper Abdomen: The hypodense right lobe hepatic lesion is statistically   benign and likely represents a cyst.  Bones And Soft Tissues: Spondylosis of the thoracic spine.  The bones are   qualitatively osteopenic. Compression deformities of the T10 and T12   vertebral bodies involving greater than 50% of the vertebral body height.   L1 vertebral body hemangioma.  Fractures of the left anterior eleventh and twelfth ribs. Healing left   anterior tenth rib fracture.  IMPRESSION:  1.  No pulmonary embolus.  2.  Bilateral patchy foci consolidation, bronchiectasis, and branching   opacities.  3.  Fractures of the left eleventh and twelfth ribs.  4.  Healing left anterior tenth rib fracture.  --- End of Report ---  JOSE L WEIR MD; Attending Radiologist  This document has been electronically signed. Jul 27 2022  6:47PM

## 2022-08-02 NOTE — PROGRESS NOTE ADULT - ASSESSMENT
89 yo F, history of CLL and Colon cancer  Admitted due to hypotension, weakness, Hgb 5.7, s/p transfusion  found to be anemic and has underlying CLL as well as colon cancer,   CT Chest reviewed from 7/27-   There are bilateral, predominantly peripheral   patchy foci of consolidation and branching opacities. Bilateral   bronchiectasis best shown in the right middle lobe, lingula, and right   lower lobe. There is opacification of the distal segmental airways, best shown in the   right middle lobe, lingula and right lower lobe.  Currently on Ceftriaxone and Azithromycin / will complete course  Emphysematous changes/hyperinflation noted and she was started on Spiriva  Will continue to monitor   Dr Ramires discussed all with pt's dtr yesterday  ct abd/plevis without retroperitoneal hematoma  will need fu as outpt with Dr DE GUZMAN  8/2  pt is seen and examined while in bed  asks about going home  no distress / ID eval noted  low BP with recent IVF infusion  still with RLL rhonchi on exam  repeat cxr is pending for today  DC planning on po antibiotics as per primary team

## 2022-08-02 NOTE — DISCHARGE NOTE PROVIDER - NSDCMRMEDTOKEN_GEN_ALL_CORE_FT
cephalexin 250 mg/5 mL oral liquid: 10 milliliter(s) orally 2 times a day for 6 days  levothyroxine 25 mcg (0.025 mg) oral tablet: 1 tab(s) orally once a day  Multiple Vitamins oral tablet, chewable: 1 tab(s) orally once a day  polyethylene glycol 3350 oral powder for reconstitution: 17 gram(s) orally once a day, As Needed  Probiotic Formula oral capsule: 1 cap(s) orally once a day, As Needed when on Antibiotic  Vitamin D3 25 mcg (1000 intl units) oral tablet: 1 tab(s) orally once a day   cephalexin 250 mg/5 mL oral liquid: 10 milliliter(s) orally 2 times a day for 6 days  levothyroxine 25 mcg (0.025 mg) oral tablet: 1 tab(s) orally once a day  Multiple Vitamins oral tablet, chewable: 1 tab(s) orally once a day  polyethylene glycol 3350 oral powder for reconstitution: 17 gram(s) orally once a day, As Needed  ProAir HFA 90 mcg/inh inhalation aerosol: 2 puff(s) inhaled every 4 hours, As Needed -for bronchospasm   Probiotic Formula oral capsule: 1 cap(s) orally once a day, As Needed when on Antibiotic  tiotropium 18 mcg inhalation capsule: 1 cap(s) inhaled once a day  Vitamin D3 25 mcg (1000 intl units) oral tablet: 1 tab(s) orally once a day

## 2022-08-08 DIAGNOSIS — E43 UNSPECIFIED SEVERE PROTEIN-CALORIE MALNUTRITION: ICD-10-CM

## 2022-08-08 DIAGNOSIS — J90 PLEURAL EFFUSION, NOT ELSEWHERE CLASSIFIED: ICD-10-CM

## 2022-08-08 DIAGNOSIS — J15.6 PNEUMONIA DUE TO OTHER GRAM-NEGATIVE BACTERIA: ICD-10-CM

## 2022-08-08 DIAGNOSIS — I95.9 HYPOTENSION, UNSPECIFIED: ICD-10-CM

## 2022-08-08 DIAGNOSIS — E03.9 HYPOTHYROIDISM, UNSPECIFIED: ICD-10-CM

## 2022-08-08 DIAGNOSIS — I24.8 OTHER FORMS OF ACUTE ISCHEMIC HEART DISEASE: ICD-10-CM

## 2022-08-08 DIAGNOSIS — D84.9 IMMUNODEFICIENCY, UNSPECIFIED: ICD-10-CM

## 2022-08-08 DIAGNOSIS — Z20.822 CONTACT WITH AND (SUSPECTED) EXPOSURE TO COVID-19: ICD-10-CM

## 2022-08-08 DIAGNOSIS — Z91.048 OTHER NONMEDICINAL SUBSTANCE ALLERGY STATUS: ICD-10-CM

## 2022-08-08 DIAGNOSIS — C91.10 CHRONIC LYMPHOCYTIC LEUKEMIA OF B-CELL TYPE NOT HAVING ACHIEVED REMISSION: ICD-10-CM

## 2022-08-08 DIAGNOSIS — D64.9 ANEMIA, UNSPECIFIED: ICD-10-CM

## 2022-08-08 DIAGNOSIS — R62.7 ADULT FAILURE TO THRIVE: ICD-10-CM

## 2022-08-08 DIAGNOSIS — Z85.038 PERSONAL HISTORY OF OTHER MALIGNANT NEOPLASM OF LARGE INTESTINE: ICD-10-CM

## 2022-08-08 RX ORDER — TRAMADOL HYDROCHLORIDE 50 MG/1
50 TABLET, COATED ORAL
Qty: 14 | Refills: 0 | Status: DISCONTINUED | COMMUNITY
Start: 2022-04-28

## 2022-08-08 RX ORDER — CEFUROXIME AXETIL 500 MG/1
500 TABLET ORAL
Qty: 6 | Refills: 0 | Status: DISCONTINUED | COMMUNITY
Start: 2022-07-21

## 2022-08-08 RX ORDER — OXYCODONE 5 MG/1
5 TABLET ORAL
Qty: 28 | Refills: 0 | Status: DISCONTINUED | COMMUNITY
Start: 2022-05-27

## 2022-08-08 RX ORDER — UBIDECARENONE/VIT E ACET 100MG-5
25 MCG CAPSULE ORAL DAILY
Refills: 0 | Status: ACTIVE | COMMUNITY
Start: 2022-08-08

## 2022-08-08 RX ORDER — METHYLPREDNISOLONE 4 MG/1
4 TABLET ORAL
Qty: 21 | Refills: 0 | Status: DISCONTINUED | COMMUNITY
Start: 2022-04-22

## 2022-08-08 RX ORDER — ACETAMINOPHEN AND CODEINE PHOSPHATE 300; 30 MG/1; MG/1
300-30 TABLET ORAL
Qty: 30 | Refills: 0 | Status: DISCONTINUED | COMMUNITY
Start: 2022-04-21

## 2022-08-09 ENCOUNTER — APPOINTMENT (OUTPATIENT)
Dept: CARE COORDINATION | Facility: HOME HEALTH | Age: 87
End: 2022-08-09

## 2022-08-09 VITALS
HEART RATE: 68 BPM | WEIGHT: 74 LBS | SYSTOLIC BLOOD PRESSURE: 106 MMHG | DIASTOLIC BLOOD PRESSURE: 52 MMHG | OXYGEN SATURATION: 96 % | BODY MASS INDEX: 14.88 KG/M2 | RESPIRATION RATE: 16 BRPM

## 2022-08-09 DIAGNOSIS — J15.6 PNEUMONIA DUE TO OTHER AEROBIC GRAM-NEGATIVE BACTERIA: ICD-10-CM

## 2022-08-09 DIAGNOSIS — E46 UNSPECIFIED PROTEIN-CALORIE MALNUTRITION: ICD-10-CM

## 2022-08-09 PROCEDURE — 99349 HOME/RES VST EST MOD MDM 40: CPT

## 2022-08-09 RX ORDER — PANTOPRAZOLE 40 MG/1
40 TABLET, DELAYED RELEASE ORAL
Qty: 30 | Refills: 0 | Status: DISCONTINUED | COMMUNITY
Start: 2022-05-20

## 2022-08-09 RX ORDER — MUPIROCIN 20 MG/G
2 OINTMENT TOPICAL
Qty: 22 | Refills: 0 | Status: DISCONTINUED | COMMUNITY
Start: 2022-02-10

## 2022-08-09 RX ORDER — GABAPENTIN 100 MG/1
100 CAPSULE ORAL
Qty: 90 | Refills: 0 | Status: DISCONTINUED | COMMUNITY
Start: 2022-06-10

## 2022-08-09 RX ORDER — METHOCARBAMOL 500 MG/1
500 TABLET, FILM COATED ORAL
Qty: 30 | Refills: 0 | Status: DISCONTINUED | COMMUNITY
Start: 2022-04-14

## 2022-08-09 NOTE — HISTORY OF PRESENT ILLNESS
[Post-hospitalization from ___ Hospital] : Post-hospitalization from [unfilled] Hospital [Admitted on: ___] : The patient was admitted on [unfilled] [Discharged on ___] : discharged on [unfilled] [FreeTextEntry3] : READMITTED 7/27/22 - 8/2/22 [FreeTextEntry2] : Hospital Course	\par 89 y/o elderly frail female with  h/o CLL,  colon cancer , recent admission for PNA  admitted on 7/27/22  with increased HRs, low Blood pressures and shortness of breath. Pt is s/p recent admission to  with RLL pneumonia. she was treated with course of antibiotics and discharged on oral Rx. In the ED -  patient was noted to be hypotensive. Workup suggested patient being very anemic with HH 5.7. Patient was transfused 2 units of PRBCs with great response to transfusions. s/p IV cefepime and dc home on po cefin x 6 days. Noted with TSH 12, started on synthroid 15, to f/u with PCP for further monitoring\par \par Upon examination A&O x 3 NAD. Lives with dtg Ilene boyd with RW and has chair lift. She denies CP, SOB, headache, dizziness, pain, abd discomfort or difficulty with bowel or bladder. Had BM yesterday, miralax prn for constipation. Saw PCP yesterday, no med changes.Sister spoke with Jennifer, to have home blood draw ordered. Has appt with Jad DE GUZMAN on 8/11. F/U PCP/Jennifer ONC/Pulm. [facility] RN/PT. \par

## 2022-08-09 NOTE — PLAN
[FreeTextEntry1] : A/P:\par 1. Pneumonia:\par - s/p IV cefepime, complete 6 day course po ceftin\par - pace activities; albuterol prn\par - call for any cough/SOB/fever\par - f/u Pulm 8/11\par \par 2. Colon Cancer:\par - Stage 3 s/p colectomy Dec 2021, declined adjuvant chemo\par - + FOBT HB 5.7 in ED s/p 2u PRBC, HB 9.0\par - follow Heme/Onc & surgeon\par \par 3. Protein Calorie Malnutrition:\par - BMI 14.88\par - muscle/temporal wasting, loss of sq adipose tissue, visible ribs\par - small frequent meals, ensure supplements\par \par 4. Hypothyroid:\par - con't synthroid 25 daily, f/u PCP for routine monitoring\par \par 5. constipation:\par - miralax prn

## 2022-08-09 NOTE — REVIEW OF SYSTEMS
[Lower Ext Edema] : lower extremity edema [Negative] : Psychiatric [de-identified] : skin tear R wrist

## 2022-08-09 NOTE — PHYSICAL EXAM
[No Acute Distress] : no acute distress [Well Developed] : well developed [Well-Appearing] : well-appearing [Normal Sclera/Conjunctiva] : normal sclera/conjunctiva [Normal Outer Ear/Nose] : the outer ears and nose were normal in appearance [Supple] : supple [No Respiratory Distress] : no respiratory distress  [No Accessory Muscle Use] : no accessory muscle use [Normal Rate] : normal rate  [Regular Rhythm] : with a regular rhythm [Normal S1, S2] : normal S1 and S2 [Pedal Pulses Present] : the pedal pulses are present [No Extremity Clubbing/Cyanosis] : no extremity clubbing/cyanosis [Soft] : abdomen soft [Non Tender] : non-tender [Non-distended] : non-distended [Normal Bowel Sounds] : normal bowel sounds [No Spinal Tenderness] : no spinal tenderness [Grossly Normal Strength/Tone] : grossly normal strength/tone [No Rash] : no rash [Coordination Grossly Intact] : coordination grossly intact [No Focal Deficits] : no focal deficits [Normal Affect] : the affect was normal [Alert and Oriented x3] : oriented to person, place, and time [Normal Insight/Judgement] : insight and judgment were intact [de-identified] : no thrush [de-identified] : few rhonchi RLL [de-identified] : trace - 1+ pedal edema R>L [de-identified] : healing skin tear R wrist

## 2022-08-16 ENCOUNTER — NON-APPOINTMENT (OUTPATIENT)
Age: 87
End: 2022-08-16

## 2022-08-16 ENCOUNTER — APPOINTMENT (OUTPATIENT)
Dept: CARDIOLOGY | Facility: CLINIC | Age: 87
End: 2022-08-16

## 2022-08-16 VITALS
DIASTOLIC BLOOD PRESSURE: 50 MMHG | OXYGEN SATURATION: 97 % | SYSTOLIC BLOOD PRESSURE: 106 MMHG | BODY MASS INDEX: 15.25 KG/M2 | WEIGHT: 75.84 LBS | HEART RATE: 74 BPM

## 2022-08-16 DIAGNOSIS — R77.8 OTHER SPECIFIED ABNORMALITIES OF PLASMA PROTEINS: ICD-10-CM

## 2022-08-16 DIAGNOSIS — I34.0 NONRHEUMATIC MITRAL (VALVE) INSUFFICIENCY: ICD-10-CM

## 2022-08-16 DIAGNOSIS — I07.1 RHEUMATIC TRICUSPID INSUFFICIENCY: ICD-10-CM

## 2022-08-16 DIAGNOSIS — R00.2 PALPITATIONS: ICD-10-CM

## 2022-08-16 PROCEDURE — 99214 OFFICE O/P EST MOD 30 MIN: CPT

## 2022-08-16 PROCEDURE — 93000 ELECTROCARDIOGRAM COMPLETE: CPT

## 2022-08-16 RX ORDER — CEPHALEXIN 250 MG/5ML
250 FOR SUSPENSION ORAL
Refills: 0 | Status: DISCONTINUED | COMMUNITY
Start: 2022-07-22 | End: 2022-08-16

## 2022-08-16 NOTE — CARDIOLOGY SUMMARY
[de-identified] : 8/16/22 sinus rhythm  possible lateral infarct , non specific T changes in anterior leads  [de-identified] : 7/19/22 EF 60-65% mild DD , moderate MR , mild to moderate TR RVSp 32 mm hg , aortic sclerosis  mild AI

## 2022-08-16 NOTE — HISTORY OF PRESENT ILLNESS
[FreeTextEntry1] : 89 year old female with hx of  CLL , ? stage colon carcinoma who hospitalized with pneumonia , palpitations , noted to have severe anemia , mild elevated troponin , deemed to be demand related ischemia ,came for follow up . Patient says she is feeling better ,gained some amount of weight , patient did receive PRBC transfusion , patient currently denies any shortness of breath or chest pain  her blood pressure is controlled , \par \par Patient s echo in hospital showed normal EF moderate MR , mild to moderate TR mild elevated RSVP 32 mm hg , \par \par recent hemoglobin  9 s \par \par hospital records reviewed

## 2022-08-16 NOTE — ASSESSMENT
[FreeTextEntry1] : Patient with above hx\par \par who had palpitations , without evidence of arrhythmia on monitor , possible due to severe symptomatic anemia .\par \par Mild elevated troponin in setting of severe anemia , pneumonia , normal ventricular systolic function , possible due to demand related ischemia not ACS continue monitor , no asa due to anemia with extensive ecchymosis , \par \par Valvular heart disease : Moderate MR , mild to moderate TR , aortic sclerosis Mild AI \par \par abnormal EKG :   normal EF no evidence wall motion abnormalities \par \par follow up as needed ,

## 2022-08-16 NOTE — PHYSICAL EXAM
[Frail] : frail [Ill-Appearing] : ill-appearing [Cachectic] : cachexia was observed [Normal Conjunctiva] : normal conjunctiva [Normal Venous Pressure] : normal venous pressure [Normal S1, S2] : normal S1, S2 [No Rub] : no rub [No Gallop] : no gallop [Murmur] : murmur [Soft] : abdomen soft [Non Tender] : non-tender [Abnormal Gait] : abnormal gait [No Edema] : no edema [Moves all extremities] : moves all extremities [de-identified] : I/VI SM  [de-identified] : multiple echymosis

## 2022-08-16 NOTE — REASON FOR VISIT
[Symptom and Test Evaluation] : symptom and test evaluation [Structural Heart and Valve Disease] : structural heart and valve disease [Other: ____] : [unfilled] Yes

## 2022-08-16 NOTE — REVIEW OF SYSTEMS
[Fever] : no fever [Chills] : no chills [Seeing Double (Diplopia)] : no diplopia [Discharge From Ears] : no discharge from the ears [SOB] : no shortness of breath [Dyspnea on exertion] : not dyspnea during exertion [Cough] : no cough [Abdominal Pain] : no abdominal pain [Dysuria] : no dysuria [Dizziness] : no dizziness

## 2023-01-11 ENCOUNTER — INPATIENT (INPATIENT)
Facility: HOSPITAL | Age: 88
LOS: 7 days | Discharge: HOME HEALTH | DRG: 871 | End: 2023-01-19
Attending: FAMILY MEDICINE | Admitting: FAMILY MEDICINE
Payer: MEDICARE

## 2023-01-11 VITALS — HEIGHT: 60 IN | WEIGHT: 74.08 LBS

## 2023-01-11 DIAGNOSIS — Z90.49 ACQUIRED ABSENCE OF OTHER SPECIFIED PARTS OF DIGESTIVE TRACT: Chronic | ICD-10-CM

## 2023-01-11 DIAGNOSIS — U07.1 COVID-19: ICD-10-CM

## 2023-01-11 LAB
ALBUMIN SERPL ELPH-MCNC: 2.9 G/DL — LOW (ref 3.3–5)
ALP SERPL-CCNC: 96 U/L — SIGNIFICANT CHANGE UP (ref 40–120)
ALT FLD-CCNC: 11 U/L — LOW (ref 12–78)
ANION GAP SERPL CALC-SCNC: 5 MMOL/L — SIGNIFICANT CHANGE UP (ref 5–17)
ANISOCYTOSIS BLD QL: SLIGHT — SIGNIFICANT CHANGE UP
AST SERPL-CCNC: 15 U/L — SIGNIFICANT CHANGE UP (ref 15–37)
BASO STIPL BLD QL SMEAR: PRESENT — SIGNIFICANT CHANGE UP
BASOPHILS # BLD AUTO: 0 K/UL — SIGNIFICANT CHANGE UP (ref 0–0.2)
BASOPHILS NFR BLD AUTO: 0 % — SIGNIFICANT CHANGE UP (ref 0–2)
BILIRUB SERPL-MCNC: 0.9 MG/DL — SIGNIFICANT CHANGE UP (ref 0.2–1.2)
BUN SERPL-MCNC: 45 MG/DL — HIGH (ref 7–23)
CALCIUM SERPL-MCNC: 9.6 MG/DL — SIGNIFICANT CHANGE UP (ref 8.5–10.1)
CHLORIDE SERPL-SCNC: 101 MMOL/L — SIGNIFICANT CHANGE UP (ref 96–108)
CO2 SERPL-SCNC: 29 MMOL/L — SIGNIFICANT CHANGE UP (ref 22–31)
CREAT SERPL-MCNC: 1.12 MG/DL — SIGNIFICANT CHANGE UP (ref 0.5–1.3)
D DIMER BLD IA.RAPID-MCNC: 637 NG/ML DDU — HIGH
DACRYOCYTES BLD QL SMEAR: SLIGHT — SIGNIFICANT CHANGE UP
EGFR: 45 ML/MIN/1.73M2 — LOW
EOSINOPHIL # BLD AUTO: 0 K/UL — SIGNIFICANT CHANGE UP (ref 0–0.5)
EOSINOPHIL NFR BLD AUTO: 0 % — SIGNIFICANT CHANGE UP (ref 0–6)
FLUAV AG NPH QL: SIGNIFICANT CHANGE UP
FLUBV AG NPH QL: SIGNIFICANT CHANGE UP
GLUCOSE SERPL-MCNC: 172 MG/DL — HIGH (ref 70–99)
HCT VFR BLD CALC: 31.3 % — LOW (ref 34.5–45)
HGB BLD-MCNC: 10 G/DL — LOW (ref 11.5–15.5)
INR BLD: 1.2 RATIO — HIGH (ref 0.88–1.16)
LACTATE SERPL-SCNC: 0.6 MMOL/L — LOW (ref 0.7–2)
LYMPHOCYTES # BLD AUTO: 30.8 K/UL — HIGH (ref 1–3.3)
LYMPHOCYTES # BLD AUTO: 84 % — HIGH (ref 13–44)
LYMPHOCYTES # SPEC AUTO: 8 % — HIGH (ref 0–0)
MACROCYTES BLD QL: SLIGHT — SIGNIFICANT CHANGE UP
MAGNESIUM SERPL-MCNC: 2.2 MG/DL — SIGNIFICANT CHANGE UP (ref 1.6–2.6)
MANUAL SMEAR VERIFICATION: SIGNIFICANT CHANGE UP
MCHC RBC-ENTMCNC: 31.9 GM/DL — LOW (ref 32–36)
MCHC RBC-ENTMCNC: 33.6 PG — SIGNIFICANT CHANGE UP (ref 27–34)
MCV RBC AUTO: 105 FL — HIGH (ref 80–100)
MONOCYTES # BLD AUTO: 0 K/UL — SIGNIFICANT CHANGE UP (ref 0–0.9)
MONOCYTES NFR BLD AUTO: 0 % — LOW (ref 2–14)
NEUTROPHILS # BLD AUTO: 1.83 K/UL — SIGNIFICANT CHANGE UP (ref 1.8–7.4)
NEUTROPHILS NFR BLD AUTO: 5 % — LOW (ref 43–77)
NRBC # BLD: 0 /100 — SIGNIFICANT CHANGE UP (ref 0–0)
NRBC # BLD: SIGNIFICANT CHANGE UP /100 WBCS (ref 0–0)
NT-PROBNP SERPL-SCNC: 950 PG/ML — HIGH (ref 0–450)
PLAT MORPH BLD: NORMAL — SIGNIFICANT CHANGE UP
PLATELET # BLD AUTO: 120 K/UL — LOW (ref 150–400)
POTASSIUM SERPL-MCNC: 4.6 MMOL/L — SIGNIFICANT CHANGE UP (ref 3.5–5.3)
POTASSIUM SERPL-SCNC: 4.6 MMOL/L — SIGNIFICANT CHANGE UP (ref 3.5–5.3)
PROT SERPL-MCNC: 7.4 GM/DL — SIGNIFICANT CHANGE UP (ref 6–8.3)
PROTHROM AB SERPL-ACNC: 14 SEC — HIGH (ref 10.5–13.4)
RBC # BLD: 2.98 M/UL — LOW (ref 3.8–5.2)
RBC # FLD: 16.6 % — HIGH (ref 10.3–14.5)
RBC BLD AUTO: ABNORMAL
RSV RNA NPH QL NAA+NON-PROBE: SIGNIFICANT CHANGE UP
SARS-COV-2 RNA SPEC QL NAA+PROBE: DETECTED
SMUDGE CELLS # BLD: PRESENT — SIGNIFICANT CHANGE UP
SODIUM SERPL-SCNC: 135 MMOL/L — SIGNIFICANT CHANGE UP (ref 135–145)
TROPONIN I, HIGH SENSITIVITY RESULT: 51.5 NG/L — SIGNIFICANT CHANGE UP
TROPONIN I, HIGH SENSITIVITY RESULT: 58.55 NG/L — HIGH
TROPONIN I, HIGH SENSITIVITY RESULT: 63.37 NG/L — HIGH
VARIANT LYMPHS # BLD: 3 % — SIGNIFICANT CHANGE UP (ref 0–6)
WBC # BLD: 36.67 K/UL — HIGH (ref 3.8–10.5)
WBC # FLD AUTO: 36.67 K/UL — HIGH (ref 3.8–10.5)

## 2023-01-11 PROCEDURE — 84484 ASSAY OF TROPONIN QUANT: CPT

## 2023-01-11 PROCEDURE — 80053 COMPREHEN METABOLIC PANEL: CPT

## 2023-01-11 PROCEDURE — 80048 BASIC METABOLIC PNL TOTAL CA: CPT

## 2023-01-11 PROCEDURE — P9016: CPT

## 2023-01-11 PROCEDURE — 83540 ASSAY OF IRON: CPT

## 2023-01-11 PROCEDURE — 85379 FIBRIN DEGRADATION QUANT: CPT

## 2023-01-11 PROCEDURE — 83550 IRON BINDING TEST: CPT

## 2023-01-11 PROCEDURE — 99223 1ST HOSP IP/OBS HIGH 75: CPT

## 2023-01-11 PROCEDURE — 80076 HEPATIC FUNCTION PANEL: CPT

## 2023-01-11 PROCEDURE — 85610 PROTHROMBIN TIME: CPT

## 2023-01-11 PROCEDURE — 85025 COMPLETE CBC W/AUTO DIFF WBC: CPT

## 2023-01-11 PROCEDURE — 82607 VITAMIN B-12: CPT

## 2023-01-11 PROCEDURE — 87086 URINE CULTURE/COLONY COUNT: CPT

## 2023-01-11 PROCEDURE — 82565 ASSAY OF CREATININE: CPT

## 2023-01-11 PROCEDURE — 87040 BLOOD CULTURE FOR BACTERIA: CPT

## 2023-01-11 PROCEDURE — 83735 ASSAY OF MAGNESIUM: CPT

## 2023-01-11 PROCEDURE — 93010 ELECTROCARDIOGRAM REPORT: CPT

## 2023-01-11 PROCEDURE — 81001 URINALYSIS AUTO W/SCOPE: CPT

## 2023-01-11 PROCEDURE — 83605 ASSAY OF LACTIC ACID: CPT

## 2023-01-11 PROCEDURE — 93306 TTE W/DOPPLER COMPLETE: CPT

## 2023-01-11 PROCEDURE — 82728 ASSAY OF FERRITIN: CPT

## 2023-01-11 PROCEDURE — 86900 BLOOD TYPING SEROLOGIC ABO: CPT

## 2023-01-11 PROCEDURE — 94761 N-INVAS EAR/PLS OXIMETRY MLT: CPT

## 2023-01-11 PROCEDURE — 71045 X-RAY EXAM CHEST 1 VIEW: CPT | Mod: 26

## 2023-01-11 PROCEDURE — 84100 ASSAY OF PHOSPHORUS: CPT

## 2023-01-11 PROCEDURE — 71275 CT ANGIOGRAPHY CHEST: CPT | Mod: 26

## 2023-01-11 PROCEDURE — 82746 ASSAY OF FOLIC ACID SERUM: CPT

## 2023-01-11 PROCEDURE — 97162 PT EVAL MOD COMPLEX 30 MIN: CPT | Mod: GP

## 2023-01-11 PROCEDURE — 86140 C-REACTIVE PROTEIN: CPT

## 2023-01-11 PROCEDURE — 83880 ASSAY OF NATRIURETIC PEPTIDE: CPT

## 2023-01-11 PROCEDURE — C9113: CPT

## 2023-01-11 PROCEDURE — 36415 COLL VENOUS BLD VENIPUNCTURE: CPT

## 2023-01-11 PROCEDURE — 86901 BLOOD TYPING SEROLOGIC RH(D): CPT

## 2023-01-11 PROCEDURE — 97530 THERAPEUTIC ACTIVITIES: CPT | Mod: GP

## 2023-01-11 PROCEDURE — 86850 RBC ANTIBODY SCREEN: CPT

## 2023-01-11 PROCEDURE — 71275 CT ANGIOGRAPHY CHEST: CPT | Mod: MA

## 2023-01-11 PROCEDURE — 83036 HEMOGLOBIN GLYCOSYLATED A1C: CPT

## 2023-01-11 PROCEDURE — 85027 COMPLETE CBC AUTOMATED: CPT

## 2023-01-11 PROCEDURE — 93005 ELECTROCARDIOGRAM TRACING: CPT

## 2023-01-11 PROCEDURE — 71045 X-RAY EXAM CHEST 1 VIEW: CPT

## 2023-01-11 PROCEDURE — 84466 ASSAY OF TRANSFERRIN: CPT

## 2023-01-11 PROCEDURE — 82248 BILIRUBIN DIRECT: CPT

## 2023-01-11 PROCEDURE — 94640 AIRWAY INHALATION TREATMENT: CPT

## 2023-01-11 PROCEDURE — 82962 GLUCOSE BLOOD TEST: CPT

## 2023-01-11 PROCEDURE — 99291 CRITICAL CARE FIRST HOUR: CPT | Mod: CS

## 2023-01-11 PROCEDURE — 97116 GAIT TRAINING THERAPY: CPT | Mod: GP

## 2023-01-11 PROCEDURE — 36430 TRANSFUSION BLD/BLD COMPNT: CPT

## 2023-01-11 PROCEDURE — 82784 ASSAY IGA/IGD/IGG/IGM EACH: CPT

## 2023-01-11 PROCEDURE — 94760 N-INVAS EAR/PLS OXIMETRY 1: CPT

## 2023-01-11 PROCEDURE — 86923 COMPATIBILITY TEST ELECTRIC: CPT

## 2023-01-11 RX ORDER — LANOLIN ALCOHOL/MO/W.PET/CERES
3 CREAM (GRAM) TOPICAL AT BEDTIME
Refills: 0 | Status: DISCONTINUED | OUTPATIENT
Start: 2023-01-11 | End: 2023-01-19

## 2023-01-11 RX ORDER — ALBUTEROL 90 UG/1
2 AEROSOL, METERED ORAL EVERY 6 HOURS
Refills: 0 | Status: DISCONTINUED | OUTPATIENT
Start: 2023-01-11 | End: 2023-01-13

## 2023-01-11 RX ORDER — ONDANSETRON 8 MG/1
4 TABLET, FILM COATED ORAL EVERY 8 HOURS
Refills: 0 | Status: DISCONTINUED | OUTPATIENT
Start: 2023-01-11 | End: 2023-01-19

## 2023-01-11 RX ORDER — PREGABALIN 225 MG/1
1000 CAPSULE ORAL DAILY
Refills: 0 | Status: DISCONTINUED | OUTPATIENT
Start: 2023-01-11 | End: 2023-01-19

## 2023-01-11 RX ORDER — DEXAMETHASONE 0.5 MG/5ML
6 ELIXIR ORAL DAILY
Refills: 0 | Status: DISCONTINUED | OUTPATIENT
Start: 2023-01-11 | End: 2023-01-15

## 2023-01-11 RX ORDER — CEFTRIAXONE 500 MG/1
1000 INJECTION, POWDER, FOR SOLUTION INTRAMUSCULAR; INTRAVENOUS EVERY 24 HOURS
Refills: 0 | Status: COMPLETED | OUTPATIENT
Start: 2023-01-11 | End: 2023-01-15

## 2023-01-11 RX ORDER — GUAIFENESIN/DEXTROMETHORPHAN 600MG-30MG
10 TABLET, EXTENDED RELEASE 12 HR ORAL EVERY 4 HOURS
Refills: 0 | Status: DISCONTINUED | OUTPATIENT
Start: 2023-01-11 | End: 2023-01-19

## 2023-01-11 RX ORDER — SODIUM CHLORIDE 9 MG/ML
500 INJECTION INTRAMUSCULAR; INTRAVENOUS; SUBCUTANEOUS ONCE
Refills: 0 | Status: COMPLETED | OUTPATIENT
Start: 2023-01-11 | End: 2023-01-11

## 2023-01-11 RX ORDER — REMDESIVIR 5 MG/ML
200 INJECTION INTRAVENOUS EVERY 24 HOURS
Refills: 0 | Status: DISCONTINUED | OUTPATIENT
Start: 2023-01-11 | End: 2023-01-11

## 2023-01-11 RX ORDER — BUDESONIDE AND FORMOTEROL FUMARATE DIHYDRATE 160; 4.5 UG/1; UG/1
2 AEROSOL RESPIRATORY (INHALATION)
Refills: 0 | Status: DISCONTINUED | OUTPATIENT
Start: 2023-01-11 | End: 2023-01-19

## 2023-01-11 RX ORDER — REMDESIVIR 5 MG/ML
INJECTION INTRAVENOUS
Refills: 0 | Status: DISCONTINUED | OUTPATIENT
Start: 2023-01-11 | End: 2023-01-11

## 2023-01-11 RX ORDER — DEXAMETHASONE 0.5 MG/5ML
10 ELIXIR ORAL ONCE
Refills: 0 | Status: COMPLETED | OUTPATIENT
Start: 2023-01-11 | End: 2023-01-11

## 2023-01-11 RX ORDER — LEVOTHYROXINE SODIUM 125 MCG
25 TABLET ORAL DAILY
Refills: 0 | Status: DISCONTINUED | OUTPATIENT
Start: 2023-01-12 | End: 2023-01-19

## 2023-01-11 RX ORDER — SODIUM CHLORIDE 9 MG/ML
1000 INJECTION INTRAMUSCULAR; INTRAVENOUS; SUBCUTANEOUS
Refills: 0 | Status: DISCONTINUED | OUTPATIENT
Start: 2023-01-11 | End: 2023-01-13

## 2023-01-11 RX ORDER — ENOXAPARIN SODIUM 100 MG/ML
30 INJECTION SUBCUTANEOUS EVERY 24 HOURS
Refills: 0 | Status: DISCONTINUED | OUTPATIENT
Start: 2023-01-11 | End: 2023-01-13

## 2023-01-11 RX ORDER — ACETAMINOPHEN 500 MG
650 TABLET ORAL EVERY 6 HOURS
Refills: 0 | Status: DISCONTINUED | OUTPATIENT
Start: 2023-01-11 | End: 2023-01-19

## 2023-01-11 RX ORDER — AZITHROMYCIN 500 MG/1
500 TABLET, FILM COATED ORAL EVERY 24 HOURS
Refills: 0 | Status: COMPLETED | OUTPATIENT
Start: 2023-01-11 | End: 2023-01-15

## 2023-01-11 RX ORDER — TIOTROPIUM BROMIDE 18 UG/1
2 CAPSULE ORAL; RESPIRATORY (INHALATION) DAILY
Refills: 0 | Status: DISCONTINUED | OUTPATIENT
Start: 2023-01-11 | End: 2023-01-19

## 2023-01-11 RX ORDER — LACTOBACILLUS ACIDOPHILUS 100MM CELL
1 CAPSULE ORAL DAILY
Refills: 0 | Status: DISCONTINUED | OUTPATIENT
Start: 2023-01-11 | End: 2023-01-19

## 2023-01-11 RX ORDER — CHOLECALCIFEROL (VITAMIN D3) 125 MCG
1000 CAPSULE ORAL DAILY
Refills: 0 | Status: DISCONTINUED | OUTPATIENT
Start: 2023-01-11 | End: 2023-01-19

## 2023-01-11 RX ORDER — L.ACIDOPH/B.ANIMALIS/B.LONGUM 15B CELL
1 CAPSULE ORAL
Qty: 0 | Refills: 0 | DISCHARGE

## 2023-01-11 RX ADMIN — AZITHROMYCIN 255 MILLIGRAM(S): 500 TABLET, FILM COATED ORAL at 23:04

## 2023-01-11 RX ADMIN — CEFTRIAXONE 1000 MILLIGRAM(S): 500 INJECTION, POWDER, FOR SOLUTION INTRAMUSCULAR; INTRAVENOUS at 23:03

## 2023-01-11 RX ADMIN — Medication 102 MILLIGRAM(S): at 15:56

## 2023-01-11 RX ADMIN — SODIUM CHLORIDE 50 MILLILITER(S): 9 INJECTION INTRAMUSCULAR; INTRAVENOUS; SUBCUTANEOUS at 23:03

## 2023-01-11 RX ADMIN — ENOXAPARIN SODIUM 30 MILLIGRAM(S): 100 INJECTION SUBCUTANEOUS at 23:04

## 2023-01-11 RX ADMIN — SODIUM CHLORIDE 500 MILLILITER(S): 9 INJECTION INTRAMUSCULAR; INTRAVENOUS; SUBCUTANEOUS at 21:19

## 2023-01-11 NOTE — ED PROVIDER NOTE - CLINICAL SUMMARY MEDICAL DECISION MAKING FREE TEXT BOX
Pt with hx of CLL, colon cancer, hypothyroidism, COPD here with cough and SOB since Monday pt tested + for covid, will do decadron, remdesivir, labs, CT, admission as pt is hypoxic. Pt with hx of CLL, colon cancer, hypothyroidism, COPD here with cough and SOB since Monday pt tested + for covid, will do decadron, remdesivir, labs, CT, admission as pt is hypoxic.    PA: Spoke with hospitalist dr. Escobedo, will admit patient. ~Bijan Jerry PA-C

## 2023-01-11 NOTE — H&P ADULT - NSHPREVIEWOFSYSTEMS_GEN_ALL_CORE
Constitutional: negative for fatigue, negative for fever, negative for chills, negative for decreased appetite.  Skin: negative for rashes, negative for open wounds, negative for jaundice.   Eyes: negative for blurry vision, negative for double vision.   Ears, nose, throat: negative for ear pain, negative for nasal congestion, negative for sore throat, negative for lymph node swelling.   Cardiovascular: negative for chest pain, negative for palpitations, negative for lower extremity swelling.   Respiratory: positive for shortness of breath, negative for wheezing, positive for cough.   Gastrointestinal: negative for abdominal pain, negative for nausea, negative for vomiting, negative for diarrhea, positive for constipation, negative for blood in the stool, negative for black tarry stools.   Genitourinary: negative for burning on urination, negative for urinary urgency or frequency, negative for blood in the urine.   Endocrine: negative for cold intolerance, negative for heat intolerance, negative for increased thirst.   Hematologic: negative for easy bruising or bleeding.   Musculoskeletal: negative for muscle/joint pain, negative for decreased range of motion.   Neurological: negative for dizziness, negative for headaches, negative for loss of consciousness, negative for motor weakness, negative for sensory deficits.   Psychiatric: negative for depression, negative for anxiety.

## 2023-01-11 NOTE — ED PROVIDER NOTE - ATTENDING CONTRIBUTION TO CARE
Critical care time spent evaluating and reevaluating patient, ordering and interpreting diagnostics, ordering therapeutics, speaking to patient and family,, admitting MD, reviewing records, and documenting. Cami CERON Critical care time spent evaluating and reevaluating patient, ordering and interpreting diagnostics, ordering therapeutics, speaking to patient and family,, admitting MD, reviewing records, and documenting. Pt eval, treatment and plan contemporaneously with ACP Ali. Agree with notes.Cami CERON

## 2023-01-11 NOTE — PHARMACOTHERAPY INTERVENTION NOTE - COMMENTS
Medication reconciliation completed.  Reviewed Medication list and confirmed med allergies with patient; confirmed with Dr. Mehta Med[facility].  Pt confirms that she has not used her Nebulizer for a few days, she says that the Provider at her Urgent Care told her that it would interfere with COVID.

## 2023-01-11 NOTE — H&P ADULT - CONVERSATION DETAILS
DNR/DNI (Completed MOLST form with the pt at the bedside. The patient is A+Ox3 at the time of my evaluation and has full capacity. Able to make an informed decision and understands risks associated with decisions made. MOLST completed and placed into chart. Pt is DNR/DNI, no feeding tube. Agrees to IV abx and IVF).

## 2023-01-11 NOTE — ED PROVIDER NOTE - PROGRESS NOTE DETAILS
Patient seen and evaluated. Will get labs, CXR. Decadron for COVID, likely TBA. ~Bijan Jerry PA-C Siobhan Marie for attending Dr. Hernandez.  89 year old female with PMHx of CLL, colon cancer, hypothyroidism, COPD c/o SOB since being diagnosed + covid on monday. Pt has cough but cannot bring anything up. Pt is never smoker.  physical:   rales at both bases. Spoke with hospitalist dr. Escobedo, will admit patient. ~iBjan Jerry PA-C Siobhan Marie for attending Dr. Hernandez.  89 year old female with PMHx of CLL, colon cancer, hypothyroidism, COPD c/o SOB since being diagnosed + covid on monday. Pt has cough but cannot bring anything up. Pt is never smoker.  physical: alert,heart rrr, lungs dec bs bilat, abd soft pos bs nt ext no cce  rales at both bases.

## 2023-01-11 NOTE — H&P ADULT - NSICDXFAMILYHX_GEN_ALL_CORE_FT
FAMILY HISTORY:  Father  Still living? Unknown  FH: hypertension, Age at diagnosis: Age Unknown    Mother  Still living? Unknown  FHx: diabetes mellitus, Age at diagnosis: Age Unknown

## 2023-01-11 NOTE — H&P ADULT - NSHPSOCIALHISTORY_GEN_ALL_CORE
Lives with both of her daughters (goes back and forth between the [place] and [place]). Denies smoking, alcohol, drug use.

## 2023-01-11 NOTE — ED ADULT TRIAGE NOTE - CHIEF COMPLAINT QUOTE
Patient is alert and oriented X3, presenting to the ER with c/o shortness of breath. Patient reports "I began feeling sick on Monday, tested myself at home which was positive. I went to urgent care to get tested just to make sure and it was still positive. I began having difficulty breathing today." As per the daughter "I was talking to her on the phone today when I noticed she was having difficulty breathing." Patient denies CP, fevers, nausea or vomiting.   HX: Lung issues, CLL, colon cancer.  Respirations labored, air way patent, O2 saturation 86% on room air, heart rate 84. Patient taken in for EKG and oxygen.

## 2023-01-11 NOTE — ED PROVIDER NOTE - CARE PLAN
1 Principal Discharge DX:	Acute COVID-19  Secondary Diagnosis:	Hypoxia  Secondary Diagnosis:	Elevated troponin

## 2023-01-11 NOTE — H&P ADULT - ASSESSMENT
89 y/o M presented with cough     1. 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   - Admit to telemetry with continuous pulse ox   - SpO2 86% -> 97% on 3L nasal cannula  - Keep SpO2 88-92%, supplemental O2 PRN   - BP 94/49, RR 22, WBC 36.67  - s/p Remdesivir and Decadron in the ER, will continue Decadron   - Stop further Remdesivir, CrCl 15   - On Incruse at home -> start Symbicort and Spiriva while hospitalized symb  - Albuterol, Mucinex, Tessalon for cough  - Will start Ceftriaxone and Azithromycin, add probiotic   - f/u UA, UCx, BCx x 2, sputum culture, procalcitonin, lactic acid; adjust abx based on sensitivities  - Trend WBC, monitor for temperatures   - Tylenol for temperatures PRN   - BP low in the ER, give 500 ml bolus and start 50 ml/hr overnight (monitor for fluid overload)   - ID consult - Dr. Inman   - Pulmonology consult - Dr. Ramires     2. Elevated troponin likely secondary to demand ischemia (r/o STEMI)    - Monitor on telemetry   - Troponin 58.55 -> 63.37 will trend 1 more troponins   - No active chest pain, monitor off ASA for now   - If troponins continue to trend upwards -> will order heparin, place pt NPO, and place cardiology consult     3. Leukocytosis likely multifactorial from CLL and PNA   - WBC 36.67, monitor closely (has been up to 60 in the past)     4. Macrocytic anemia   - Hb 10 (baseline ~),   - Last B12 was low/normal -> start B12 supplementation     5. Thrombocytopenia   - , monitor     6. Elevated D-dimer   - D-dimer 637, CTA negative for PE     7. Hyperglycemia   - Glucose 172, monitor closely   - Ordered HbA1c     8. Elevated BNP   - , pt is euvolemic on exam   - ECHO (7/19/22): mild 1+ AR, EF 60-65%, moderate 2+ MR, mild pulmonic regurgitation 1+, mild to moderate TR, mild pulmonary HTN   - Strict I+Os, daily weights   - Keep K > 4 and Mg > 2   - c/w home medications     9. Hypoalbuminemia   - Albumin 2.9  - Nutrition consult     10. History of CLL, colon cancer, hypothyroidism, emphysema, PNA, anemia   - c/w home medications; verified with pt and her daughter at the bedside     DVT ppx: Lovenox 40 mg subcutaneous daily   Code status: DNR/DNI (Completed MOLST form with the pt at the bedside. The patient is A+Ox3 at the time of my evaluation and has full capacity. Able to make an informed decision and understands risks associated with decisions made. MOLST completed and placed into chart).   Emergency contact: Alana (daughter/HCP) [phone] or Kat (daughter/HCP)

## 2023-01-11 NOTE — ED ADULT NURSE NOTE - OBJECTIVE STATEMENT
Patient is alert and oriented X3, presenting to the ER with c/o shortness of breath. Patient reports "I began feeling sick on Monday, tested myself at home which was positive. I went to urgent care to get tested just to make sure and it was still positive. I began having difficulty breathing today." As per the daughter "I was talking to her on the phone today when I noticed she was having difficulty breathing." Patient denies CP, fevers, nausea or vomiting.

## 2023-01-11 NOTE — H&P ADULT - NSHPPHYSICALEXAM_GEN_ALL_CORE
ICU Vital Signs Last 24 Hrs  T(C): 36.7 (11 Jan 2023 21:07), Max: 36.7 (11 Jan 2023 21:07)  T(F): 98 (11 Jan 2023 21:07), Max: 98 (11 Jan 2023 21:07)  HR: 70 (11 Jan 2023 21:07) (70 - 80)  BP: 95/52 (11 Jan 2023 21:07) (94/49 - 101/50)  BP(mean): 59 (11 Jan 2023 13:48) (59 - 59)  RR: 19 (11 Jan 2023 21:07) (18 - 22)  SpO2: 99% (11 Jan 2023 21:07) (86% - 99%)    O2 Parameters below as of 11 Jan 2023 21:07  Patient On (Oxygen Delivery Method): nasal cannula  O2 Flow (L/min): 3    General: Awake and alert, cooperative with exam. No acute distress.   Skin: Warm, dry, and pink.   Eyes: Pupils equal and reactive to light. Extraocular eye movements intact. No conjunctival injection, discharge, or scleral icterus.   HEENT: Atraumatic, normocephalic. Moist mucus membranes.  Cardiology: Normal S1, S2. No murmurs, rubs, or gallops. Regular rate and rhythm.   Respiratory: Rhonchi at the bases bilaterally, more on the right. Normal chest expansion. No accessory muscle use.   Gastrointestinal: Positive bowel sounds. Soft, non-tender, non-distended. No guarding, rigidity, or rebound tenderness. No hepatosplenomegaly.   Musculoskeletal: 5/5 motor strength in all extremities. Normal range of motion.   Extremities: No peripheral edema bilaterally. Dorsalis pedis pulses 2+ bilaterally.   Neurological: A+Ox3 (person, place, and time). Cranial nerves 2-12 intact. Normal speech. No facial droop. No focal neurological deficits.  Psychiatric: Normal affect. Normal mood.

## 2023-01-11 NOTE — ED PROVIDER NOTE - OBJECTIVE STATEMENT
PA: Patient is a 89 year old female with PMHx of CLL, colon cancer, hypothyroidism, COPD, who presents to [facility] c/o SOB and hypoxia x 1 day. Patient was diagnosed with +COVID 3 days ago. Patient is staying in [place] with her daughter who also has COVID. Patient c/o productive cough and difficulty breathing. DENIES CP, palpitations, fever. Patient's O2sat was 84% RA upon arrival to ED. ~Bijan Jerry PA-C

## 2023-01-11 NOTE — ED PROVIDER NOTE - NS ED ATTENDING STATEMENT MOD
This was a shared visit with the GABE. I reviewed and verified the documentation and independently performed the documented: I have personally provided the amount of critical care time documented below concurrently with the resident/fellow.  This time excludes time spent on separate procedures and time spent teaching. I have reviewed the resident’s / fellow’s documentation and I agree with the history, exam, and assessment and plan of care.

## 2023-01-11 NOTE — ED PROVIDER NOTE - PHYSICAL EXAMINATION
PA NOTE: GEN: AOX3, NAD. HEENT: Throat clear. Airway is patent. EYES: PERRLA. EOMI. Head: NC/AT. NECK: Supple, No JVD. FROM. C-spine non-tender. CV:S1S2, RRR, LUNGS: Non-labored breathing, no tachypnea. O2sat 100% on 2L/min NC oxygen. Slightly diminished breath sounds. CHEST: Equal chest expansion and rise. No deformity. ABD: Soft, NT/ND, no rebound, no guarding. No CVAT. EXT: No e/c/c. 2+ distal pulses. SKIN: No rashes. NEURO: No focal deficits. CN II-XII intact. FROM. 5/5 motor and sensory. ~Bijan Jerry PA-C

## 2023-01-12 LAB
A1C WITH ESTIMATED AVERAGE GLUCOSE RESULT: 5.8 % — HIGH (ref 4–5.6)
ALBUMIN SERPL ELPH-MCNC: 2.2 G/DL — LOW (ref 3.3–5)
ALP SERPL-CCNC: 80 U/L — SIGNIFICANT CHANGE UP (ref 40–120)
ALT FLD-CCNC: 11 U/L — LOW (ref 12–78)
ANION GAP SERPL CALC-SCNC: 5 MMOL/L — SIGNIFICANT CHANGE UP (ref 5–17)
APPEARANCE UR: CLEAR — SIGNIFICANT CHANGE UP
AST SERPL-CCNC: 11 U/L — LOW (ref 15–37)
BASOPHILS # BLD AUTO: 0.09 K/UL — SIGNIFICANT CHANGE UP (ref 0–0.2)
BASOPHILS NFR BLD AUTO: 0.3 % — SIGNIFICANT CHANGE UP (ref 0–2)
BILIRUB DIRECT SERPL-MCNC: 0.1 MG/DL — SIGNIFICANT CHANGE UP (ref 0–0.3)
BILIRUB INDIRECT FLD-MCNC: 0.3 MG/DL — SIGNIFICANT CHANGE UP (ref 0.2–1)
BILIRUB SERPL-MCNC: 0.4 MG/DL — SIGNIFICANT CHANGE UP (ref 0.2–1.2)
BILIRUB UR-MCNC: NEGATIVE — SIGNIFICANT CHANGE UP
BUN SERPL-MCNC: 43 MG/DL — HIGH (ref 7–23)
CALCIUM SERPL-MCNC: 8.8 MG/DL — SIGNIFICANT CHANGE UP (ref 8.5–10.1)
CHLORIDE SERPL-SCNC: 106 MMOL/L — SIGNIFICANT CHANGE UP (ref 96–108)
CO2 SERPL-SCNC: 26 MMOL/L — SIGNIFICANT CHANGE UP (ref 22–31)
COLOR SPEC: YELLOW — SIGNIFICANT CHANGE UP
CREAT SERPL-MCNC: 0.89 MG/DL — SIGNIFICANT CHANGE UP (ref 0.5–1.3)
DIFF PNL FLD: ABNORMAL
EGFR: 60 ML/MIN/1.73M2 — SIGNIFICANT CHANGE UP
EOSINOPHIL # BLD AUTO: 0 K/UL — SIGNIFICANT CHANGE UP (ref 0–0.5)
EOSINOPHIL NFR BLD AUTO: 0 % — SIGNIFICANT CHANGE UP (ref 0–6)
ESTIMATED AVERAGE GLUCOSE: 120 MG/DL — HIGH (ref 68–114)
GLUCOSE SERPL-MCNC: 191 MG/DL — HIGH (ref 70–99)
GLUCOSE UR QL: NEGATIVE — SIGNIFICANT CHANGE UP
HCT VFR BLD CALC: 28.2 % — LOW (ref 34.5–45)
HGB BLD-MCNC: 9 G/DL — LOW (ref 11.5–15.5)
IMM GRANULOCYTES NFR BLD AUTO: 0.1 % — SIGNIFICANT CHANGE UP (ref 0–0.9)
INR BLD: 1.17 RATIO — HIGH (ref 0.88–1.16)
KETONES UR-MCNC: NEGATIVE — SIGNIFICANT CHANGE UP
LEUKOCYTE ESTERASE UR-ACNC: NEGATIVE — SIGNIFICANT CHANGE UP
LYMPHOCYTES # BLD AUTO: 25.14 K/UL — HIGH (ref 1–3.3)
LYMPHOCYTES # BLD AUTO: 93 % — HIGH (ref 13–44)
MCHC RBC-ENTMCNC: 31.9 GM/DL — LOW (ref 32–36)
MCHC RBC-ENTMCNC: 33.3 PG — SIGNIFICANT CHANGE UP (ref 27–34)
MCV RBC AUTO: 104.4 FL — HIGH (ref 80–100)
MONOCYTES # BLD AUTO: 0.18 K/UL — SIGNIFICANT CHANGE UP (ref 0–0.9)
MONOCYTES NFR BLD AUTO: 0.7 % — LOW (ref 2–14)
NEUTROPHILS # BLD AUTO: 1.6 K/UL — LOW (ref 1.8–7.4)
NEUTROPHILS NFR BLD AUTO: 5.9 % — LOW (ref 43–77)
NITRITE UR-MCNC: NEGATIVE — SIGNIFICANT CHANGE UP
PH UR: 5 — SIGNIFICANT CHANGE UP (ref 5–8)
PLATELET # BLD AUTO: 99 K/UL — LOW (ref 150–400)
POTASSIUM SERPL-MCNC: 4.5 MMOL/L — SIGNIFICANT CHANGE UP (ref 3.5–5.3)
POTASSIUM SERPL-SCNC: 4.5 MMOL/L — SIGNIFICANT CHANGE UP (ref 3.5–5.3)
PROCALCITONIN SERPL-MCNC: 1.21 NG/ML — HIGH (ref 0.02–0.1)
PROT SERPL-MCNC: 6.4 GM/DL — SIGNIFICANT CHANGE UP (ref 6–8.3)
PROT UR-MCNC: SIGNIFICANT CHANGE UP MG/DL
PROTHROM AB SERPL-ACNC: 13.6 SEC — HIGH (ref 10.5–13.4)
RBC # BLD: 2.7 M/UL — LOW (ref 3.8–5.2)
RBC # FLD: 16.3 % — HIGH (ref 10.3–14.5)
SODIUM SERPL-SCNC: 137 MMOL/L — SIGNIFICANT CHANGE UP (ref 135–145)
SP GR SPEC: 1.01 — SIGNIFICANT CHANGE UP (ref 1.01–1.02)
UROBILINOGEN FLD QL: NEGATIVE — SIGNIFICANT CHANGE UP
WBC # BLD: 27.03 K/UL — HIGH (ref 3.8–10.5)
WBC # FLD AUTO: 27.03 K/UL — HIGH (ref 3.8–10.5)

## 2023-01-12 PROCEDURE — 99233 SBSQ HOSP IP/OBS HIGH 50: CPT | Mod: GC

## 2023-01-12 RX ORDER — DEXTROSE 50 % IN WATER 50 %
15 SYRINGE (ML) INTRAVENOUS ONCE
Refills: 0 | Status: DISCONTINUED | OUTPATIENT
Start: 2023-01-12 | End: 2023-01-17

## 2023-01-12 RX ORDER — DEXTROSE 50 % IN WATER 50 %
25 SYRINGE (ML) INTRAVENOUS ONCE
Refills: 0 | Status: DISCONTINUED | OUTPATIENT
Start: 2023-01-12 | End: 2023-01-17

## 2023-01-12 RX ORDER — REMDESIVIR 5 MG/ML
88 INJECTION INTRAVENOUS EVERY 24 HOURS
Refills: 0 | Status: COMPLETED | OUTPATIENT
Start: 2023-01-13 | End: 2023-01-16

## 2023-01-12 RX ORDER — SODIUM CHLORIDE 9 MG/ML
500 INJECTION INTRAMUSCULAR; INTRAVENOUS; SUBCUTANEOUS ONCE
Refills: 0 | Status: COMPLETED | OUTPATIENT
Start: 2023-01-12 | End: 2023-01-12

## 2023-01-12 RX ORDER — REMDESIVIR 5 MG/ML
INJECTION INTRAVENOUS
Refills: 0 | Status: COMPLETED | OUTPATIENT
Start: 2023-01-12 | End: 2023-01-16

## 2023-01-12 RX ORDER — INSULIN LISPRO 100/ML
VIAL (ML) SUBCUTANEOUS EVERY 6 HOURS
Refills: 0 | Status: DISCONTINUED | OUTPATIENT
Start: 2023-01-12 | End: 2023-01-15

## 2023-01-12 RX ORDER — PANTOPRAZOLE SODIUM 20 MG/1
40 TABLET, DELAYED RELEASE ORAL
Refills: 0 | Status: DISCONTINUED | OUTPATIENT
Start: 2023-01-12 | End: 2023-01-16

## 2023-01-12 RX ORDER — REMDESIVIR 5 MG/ML
180 INJECTION INTRAVENOUS EVERY 24 HOURS
Refills: 0 | Status: COMPLETED | OUTPATIENT
Start: 2023-01-12 | End: 2023-01-12

## 2023-01-12 RX ORDER — REMDESIVIR 5 MG/ML
INJECTION INTRAVENOUS
Refills: 0 | Status: DISCONTINUED | OUTPATIENT
Start: 2023-01-12 | End: 2023-01-12

## 2023-01-12 RX ORDER — SODIUM CHLORIDE 9 MG/ML
1000 INJECTION, SOLUTION INTRAVENOUS
Refills: 0 | Status: DISCONTINUED | OUTPATIENT
Start: 2023-01-12 | End: 2023-01-17

## 2023-01-12 RX ORDER — PANTOPRAZOLE SODIUM 20 MG/1
40 TABLET, DELAYED RELEASE ORAL ONCE
Refills: 0 | Status: COMPLETED | OUTPATIENT
Start: 2023-01-12 | End: 2023-01-12

## 2023-01-12 RX ORDER — GLUCAGON INJECTION, SOLUTION 0.5 MG/.1ML
1 INJECTION, SOLUTION SUBCUTANEOUS ONCE
Refills: 0 | Status: DISCONTINUED | OUTPATIENT
Start: 2023-01-12 | End: 2023-01-17

## 2023-01-12 RX ORDER — DEXTROSE 50 % IN WATER 50 %
12.5 SYRINGE (ML) INTRAVENOUS ONCE
Refills: 0 | Status: DISCONTINUED | OUTPATIENT
Start: 2023-01-12 | End: 2023-01-17

## 2023-01-12 RX ADMIN — REMDESIVIR 180 MILLIGRAM(S): 5 INJECTION INTRAVENOUS at 11:32

## 2023-01-12 RX ADMIN — Medication 25 MICROGRAM(S): at 05:01

## 2023-01-12 RX ADMIN — ENOXAPARIN SODIUM 30 MILLIGRAM(S): 100 INJECTION SUBCUTANEOUS at 22:12

## 2023-01-12 RX ADMIN — CEFTRIAXONE 1000 MILLIGRAM(S): 500 INJECTION, POWDER, FOR SOLUTION INTRAMUSCULAR; INTRAVENOUS at 22:12

## 2023-01-12 RX ADMIN — PREGABALIN 1000 MICROGRAM(S): 225 CAPSULE ORAL at 09:31

## 2023-01-12 RX ADMIN — Medication 1 TABLET(S): at 09:31

## 2023-01-12 RX ADMIN — Medication 3: at 23:26

## 2023-01-12 RX ADMIN — TIOTROPIUM BROMIDE 2 PUFF(S): 18 CAPSULE ORAL; RESPIRATORY (INHALATION) at 09:11

## 2023-01-12 RX ADMIN — SODIUM CHLORIDE 250 MILLILITER(S): 9 INJECTION INTRAMUSCULAR; INTRAVENOUS; SUBCUTANEOUS at 12:20

## 2023-01-12 RX ADMIN — BUDESONIDE AND FORMOTEROL FUMARATE DIHYDRATE 2 PUFF(S): 160; 4.5 AEROSOL RESPIRATORY (INHALATION) at 09:11

## 2023-01-12 RX ADMIN — Medication 3: at 18:15

## 2023-01-12 RX ADMIN — PANTOPRAZOLE SODIUM 40 MILLIGRAM(S): 20 TABLET, DELAYED RELEASE ORAL at 17:49

## 2023-01-12 RX ADMIN — AZITHROMYCIN 255 MILLIGRAM(S): 500 TABLET, FILM COATED ORAL at 22:10

## 2023-01-12 RX ADMIN — Medication 6 MILLIGRAM(S): at 05:01

## 2023-01-12 RX ADMIN — BUDESONIDE AND FORMOTEROL FUMARATE DIHYDRATE 2 PUFF(S): 160; 4.5 AEROSOL RESPIRATORY (INHALATION) at 20:14

## 2023-01-12 RX ADMIN — Medication 1000 UNIT(S): at 09:31

## 2023-01-12 NOTE — CONSULT NOTE ADULT - SUBJECTIVE AND OBJECTIVE BOX
HPI:  89 y/o F with PMH CLL, colon cancer, hypothyroidism, emphysema, PNA, anemia presented with cough for 2 days. Pt was not feeling herself and her daughter was sick and tested positive fof COVID. The pt tested positive Monday night. She reports hoarseness, cough productive of mucus, congestion, constipation. Her O2 at home was 88%. She was vaccinated for COVID x 4. Denies fevers, chills, chest pain, abdominal pain, N/V, diarrhea/constipation.     Prior admission:   - 8/2/22: low BP, SOB -> Hb 5.7 -> s/p 2 units PRBCs     ER course: BP 94/49, RR 22, SpO2 86% -> 97% on 3L nasal cannula. Labs: WBC 36.67, Hb 10 (baseline ~9), , , neutrophils 84%, D-dimer 637, INR 1.20, troponin 58.55 -> 63.37, glucose 172, albumin 2.9, . COVID 19 positive. EKG: NSR with HR 76 bpm, normal intervals, no ST segment changes, T wave inversions in V3 and V4 (personally reviewed).     Imaging:   - CXR: increased opacities bilaterally, consolidation RLL, no pneumothorax, no effusion (personally reviewed). -> official read: 1. There is a diffuse bilateral reticulonodular pattern, increased from the prior exam and greatest in the lower lobes. Infectious or inflammatory etiologies are favored over CHF. The latter however cannot be entirely excluded.  - CTA: No pulmonary embolus is noted. Bronchiectasis is noted within both lungs. In addition, few ill-defined nodular opacities as well as tree-in-bud opacities are noted within both lungs. Exact etiology of the findings is unclear, however, the constellation of the above findings suggest possible Mycobacterium avium complex infection.    Pt was given Remdesivir, Decadron. She is being admitted to med/surg for further management.  (11 Jan 2023 21:34)      PAST MEDICAL & SURGICAL HISTORY:  CLL (chronic lymphocytic leukemia)      Colon cancer      Hypothyroidism      History of emphysema      Pneumonia      Anemia      S/P colon resection          Allergies    cefdinir (Diarrhea)    Intolerances        MEDICATIONS  (STANDING):  azithromycin  IVPB 500 milliGRAM(s) IV Intermittent every 24 hours  budesonide 160 MICROgram(s)/formoterol 4.5 MICROgram(s) Inhaler 2 Puff(s) Inhalation two times a day  cefTRIAXone Injectable. 1000 milliGRAM(s) IV Push every 24 hours  cholecalciferol 1000 Unit(s) Oral daily  cyanocobalamin 1000 MICROGram(s) Oral daily  dexAMETHasone  Injectable 6 milliGRAM(s) IV Push daily  dextrose 5%. 1000 milliLiter(s) (100 mL/Hr) IV Continuous <Continuous>  dextrose 5%. 1000 milliLiter(s) (50 mL/Hr) IV Continuous <Continuous>  dextrose 50% Injectable 25 Gram(s) IV Push once  dextrose 50% Injectable 12.5 Gram(s) IV Push once  dextrose 50% Injectable 25 Gram(s) IV Push once  enoxaparin Injectable 30 milliGRAM(s) SubCutaneous every 24 hours  glucagon  Injectable 1 milliGRAM(s) IntraMuscular once  insulin lispro (ADMELOG) corrective regimen sliding scale   SubCutaneous every 6 hours  lactobacillus acidophilus 1 Tablet(s) Oral daily  levothyroxine 25 MICROGram(s) Oral daily  pantoprazole    Tablet 40 milliGRAM(s) Oral before breakfast  remdesivir  IVPB   IV Intermittent   sodium chloride 0.9%. 1000 milliLiter(s) (50 mL/Hr) IV Continuous <Continuous>  tiotropium 2.5 MICROgram(s) Inhaler 2 Puff(s) Inhalation daily    MEDICATIONS  (PRN):  acetaminophen     Tablet .. 650 milliGRAM(s) Oral every 6 hours PRN Temp greater or equal to 38C (100.4F), Mild Pain (1 - 3)  albuterol    90 MICROgram(s) HFA Inhaler 2 Puff(s) Inhalation every 6 hours PRN Bronchospasm  aluminum hydroxide/magnesium hydroxide/simethicone Suspension 30 milliLiter(s) Oral every 4 hours PRN Dyspepsia  benzonatate 100 milliGRAM(s) Oral three times a day PRN Cough  dextrose Oral Gel 15 Gram(s) Oral once PRN Blood Glucose LESS THAN 70 milliGRAM(s)/deciliter  guaifenesin/dextromethorphan Oral Liquid 10 milliLiter(s) Oral every 4 hours PRN Cough  melatonin 3 milliGRAM(s) Oral at bedtime PRN Insomnia  ondansetron Injectable 4 milliGRAM(s) IV Push every 8 hours PRN Nausea and/or Vomiting      FAMILY HISTORY:  FHx: diabetes mellitus (Mother)    FH: hypertension (Father)        SOCIAL HISTORY: No EtOH, no tobacco    REVIEW OF SYSTEMS:    CONSTITUTIONAL: No weakness, fevers or chills  EYES/ENT: No visual changes;  No vertigo or throat pain   NECK: No pain or stiffness  RESPIRATORY: No cough, wheezing, hemoptysis; No shortness of breath  CARDIOVASCULAR: No chest pain or palpitations  GASTROINTESTINAL: No abdominal or epigastric pain. No nausea, vomiting, or hematemesis; No diarrhea or constipation. No melena or hematochezia.  GENITOURINARY: No dysuria, frequency or hematuria  NEUROLOGICAL: No numbness or weakness  SKIN: No itching, burning, rashes, or lesions   All other review of systems is negative unless indicated above.      Weight (kg): 35.3 (01-11 @ 21:45)    T(F): 98.1 (01-12-23 @ 15:06), Max: 98.5 (01-11-23 @ 21:45)  HR: 66 (01-12-23 @ 15:06)  BP: 106/63 (01-12-23 @ 15:06)  RR: 18 (01-12-23 @ 15:06)  SpO2: 100% (01-12-23 @ 15:06)  Wt(kg): --    GENERAL: NAD, well-developed  HEAD:  Atraumatic, Normocephalic  EYES: EOMI, PERRLA, conjunctiva and sclera clear  NECK: Supple, No JVD  CHEST/LUNG: Clear to auscultation bilaterally; No wheeze  HEART: Regular rate and rhythm; No murmurs, rubs, or gallops  ABDOMEN: Soft, Nontender, Nondistended; Bowel sounds present  EXTREMITIES:  2+ Peripheral Pulses, No clubbing, cyanosis, or edema  NEUROLOGY: non-focal  SKIN: No rashes or lesions                          9.0    27.03 )-----------( 99       ( 12 Jan 2023 07:56 )             28.2       01-12    137  |  106  |  43<H>  ----------------------------<  191<H>  4.5   |  26  |  0.89    Ca    8.8      12 Jan 2023 07:56  Mg     2.2     01-11    TPro  6.4  /  Alb  2.2<L>  /  TBili  0.4  /  DBili  0.1  /  AST  11<L>  /  ALT  11<L>  /  AlkPhos  80  01-12          PT/INR - ( 12 Jan 2023 07:56 )   PT: 13.6 sec;   INR: 1.17 ratio

## 2023-01-12 NOTE — PHYSICAL THERAPY INITIAL EVALUATION ADULT - DIAGNOSIS, PT EVAL
Acute hypoxic respiratory distress and sepsis likely secondary to COVID, bronchiectasis, emphysema, PNA.

## 2023-01-12 NOTE — DIETITIAN INITIAL EVALUATION ADULT - NSICDXPASTMEDICALHX_GEN_ALL_CORE_FT
PAST MEDICAL HISTORY:  Anemia     CLL (chronic lymphocytic leukemia)     Colon cancer     History of emphysema     Hypothyroidism     Pneumonia

## 2023-01-12 NOTE — DIETITIAN NUTRITION RISK NOTIFICATION - ADDITIONAL COMMENTS/DIETITIAN RECOMMENDATIONS
1) Liberalize diet to regular to maximize caloric and nutrient intake.  2) Add ensure + HP shake 1x/day, gelatein BID   3) Monitor bowel movements, if no BM for >3 days, consider implementing bowel regimen.  4) MVI w/ minerals daily to ensure 100% RDA met  5) Consider adding thiamine 100 mg daily 2/2 poor PO intake/ malnutrition  6) Meal encouragement; tray set-up to increase po intake  7) Confirm goals of care regarding nutrition support. Nutrition support is not recommended due to overall declining medical status which evidenced based studies indicate EN is not effective in prolonging survival and improving quality of life. It can also increase risk of aspiration pneumonia as well as other related issues.  RD will continue to monitor PO intake, labs, hydration, and wt prn.

## 2023-01-12 NOTE — PROGRESS NOTE ADULT - ASSESSMENT
89 y/o F with PMH CLL, colon cancer, hypothyroidism, COPD, PNA, anemia presented with cough/SOB for 2 days after testing COVID+ at home and at urgent care.     Acute hypoxic respiratory distress and sepsis likely secondary to COVID, bronchiectasis, emphysema, PNA. 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  - CXR/CTC- B/L bronchiectasis, ill-defined nodular opacities in B/L lower lobes  - SpO2 86% -> 100% on 3L nasal cannula  - Keep SpO2 88-92%, supplemental O2 PRN   - Leukocytosis- WBC 36.67, likely 2/2 CLL. Trend and monitor vitals.  - Start Remdesivir (GFR 60), continue Decadron. Weight-based dosing 2/2 weight <40kg per ID.  - C/w Ceftriaxone/Azithro. Will get ID consult; awaiting sputum cx/sensitivity to possibly adjust abx.  - Tylenol PRN for fevers.  - BP low per nurse (SBP in 70's) due to too large of BP cuff. Reassessed with pediatric cuff (88/54). Will administer NS bolus.  - Pending recommendations per ID and Pulmonology.     Elevated troponin likely secondary to demand ischemia (r/o STEMI)    - Monitor on telemetry   - Initial EKG- NSR, no ST elevations.   - Troponin 58.55 > 63.37 > 51.50  - If troponin trends upwards, consider cardiology consult/ASA.    Leukocytosis likely 2/2 CLL/PNA  - WBC 36.67.  - Monitor closely (has been up to 60 in the past)     Macrocytic anemia   - Hb 10 (baseline ~),   - Hgb/Hct today 9.0/28.2.  - Trend B12 since supplementation     Thrombocytopenia   -  > 99  - Continue to monitor     Elevated D-dimer likely 2/2 infectious cause   - D-dimer 637, CTA negative for PE     Hyperglycemia   - Glucose 172 >191, continue to monitor closely   - Pending HbA1c     Elevated BNP   -   - ECHO (7/19/22): mild 1+ AR, EF 60-65%, moderate 2+ MR, mild pulmonic regurgitation 1+, mild to moderate TR, mild pulmonary HTN. HFpEF.  - Strict I+Os, daily weights   - Keep K > 4 and Mg > 2   - c/w home medications     Hypoalbuminemia   - Albumin 2.9 > 2.2   - Pending nutrition consult     History of CLL, colon cancer, hypothyroidism, emphysema, PNA, anemia   - C/w home medications/outpatient management     DVT ppx:   - Lovenox 40 mg subcutaneous daily     Code status:   - DNR/DNI     See H&P 01/11/23  Emergency contact: Alana (daughter/HCP) [phone] or Kat (daughter/HCP)    89 y/o F with PMH CLL, colon cancer, hypothyroidism, COPD, PNA, anemia presented with cough/SOB for 2 days after testing COVID+ at home and at urgent care.     Acute hypoxic respiratory distress and sepsis likely secondary to COVID, bronchiectasis, emphysema, PNA. 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  - CXR/CTC- B/L bronchiectasis, ill-defined nodular opacities in B/L lower lobes  - SpO2 86% -> 100% on 3L nasal cannula  - Keep SpO2 88-92%, supplemental O2 PRN   - Leukocytosis- WBC 36.67, likely 2/2 CLL. Trend and monitor vitals.  - Start Remdesivir (GFR 60), continue Decadron. Weight-based dosing 2/2 weight <40kg per ID.  - C/w Ceftriaxone/Azithro. Will get ID consult; awaiting sputum cx/sensitivity to possibly adjust abx.  - Tylenol PRN for fevers.  - Pending recommendations per ID and Pulmonology.     Hypotension  - BP low per nurse (SBP in 70's) due to too large of BP cuff  - Reassessed with pediatric cuff (88/54), MAP 65. Will administer NS bolus.  - Patient asymptomatic    Elevated troponin likely secondary to demand ischemia (r/o STEMI)    - Monitor on telemetry   - Initial EKG- NSR, no ST elevations.   - Troponin 58.55 > 63.37 > 51.50  - If troponin trends upwards, consider cardiology consult/ASA.    Leukocytosis likely 2/2 CLL/PNA  - WBC 36.67.  - Monitor closely (has been up to 60 in the past)   - Hematology Dr. Donis following    Macrocytic anemia   - Hb 10 (baseline ~),   - Hgb/Hct today 9.0/28.2.  - Trend B12 since supplementation     Thrombocytopenia   -  > 99  - Continue to monitor     Elevated D-dimer likely 2/2 infectious cause   - D-dimer 637, CTA negative for PE     Hyperglycemia   - Glucose 172 >191, continue to monitor closely   - Pending HbA1c     Elevated BNP   -   - ECHO (7/19/22): mild 1+ AR, EF 60-65%, moderate 2+ MR, mild pulmonic regurgitation 1+, mild to moderate TR, mild pulmonary HTN. HFpEF.  - Strict I+Os, daily weights   - Keep K > 4 and Mg > 2   - c/w home medications     Hypoalbuminemia   - Albumin 2.9 > 2.2   - Pending nutrition consult     History of CLL, colon cancer, hypothyroidism, emphysema, PNA, anemia   - C/w home medications/outpatient management     DVT ppx:   - Lovenox 40 mg subcutaneous daily     Code status:   - DNR/DNI     See H&P 01/11/23  Emergency contact: Alana (daughter/HCP) [phone] or Kat (daughter/HCP)    89 y/o F with PMH CLL, colon cancer, hypothyroidism, COPD, PNA, anemia presented with cough/SOB for 2 days after testing COVID+ at home and at urgent care.     Acute hypoxic respiratory distress and sepsis likely secondary to COVID, bronchiectasis, emphysema, PNA. 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  - CXR/CTC- B/L bronchiectasis, ill-defined nodular opacities in B/L lower lobes  - SpO2 86% -> 100% on 3L nasal cannula  - Keep SpO2 88-92%, supplemental O2 PRN   - Leukocytosis- WBC 36.67, likely 2/2 CLL. Trend and monitor vitals.  - Start Remdesivir (GFR 60), continue Decadron. Weight-based dosing 2/2 weight <40kg per ID.  - C/w Ceftriaxone/Azithro. Will get ID consult; awaiting sputum cx/sensitivity to possibly adjust abx.  - Tylenol PRN for fevers.  - Pending recommendations per ID and Pulmonology.     Hypotension  - BP low per nurse (SBP in 70's) due to too large of BP cuff  - Reassessed with pediatric cuff (88/54), MAP 65. Will administer NS bolus.  - Patient asymptomatic    Elevated troponin likely secondary to demand ischemia   - Monitor on telemetry   - levels trended down to normal   - Initial EKG- NSR, no ST elevations.   - Troponin 58.55 > 63.37 > 51.50    Leukocytosis likely 2/2 CLL/PNA  - WBC 36.67.  - Monitor closely (has been up to 60 in the past)   - Hematology Dr. Donis following    Macrocytic anemia   - Hb 10 (baseline ~),   - Hgb/Hct today 9.0/28.2.  - Trend B12 since supplementation     Thrombocytopenia   -  > 99  - Continue to monitor     Elevated D-dimer likely 2/2 infectious cause   - D-dimer 637, CTA negative for PE     Hyperglycemia   - Glucose 172 >191, continue to monitor closely   - HbA1c 5.8%    Elevated BNP   -   - ECHO (7/19/22): mild 1+ AR, EF 60-65%, moderate 2+ MR, mild pulmonic regurgitation 1+, mild to moderate TR, mild pulmonary HTN. HFpEF.  - Strict I+Os, daily weights   - Keep K > 4 and Mg > 2   - c/w home medications     Hypoalbuminemia   - Albumin 2.9 > 2.2   - Pending nutrition consult     History of CLL, colon cancer, hypothyroidism, emphysema, PNA, anemia   - C/w home medications/outpatient management     DVT ppx:   - Lovenox 30 mg subcutaneous daily     Code status:   - DNR/DNI     See H&P 01/11/23  Emergency contact: Alana (daughter/HCP) [phone] or Kat (daughter/HCP)

## 2023-01-12 NOTE — PHYSICAL THERAPY INITIAL EVALUATION ADULT - PERTINENT HX OF CURRENT PROBLEM, REHAB EVAL
89 yo F admitted c/o cough/SOB for 2 days after testing COVID+ at home and at urgent care. Symptoms reported limited to cough and mild SOB, improved with oxygen administration.  CTA (-) for PE.

## 2023-01-12 NOTE — DIETITIAN INITIAL EVALUATION ADULT - OTHER CALCULATIONS
Energy/fluid needs based on bed scale wt of 75# taken on 1/12/23; protein needs based on IBW of 100#

## 2023-01-12 NOTE — DIETITIAN INITIAL EVALUATION ADULT - ORAL INTAKE PTA/DIET HISTORY
Pt lives at home with daughter; daughter does grocery shopping/cooking. Reports small appetite, consumes 3 small meals/day and consuming <50% of ENN 2/2 early satiety, productive cough, constipation. Attempts to consume 1 ensure daily.

## 2023-01-12 NOTE — CONSULT NOTE ADULT - SUBJECTIVE AND OBJECTIVE BOX
Patient is a 89y old  Female who presents with a chief complaint of Cough (2023 21:34)      HPI:  89 y/o F with PMH CLL, colon cancer, hypothyroidism, emphysema, PNA, anemia presented with cough for 2 days. Pt was not feeling herself and her daughter was sick and tested positive fof COVID. The pt tested positive Monday night. She reports hoarseness, cough productive of mucus, congestion, constipation. Her O2 at home was 88%. She was vaccinated for COVID x 4. Denies fevers, chills, chest pain, abdominal pain, N/V, diarrhea/constipation.   Found to have COVID+   CTA: No pulmonary embolus is noted. Bronchiectasis is noted within both lungs. In addition, few ill-defined nodular opacities as well as tree-in-bud opacities are noted within both lungs. Exact etiology of the findings is unclear, however, the constellation of the above findings suggest possible Mycobacterium avium complex infection.  Pt was given Remdesivir, Decadron.      PAST MEDICAL & SURGICAL HISTORY:  CLL (chronic lymphocytic leukemia)      Colon cancer      Hypothyroidism      History of emphysema      Pneumonia      Anemia      S/P colon resection          PREVIOUS DIAGNOSTIC TESTING:      MEDICATIONS  (STANDING):  azithromycin  IVPB 500 milliGRAM(s) IV Intermittent every 24 hours  budesonide 160 MICROgram(s)/formoterol 4.5 MICROgram(s) Inhaler 2 Puff(s) Inhalation two times a day  cefTRIAXone Injectable. 1000 milliGRAM(s) IV Push every 24 hours  cholecalciferol 1000 Unit(s) Oral daily  cyanocobalamin 1000 MICROGram(s) Oral daily  dexAMETHasone  Injectable 6 milliGRAM(s) IV Push daily  enoxaparin Injectable 30 milliGRAM(s) SubCutaneous every 24 hours  lactobacillus acidophilus 1 Tablet(s) Oral daily  levothyroxine 25 MICROGram(s) Oral daily  sodium chloride 0.9%. 1000 milliLiter(s) (50 mL/Hr) IV Continuous <Continuous>  tiotropium 2.5 MICROgram(s) Inhaler 2 Puff(s) Inhalation daily    MEDICATIONS  (PRN):  acetaminophen     Tablet .. 650 milliGRAM(s) Oral every 6 hours PRN Temp greater or equal to 38C (100.4F), Mild Pain (1 - 3)  albuterol    90 MICROgram(s) HFA Inhaler 2 Puff(s) Inhalation every 6 hours PRN Bronchospasm  aluminum hydroxide/magnesium hydroxide/simethicone Suspension 30 milliLiter(s) Oral every 4 hours PRN Dyspepsia  benzonatate 100 milliGRAM(s) Oral three times a day PRN Cough  guaifenesin/dextromethorphan Oral Liquid 10 milliLiter(s) Oral every 4 hours PRN Cough  melatonin 3 milliGRAM(s) Oral at bedtime PRN Insomnia  ondansetron Injectable 4 milliGRAM(s) IV Push every 8 hours PRN Nausea and/or Vomiting      FAMILY HISTORY:  FHx: diabetes mellitus (Mother)    FH: hypertension (Father)        SOCIAL HISTORY:  ***    REVIEW OF SYSTEM:  dyspnea , cough  Vital Signs Last 24 Hrs  T(C): 35.2 (2023 06:13), Max: 36.9 (2023 21:45)  T(F): 95.3 (2023 06:13), Max: 98.5 (2023 21:45)  HR: 52 (2023 05:53) (52 - 80)  BP: 98/58 (2023 05:53) (94/49 - 101/50)  BP(mean): 59 (2023 13:48) (59 - 59)  RR: 18 (2023 05:53) (18 - 22)  SpO2: 100% (2023 05:53) (86% - 100%)    Parameters below as of 2023 05:53  Patient On (Oxygen Delivery Method): nasal cannula  O2 Flow (L/min): 3      I&O's Summary    2023 07:01  -  2023 07:00  --------------------------------------------------------  IN: 600 mL / OUT: 400 mL / NET: 200 mL      PHYSICAL EXAM  General Appearance: cooperative, no acute distress,   HEENT: PERRL, conjunctiva clear, EOM's intact, non injected pharynx, no exudate, TM   normal  Neck: Supple, , no adenopathy, thyroid: not enlarged, no carotid bruit or JVD  Back: Symmetric, no  tenderness,no soft tissue tenderness  Lungs: Clear to auscultation bilateral,no adventitious breath sounds, normal   expiratory phase  Heart: Regular rate and rhythm, S1, S2 normal, no murmur, rub or gallop  Abdomen: Soft, non-tender, bowel sounds active , no hepatosplenomegaly  Extremities: no cyanosis or edema, no joint swelling  Skin: Skin color, texture normal, no rashes   Neurologic: Alert and oriented X3 , cranial nerves intact, sensory and motor normal,    ECG:    LABS:                          10.0   36.67 )-----------( 120      ( 2023 15:29 )             31.3         135  |  101  |  45<H>  ----------------------------<  172<H>  4.6   |  29  |  1.12    Ca    9.6      2023 15:29  Mg     2.2         TPro  7.4  /  Alb  2.9<L>  /  TBili  0.9  /  DBili  x   /  AST  15  /  ALT  11<L>  /  AlkPhos  96            Pro BNP  950 - @ 15:29  D Dimer  637 -11 @ 15:29    PT/INR - ( 2023 15:29 )   PT: 14.0 sec;   INR: 1.20 ratio           Urinalysis Basic - ( 2023 04:55 )    Color: Yellow / Appearance: Clear / S.010 / pH: x  Gluc: x / Ketone: Negative  / Bili: Negative / Urobili: Negative   Blood: x / Protein: Trace mg/dL / Nitrite: Negative   Leuk Esterase: Negative / RBC: 0-2 /HPF / WBC 0-2 /HPF   Sq Epi: x / Non Sq Epi: Few / Bacteria: Few            RADIOLOGY & ADDITIONAL STUDIES:     Patient is a 89y old  Female who presents with a chief complaint of Cough (2023 21:34)      HPI:  89 y/o F with PMH CLL, colon cancer, hypothyroidism, emphysema, PNA, anemia presented with cough for 2 days. Pt was not feeling herself and her daughter was sick and tested positive fof COVID. The pt tested positive Monday night. She reports hoarseness, cough productive of mucus, congestion, constipation. Her O2 at home was 88%. She was vaccinated for COVID x 4. Denies fevers, chills, chest pain, abdominal pain, N/V, diarrhea/constipation.   Found to have COVID+   CTA: No pulmonary embolus is noted. Bronchiectasis is noted within both lungs. In addition, few ill-defined nodular opacities as well as tree-in-bud opacities are noted within both lungs. Exact etiology of the findings is unclear, however, the constellation of the above findings suggest possible Mycobacterium avium complex infection.  Pt was given Remdesivir, Decadron.      PAST MEDICAL & SURGICAL HISTORY:  CLL (chronic lymphocytic leukemia)      Colon cancer      Hypothyroidism      History of emphysema      Pneumonia      Anemia      S/P colon resection          PREVIOUS DIAGNOSTIC TESTING:      MEDICATIONS  (STANDING):  azithromycin  IVPB 500 milliGRAM(s) IV Intermittent every 24 hours  budesonide 160 MICROgram(s)/formoterol 4.5 MICROgram(s) Inhaler 2 Puff(s) Inhalation two times a day  cefTRIAXone Injectable. 1000 milliGRAM(s) IV Push every 24 hours  cholecalciferol 1000 Unit(s) Oral daily  cyanocobalamin 1000 MICROGram(s) Oral daily  dexAMETHasone  Injectable 6 milliGRAM(s) IV Push daily  enoxaparin Injectable 30 milliGRAM(s) SubCutaneous every 24 hours  lactobacillus acidophilus 1 Tablet(s) Oral daily  levothyroxine 25 MICROGram(s) Oral daily  sodium chloride 0.9%. 1000 milliLiter(s) (50 mL/Hr) IV Continuous <Continuous>  tiotropium 2.5 MICROgram(s) Inhaler 2 Puff(s) Inhalation daily    MEDICATIONS  (PRN):  acetaminophen     Tablet .. 650 milliGRAM(s) Oral every 6 hours PRN Temp greater or equal to 38C (100.4F), Mild Pain (1 - 3)  albuterol    90 MICROgram(s) HFA Inhaler 2 Puff(s) Inhalation every 6 hours PRN Bronchospasm  aluminum hydroxide/magnesium hydroxide/simethicone Suspension 30 milliLiter(s) Oral every 4 hours PRN Dyspepsia  benzonatate 100 milliGRAM(s) Oral three times a day PRN Cough  guaifenesin/dextromethorphan Oral Liquid 10 milliLiter(s) Oral every 4 hours PRN Cough  melatonin 3 milliGRAM(s) Oral at bedtime PRN Insomnia  ondansetron Injectable 4 milliGRAM(s) IV Push every 8 hours PRN Nausea and/or Vomiting      FAMILY HISTORY:  FHx: diabetes mellitus (Mother)    FH: hypertension (Father)        SOCIAL HISTORY:  ***    REVIEW OF SYSTEM:  dyspnea , cough  Vital Signs Last 24 Hrs  T(C): 35.2 (2023 06:13), Max: 36.9 (2023 21:45)  T(F): 95.3 (2023 06:13), Max: 98.5 (2023 21:45)  HR: 52 (2023 05:53) (52 - 80)  BP: 98/58 (2023 05:53) (94/49 - 101/50)  BP(mean): 59 (2023 13:48) (59 - 59)  RR: 18 (2023 05:53) (18 - 22)  SpO2: 100% (2023 05:53) (86% - 100%)    Parameters below as of 2023 05:53  Patient On (Oxygen Delivery Method): nasal cannula  O2 Flow (L/min): 3      I&O's Summary    2023 07:01  -  2023 07:00  --------------------------------------------------------  IN: 600 mL / OUT: 400 mL / NET: 200 mL      PHYSICAL EXAM  General Appearance: cooperative, no acute distress,   HEENT: PERRL, conjunctiva clear, EOM's intact, non injected pharynx, no exudate, TM   normal  Neck: Supple, , no adenopathy, thyroid: not enlarged, no carotid bruit or JVD  Back: Symmetric, no  tenderness,no soft tissue tenderness  Lungs: coarse sai, +Wheezing in the LLL and RLL on exhalation   Heart: Regular rate and rhythm, S1, S2 normal, no murmur, rub or gallop  Abdomen: Soft, non-tender, bowel sounds active , no hepatosplenomegaly  Extremities: no cyanosis or edema, no joint swelling  Skin: Skin color, texture normal, no rashes   Neurologic: Alert and oriented X3 , cranial nerves intact, sensory and motor normal,    ECG:    LABS:                          10.0   36.67 )-----------( 120      ( 2023 15:29 )             31.3         135  |  101  |  45<H>  ----------------------------<  172<H>  4.6   |  29  |  1.12    Ca    9.6      2023 15:29  Mg     2.2         TPro  7.4  /  Alb  2.9<L>  /  TBili  0.9  /  DBili  x   /  AST  15  /  ALT  11<L>  /  AlkPhos  96            Pro BNP  950  @ 15:29  D Dimer  637 11 @ 15:29    PT/INR - ( 2023 15:29 )   PT: 14.0 sec;   INR: 1.20 ratio           Urinalysis Basic - ( 2023 04:55 )    Color: Yellow / Appearance: Clear / S.010 / pH: x  Gluc: x / Ketone: Negative  / Bili: Negative / Urobili: Negative   Blood: x / Protein: Trace mg/dL / Nitrite: Negative   Leuk Esterase: Negative / RBC: 0-2 /HPF / WBC 0-2 /HPF   Sq Epi: x / Non Sq Epi: Few / Bacteria: Few            RADIOLOGY & ADDITIONAL STUDIES:

## 2023-01-12 NOTE — PHYSICAL THERAPY INITIAL EVALUATION ADULT - MODALITIES TREATMENT COMMENTS
Patient returned to bed by request, call bell in reach and bed alarm active. Patient ambulated in room without O2, Sats: 87%.  O2 replaced with sats: 96%. Will need tank in room &/or extension tubing  next session. Tolerated all well. RN informed of session/status.

## 2023-01-12 NOTE — DIETITIAN INITIAL EVALUATION ADULT - [id number]_GEN_A_CORE
I&O's Detail    11 Jan 2023 07:01  -  12 Jan 2023 07:00  --------------------------------------------------------  IN:    IV PiggyBack: 250 mL    sodium chloride 0.9%: 350 mL  Total IN: 600 mL    OUT:    Voided (mL): 400 mL  Total OUT: 400 mL    Total NET: 200 mL

## 2023-01-12 NOTE — PHYSICAL THERAPY INITIAL EVALUATION ADULT - SITTING BALANCE: STATIC
good balance Skin normal color for race, warm, dry and intact. No evidence of rash.  No petechiae.  No perirectal or periungual desquamation.

## 2023-01-12 NOTE — CONSULT NOTE ADULT - SUBJECTIVE AND OBJECTIVE BOX
Patient is a 89y old  Female who presents with a chief complaint of Infection due to severe acute respiratory syndrome coronavirus 2 (SARS-CoV-2)     (2023 15:00)    HPI:  89 y/o F with PMH CLL, colon cancer, hypothyroidism, emphysema, PNA, anemia admitted on  for evaluation of cough over preceding 2 days; has been COVID-19 vaccinated and boosted; Is on supplemental oxygen via nasal cannula; is awake and alert.       PMH: as above  PSH: as above  Meds: per reconciliation sheet, noted below  MEDICATIONS  (STANDING):  azithromycin  IVPB 500 milliGRAM(s) IV Intermittent every 24 hours  budesonide 160 MICROgram(s)/formoterol 4.5 MICROgram(s) Inhaler 2 Puff(s) Inhalation two times a day  cefTRIAXone Injectable. 1000 milliGRAM(s) IV Push every 24 hours  cholecalciferol 1000 Unit(s) Oral daily  cyanocobalamin 1000 MICROGram(s) Oral daily  dexAMETHasone  Injectable 6 milliGRAM(s) IV Push daily  dextrose 5%. 1000 milliLiter(s) (50 mL/Hr) IV Continuous <Continuous>  dextrose 5%. 1000 milliLiter(s) (100 mL/Hr) IV Continuous <Continuous>  dextrose 50% Injectable 25 Gram(s) IV Push once  dextrose 50% Injectable 12.5 Gram(s) IV Push once  dextrose 50% Injectable 25 Gram(s) IV Push once  enoxaparin Injectable 30 milliGRAM(s) SubCutaneous every 24 hours  glucagon  Injectable 1 milliGRAM(s) IntraMuscular once  insulin lispro (ADMELOG) corrective regimen sliding scale   SubCutaneous every 6 hours  lactobacillus acidophilus 1 Tablet(s) Oral daily  levothyroxine 25 MICROGram(s) Oral daily  pantoprazole    Tablet 40 milliGRAM(s) Oral before breakfast  pantoprazole  Injectable 40 milliGRAM(s) IV Push once  remdesivir  IVPB   IV Intermittent   sodium chloride 0.9%. 1000 milliLiter(s) (50 mL/Hr) IV Continuous <Continuous>  tiotropium 2.5 MICROgram(s) Inhaler 2 Puff(s) Inhalation daily    MEDICATIONS  (PRN):  acetaminophen     Tablet .. 650 milliGRAM(s) Oral every 6 hours PRN Temp greater or equal to 38C (100.4F), Mild Pain (1 - 3)  albuterol    90 MICROgram(s) HFA Inhaler 2 Puff(s) Inhalation every 6 hours PRN Bronchospasm  aluminum hydroxide/magnesium hydroxide/simethicone Suspension 30 milliLiter(s) Oral every 4 hours PRN Dyspepsia  benzonatate 100 milliGRAM(s) Oral three times a day PRN Cough  dextrose Oral Gel 15 Gram(s) Oral once PRN Blood Glucose LESS THAN 70 milliGRAM(s)/deciliter  guaifenesin/dextromethorphan Oral Liquid 10 milliLiter(s) Oral every 4 hours PRN Cough  melatonin 3 milliGRAM(s) Oral at bedtime PRN Insomnia  ondansetron Injectable 4 milliGRAM(s) IV Push every 8 hours PRN Nausea and/or Vomiting    Allergies    cefdinir (Diarrhea)    Intolerances      Social: no smoking, no alcohol, no illegal drugs; no recent travel, no exposure to TB  FAMILY HISTORY:  FHx: diabetes mellitus (Mother)    FH: hypertension (Father)       no history of premature cardiovascular disease in first degree relatives  ROS: the patient denies fever, no chills, no HA, no dizziness, no sore throat, no blurry vision, no CP, no palpitations,  no abdominal pain, no diarrhea, no N/V, no dysuria, no leg pain, no claudication, no rash, no joint aches, no rectal pain or bleeding, no night sweats  All other systems reviewed and are negative    Vital Signs Last 24 Hrs  T(C): 36.7 (2023 15:06), Max: 36.9 (2023 21:45)  T(F): 98.1 (2023 15:06), Max: 98.5 (2023 21:45)  HR: 66 (2023 15:06) (52 - 75)  BP: 106/63 (2023 15:06) (88/54 - 106/63)  BP(mean): --  RR: 18 (2023 15:06) (18 - 19)  SpO2: 100% (2023 15:06) (99% - 100%)    Parameters below as of 2023 15:06  Patient On (Oxygen Delivery Method): nasal cannula      Daily     Daily     PE:    Constitutional: frail looking  HEENT: NC/AT, EOMI, PERRLA, conjunctivae clear; ears and nose atraumatic; pharynx clear  Neck: supple; thyroid not palpable  Back: no tenderness  Respiratory: respiratory effort normal; clear to auscultation  Cardiovascular: S1S2 regular, no murmurs  Abdomen: soft, not tender, not distended, positive BS; no liver or spleen organomegaly  Genitourinary: no suprapubic tenderness  Musculoskeletal: no muscle tenderness, no joint swelling or tenderness  Neurological/ Psychiatric: AxOx3, judgement and insight normal;  moving all extremities  Skin: no rashes; no palpable lesions    Labs: all available labs reviewed                        9.0    27.03 )-----------( 99       ( 2023 07:56 )             28.2         137  |  106  |  43<H>  ----------------------------<  191<H>  4.5   |  26  |  0.89    Ca    8.8      2023 07:56  Mg     2.2         TPro  6.4  /  Alb  2.2<L>  /  TBili  0.4  /  DBili  0.1  /  AST  11<L>  /  ALT  11<L>  /  AlkPhos  80       LIVER FUNCTIONS - ( 2023 07:56 )  Alb: 2.2 g/dL / Pro: 6.4 gm/dL / ALK PHOS: 80 U/L / ALT: 11 U/L / AST: 11 U/L / GGT: x           Urinalysis Basic - ( 2023 04:55 )    Color: Yellow / Appearance: Clear / S.010 / pH: x  Gluc: x / Ketone: Negative  / Bili: Negative / Urobili: Negative   Blood: x / Protein: Trace mg/dL / Nitrite: Negative   Leuk Esterase: Negative / RBC: 0-2 /HPF / WBC 0-2 /HPF   Sq Epi: x / Non Sq Epi: Few / Bacteria: Few    Flu With COVID-19 By OLIVER (23 @ 15:29)    SARS-CoV-2 Result: Detected: This Respiratory Panel uses polymerase chain reaction (PCR) to detect for  influenza A; influenza B; respiratory syncytial virus; and SARS-CoV-2.  This test was validated by [employer] and is in use under the FDA  Emergency Use Authorization (EUA) for clinical labs CLIA-certified to  perform high complexity testing. Test results should be correlated with  clinical presentation, patient history, and epidemiology.    Influenza A Result: NotDetec    Influenza B Result: NotDetec    Resp Syn Virus Result: NotDetec          < from: CT Angio Chest PE Protocol w/ IV Cont (23 @ 19:16) >    IMPRESSION: No pulmonary embolus is noted.    Bronchiectasis is noted within both lungs. In addition, few ill-defined   nodular opacities as well as tree-in-bud opacities are noted within both   lungs. Exact etiology of the findings is unclear, however, the   constellation of the above findings suggest possible Mycobacterium avium   complex infection.    < end of copied text >        Radiology: all available radiological tests reviewed    Advanced directives addressed: full resuscitation

## 2023-01-12 NOTE — DIETITIAN INITIAL EVALUATION ADULT - PERTINENT LABORATORY DATA
01-12    137  |  106  |  43<H>  ----------------------------<  191<H>  4.5   |  26  |  0.89    Ca    8.8      12 Jan 2023 07:56  Mg     2.2     01-11    TPro  6.4  /  Alb  2.2<L>  /  TBili  0.4  /  DBili  0.1  /  AST  11<L>  /  ALT  11<L>  /  AlkPhos  80  01-12  A1C with Estimated Average Glucose Result: 5.8 % (01-12-23 @ 07:56)

## 2023-01-12 NOTE — DIETITIAN INITIAL EVALUATION ADULT - OTHER INFO
89 y/o F with PMH CLL, colon cancer, hypothyroidism, emphysema, PNA, anemia presented with cough for 2 days. Pt was not feeling herself and her daughter was sick and tested positive fof COVID. The pt tested positive Monday night. She reports hoarseness, cough productive of mucus, congestion, constipation.   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 RD observed breakfast tray and pt consumed ~75% (100% of Hebrew toast, <25% of fruit). Is DNR/DNI - Confirm goals of care regarding nutrition support; Nutrition support is not recommended due to overall declining medical status which evidenced based studies indicate EN is not effective in prolonging survival and improving quality of life. It can also increase risk of aspiration pneumonia as well as other related issues. Reports UBW of 80# x ? time frame, bed scale wt of 75# taken by RD on 1/12/23. Wt hx: 71# (taken by RD on 7/29/22), 77.8# (as per EMR on 7/21/22) - no pertinent wt changes at this time. NFPE reveals severe muscle/ fat wasting - pt appears thin, frail, jennifer, emaciated. Liberalize diet to regular to maximize caloric and nutrient intake. Add ensure + HP shake 1x/day and gelatein BID. See below for other recommendations.

## 2023-01-12 NOTE — PHYSICAL THERAPY INITIAL EVALUATION ADULT - GENERAL OBSERVATIONS, REHAB EVAL
Patient received in bed on 3N+HM, +Airborne Precautions for COVID-19, +IV, +O2@3L via nc.  Patient denied pain. Patient thin and frail looking.

## 2023-01-12 NOTE — PROGRESS NOTE ADULT - SUBJECTIVE AND OBJECTIVE BOX
PA STUDENT MEDICINE PROGRESS NOTE     89 y/o F with PMH CLL, colon cancer, hypothyroidism, COPD, PNA, anemia presented with cough/SOB for 2 days after testing COVID+ at home and at urgent care. Symptoms reported limited to cough and mild SOB, improved with oxygen administration. Denies fevers, chills, chest pain, abdominal pain, n/v/d, fatigue. Patient states she feels generally well.     REVIEW OF SYSTEMS:  CONSTITUTIONAL: No weakness, fevers or chills  EYES/ENT: No visual changes;  No vertigo or throat pain   NECK: No pain or stiffness  RESPIRATORY: No cough, wheezing, hemoptysis; No shortness of breath  CARDIOVASCULAR: No chest pain or palpitations  GASTROINTESTINAL: No abdominal or epigastric pain. No nausea, vomiting; No diarrhea or constipation.   GENITOURINARY: No dysuria, frequency or hematuria  NEUROLOGICAL: No numbness or weakness  SKIN: No itching, burning, rashes, or lesions     PHYSICAL EXAM:  Vital Signs Last 24 Hrs  T(C): 36.3 (12 Jan 2023 08:42), Max: 36.9 (11 Jan 2023 21:45)  T(F): 97.3 (12 Jan 2023 08:42), Max: 98.5 (11 Jan 2023 21:45)  HR: 74 (12 Jan 2023 09:14) (52 - 80)  BP: 88/54 (12 Jan 2023 10:16) (88/54 - 101/50)  BP(mean): 59 (11 Jan 2023 13:48) (59 - 59)  RR: 18 (12 Jan 2023 05:53) (18 - 22)  SpO2: 100% (12 Jan 2023 05:53) (86% - 100%)    Parameters below as of 12 Jan 2023 05:53  Patient On (Oxygen Delivery Method): nasal cannula  O2 Flow (L/min): 3      Constitutional: Pt lying in bed, awake and alert, NAD  HEENT: EOMI, normal hearing, moist mucous membranes  Neck: Soft and supple, no JVD  Respiratory: CTABL, No wheezing, rales or rhonchi  Cardiovascular: S1S2+, RRR, no M/G/R  Gastrointestinal: BS+, soft, NT/ND, no guarding, no rebound  Extremities: No peripheral edema  Vascular: 2+ peripheral pulses  Neurological: AAOx3, no focal deficits  Musculoskeletal: 5/5 strength b/l upper and lower extremities  Skin: No rashes    MEDICATIONS:  MEDICATIONS  (STANDING):  azithromycin  IVPB 500 milliGRAM(s) IV Intermittent every 24 hours  budesonide 160 MICROgram(s)/formoterol 4.5 MICROgram(s) Inhaler 2 Puff(s) Inhalation two times a day  cefTRIAXone Injectable. 1000 milliGRAM(s) IV Push every 24 hours  cholecalciferol 1000 Unit(s) Oral daily  cyanocobalamin 1000 MICROGram(s) Oral daily  dexAMETHasone  Injectable 6 milliGRAM(s) IV Push daily  enoxaparin Injectable 30 milliGRAM(s) SubCutaneous every 24 hours  lactobacillus acidophilus 1 Tablet(s) Oral daily  levothyroxine 25 MICROGram(s) Oral daily  remdesivir  IVPB   IV Intermittent   remdesivir  IVPB 180 milliGRAM(s) IV Intermittent every 24 hours  sodium chloride 0.9%. 1000 milliLiter(s) (50 mL/Hr) IV Continuous <Continuous>  tiotropium 2.5 MICROgram(s) Inhaler 2 Puff(s) Inhalation daily    MEDICATIONS  (PRN):  acetaminophen     Tablet .. 650 milliGRAM(s) Oral every 6 hours PRN Temp greater or equal to 38C (100.4F), Mild Pain (1 - 3)  albuterol    90 MICROgram(s) HFA Inhaler 2 Puff(s) Inhalation every 6 hours PRN Bronchospasm  aluminum hydroxide/magnesium hydroxide/simethicone Suspension 30 milliLiter(s) Oral every 4 hours PRN Dyspepsia  benzonatate 100 milliGRAM(s) Oral three times a day PRN Cough  guaifenesin/dextromethorphan Oral Liquid 10 milliLiter(s) Oral every 4 hours PRN Cough  melatonin 3 milliGRAM(s) Oral at bedtime PRN Insomnia  ondansetron Injectable 4 milliGRAM(s) IV Push every 8 hours PRN Nausea and/or Vomiting      LABS: All Labs Reviewed:                        9.0    27.03 )-----------( 99       ( 12 Jan 2023 07:56 )             28.2     01-12    137  |  106  |  43<H>  ----------------------------<  191<H>  4.5   |  26  |  0.89    Ca    8.8      12 Jan 2023 07:56  Mg     2.2     01-11    TPro  6.4  /  Alb  2.2<L>  /  TBili  0.4  /  DBili  0.1  /  AST  11<L>  /  ALT  11<L>  /  AlkPhos  80  01-12    PT/INR - ( 12 Jan 2023 07:56 )   PT: 13.6 sec;   INR: 1.17 ratio               Blood Culture:   I&O's Summary    11 Jan 2023 07:01  -  12 Jan 2023 07:00  --------------------------------------------------------  IN: 600 mL / OUT: 400 mL / NET: 200 mL      CAPILLARY BLOOD GLUCOSE      RADIOLOGY/EKG:  < from: CT Angio Chest PE Protocol w/ IV Cont (01.11.23 @ 19:16) >  ACC: [id number] EXAM:  CT ANGIO CHEST PULM [facility]                          PROCEDURE DATE:  01/11/2023          INTERPRETATION:  Clinical information: Evaluate for pulmonary embolus.   Exam is compared to previous study of 7/27/2022.    CT angiogram of the chest was obtained following administration of   intravenous contrast. Approximately 90 cc of Omnipaque 350 was   administered and 10 cc was discarded. Coronal, sagittal and MIP images   were submitted for review.    1.7 cm isodense structure is present just to the right of the esophagus   at the level of the thoracic inlet. It has slightly increased in size   when compared to previous exam when it measured approximately 1.2 cm.    No hilar and or mediastinal adenopathy is noted.    Heart is enlarged in size. No pericardial effusion is noted.   Calcification the coronary arteries is noted. Pulmonary arteries are   normal in caliber. No filling defects are noted.    No endobronchial lesions are noted. Mucous/secretions are noted within   few of the distal bronchi in the right lower lobe. Bronchiectasis is   noted within both lungs, more so in the right middle lobe and lingula.   Few ill-defined nodular opacities as well as tree-in-bud opacities are   noted within both lungs, more so in the right lung. Small bilateral   pleural effusions are noted.    Below the diaphragm, visualized portions of the abdomen are unremarkable.    Loss of height of several vertebral bodies is noted.    IMPRESSION: No pulmonary embolus is noted.    Bronchiectasis is noted within both lungs. In addition, few ill-defined   nodular opacities as well as tree-in-bud opacities are noted within both   lungs. Exact etiology of the findings is unclear, however, the   constellation of the above findings suggest possible Mycobacterium avium   complex infection.    --- End of Report ---            OBED WEIR MD; Attending Radiologist  This document has been electronically signed. Jan 11 2023  7:35PM    < end of copied text >  < from: Xray Chest 1 View- PORTABLE-Urgent (01.11.23 @ 16:35) >    ACC: [id number] EXAM:  XR CHEST PORTABLE URGENT 1V                          PROCEDURE DATE:  01/11/2023          INTERPRETATION:  INDICATION: Chest pain    COMPARISON: 8/2/2022    FINDINGS:  An AP portable supine radiograph of the chest demonstrates a   reticulonodular pattern throughout both lungs, greatest in the lower   lobes. This has increased from the prior exam. There is no pneumothorax.   There is no definite evidence of pleural effusion. There is no hilar or   mediastinal lesion seen. The cardiac silhouette is likely magnified by   technique, but is at least borderline. The bony thorax remains stable.    IMPRESSION:  1. There is a diffuse bilateral reticulonodular pattern, increased from   the prior exam and greatest in the lower lobes. Infectious or   inflammatory etiologies are favored over CHF. The latter however cannot   be entirely excluded.    --- End of Report ---            AP GONZALEZ MD; Attending Radiologist  This document has been electronically signed. Jan 11 2023  4:43PM    < end of copied text > PA STUDENT MEDICINE PROGRESS NOTE     89 y/o F with PMH CLL, colon cancer, hypothyroidism, COPD, PNA, anemia presented with cough/SOB for 2 days after testing COVID+ at home and at urgent care. Symptoms reported limited to cough and mild SOB, improved with oxygen administration. Denies fevers, chills, chest pain, abdominal pain, n/v/d, fatigue. Patient states she feels generally well.     REVIEW OF SYSTEMS:  CONSTITUTIONAL: No weakness, fevers or chills  EYES/ENT: No visual changes;  No vertigo or throat pain   NECK: No pain or stiffness  RESPIRATORY: No cough, wheezing, hemoptysis; No shortness of breath  CARDIOVASCULAR: No chest pain or palpitations  GASTROINTESTINAL: No abdominal or epigastric pain. No nausea, vomiting; No diarrhea or constipation.   GENITOURINARY: No dysuria, frequency or hematuria  NEUROLOGICAL: No numbness or weakness  SKIN: No itching, burning, rashes, or lesions     PHYSICAL EXAM:  Vital Signs Last 24 Hrs  T(C): 36.3 (12 Jan 2023 08:42), Max: 36.9 (11 Jan 2023 21:45)  T(F): 97.3 (12 Jan 2023 08:42), Max: 98.5 (11 Jan 2023 21:45)  HR: 74 (12 Jan 2023 09:14) (52 - 80)  BP: 88/54 (12 Jan 2023 10:16) (88/54 - 101/50)  BP(mean): 59 (11 Jan 2023 13:48) (59 - 59)  RR: 18 (12 Jan 2023 05:53) (18 - 22)  SpO2: 100% (12 Jan 2023 05:53) (86% - 100%)    Parameters below as of 12 Jan 2023 05:53  Patient On (Oxygen Delivery Method): nasal cannula  O2 Flow (L/min): 3      Constitutional: Pt lying in bed, awake and alert, NAD  HEENT: normal hearing, moist mucous membranes  Neck: Soft and supple, no JVD  Respiratory: CTABL, No wheezing, rales or rhonchi  Cardiovascular: S1S2+, RRR, no M/G/R  Gastrointestinal: BS+, soft, NT/ND, no guarding, no rebound  Extremities: No peripheral edema  Vascular: 2+ peripheral pulses  Neurological: AAOx3, no focal deficits  Musculoskeletal: 5/5 strength b/l upper and lower extremities  Skin: No rashes    MEDICATIONS:  MEDICATIONS  (STANDING):  azithromycin  IVPB 500 milliGRAM(s) IV Intermittent every 24 hours  budesonide 160 MICROgram(s)/formoterol 4.5 MICROgram(s) Inhaler 2 Puff(s) Inhalation two times a day  cefTRIAXone Injectable. 1000 milliGRAM(s) IV Push every 24 hours  cholecalciferol 1000 Unit(s) Oral daily  cyanocobalamin 1000 MICROGram(s) Oral daily  dexAMETHasone  Injectable 6 milliGRAM(s) IV Push daily  enoxaparin Injectable 30 milliGRAM(s) SubCutaneous every 24 hours  lactobacillus acidophilus 1 Tablet(s) Oral daily  levothyroxine 25 MICROGram(s) Oral daily  remdesivir  IVPB   IV Intermittent   remdesivir  IVPB 180 milliGRAM(s) IV Intermittent every 24 hours  sodium chloride 0.9%. 1000 milliLiter(s) (50 mL/Hr) IV Continuous <Continuous>  tiotropium 2.5 MICROgram(s) Inhaler 2 Puff(s) Inhalation daily    MEDICATIONS  (PRN):  acetaminophen     Tablet .. 650 milliGRAM(s) Oral every 6 hours PRN Temp greater or equal to 38C (100.4F), Mild Pain (1 - 3)  albuterol    90 MICROgram(s) HFA Inhaler 2 Puff(s) Inhalation every 6 hours PRN Bronchospasm  aluminum hydroxide/magnesium hydroxide/simethicone Suspension 30 milliLiter(s) Oral every 4 hours PRN Dyspepsia  benzonatate 100 milliGRAM(s) Oral three times a day PRN Cough  guaifenesin/dextromethorphan Oral Liquid 10 milliLiter(s) Oral every 4 hours PRN Cough  melatonin 3 milliGRAM(s) Oral at bedtime PRN Insomnia  ondansetron Injectable 4 milliGRAM(s) IV Push every 8 hours PRN Nausea and/or Vomiting      LABS: All Labs Reviewed:                        9.0    27.03 )-----------( 99       ( 12 Jan 2023 07:56 )             28.2     01-12    137  |  106  |  43<H>  ----------------------------<  191<H>  4.5   |  26  |  0.89    Ca    8.8      12 Jan 2023 07:56  Mg     2.2     01-11    TPro  6.4  /  Alb  2.2<L>  /  TBili  0.4  /  DBili  0.1  /  AST  11<L>  /  ALT  11<L>  /  AlkPhos  80  01-12    PT/INR - ( 12 Jan 2023 07:56 )   PT: 13.6 sec;   INR: 1.17 ratio               Blood Culture:   I&O's Summary    11 Jan 2023 07:01  -  12 Jan 2023 07:00  --------------------------------------------------------  IN: 600 mL / OUT: 400 mL / NET: 200 mL      CAPILLARY BLOOD GLUCOSE      RADIOLOGY/EKG:  < from: CT Angio Chest PE Protocol w/ IV Cont (01.11.23 @ 19:16) >  ACC: [id number] EXAM:  CT ANGIO CHEST PULM [facility]                          PROCEDURE DATE:  01/11/2023          INTERPRETATION:  Clinical information: Evaluate for pulmonary embolus.   Exam is compared to previous study of 7/27/2022.    CT angiogram of the chest was obtained following administration of   intravenous contrast. Approximately 90 cc of Omnipaque 350 was   administered and 10 cc was discarded. Coronal, sagittal and MIP images   were submitted for review.    1.7 cm isodense structure is present just to the right of the esophagus   at the level of the thoracic inlet. It has slightly increased in size   when compared to previous exam when it measured approximately 1.2 cm.    No hilar and or mediastinal adenopathy is noted.    Heart is enlarged in size. No pericardial effusion is noted.   Calcification the coronary arteries is noted. Pulmonary arteries are   normal in caliber. No filling defects are noted.    No endobronchial lesions are noted. Mucous/secretions are noted within   few of the distal bronchi in the right lower lobe. Bronchiectasis is   noted within both lungs, more so in the right middle lobe and lingula.   Few ill-defined nodular opacities as well as tree-in-bud opacities are   noted within both lungs, more so in the right lung. Small bilateral   pleural effusions are noted.    Below the diaphragm, visualized portions of the abdomen are unremarkable.    Loss of height of several vertebral bodies is noted.    IMPRESSION: No pulmonary embolus is noted.    Bronchiectasis is noted within both lungs. In addition, few ill-defined   nodular opacities as well as tree-in-bud opacities are noted within both   lungs. Exact etiology of the findings is unclear, however, the   constellation of the above findings suggest possible Mycobacterium avium   complex infection.    --- End of Report ---            OBED WEIR MD; Attending Radiologist  This document has been electronically signed. Jan 11 2023  7:35PM    < end of copied text >  < from: Xray Chest 1 View- PORTABLE-Urgent (01.11.23 @ 16:35) >    ACC: [id number] EXAM:  XR CHEST PORTABLE URGENT 1V                          PROCEDURE DATE:  01/11/2023          INTERPRETATION:  INDICATION: Chest pain    COMPARISON: 8/2/2022    FINDINGS:  An AP portable supine radiograph of the chest demonstrates a   reticulonodular pattern throughout both lungs, greatest in the lower   lobes. This has increased from the prior exam. There is no pneumothorax.   There is no definite evidence of pleural effusion. There is no hilar or   mediastinal lesion seen. The cardiac silhouette is likely magnified by   technique, but is at least borderline. The bony thorax remains stable.    IMPRESSION:  1. There is a diffuse bilateral reticulonodular pattern, increased from   the prior exam and greatest in the lower lobes. Infectious or   inflammatory etiologies are favored over CHF. The latter however cannot   be entirely excluded.    --- End of Report ---            AP GONZALEZ MD; Attending Radiologist  This document has been electronically signed. Jan 11 2023  4:43PM    < end of copied text >

## 2023-01-12 NOTE — DIETITIAN INITIAL EVALUATION ADULT - PERTINENT MEDS FT
MEDICATIONS  (STANDING):  azithromycin  IVPB 500 milliGRAM(s) IV Intermittent every 24 hours  budesonide 160 MICROgram(s)/formoterol 4.5 MICROgram(s) Inhaler 2 Puff(s) Inhalation two times a day  cefTRIAXone Injectable. 1000 milliGRAM(s) IV Push every 24 hours  cholecalciferol 1000 Unit(s) Oral daily  cyanocobalamin 1000 MICROGram(s) Oral daily  dexAMETHasone  Injectable 6 milliGRAM(s) IV Push daily  enoxaparin Injectable 30 milliGRAM(s) SubCutaneous every 24 hours  lactobacillus acidophilus 1 Tablet(s) Oral daily  levothyroxine 25 MICROGram(s) Oral daily  remdesivir  IVPB   IV Intermittent   sodium chloride 0.9%. 1000 milliLiter(s) (50 mL/Hr) IV Continuous <Continuous>  tiotropium 2.5 MICROgram(s) Inhaler 2 Puff(s) Inhalation daily    MEDICATIONS  (PRN):  acetaminophen     Tablet .. 650 milliGRAM(s) Oral every 6 hours PRN Temp greater or equal to 38C (100.4F), Mild Pain (1 - 3)  albuterol    90 MICROgram(s) HFA Inhaler 2 Puff(s) Inhalation every 6 hours PRN Bronchospasm  aluminum hydroxide/magnesium hydroxide/simethicone Suspension 30 milliLiter(s) Oral every 4 hours PRN Dyspepsia  benzonatate 100 milliGRAM(s) Oral three times a day PRN Cough  guaifenesin/dextromethorphan Oral Liquid 10 milliLiter(s) Oral every 4 hours PRN Cough  melatonin 3 milliGRAM(s) Oral at bedtime PRN Insomnia  ondansetron Injectable 4 milliGRAM(s) IV Push every 8 hours PRN Nausea and/or Vomiting

## 2023-01-13 LAB
ADD ON TEST-SPECIMEN IN LAB: SIGNIFICANT CHANGE UP
ALBUMIN SERPL ELPH-MCNC: 2.3 G/DL — LOW (ref 3.3–5)
ALP SERPL-CCNC: 76 U/L — SIGNIFICANT CHANGE UP (ref 40–120)
ALT FLD-CCNC: 15 U/L — SIGNIFICANT CHANGE UP (ref 12–78)
ANION GAP SERPL CALC-SCNC: 7 MMOL/L — SIGNIFICANT CHANGE UP (ref 5–17)
AST SERPL-CCNC: 16 U/L — SIGNIFICANT CHANGE UP (ref 15–37)
BASOPHILS # BLD AUTO: 0 K/UL — SIGNIFICANT CHANGE UP (ref 0–0.2)
BASOPHILS NFR BLD AUTO: 0 % — SIGNIFICANT CHANGE UP (ref 0–2)
BILIRUB DIRECT SERPL-MCNC: 0.1 MG/DL — SIGNIFICANT CHANGE UP (ref 0–0.3)
BILIRUB SERPL-MCNC: 0.3 MG/DL — SIGNIFICANT CHANGE UP (ref 0.2–1.2)
BUN SERPL-MCNC: 52 MG/DL — HIGH (ref 7–23)
CALCIUM SERPL-MCNC: 9.2 MG/DL — SIGNIFICANT CHANGE UP (ref 8.5–10.1)
CHLORIDE SERPL-SCNC: 109 MMOL/L — HIGH (ref 96–108)
CO2 SERPL-SCNC: 25 MMOL/L — SIGNIFICANT CHANGE UP (ref 22–31)
CREAT SERPL-MCNC: 1.22 MG/DL — SIGNIFICANT CHANGE UP (ref 0.5–1.3)
CULTURE RESULTS: SIGNIFICANT CHANGE UP
EGFR: 41 ML/MIN/1.73M2 — LOW
EOSINOPHIL # BLD AUTO: 0 K/UL — SIGNIFICANT CHANGE UP (ref 0–0.5)
EOSINOPHIL NFR BLD AUTO: 0 % — SIGNIFICANT CHANGE UP (ref 0–6)
FERRITIN SERPL-MCNC: 1103 NG/ML — HIGH (ref 15–150)
FOLATE SERPL-MCNC: 12.9 NG/ML — SIGNIFICANT CHANGE UP
GLUCOSE SERPL-MCNC: 118 MG/DL — HIGH (ref 70–99)
HCT VFR BLD CALC: 26.3 % — LOW (ref 34.5–45)
HCT VFR BLD CALC: 28.2 % — LOW (ref 34.5–45)
HGB BLD-MCNC: 8.1 G/DL — LOW (ref 11.5–15.5)
HGB BLD-MCNC: 8.6 G/DL — LOW (ref 11.5–15.5)
INR BLD: 1.2 RATIO — HIGH (ref 0.88–1.16)
IRON SATN MFR SERPL: 21 % — SIGNIFICANT CHANGE UP (ref 14–50)
IRON SATN MFR SERPL: 31 UG/DL — SIGNIFICANT CHANGE UP (ref 30–160)
LYMPHOCYTES # BLD AUTO: 41.87 K/UL — HIGH (ref 1–3.3)
LYMPHOCYTES # BLD AUTO: 92 % — HIGH (ref 13–44)
MCHC RBC-ENTMCNC: 30.5 GM/DL — LOW (ref 32–36)
MCHC RBC-ENTMCNC: 30.8 GM/DL — LOW (ref 32–36)
MCHC RBC-ENTMCNC: 32.8 PG — SIGNIFICANT CHANGE UP (ref 27–34)
MCHC RBC-ENTMCNC: 32.8 PG — SIGNIFICANT CHANGE UP (ref 27–34)
MCV RBC AUTO: 106.5 FL — HIGH (ref 80–100)
MCV RBC AUTO: 107.6 FL — HIGH (ref 80–100)
MONOCYTES # BLD AUTO: 0.46 K/UL — SIGNIFICANT CHANGE UP (ref 0–0.9)
MONOCYTES NFR BLD AUTO: 1 % — LOW (ref 2–14)
NEUTROPHILS # BLD AUTO: 2.73 K/UL — SIGNIFICANT CHANGE UP (ref 1.8–7.4)
NEUTROPHILS NFR BLD AUTO: 6 % — LOW (ref 43–77)
NRBC # BLD: SIGNIFICANT CHANGE UP /100 WBCS (ref 0–0)
PLATELET # BLD AUTO: 122 K/UL — LOW (ref 150–400)
PLATELET # BLD AUTO: 131 K/UL — LOW (ref 150–400)
POTASSIUM SERPL-MCNC: 4.2 MMOL/L — SIGNIFICANT CHANGE UP (ref 3.5–5.3)
POTASSIUM SERPL-SCNC: 4.2 MMOL/L — SIGNIFICANT CHANGE UP (ref 3.5–5.3)
PROT SERPL-MCNC: 6.2 GM/DL — SIGNIFICANT CHANGE UP (ref 6–8.3)
PROTHROM AB SERPL-ACNC: 13.9 SEC — HIGH (ref 10.5–13.4)
RBC # BLD: 2.47 M/UL — LOW (ref 3.8–5.2)
RBC # BLD: 2.62 M/UL — LOW (ref 3.8–5.2)
RBC # FLD: 16.2 % — HIGH (ref 10.3–14.5)
RBC # FLD: 16.5 % — HIGH (ref 10.3–14.5)
SODIUM SERPL-SCNC: 141 MMOL/L — SIGNIFICANT CHANGE UP (ref 135–145)
SPECIMEN SOURCE: SIGNIFICANT CHANGE UP
TIBC SERPL-MCNC: 145 UG/DL — LOW (ref 220–430)
TRANSFERRIN SERPL-MCNC: 105 MG/DL — LOW (ref 200–360)
UIBC SERPL-MCNC: 115 UG/DL — SIGNIFICANT CHANGE UP (ref 110–370)
VIT B12 SERPL-MCNC: 1238 PG/ML — SIGNIFICANT CHANGE UP (ref 232–1245)
WBC # BLD: 45.15 K/UL — CRITICAL HIGH (ref 3.8–10.5)
WBC # BLD: 45.51 K/UL — CRITICAL HIGH (ref 3.8–10.5)
WBC # FLD AUTO: 45.15 K/UL — CRITICAL HIGH (ref 3.8–10.5)
WBC # FLD AUTO: 45.51 K/UL — CRITICAL HIGH (ref 3.8–10.5)

## 2023-01-13 PROCEDURE — 99233 SBSQ HOSP IP/OBS HIGH 50: CPT | Mod: GC

## 2023-01-13 RX ORDER — ALBUTEROL 90 UG/1
2.5 AEROSOL, METERED ORAL EVERY 4 HOURS
Refills: 0 | Status: DISCONTINUED | OUTPATIENT
Start: 2023-01-13 | End: 2023-01-19

## 2023-01-13 RX ORDER — ENOXAPARIN SODIUM 100 MG/ML
30 INJECTION SUBCUTANEOUS EVERY 24 HOURS
Refills: 0 | Status: DISCONTINUED | OUTPATIENT
Start: 2023-01-13 | End: 2023-01-15

## 2023-01-13 RX ORDER — ENOXAPARIN SODIUM 100 MG/ML
30 INJECTION SUBCUTANEOUS EVERY 24 HOURS
Refills: 0 | Status: DISCONTINUED | OUTPATIENT
Start: 2023-01-13 | End: 2023-01-13

## 2023-01-13 RX ADMIN — ALBUTEROL 2.5 MILLIGRAM(S): 90 AEROSOL, METERED ORAL at 13:40

## 2023-01-13 RX ADMIN — REMDESIVIR 200 MILLIGRAM(S): 5 INJECTION INTRAVENOUS at 09:20

## 2023-01-13 RX ADMIN — TIOTROPIUM BROMIDE 2 PUFF(S): 18 CAPSULE ORAL; RESPIRATORY (INHALATION) at 09:47

## 2023-01-13 RX ADMIN — ALBUTEROL 2.5 MILLIGRAM(S): 90 AEROSOL, METERED ORAL at 23:45

## 2023-01-13 RX ADMIN — Medication 25 MICROGRAM(S): at 05:35

## 2023-01-13 RX ADMIN — BUDESONIDE AND FORMOTEROL FUMARATE DIHYDRATE 2 PUFF(S): 160; 4.5 AEROSOL RESPIRATORY (INHALATION) at 09:47

## 2023-01-13 RX ADMIN — Medication 4: at 11:48

## 2023-01-13 RX ADMIN — PREGABALIN 1000 MICROGRAM(S): 225 CAPSULE ORAL at 09:20

## 2023-01-13 RX ADMIN — AZITHROMYCIN 255 MILLIGRAM(S): 500 TABLET, FILM COATED ORAL at 22:50

## 2023-01-13 RX ADMIN — ALBUTEROL 2.5 MILLIGRAM(S): 90 AEROSOL, METERED ORAL at 20:02

## 2023-01-13 RX ADMIN — Medication 6 MILLIGRAM(S): at 06:12

## 2023-01-13 RX ADMIN — ENOXAPARIN SODIUM 30 MILLIGRAM(S): 100 INJECTION SUBCUTANEOUS at 23:06

## 2023-01-13 RX ADMIN — ALBUTEROL 2.5 MILLIGRAM(S): 90 AEROSOL, METERED ORAL at 16:39

## 2023-01-13 RX ADMIN — Medication 1000 UNIT(S): at 09:20

## 2023-01-13 RX ADMIN — Medication 1 TABLET(S): at 09:26

## 2023-01-13 RX ADMIN — PANTOPRAZOLE SODIUM 40 MILLIGRAM(S): 20 TABLET, DELAYED RELEASE ORAL at 06:12

## 2023-01-13 RX ADMIN — CEFTRIAXONE 1000 MILLIGRAM(S): 500 INJECTION, POWDER, FOR SOLUTION INTRAMUSCULAR; INTRAVENOUS at 22:50

## 2023-01-13 RX ADMIN — Medication 2: at 23:06

## 2023-01-13 RX ADMIN — BUDESONIDE AND FORMOTEROL FUMARATE DIHYDRATE 2 PUFF(S): 160; 4.5 AEROSOL RESPIRATORY (INHALATION) at 20:02

## 2023-01-13 NOTE — PROGRESS NOTE ADULT - SUBJECTIVE AND OBJECTIVE BOX
PA STUDENT MEDICINE PROGRESS NOTE     89 y/o F with PMH CLL, colon cancer, hypothyroidism, COPD, PNA, anemia presented with cough/SOB for 2 days after testing COVID+ at home and at urgent care. Symptoms reported limited to cough and mild SOB, improved with oxygen administration. Denies fevers, chills, chest pain, abdominal pain, n/v/d, fatigue. Patient states she feels generally well, aside from being tired and not getting much sleep last night.     REVIEW OF SYSTEMS:  CONSTITUTIONAL: No weakness, fevers or chills  EYES/ENT: No visual changes;  No vertigo or throat pain   NECK: No pain or stiffness  RESPIRATORY: No cough, wheezing, hemoptysis; No shortness of breath  CARDIOVASCULAR: No chest pain or palpitations  GASTROINTESTINAL: No abdominal or epigastric pain. No nausea, vomiting; No diarrhea or constipation.   GENITOURINARY: No dysuria, frequency or hematuria  NEUROLOGICAL: No numbness or weakness  SKIN: No itching, burning, rashes, or lesions     PHYSICAL EXAM:  Vital Signs Last 24 Hrs  T(C): 37.2 (13 Jan 2023 08:20), Max: 37.3 (12 Jan 2023 22:17)  T(F): 99 (13 Jan 2023 08:20), Max: 99.1 (12 Jan 2023 22:17)  HR: 76 (13 Jan 2023 09:39) (66 - 77)  BP: 110/59 (13 Jan 2023 08:20) (102/51 - 110/59)  BP(mean): --  RR: 17 (13 Jan 2023 08:20) (17 - 18)  SpO2: 97% (13 Jan 2023 08:20) (97% - 100%)    Parameters below as of 13 Jan 2023 08:20  Patient On (Oxygen Delivery Method): nasal cannula  O2 Flow (L/min): 3      Constitutional: Pt lying in bed, awake and alert, NAD  HEENT: EOMI, normal hearing, moist mucous membranes  Neck: Soft and supple, no JVD  Respiratory: CTABL, No wheezing, rales or rhonchi  Cardiovascular: S1S2+, RRR, no M/G/R  Gastrointestinal: BS+, soft, NT/ND, no guarding, no rebound  Extremities: No peripheral edema  Vascular: 2+ peripheral pulses  Neurological: AAOx3, no focal deficits  Musculoskeletal: 5/5 strength b/l upper and lower extremities  Skin: No rashes    MEDICATIONS:  MEDICATIONS  (STANDING):  azithromycin  IVPB 500 milliGRAM(s) IV Intermittent every 24 hours  budesonide 160 MICROgram(s)/formoterol 4.5 MICROgram(s) Inhaler 2 Puff(s) Inhalation two times a day  cefTRIAXone Injectable. 1000 milliGRAM(s) IV Push every 24 hours  cholecalciferol 1000 Unit(s) Oral daily  cyanocobalamin 1000 MICROGram(s) Oral daily  dexAMETHasone  Injectable 6 milliGRAM(s) IV Push daily  dextrose 5%. 1000 milliLiter(s) (50 mL/Hr) IV Continuous <Continuous>  dextrose 5%. 1000 milliLiter(s) (100 mL/Hr) IV Continuous <Continuous>  dextrose 50% Injectable 25 Gram(s) IV Push once  dextrose 50% Injectable 12.5 Gram(s) IV Push once  dextrose 50% Injectable 25 Gram(s) IV Push once  glucagon  Injectable 1 milliGRAM(s) IntraMuscular once  insulin lispro (ADMELOG) corrective regimen sliding scale   SubCutaneous every 6 hours  lactobacillus acidophilus 1 Tablet(s) Oral daily  levothyroxine 25 MICROGram(s) Oral daily  pantoprazole    Tablet 40 milliGRAM(s) Oral before breakfast  remdesivir  IVPB   IV Intermittent   remdesivir  IVPB 88 milliGRAM(s) IV Intermittent every 24 hours  sodium chloride 0.9%. 1000 milliLiter(s) (50 mL/Hr) IV Continuous <Continuous>  tiotropium 2.5 MICROgram(s) Inhaler 2 Puff(s) Inhalation daily    MEDICATIONS  (PRN):  acetaminophen     Tablet .. 650 milliGRAM(s) Oral every 6 hours PRN Temp greater or equal to 38C (100.4F), Mild Pain (1 - 3)  albuterol    90 MICROgram(s) HFA Inhaler 2 Puff(s) Inhalation every 6 hours PRN Bronchospasm  aluminum hydroxide/magnesium hydroxide/simethicone Suspension 30 milliLiter(s) Oral every 4 hours PRN Dyspepsia  benzonatate 100 milliGRAM(s) Oral three times a day PRN Cough  dextrose Oral Gel 15 Gram(s) Oral once PRN Blood Glucose LESS THAN 70 milliGRAM(s)/deciliter  guaifenesin/dextromethorphan Oral Liquid 10 milliLiter(s) Oral every 4 hours PRN Cough  melatonin 3 milliGRAM(s) Oral at bedtime PRN Insomnia  ondansetron Injectable 4 milliGRAM(s) IV Push every 8 hours PRN Nausea and/or Vomiting      LABS: All Labs Reviewed:                        8.1    45.51 )-----------( 122      ( 13 Jan 2023 07:32 )             26.3     01-13    141  |  109<H>  |  52<H>  ----------------------------<  118<H>  4.2   |  25  |  1.22    Ca    9.2      13 Jan 2023 07:32  Mg     2.2     01-11    TPro  6.2  /  Alb  2.3<L>  /  TBili  0.3  /  DBili  0.1  /  AST  16  /  ALT  15  /  AlkPhos  76  01-13    PT/INR - ( 13 Jan 2023 07:32 )   PT: 13.9 sec;   INR: 1.20 ratio               Blood Culture: 01-12 @ 04:55  Organism --  Gram Stain Blood -- Gram Stain --  Specimen Source Clean Catch Clean Catch (Midstream)  Culture-Blood --    01-11 @ 23:04  Organism --  Gram Stain Blood -- Gram Stain --  Specimen Source .Blood None  Culture-Blood --      I&O's Summary    12 Jan 2023 07:01  -  13 Jan 2023 07:00  --------------------------------------------------------  IN: 0 mL / OUT: 300 mL / NET: -300 mL      CAPILLARY BLOOD GLUCOSE      POCT Blood Glucose.: 334 mg/dL (13 Jan 2023 11:45)  POCT Blood Glucose.: 128 mg/dL (13 Jan 2023 08:11)  POCT Blood Glucose.: 128 mg/dL (13 Jan 2023 06:18)  POCT Blood Glucose.: 298 mg/dL (12 Jan 2023 23:23)  POCT Blood Glucose.: 299 mg/dL (12 Jan 2023 17:55)      RADIOLOGY/EKG:  < from: CT Angio Chest PE Protocol w/ IV Cont (01.11.23 @ 19:16) >  ACC: [id number] EXAM:  CT ANGIO CHEST PULM ART [facility]                          PROCEDURE DATE:  01/11/2023          INTERPRETATION:  Clinical information: Evaluate for pulmonary embolus.   Exam is compared to previous study of 7/27/2022.    CT angiogram of the chest was obtained following administration of   intravenous contrast. Approximately 90 cc of Omnipaque 350 was   administered and 10 cc was discarded. Coronal, sagittal and MIP images   were submitted for review.    1.7 cm isodense structure is present just to the right of the esophagus   at the level of the thoracic inlet. It has slightly increased in size   when compared to previous exam when it measured approximately 1.2 cm.    No hilar and or mediastinal adenopathy is noted.    Heart is enlarged in size. No pericardial effusion is noted.   Calcification the coronary arteries is noted. Pulmonary arteries are   normal in caliber. No filling defects are noted.    No endobronchial lesions are noted. Mucous/secretions are noted within   few of the distal bronchi in the right lower lobe. Bronchiectasis is   noted within both lungs, more so in the right middle lobe and lingula.   Few ill-defined nodular opacities as well as tree-in-bud opacities are   noted within both lungs, more so in the right lung. Small bilateral   pleural effusions are noted.    Below the diaphragm, visualized portions of the abdomen are unremarkable.    Loss of height of several vertebral bodies is noted.    IMPRESSION: No pulmonary embolus is noted.    Bronchiectasis is noted within both lungs. In addition, few ill-defined   nodular opacities as well as tree-in-bud opacities are noted within both   lungs. Exact etiology of the findings is unclear, however, the   constellation of the above findings suggest possible Mycobacterium avium   complex infection.    --- End of Report ---            OBED WEIR MD; Attending Radiologist  This document has been electronically signed. Jan 11 2023  7:35PM    < end of copied text >  < from: Xray Chest 1 View- PORTABLE-Urgent (01.11.23 @ 16:35) >    ACC: [id number] EXAM:  XR CHEST PORTABLE URGENT 1V                          PROCEDURE DATE:  01/11/2023          INTERPRETATION:  INDICATION: Chest pain    COMPARISON: 8/2/2022    FINDINGS:  An AP portable supine radiograph of the chest demonstrates a   reticulonodular pattern throughout both lungs, greatest in the lower   lobes. This has increased from the prior exam. There is no pneumothorax.   There is no definite evidence of pleural effusion. There is no hilar or   mediastinal lesion seen. The cardiac silhouette is likely magnified by   technique, but is at least borderline. The bony thorax remains stable.    IMPRESSION:  1. There is a diffuse bilateral reticulonodular pattern, increased from   the prior exam and greatest in the lower lobes. Infectious or   inflammatory etiologies are favored over CHF. The latter however cannot   be entirely excluded.    --- End of Report ---            AP GONZALEZ MD; Attending Radiologist  This document has been electronically signed. Jan 11 2023  4:43PM    < end of copied text >

## 2023-01-13 NOTE — PROGRESS NOTE ADULT - ASSESSMENT
1) COVID  2) Aspiration  3) Bronchiectasis  4) Dyspnea  5) Abnormal CXR    89 y/o F with PMH CLL, colon cancer, hypothyroidism, emphysema, PNA, anemia presented with cough for 2 days. Pt was not feeling herself and her daughter was sick and tested positive fof COVID. The pt tested positive Monday night. She reports hoarseness, cough productive of mucus, congestion, constipation. Her O2 at home was 88%. She was vaccinated for COVID x 4. Denies fevers, chills, chest pain, abdominal pain, N/V, diarrhea/constipation.   Found to have COVID+   CTA: No pulmonary embolus is noted. Bronchiectasis is noted within both lungs. In addition, few ill-defined nodular opacities as well as tree-in-bud opacities are noted within both lungs. Exact etiology of the findings is unclear, however, the constellation of the above findings suggest possible Mycobacterium avium complex infection.  Pt was given Remdesivir, Decadron.  Agree with current management  She is in no acute distress  O2 requirements are not increasing  Will continue to monito r

## 2023-01-13 NOTE — PROGRESS NOTE ADULT - SUBJECTIVE AND OBJECTIVE BOX
Patient is a 89y old  Female who presents with a chief complaint of Cough (2023 21:34)      HPI:  89 y/o F with PMH CLL, colon cancer, hypothyroidism, emphysema, PNA, anemia presented with cough for 2 days. Pt was not feeling herself and her daughter was sick and tested positive fof COVID. The pt tested positive Monday night. She reports hoarseness, cough productive of mucus, congestion, constipation. Her O2 at home was 88%. She was vaccinated for COVID x 4. Denies fevers, chills, chest pain, abdominal pain, N/V, diarrhea/constipation.   Found to have COVID+   CTA: No pulmonary embolus is noted. Bronchiectasis is noted within both lungs. In addition, few ill-defined nodular opacities as well as tree-in-bud opacities are noted within both lungs. Exact etiology of the findings is unclear, however, the constellation of the above findings suggest possible Mycobacterium avium complex infection.  Pt was given Remdesivir, Decadron.      covering for DR nolasco  seen and examined while in bed  no distress      PAST MEDICAL & SURGICAL HISTORY:  CLL (chronic lymphocytic leukemia)      Colon cancer      Hypothyroidism      History of emphysema      Pneumonia      Anemia      S/P colon resection          PREVIOUS DIAGNOSTIC TESTING:      MEDICATIONS  (STANDING):  azithromycin  IVPB 500 milliGRAM(s) IV Intermittent every 24 hours  budesonide 160 MICROgram(s)/formoterol 4.5 MICROgram(s) Inhaler 2 Puff(s) Inhalation two times a day  cefTRIAXone Injectable. 1000 milliGRAM(s) IV Push every 24 hours  cholecalciferol 1000 Unit(s) Oral daily  cyanocobalamin 1000 MICROGram(s) Oral daily  dexAMETHasone  Injectable 6 milliGRAM(s) IV Push daily  enoxaparin Injectable 30 milliGRAM(s) SubCutaneous every 24 hours  lactobacillus acidophilus 1 Tablet(s) Oral daily  levothyroxine 25 MICROGram(s) Oral daily  sodium chloride 0.9%. 1000 milliLiter(s) (50 mL/Hr) IV Continuous <Continuous>  tiotropium 2.5 MICROgram(s) Inhaler 2 Puff(s) Inhalation daily    MEDICATIONS  (PRN):  acetaminophen     Tablet .. 650 milliGRAM(s) Oral every 6 hours PRN Temp greater or equal to 38C (100.4F), Mild Pain (1 - 3)  albuterol    90 MICROgram(s) HFA Inhaler 2 Puff(s) Inhalation every 6 hours PRN Bronchospasm  aluminum hydroxide/magnesium hydroxide/simethicone Suspension 30 milliLiter(s) Oral every 4 hours PRN Dyspepsia  benzonatate 100 milliGRAM(s) Oral three times a day PRN Cough  guaifenesin/dextromethorphan Oral Liquid 10 milliLiter(s) Oral every 4 hours PRN Cough  melatonin 3 milliGRAM(s) Oral at bedtime PRN Insomnia  ondansetron Injectable 4 milliGRAM(s) IV Push every 8 hours PRN Nausea and/or Vomiting      FAMILY HISTORY:  FHx: diabetes mellitus (Mother)    FH: hypertension (Father)        SOCIAL HISTORY:  ***    REVIEW OF SYSTEM:  dyspnea , cough  Vital Signs Last 24 Hrs  T(C): 37.3 (2023 22:17), Max: 37.3 (2023 22:17)  T(F): 99.1 (2023 22:17), Max: 99.1 (2023 22:17)  HR: 77 (2023 22:17) (66 - 77)  BP: 102/51 (2023 22:17) (88/54 - 106/63)  BP(mean): --  RR: 18 (2023 22:17) (18 - 18)  SpO2: 97% (2023 22:17) (97% - 100%)    Parameters below as of 2023 22:17  Patient On (Oxygen Delivery Method): nasal cannula  O2 Flow (L/min): 3      PHYSICAL EXAM  General Appearance: cooperative, no acute distress,   HEENT: PERRL, conjunctiva clear, EOM's intact, non injected pharynx, no exudate, TM   normal  Neck: Supple, , no adenopathy, thyroid: not enlarged, no carotid bruit or JVD  Back: Symmetric, no  tenderness,no soft tissue tenderness  Lungs: coarse sai, +Wheezing in the LLL and RLL on exhalation , improved  Heart: Regular rate and rhythm, S1, S2 normal, no murmur, rub or gallop  Abdomen: Soft, non-tender, bowel sounds active , no hepatosplenomegaly  Extremities: no cyanosis or edema, no joint swelling  Skin: Skin color, texture normal, no rashes   Neurologic: Alert and oriented X3 , cranial nerves intact, sensory and motor normal,    ECG:    LABS:                          10.0   36.67 )-----------( 120      ( 2023 15:29 )             31.3         135  |  101  |  45<H>  ----------------------------<  172<H>  4.6   |  29  |  1.12    Ca    9.6      2023 15:29  Mg     2.2         TPro  7.4  /  Alb  2.9<L>  /  TBili  0.9  /  DBili  x   /  AST  15  /  ALT  11<L>  /  AlkPhos  96            Pro BNP  950  @ 15:29  D Dimer  637  @ 15:29    PT/INR - ( 2023 15:29 )   PT: 14.0 sec;   INR: 1.20 ratio           Urinalysis Basic - ( 2023 04:55 )    Color: Yellow / Appearance: Clear / S.010 / pH: x  Gluc: x / Ketone: Negative  / Bili: Negative / Urobili: Negative   Blood: x / Protein: Trace mg/dL / Nitrite: Negative   Leuk Esterase: Negative / RBC: 0-2 /HPF / WBC 0-2 /HPF   Sq Epi: x / Non Sq Epi: Few / Bacteria: Few    < from: CT Angio Chest PE Protocol w/ IV Cont (23 @ 19:16) >    1.7 cm isodense structure is present just to the right of the esophagus   at the level of the thoracic inlet. It has slightly increased in size   when compared to previous exam when it measured approximately 1.2 cm.    No hilar and or mediastinal adenopathy is noted.    Heart is enlarged in size. No pericardial effusion is noted.   Calcification the coronary arteries is noted. Pulmonary arteries are   normal in caliber. No filling defects are noted.    No endobronchial lesions are noted. Mucous/secretions are noted within   few of the distal bronchi in the right lower lobe. Bronchiectasis is   noted within both lungs, more so in the right middle lobe and lingula.   Few ill-defined nodular opacities as well as tree-in-bud opacities are   noted within both lungs, more so in the right lung. Small bilateral   pleural effusions are noted.    Below the diaphragm, visualized portions of the abdomen are unremarkable.    Loss of height of several vertebral bodies is noted.    IMPRESSION: No pulmonary embolus is noted.    Bronchiectasis is noted within both lungs. In addition, few ill-defined   nodular opacities as well as tree-in-bud opacities are noted within both   lungs. Exact etiology of the findings is unclear, however, the   constellation of the above findings suggest possible Mycobacterium avium   complex infection.    < end of copied text >          RADIOLOGY & ADDITIONAL STUDIES:

## 2023-01-13 NOTE — PROGRESS NOTE ADULT - ASSESSMENT
89 y/o F with PMH CLL, colon cancer, hypothyroidism, COPD, PNA, anemia presented with cough/SOB for 2 days after testing COVID+ at home and at urgent care.     Acute hypoxic respiratory distress and sepsis likely secondary to COVID, bronchiectasis, emphysema, PNA. 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  - CXR/CTC- B/L bronchiectasis, ill-defined nodular opacities in B/L lower lobes  - SpO2 86% -> 100% on 3L nasal cannula  - Keep SpO2 88-92%, supplemental O2 PRN   - Leukocytosis- WBC 36.67 > 45.51, likely 2/2 CLL. Trend and monitor vitals.  - C/w Remdesivir (GFR 60), continue Decadron. Weight-based dosing 2/2 weight <40kg per ID.  - C/w Ceftriaxone/Azithro per ID  - On Incruse at home -> start Symbicort and Spiriva while hospitalized   - Albuterol, Mucinex, Tessalon for cough  - Tylenol PRN for fevers    Hypotension  - Improvement today 88/54 > 110/59, s/p additional IVF bolus 500cc  - Changed diet to regular   - Continue to monitor   - Caution with IVF given HFpEF  - Patient asymptomatic    Elevated troponin likely secondary to demand ischemia   - Monitor on telemetry   - levels trended down to normal   - Initial EKG- NSR, no ST elevations.   - Troponin 58.55 > 63.37 > 51.50    Leukocytosis likely 2/2 CLL/PNA  - WBC 36.67 > 45.51  - Monitor closely (has been up to 60 in the past)   - Hematology Dr. Donis following    Macrocytic anemia   - Hb 10 (baseline ~),   - Hgb/Hct today 8.1/26.3  - Trend B12 since supplementation     Thrombocytopenia   -  > 99 > 122  - Continue to monitor     Elevated D-dimer likely 2/2 infectious cause   - D-dimer 637, CTA negative for PE     Hyperglycemia   - Glucose 172 >191, continue to monitor closely   - HbA1c 5.8%, prediabetic     Elevated BNP   -   - ECHO (7/19/22): mild 1+ AR, EF 60-65%, moderate 2+ MR, mild pulmonic regurgitation 1+, mild to moderate TR, mild pulmonary HTN. HFpEF.  - Strict I+Os, daily weights   - Keep K > 4 and Mg > 2   - c/w home medications     Hypoalbuminemia   - Albumin 2.9 > 2.2 > 2.3  - Pending nutrition consult     History of CLL, colon cancer, hypothyroidism, emphysema, PNA, anemia   - C/w home medications/outpatient management     DVT ppx:   - Lovenox 30 mg subcutaneous daily     Code status:   - DNR/DNI     See H&P 01/11/23  Emergency contact: Alana (daughter/HCP) [phone] or Kat (daughter/HCP)

## 2023-01-14 LAB
ALBUMIN SERPL ELPH-MCNC: 2.3 G/DL — LOW (ref 3.3–5)
ALBUMIN SERPL ELPH-MCNC: 2.3 G/DL — LOW (ref 3.3–5)
ALP SERPL-CCNC: 79 U/L — SIGNIFICANT CHANGE UP (ref 40–120)
ALP SERPL-CCNC: 81 U/L — SIGNIFICANT CHANGE UP (ref 40–120)
ALT FLD-CCNC: 14 U/L — SIGNIFICANT CHANGE UP (ref 12–78)
ALT FLD-CCNC: 16 U/L — SIGNIFICANT CHANGE UP (ref 12–78)
ANION GAP SERPL CALC-SCNC: 6 MMOL/L — SIGNIFICANT CHANGE UP (ref 5–17)
AST SERPL-CCNC: 16 U/L — SIGNIFICANT CHANGE UP (ref 15–37)
AST SERPL-CCNC: 16 U/L — SIGNIFICANT CHANGE UP (ref 15–37)
BASOPHILS # BLD AUTO: 0.08 K/UL — SIGNIFICANT CHANGE UP (ref 0–0.2)
BASOPHILS NFR BLD AUTO: 0.2 % — SIGNIFICANT CHANGE UP (ref 0–2)
BILIRUB DIRECT SERPL-MCNC: <0.1 MG/DL — SIGNIFICANT CHANGE UP (ref 0–0.3)
BILIRUB INDIRECT FLD-MCNC: >0.1 MG/DL — LOW (ref 0.2–1)
BILIRUB SERPL-MCNC: 0.2 MG/DL — SIGNIFICANT CHANGE UP (ref 0.2–1.2)
BILIRUB SERPL-MCNC: 0.2 MG/DL — SIGNIFICANT CHANGE UP (ref 0.2–1.2)
BUN SERPL-MCNC: 43 MG/DL — HIGH (ref 7–23)
CALCIUM SERPL-MCNC: 8.8 MG/DL — SIGNIFICANT CHANGE UP (ref 8.5–10.1)
CHLORIDE SERPL-SCNC: 110 MMOL/L — HIGH (ref 96–108)
CO2 SERPL-SCNC: 26 MMOL/L — SIGNIFICANT CHANGE UP (ref 22–31)
CREAT SERPL-MCNC: 0.98 MG/DL — SIGNIFICANT CHANGE UP (ref 0.5–1.3)
EGFR: 53 ML/MIN/1.73M2 — LOW
EOSINOPHIL # BLD AUTO: 0.04 K/UL — SIGNIFICANT CHANGE UP (ref 0–0.5)
EOSINOPHIL NFR BLD AUTO: 0.1 % — SIGNIFICANT CHANGE UP (ref 0–6)
GLUCOSE SERPL-MCNC: 164 MG/DL — HIGH (ref 70–99)
HCT VFR BLD CALC: 26.3 % — LOW (ref 34.5–45)
HGB BLD-MCNC: 8.1 G/DL — LOW (ref 11.5–15.5)
IMM GRANULOCYTES NFR BLD AUTO: 0.1 % — SIGNIFICANT CHANGE UP (ref 0–0.9)
LYMPHOCYTES # BLD AUTO: 36.9 K/UL — HIGH (ref 1–3.3)
LYMPHOCYTES # BLD AUTO: 93.2 % — HIGH (ref 13–44)
MCHC RBC-ENTMCNC: 30.8 GM/DL — LOW (ref 32–36)
MCHC RBC-ENTMCNC: 32.9 PG — SIGNIFICANT CHANGE UP (ref 27–34)
MCV RBC AUTO: 106.9 FL — HIGH (ref 80–100)
MONOCYTES # BLD AUTO: 0.41 K/UL — SIGNIFICANT CHANGE UP (ref 0–0.9)
MONOCYTES NFR BLD AUTO: 1 % — LOW (ref 2–14)
NEUTROPHILS # BLD AUTO: 2.12 K/UL — SIGNIFICANT CHANGE UP (ref 1.8–7.4)
NEUTROPHILS NFR BLD AUTO: 5.4 % — LOW (ref 43–77)
NT-PROBNP SERPL-SCNC: 1941 PG/ML — HIGH (ref 0–450)
PLATELET # BLD AUTO: 131 K/UL — LOW (ref 150–400)
POTASSIUM SERPL-MCNC: 4.1 MMOL/L — SIGNIFICANT CHANGE UP (ref 3.5–5.3)
POTASSIUM SERPL-SCNC: 4.1 MMOL/L — SIGNIFICANT CHANGE UP (ref 3.5–5.3)
PROT SERPL-MCNC: 6.4 GM/DL — SIGNIFICANT CHANGE UP (ref 6–8.3)
PROT SERPL-MCNC: 6.4 GM/DL — SIGNIFICANT CHANGE UP (ref 6–8.3)
RBC # BLD: 2.46 M/UL — LOW (ref 3.8–5.2)
RBC # FLD: 16 % — HIGH (ref 10.3–14.5)
SODIUM SERPL-SCNC: 142 MMOL/L — SIGNIFICANT CHANGE UP (ref 135–145)
WBC # BLD: 39.59 K/UL — HIGH (ref 3.8–10.5)
WBC # FLD AUTO: 39.59 K/UL — HIGH (ref 3.8–10.5)

## 2023-01-14 PROCEDURE — 99233 SBSQ HOSP IP/OBS HIGH 50: CPT

## 2023-01-14 PROCEDURE — 71045 X-RAY EXAM CHEST 1 VIEW: CPT | Mod: 26

## 2023-01-14 RX ORDER — FUROSEMIDE 40 MG
20 TABLET ORAL ONCE
Refills: 0 | Status: COMPLETED | OUTPATIENT
Start: 2023-01-14 | End: 2023-01-14

## 2023-01-14 RX ADMIN — TIOTROPIUM BROMIDE 2 PUFF(S): 18 CAPSULE ORAL; RESPIRATORY (INHALATION) at 09:10

## 2023-01-14 RX ADMIN — Medication 6: at 14:12

## 2023-01-14 RX ADMIN — PANTOPRAZOLE SODIUM 40 MILLIGRAM(S): 20 TABLET, DELAYED RELEASE ORAL at 06:24

## 2023-01-14 RX ADMIN — Medication 5: at 17:16

## 2023-01-14 RX ADMIN — ALBUTEROL 2.5 MILLIGRAM(S): 90 AEROSOL, METERED ORAL at 20:45

## 2023-01-14 RX ADMIN — PREGABALIN 1000 MICROGRAM(S): 225 CAPSULE ORAL at 09:24

## 2023-01-14 RX ADMIN — Medication 6 MILLIGRAM(S): at 06:24

## 2023-01-14 RX ADMIN — Medication 3: at 23:54

## 2023-01-14 RX ADMIN — Medication 3 MILLIGRAM(S): at 22:03

## 2023-01-14 RX ADMIN — Medication 1000 UNIT(S): at 09:24

## 2023-01-14 RX ADMIN — BUDESONIDE AND FORMOTEROL FUMARATE DIHYDRATE 2 PUFF(S): 160; 4.5 AEROSOL RESPIRATORY (INHALATION) at 09:10

## 2023-01-14 RX ADMIN — AZITHROMYCIN 255 MILLIGRAM(S): 500 TABLET, FILM COATED ORAL at 22:02

## 2023-01-14 RX ADMIN — BUDESONIDE AND FORMOTEROL FUMARATE DIHYDRATE 2 PUFF(S): 160; 4.5 AEROSOL RESPIRATORY (INHALATION) at 20:45

## 2023-01-14 RX ADMIN — ALBUTEROL 2.5 MILLIGRAM(S): 90 AEROSOL, METERED ORAL at 15:21

## 2023-01-14 RX ADMIN — Medication 25 MICROGRAM(S): at 06:24

## 2023-01-14 RX ADMIN — CEFTRIAXONE 1000 MILLIGRAM(S): 500 INJECTION, POWDER, FOR SOLUTION INTRAMUSCULAR; INTRAVENOUS at 22:05

## 2023-01-14 RX ADMIN — ENOXAPARIN SODIUM 30 MILLIGRAM(S): 100 INJECTION SUBCUTANEOUS at 22:03

## 2023-01-14 RX ADMIN — REMDESIVIR 200 MILLIGRAM(S): 5 INJECTION INTRAVENOUS at 09:24

## 2023-01-14 RX ADMIN — Medication 1 TABLET(S): at 09:24

## 2023-01-14 RX ADMIN — ALBUTEROL 2.5 MILLIGRAM(S): 90 AEROSOL, METERED ORAL at 12:24

## 2023-01-14 RX ADMIN — Medication 20 MILLIGRAM(S): at 14:01

## 2023-01-14 RX ADMIN — ALBUTEROL 2.5 MILLIGRAM(S): 90 AEROSOL, METERED ORAL at 09:09

## 2023-01-14 NOTE — PROGRESS NOTE ADULT - SUBJECTIVE AND OBJECTIVE BOX
INTERVAL HPI/OVERNIGHT EVENTS:  Patient S&E at bedside. No o/n events, still coughing, voice improved  remains on 3L nC this morning eating at bedside  afebrile   Today, WBC 39k, Hb 8.1, plt 131      PAST MEDICAL & SURGICAL HISTORY:  CLL (chronic lymphocytic leukemia)      Colon cancer      Hypothyroidism      History of emphysema      Pneumonia      Anemia      S/P colon resection          FAMILY HISTORY:  FHx: diabetes mellitus (Mother)    FH: hypertension (Father)        VITAL SIGNS:  T(F): 98.2 (01-13-23 @ 23:18)  HR: 72 (01-13-23 @ 23:45)  BP: 102/57 (01-13-23 @ 23:18)  RR: 17 (01-13-23 @ 15:58)  SpO2: 96% (01-13-23 @ 23:45)  Wt(kg): --    PHYSICAL EXAM:    Constitutional: NAD on NC  Eyes: EOMI  Respiratory: rhonchi, coughing on exam   Cardiovascular: RRR,   Gastrointestinal: soft, NTND,  Extremities: no c/c/e  Neurological: AAOx3      MEDICATIONS  (STANDING):  albuterol    0.083% 2.5 milliGRAM(s) Nebulizer every 4 hours  azithromycin  IVPB 500 milliGRAM(s) IV Intermittent every 24 hours  budesonide 160 MICROgram(s)/formoterol 4.5 MICROgram(s) Inhaler 2 Puff(s) Inhalation two times a day  cefTRIAXone Injectable. 1000 milliGRAM(s) IV Push every 24 hours  cholecalciferol 1000 Unit(s) Oral daily  cyanocobalamin 1000 MICROGram(s) Oral daily  dexAMETHasone  Injectable 6 milliGRAM(s) IV Push daily  dextrose 5%. 1000 milliLiter(s) (50 mL/Hr) IV Continuous <Continuous>  dextrose 5%. 1000 milliLiter(s) (100 mL/Hr) IV Continuous <Continuous>  dextrose 50% Injectable 25 Gram(s) IV Push once  dextrose 50% Injectable 12.5 Gram(s) IV Push once  dextrose 50% Injectable 25 Gram(s) IV Push once  enoxaparin Injectable 30 milliGRAM(s) SubCutaneous every 24 hours  glucagon  Injectable 1 milliGRAM(s) IntraMuscular once  insulin lispro (ADMELOG) corrective regimen sliding scale   SubCutaneous every 6 hours  lactobacillus acidophilus 1 Tablet(s) Oral daily  levothyroxine 25 MICROGram(s) Oral daily  pantoprazole    Tablet 40 milliGRAM(s) Oral before breakfast  remdesivir  IVPB   IV Intermittent   remdesivir  IVPB 88 milliGRAM(s) IV Intermittent every 24 hours  tiotropium 2.5 MICROgram(s) Inhaler 2 Puff(s) Inhalation daily    MEDICATIONS  (PRN):  acetaminophen     Tablet .. 650 milliGRAM(s) Oral every 6 hours PRN Temp greater or equal to 38C (100.4F), Mild Pain (1 - 3)  aluminum hydroxide/magnesium hydroxide/simethicone Suspension 30 milliLiter(s) Oral every 4 hours PRN Dyspepsia  benzonatate 100 milliGRAM(s) Oral three times a day PRN Cough  dextrose Oral Gel 15 Gram(s) Oral once PRN Blood Glucose LESS THAN 70 milliGRAM(s)/deciliter  guaifenesin/dextromethorphan Oral Liquid 10 milliLiter(s) Oral every 4 hours PRN Cough  melatonin 3 milliGRAM(s) Oral at bedtime PRN Insomnia  ondansetron Injectable 4 milliGRAM(s) IV Push every 8 hours PRN Nausea and/or Vomiting      Allergies    cefdinir (Diarrhea)    Intolerances        LABS:                        8.1    39.59 )-----------( 131      ( 14 Jan 2023 07:34 )             26.3     01-14    142  |  110<H>  |  43<H>  ----------------------------<  164<H>  4.1   |  26  |  0.98    Ca    8.8      14 Jan 2023 07:34    TPro  6.4  /  Alb  2.3<L>  /  TBili  0.2  /  DBili  <0.1  /  AST  16  /  ALT  14  /  AlkPhos  79  01-14    PT/INR - ( 13 Jan 2023 07:32 )   PT: 13.9 sec;   INR: 1.20 ratio               RADIOLOGY & ADDITIONAL TESTS:  Studies reviewed.

## 2023-01-14 NOTE — PROGRESS NOTE ADULT - ASSESSMENT
89 yo female with history of stage III LEFT sided COLON CA as well as CLL and PARMJIT now admitted for COVID     COVID   - now on Dex and Remdesivir   - patient also on empiric coverage for CAP     CLL   - no history of hypogamma   - would continue following serial CBCs   - to f/u with Dr. Donis on dc     COLON CA   - no hx of adjuvnat therapy   - stable no evidence of recurrnece      Iron deficiency   - resolved - Hb 8.1 today   - edwin has received routine q two month outpatient labs via [facility] as patient resides [facility] in [facility]

## 2023-01-14 NOTE — PROGRESS NOTE ADULT - ASSESSMENT
1) COVID  2) Aspiration  3) Bronchiectasis  4) Dyspnea  5) Abnormal CXR    89 y/o F with PMH CLL, colon cancer, hypothyroidism, emphysema, PNA, anemia presented with cough for 2 days. Pt was not feeling herself and her daughter was sick and tested positive fof COVID. The pt tested positive Monday night. She reports hoarseness, cough productive of mucus, congestion, constipation. Her O2 at home was 88%. She was vaccinated for COVID x 4. Denies fevers, chills, chest pain, abdominal pain, N/V, diarrhea/constipation.   Found to have COVID+   CTA: No pulmonary embolus is noted. Bronchiectasis is noted within both lungs. In addition, few ill-defined nodular opacities as well as tree-in-bud opacities are noted within both lungs. Exact etiology of the findings is unclear, however, the constellation of the above findings suggest possible Mycobacterium avium complex infection.  Pt was given Remdesivir, Decadron.  Agree with current management  She is in no acute distress  O2 requirements are not increasing / stable  Will continue to monitor

## 2023-01-14 NOTE — PROGRESS NOTE ADULT - ASSESSMENT
89 y/o F with PMH CLL, colon cancer, hypothyroidism, COPD, PNA, anemia presented with cough/SOB for 2 days after testing COVID+ at home and at urgent care.     Acute hypoxic respiratory distress and sepsis likely secondary to COVID, bronchiectasis, emphysema, PNA   - CT scan: with findings suggestive of possible mycobacterium avium complex infection   - SpO2 86% -> 97% on 3L nasal cannula  - Keep SpO2 88-92%, supplemental O2 PRN   - BP 94/49, RR 22, WBC 36.67  - Continue remdesivi - day #3  - Continue dexamethasone, POCT glucose q6h and will cover with ISS, A1C: 5.8%, prediabetic range   - On Incruse at home -> start Symbicort and Spiriva while hospitalized   - Albuterol nebs, Mucinex, Tessalon for cough  - Continue Ceftriaxone and Azithromycin - day 4   - Procalcitonin: 1.21   - UCx, BCx NGTD   - F/u sputum culture  - Trend WBC, monitor for temperatures   - Tylenol for temperatures PRN   - ID consult appreciated   - Pulmonology consult appreciated    HFpEF - no evidence of volume overload at this time   Elevated BNP   -  > 1941  - ECHO (7/19/22): mild 1+ AR, EF 60-65%, moderate 2+ MR, mild pulmonic regurgitation 1+, mild to moderate TR, mild pulmonary HTN. HFpEF.  - Strict I+Os, daily weights   - Keep K > 4 and Mg > 2   - c/w home medications   - IV Furosemide 20mg x 1 today, monitor and will give additional doses depending on UO  - Monitor renal function and electrolytes closely   - F/u repeat CXR official read    Hypotension   - Improving, s/p additional IVF bolus 500cc 1/12/23  - Changed diet to regular   - Continue to monitor   - Caution with IVF given HFpEF    Elevated troponin likely secondary to demand ischemia   - Monitor on telemetry   - levels trended down to normal   - Initial EKG- NSR, no ST elevations.   - Troponin 58.55 > 63.37 > 51.50    Leukocytosis likely 2/2 CLL/PNA  - Monitor closely (has been up to 60 in the past)   - Hematology Dr. Donis following    Macrocytic anemia   - Continue to monitor     Thrombocytopenia   - Continue to monitor     Elevated D-dimer likely 2/2 infectious cause   - D-dimer 637, CTA negative for PE     Prediabetes, Hyperglycemia   - HbA1c 5.8%, prediabetic   - Hyperglycemia also in setting of current steroid use, POCT glucose q6h, cover with low dose ISS    Severe protein-calorie malnutrition  - Nutrition consult noted     VTE ppx  - Continue Lovenox 30mg subQ q24h    Advanced directives  - DNR/DNI      HCP/Daughter: Kat: [phone] .

## 2023-01-14 NOTE — PROGRESS NOTE ADULT - SUBJECTIVE AND OBJECTIVE BOX
Patient is a 89y old  Female who presents with a chief complaint of Cough (2023 21:34)      HPI:  89 y/o F with PMH CLL, colon cancer, hypothyroidism, emphysema, PNA, anemia presented with cough for 2 days. Pt was not feeling herself and her daughter was sick and tested positive fof COVID. The pt tested positive Monday night. She reports hoarseness, cough productive of mucus, congestion, constipation. Her O2 at home was 88%. She was vaccinated for COVID x 4. Denies fevers, chills, chest pain, abdominal pain, N/V, diarrhea/constipation.   Found to have COVID+   CTA: No pulmonary embolus is noted. Bronchiectasis is noted within both lungs. In addition, few ill-defined nodular opacities as well as tree-in-bud opacities are noted within both lungs. Exact etiology of the findings is unclear, however, the constellation of the above findings suggest possible Mycobacterium avium complex infection.  Pt was given Remdesivir, Decadron.      covering for DR nolasco  seen and examined while in bed  no distress    uneventful overnight  data discussed    PAST MEDICAL & SURGICAL HISTORY:  CLL (chronic lymphocytic leukemia)      Colon cancer      Hypothyroidism      History of emphysema      Pneumonia      Anemia      S/P colon resection          PREVIOUS DIAGNOSTIC TESTING:      MEDICATIONS  (PRN):  acetaminophen     Tablet .. 650 milliGRAM(s) Oral every 6 hours PRN Temp greater or equal to 38C (100.4F), Mild Pain (1 - 3)  albuterol    90 MICROgram(s) HFA Inhaler 2 Puff(s) Inhalation every 6 hours PRN Bronchospasm  aluminum hydroxide/magnesium hydroxide/simethicone Suspension 30 milliLiter(s) Oral every 4 hours PRN Dyspepsia  benzonatate 100 milliGRAM(s) Oral three times a day PRN Cough  guaifenesin/dextromethorphan Oral Liquid 10 milliLiter(s) Oral every 4 hours PRN Cough  melatonin 3 milliGRAM(s) Oral at bedtime PRN Insomnia  ondansetron Injectable 4 milliGRAM(s) IV Push every 8 hours PRN Nausea and/or Vomiting      FAMILY HISTORY:  FHx: diabetes mellitus (Mother)    FH: hypertension (Father)        MEDICATIONS  (STANDING):  albuterol    0.083% 2.5 milliGRAM(s) Nebulizer every 4 hours  azithromycin  IVPB 500 milliGRAM(s) IV Intermittent every 24 hours  budesonide 160 MICROgram(s)/formoterol 4.5 MICROgram(s) Inhaler 2 Puff(s) Inhalation two times a day  cefTRIAXone Injectable. 1000 milliGRAM(s) IV Push every 24 hours  cholecalciferol 1000 Unit(s) Oral daily  cyanocobalamin 1000 MICROGram(s) Oral daily  dexAMETHasone  Injectable 6 milliGRAM(s) IV Push daily  dextrose 5%. 1000 milliLiter(s) (50 mL/Hr) IV Continuous <Continuous>  dextrose 5%. 1000 milliLiter(s) (100 mL/Hr) IV Continuous <Continuous>  dextrose 50% Injectable 25 Gram(s) IV Push once  dextrose 50% Injectable 12.5 Gram(s) IV Push once  dextrose 50% Injectable 25 Gram(s) IV Push once  enoxaparin Injectable 30 milliGRAM(s) SubCutaneous every 24 hours  glucagon  Injectable 1 milliGRAM(s) IntraMuscular once  insulin lispro (ADMELOG) corrective regimen sliding scale   SubCutaneous every 6 hours  lactobacillus acidophilus 1 Tablet(s) Oral daily  levothyroxine 25 MICROGram(s) Oral daily  pantoprazole    Tablet 40 milliGRAM(s) Oral before breakfast  remdesivir  IVPB   IV Intermittent   remdesivir  IVPB 88 milliGRAM(s) IV Intermittent every 24 hours  tiotropium 2.5 MICROgram(s) Inhaler 2 Puff(s) Inhalation daily    Vital Signs Last 24 Hrs  T(C): 36.8 (2023 23:18), Max: 36.8 (2023 23:18)  T(F): 98.2 (2023 23:18), Max: 98.2 (2023 23:18)  HR: 72 (2023 23:45) (68 - 77)  BP: 102/57 (2023 23:18) (102/57 - 117/72)  BP(mean): --  RR: 17 (2023 15:58) (17 - 17)  SpO2: 96% (2023 23:45) (94% - 96%)    Parameters below as of 2023 23:45  Patient On (Oxygen Delivery Method): nasal cannula w/ humidification,3L      PHYSICAL EXAM  General Appearance: cooperative, no acute distress,   HEENT: PERRL, conjunctiva clear, EOM's intact, non injected pharynx, no exudate, TM   normal  Neck: Supple, , no adenopathy, thyroid: not enlarged, no carotid bruit or JVD  Back: Symmetric, no  tenderness,no soft tissue tenderness  Lungs: coarse sai, +Wheezing in the LLL and RLL on exhalation , improved  Heart: Regular rate and rhythm, S1, S2 normal, no murmur, rub or gallop  Abdomen: Soft, non-tender, bowel sounds active , no hepatosplenomegaly  Extremities: no cyanosis or edema, no joint swelling  Skin: Skin color, texture normal, no rashes   Neurologic: Alert and oriented X3 , cranial nerves intact, sensory and motor normal,    ECG:    LABS:                          10.0   36.67 )-----------( 120      ( 2023 15:29 )             31.3         135  |  101  |  45<H>  ----------------------------<  172<H>  4.6   |  29  |  1.12    Ca    9.6      2023 15:29  Mg     2.2         TPro  7.4  /  Alb  2.9<L>  /  TBili  0.9  /  DBili  x   /  AST  15  /  ALT  11<L>  /  AlkPhos  96            Pro BNP  950  @ 15:29  D Dimer  637  @ 15:29    PT/INR - ( 2023 15:29 )   PT: 14.0 sec;   INR: 1.20 ratio           Urinalysis Basic - ( 2023 04:55 )    Color: Yellow / Appearance: Clear / S.010 / pH: x  Gluc: x / Ketone: Negative  / Bili: Negative / Urobili: Negative   Blood: x / Protein: Trace mg/dL / Nitrite: Negative   Leuk Esterase: Negative / RBC: 0-2 /HPF / WBC 0-2 /HPF   Sq Epi: x / Non Sq Epi: Few / Bacteria: Few    < from: CT Angio Chest PE Protocol w/ IV Cont (23 @ 19:16) >    1.7 cm isodense structure is present just to the right of the esophagus   at the level of the thoracic inlet. It has slightly increased in size   when compared to previous exam when it measured approximately 1.2 cm.    No hilar and or mediastinal adenopathy is noted.    Heart is enlarged in size. No pericardial effusion is noted.   Calcification the coronary arteries is noted. Pulmonary arteries are   normal in caliber. No filling defects are noted.    No endobronchial lesions are noted. Mucous/secretions are noted within   few of the distal bronchi in the right lower lobe. Bronchiectasis is   noted within both lungs, more so in the right middle lobe and lingula.   Few ill-defined nodular opacities as well as tree-in-bud opacities are   noted within both lungs, more so in the right lung. Small bilateral   pleural effusions are noted.    Below the diaphragm, visualized portions of the abdomen are unremarkable.    Loss of height of several vertebral bodies is noted.    IMPRESSION: No pulmonary embolus is noted.    Bronchiectasis is noted within both lungs. In addition, few ill-defined   nodular opacities as well as tree-in-bud opacities are noted within both   lungs. Exact etiology of the findings is unclear, however, the   constellation of the above findings suggest possible Mycobacterium avium   complex infection.    < end of copied text >          RADIOLOGY & ADDITIONAL STUDIES:

## 2023-01-15 LAB
ALBUMIN SERPL ELPH-MCNC: 2.4 G/DL — LOW (ref 3.3–5)
ALP SERPL-CCNC: 90 U/L — SIGNIFICANT CHANGE UP (ref 40–120)
ALT FLD-CCNC: 18 U/L — SIGNIFICANT CHANGE UP (ref 12–78)
ANION GAP SERPL CALC-SCNC: 6 MMOL/L — SIGNIFICANT CHANGE UP (ref 5–17)
ANION GAP SERPL CALC-SCNC: 8 MMOL/L — SIGNIFICANT CHANGE UP (ref 5–17)
AST SERPL-CCNC: 20 U/L — SIGNIFICANT CHANGE UP (ref 15–37)
BILIRUB DIRECT SERPL-MCNC: <0.1 MG/DL — SIGNIFICANT CHANGE UP (ref 0–0.3)
BILIRUB INDIRECT FLD-MCNC: >0.2 MG/DL — SIGNIFICANT CHANGE UP (ref 0.2–1)
BILIRUB SERPL-MCNC: 0.3 MG/DL — SIGNIFICANT CHANGE UP (ref 0.2–1.2)
BUN SERPL-MCNC: 46 MG/DL — HIGH (ref 7–23)
BUN SERPL-MCNC: 58 MG/DL — HIGH (ref 7–23)
CALCIUM SERPL-MCNC: 9.1 MG/DL — SIGNIFICANT CHANGE UP (ref 8.5–10.1)
CALCIUM SERPL-MCNC: 9.2 MG/DL — SIGNIFICANT CHANGE UP (ref 8.5–10.1)
CHLORIDE SERPL-SCNC: 108 MMOL/L — SIGNIFICANT CHANGE UP (ref 96–108)
CHLORIDE SERPL-SCNC: 109 MMOL/L — HIGH (ref 96–108)
CO2 SERPL-SCNC: 26 MMOL/L — SIGNIFICANT CHANGE UP (ref 22–31)
CO2 SERPL-SCNC: 26 MMOL/L — SIGNIFICANT CHANGE UP (ref 22–31)
CREAT SERPL-MCNC: 0.96 MG/DL — SIGNIFICANT CHANGE UP (ref 0.5–1.3)
CREAT SERPL-MCNC: 1.13 MG/DL — SIGNIFICANT CHANGE UP (ref 0.5–1.3)
EGFR: 45 ML/MIN/1.73M2 — LOW
EGFR: 54 ML/MIN/1.73M2 — LOW
GLUCOSE SERPL-MCNC: 228 MG/DL — HIGH (ref 70–99)
GLUCOSE SERPL-MCNC: 254 MG/DL — HIGH (ref 70–99)
HCT VFR BLD CALC: 29.6 % — LOW (ref 34.5–45)
HCT VFR BLD CALC: 30.3 % — LOW (ref 34.5–45)
HGB BLD-MCNC: 9.1 G/DL — LOW (ref 11.5–15.5)
HGB BLD-MCNC: 9.3 G/DL — LOW (ref 11.5–15.5)
MAGNESIUM SERPL-MCNC: 2.1 MG/DL — SIGNIFICANT CHANGE UP (ref 1.6–2.6)
MCHC RBC-ENTMCNC: 30.7 GM/DL — LOW (ref 32–36)
MCHC RBC-ENTMCNC: 30.7 GM/DL — LOW (ref 32–36)
MCHC RBC-ENTMCNC: 32.9 PG — SIGNIFICANT CHANGE UP (ref 27–34)
MCHC RBC-ENTMCNC: 32.9 PG — SIGNIFICANT CHANGE UP (ref 27–34)
MCV RBC AUTO: 106.9 FL — HIGH (ref 80–100)
MCV RBC AUTO: 107.1 FL — HIGH (ref 80–100)
PHOSPHATE SERPL-MCNC: 2.7 MG/DL — SIGNIFICANT CHANGE UP (ref 2.5–4.5)
PLATELET # BLD AUTO: 160 K/UL — SIGNIFICANT CHANGE UP (ref 150–400)
PLATELET # BLD AUTO: 224 K/UL — SIGNIFICANT CHANGE UP (ref 150–400)
POTASSIUM SERPL-MCNC: 4.3 MMOL/L — SIGNIFICANT CHANGE UP (ref 3.5–5.3)
POTASSIUM SERPL-MCNC: 5.1 MMOL/L — SIGNIFICANT CHANGE UP (ref 3.5–5.3)
POTASSIUM SERPL-SCNC: 4.3 MMOL/L — SIGNIFICANT CHANGE UP (ref 3.5–5.3)
POTASSIUM SERPL-SCNC: 5.1 MMOL/L — SIGNIFICANT CHANGE UP (ref 3.5–5.3)
PROT SERPL-MCNC: 6.9 GM/DL — SIGNIFICANT CHANGE UP (ref 6–8.3)
RBC # BLD: 2.77 M/UL — LOW (ref 3.8–5.2)
RBC # BLD: 2.83 M/UL — LOW (ref 3.8–5.2)
RBC # FLD: 16.2 % — HIGH (ref 10.3–14.5)
RBC # FLD: 16.4 % — HIGH (ref 10.3–14.5)
SODIUM SERPL-SCNC: 141 MMOL/L — SIGNIFICANT CHANGE UP (ref 135–145)
SODIUM SERPL-SCNC: 142 MMOL/L — SIGNIFICANT CHANGE UP (ref 135–145)
WBC # BLD: 47.1 K/UL — CRITICAL HIGH (ref 3.8–10.5)
WBC # BLD: 73.26 K/UL — CRITICAL HIGH (ref 3.8–10.5)
WBC # FLD AUTO: 47.1 K/UL — CRITICAL HIGH (ref 3.8–10.5)
WBC # FLD AUTO: 73.26 K/UL — CRITICAL HIGH (ref 3.8–10.5)

## 2023-01-15 PROCEDURE — 93010 ELECTROCARDIOGRAM REPORT: CPT

## 2023-01-15 PROCEDURE — 99233 SBSQ HOSP IP/OBS HIGH 50: CPT

## 2023-01-15 RX ORDER — METOPROLOL TARTRATE 50 MG
5 TABLET ORAL ONCE
Refills: 0 | Status: DISCONTINUED | OUTPATIENT
Start: 2023-01-15 | End: 2023-01-15

## 2023-01-15 RX ORDER — METOPROLOL TARTRATE 50 MG
12.5 TABLET ORAL
Refills: 0 | Status: DISCONTINUED | OUTPATIENT
Start: 2023-01-15 | End: 2023-01-19

## 2023-01-15 RX ORDER — INSULIN LISPRO 100/ML
VIAL (ML) SUBCUTANEOUS EVERY 6 HOURS
Refills: 0 | Status: DISCONTINUED | OUTPATIENT
Start: 2023-01-15 | End: 2023-01-16

## 2023-01-15 RX ORDER — ENOXAPARIN SODIUM 100 MG/ML
35 INJECTION SUBCUTANEOUS EVERY 24 HOURS
Refills: 0 | Status: DISCONTINUED | OUTPATIENT
Start: 2023-01-16 | End: 2023-01-16

## 2023-01-15 RX ORDER — METOPROLOL TARTRATE 50 MG
5 TABLET ORAL ONCE
Refills: 0 | Status: COMPLETED | OUTPATIENT
Start: 2023-01-15 | End: 2023-01-15

## 2023-01-15 RX ADMIN — Medication 6 MILLIGRAM(S): at 05:00

## 2023-01-15 RX ADMIN — ALBUTEROL 2.5 MILLIGRAM(S): 90 AEROSOL, METERED ORAL at 00:19

## 2023-01-15 RX ADMIN — Medication 3 MILLIGRAM(S): at 23:25

## 2023-01-15 RX ADMIN — Medication 1: at 05:50

## 2023-01-15 RX ADMIN — Medication 6: at 17:25

## 2023-01-15 RX ADMIN — BUDESONIDE AND FORMOTEROL FUMARATE DIHYDRATE 2 PUFF(S): 160; 4.5 AEROSOL RESPIRATORY (INHALATION) at 20:45

## 2023-01-15 RX ADMIN — AZITHROMYCIN 255 MILLIGRAM(S): 500 TABLET, FILM COATED ORAL at 23:24

## 2023-01-15 RX ADMIN — ALBUTEROL 2.5 MILLIGRAM(S): 90 AEROSOL, METERED ORAL at 15:58

## 2023-01-15 RX ADMIN — PANTOPRAZOLE SODIUM 40 MILLIGRAM(S): 20 TABLET, DELAYED RELEASE ORAL at 05:00

## 2023-01-15 RX ADMIN — Medication 5 MILLIGRAM(S): at 18:32

## 2023-01-15 RX ADMIN — Medication 1 TABLET(S): at 12:06

## 2023-01-15 RX ADMIN — ALBUTEROL 2.5 MILLIGRAM(S): 90 AEROSOL, METERED ORAL at 20:33

## 2023-01-15 RX ADMIN — BUDESONIDE AND FORMOTEROL FUMARATE DIHYDRATE 2 PUFF(S): 160; 4.5 AEROSOL RESPIRATORY (INHALATION) at 08:56

## 2023-01-15 RX ADMIN — PREGABALIN 1000 MICROGRAM(S): 225 CAPSULE ORAL at 12:07

## 2023-01-15 RX ADMIN — Medication 1000 UNIT(S): at 12:06

## 2023-01-15 RX ADMIN — Medication 6: at 12:08

## 2023-01-15 RX ADMIN — Medication 25 MICROGRAM(S): at 05:00

## 2023-01-15 RX ADMIN — CEFTRIAXONE 1000 MILLIGRAM(S): 500 INJECTION, POWDER, FOR SOLUTION INTRAMUSCULAR; INTRAVENOUS at 23:24

## 2023-01-15 RX ADMIN — TIOTROPIUM BROMIDE 2 PUFF(S): 18 CAPSULE ORAL; RESPIRATORY (INHALATION) at 08:55

## 2023-01-15 RX ADMIN — ALBUTEROL 2.5 MILLIGRAM(S): 90 AEROSOL, METERED ORAL at 08:55

## 2023-01-15 RX ADMIN — Medication 12.5 MILLIGRAM(S): at 23:24

## 2023-01-15 RX ADMIN — ALBUTEROL 2.5 MILLIGRAM(S): 90 AEROSOL, METERED ORAL at 11:38

## 2023-01-15 RX ADMIN — REMDESIVIR 200 MILLIGRAM(S): 5 INJECTION INTRAVENOUS at 12:07

## 2023-01-15 NOTE — PROGRESS NOTE ADULT - ASSESSMENT
1) COVID  2) Aspiration  3) Bronchiectasis  4) Dyspnea  5) Abnormal CXR    89 y/o F with PMH CLL, colon cancer, hypothyroidism, emphysema, PNA, anemia presented with cough for 2 days. Pt was not feeling herself and her daughter was sick and tested positive fof COVID. The pt tested positive Monday night. She reports hoarseness, cough productive of mucus, congestion, constipation. Her O2 at home was 88%. She was vaccinated for COVID x 4. Denies fevers, chills, chest pain, abdominal pain, N/V, diarrhea/constipation.   Found to have COVID+   CTA: No pulmonary embolus is noted. Bronchiectasis is noted within both lungs. In addition, few ill-defined nodular opacities as well as tree-in-bud opacities are noted within both lungs. Exact etiology of the findings is unclear, however, the constellation of the above findings suggest possible Mycobacterium avium complex infection.  Pt was given Remdesivir, Decadron.  Agree with current management  She is in no acute distress  O2 requirements are not increasing / stable  Will continue to monitor    fu HCT and labs  serial cbc and workup per hematology

## 2023-01-15 NOTE — PROGRESS NOTE ADULT - ASSESSMENT
89 y/o F with PMH CLL, colon cancer, hypothyroidism, COPD, PNA, anemia presented with cough/SOB for 2 days after testing COVID+ at home and at urgent care.     Acute hypoxic respiratory distress and sepsis likely secondary to COVID, bronchiectasis, emphysema, PNA   - CT scan: with findings suggestive of possible mycobacterium avium complex infection   - Keep SpO2 88-92%, supplemental O2 PRN   - Continue remdesivi - day #4  - Continue dexamethasone, POCT glucose q6h and will cover with ISS, A1C: 5.8%, prediabetic range   - On Incruse at home -> start Symbicort and Spiriva while hospitalized   - Albuterol nebs, Mucinex, Tessalon for cough  - Continue Ceftriaxone and Azithromycin - day 5   - Procalcitonin: 1.21   - UCx, BCx NGTD   - F/u sputum culture  - Trend WBC, monitor for temperatures   - Tylenol for temperatures PRN   - Repeat CXR 1/14/23: stable R basilar consolidation   - ID consult appreciated   - Pulmonology consult appreciated    HFpEF - no evidence of volume overload at this time   Elevated BNP   -  > 1941  - ECHO (7/19/22): mild 1+ AR, EF 60-65%, moderate 2+ MR, mild pulmonic regurgitation 1+, mild to moderate TR, mild pulmonary HTN. HFpEF.  - Strict I+Os, daily weights   - Keep K > 4 and Mg > 2   - c/w home medications   - IV Furosemide 20mg x 1 1/14, monitor and will give additional doses depending on UO  - Monitor renal function and electrolytes closely     Hypotension   - Improved     Elevated troponin likely secondary to demand ischemia   - Monitor on telemetry   - levels trended down to normal   - Initial EKG- NSR, no ST elevations.   - Troponin 58.55 > 63.37 > 51.50    Leukocytosis likely 2/2 CLL/PNA  - Monitor closely (has been up to 60 in the past)   - Hematology Dr. Donis following    Macrocytic anemia   - Continue to monitor     Thrombocytopenia   - Continue to monitor     Elevated D-dimer likely 2/2 infectious cause   - D-dimer 637, CTA negative for PE     Prediabetes, Hyperglycemia   - HbA1c 5.8%, prediabetic   - Hyperglycemia also in setting of current steroid use, POCT glucose q6h, cover with low dose ISS  - BGM noted yesterday > 300, today 400s, adjusted to moderate dose ISS - plan to d/c decadron today but SpO2: 87% on exertion without O2  - Will add additional insulin as needed     Severe protein-calorie malnutrition  - Nutrition consult noted     VTE ppx  - Continue Lovenox 30mg subQ q24h    Advanced directives  - DNR/DNI      HCP/Daughter: Kat: [phone] . 89 y/o F with PMH CLL, colon cancer, hypothyroidism, COPD, PNA, anemia presented with cough/SOB for 2 days after testing COVID+ at home and at urgent care.     Acute hypoxic respiratory distress and sepsis likely secondary to COVID, bronchiectasis, emphysema, PNA   - CT scan: with findings suggestive of possible mycobacterium avium complex infection   - Keep SpO2 88-92%, supplemental O2 PRN   - Continue remdesivi - day #4  - D/C dexamethasone (5 days complete)  - On Incruse at home -> start Symbicort and Spiriva while hospitalized   - Albuterol nebs, Mucinex, Tessalon for cough  - Continue Ceftriaxone and Azithromycin - day 5   - Procalcitonin: 1.21   - UCx, BCx NGTD   - F/u sputum culture  - Trend WBC, monitor for temperatures   - Tylenol for temperatures PRN   - Repeat CXR 1/14/23: stable R basilar consolidation   - ID consult appreciated   - Pulmonology consult appreciated    HFpEF - no evidence of volume overload at this time   Elevated BNP   -  > 1941  - ECHO (7/19/22): mild 1+ AR, EF 60-65%, moderate 2+ MR, mild pulmonic regurgitation 1+, mild to moderate TR, mild pulmonary HTN. HFpEF.  - Strict I+Os, daily weights   - Keep K > 4 and Mg > 2   - c/w home medications   - IV Furosemide 20mg x 1 1/14, monitor and will give additional doses depending on UO  - Monitor renal function and electrolytes closely     Hypotension   - Improved     Elevated troponin likely secondary to demand ischemia   - Monitor on telemetry   - levels trended down to normal   - Initial EKG- NSR, no ST elevations.   - Troponin 58.55 > 63.37 > 51.50    Leukocytosis likely 2/2 CLL/PNA  - Monitor closely (has been up to 60 in the past)   - Hematology Dr. Donis following    Macrocytic anemia   - Continue to monitor     Thrombocytopenia   - Continue to monitor     Elevated D-dimer likely 2/2 infectious cause   - D-dimer 637, CTA negative for PE     Prediabetes, Hyperglycemia   - HbA1c 5.8%, prediabetic   - Hyperglycemia also in setting of current steroid use, POCT glucose q6h, cover with low dose ISS  - BGM noted yesterday > 300, today 400s, adjusted to moderate dose ISS - d/c decadron     Severe protein-calorie malnutrition  - Nutrition consult noted     VTE ppx  - Continue Lovenox 30mg subQ q24h    Advanced directives  - DNR/DNI      HCP/Daughter: Kat: [phone] . Yes

## 2023-01-15 NOTE — PROGRESS NOTE ADULT - SUBJECTIVE AND OBJECTIVE BOX
HPI: 89 y/o F with PMH CLL, colon cancer, hypothyroidism, emphysema, PNA, anemia presented with cough for 2 days. Pt was not feeling herself and her daughter was sick and tested positive fof COVID. The pt tested positive Monday night. She reports hoarseness, cough productive of mucus, congestion, constipation. Her O2 at home was 88%. She was vaccinated for COVID x 4.    Subjective: Patient seen and examined today, sitting up in chair, feels well. No overnight issues reported.     REVIEW OF SYSTEMS:    CONSTITUTIONAL: No weakness, fevers or chills  EYES/ENT: No visual changes;  No vertigo or throat pain   NECK: No pain or stiffness  RESPIRATORY: No cough, wheezing, hemoptysis; No shortness of breath  CARDIOVASCULAR: No chest pain or palpitations  GASTROINTESTINAL: No abdominal or epigastric pain. No nausea, vomiting, or hematemesis; No diarrhea or constipation. No melena or hematochezia.  GENITOURINARY: No dysuria, frequency or hematuria  NEUROLOGICAL: No numbness or weakness  SKIN: No itching, rashes    Vital Signs Last 24 Hrs  T(C): 36.7 (15 Marcel 2023 08:13), Max: 36.7 (15 Marcel 2023 08:13)  T(F): 98.1 (15 Marcel 2023 08:13), Max: 98.1 (15 Marcel 2023 08:13)  HR: 85 (15 Marcel 2023 16:13) (77 - 94)  BP: 120/76 (15 Marcel 2023 08:13) (120/68 - 120/76)  RR: 18 (15 Marcel 2023 08:13) (18 - 18)  SpO2: 97% (15 Marcel 2023 08:13) (96% - 97%)    Parameters below as of 15 Marcel 2023 09:05  Patient On (Oxygen Delivery Method): nasal cannula,3 lpm    PHYSICAL EXAM:  GENERAL: NAD, lying in bed comfortably  HEAD:  Atraumatic, Normocephalic  EYES: conjunctiva and sclera clear  ENT: Moist mucous membranes  NECK: Supple, No JVD  CHEST/LUNG: decreased breath sounds bilaterally, +crackles bilaterally; No rales, rhonchi, wheezing, or rubs. Unlabored respirations  HEART: Regular rate and rhythm; No murmurs, rubs, or gallops  ABDOMEN: Bowel sounds present; Soft, Nontender, Nondistended.   EXTREMITIES:  2+ Peripheral Pulses, brisk capillary refill. No clubbing, cyanosis, or edema  NERVOUS SYSTEM:  Alert & Oriented X3, speech clear. No deficits   MSK: FROM all 4 extremities, full and equal strength      MEDICATIONS  (STANDING):  albuterol    0.083% 2.5 milliGRAM(s) Nebulizer every 4 hours  azithromycin  IVPB 500 milliGRAM(s) IV Intermittent every 24 hours  budesonide 160 MICROgram(s)/formoterol 4.5 MICROgram(s) Inhaler 2 Puff(s) Inhalation two times a day  cefTRIAXone Injectable. 1000 milliGRAM(s) IV Push every 24 hours  cholecalciferol 1000 Unit(s) Oral daily  cyanocobalamin 1000 MICROGram(s) Oral daily  dexAMETHasone  Injectable 6 milliGRAM(s) IV Push daily  dextrose 5%. 1000 milliLiter(s) (50 mL/Hr) IV Continuous <Continuous>  dextrose 5%. 1000 milliLiter(s) (100 mL/Hr) IV Continuous <Continuous>  dextrose 50% Injectable 25 Gram(s) IV Push once  dextrose 50% Injectable 12.5 Gram(s) IV Push once  dextrose 50% Injectable 25 Gram(s) IV Push once  enoxaparin Injectable 30 milliGRAM(s) SubCutaneous every 24 hours  furosemide   Injectable 20 milliGRAM(s) IV Push once  glucagon  Injectable 1 milliGRAM(s) IntraMuscular once  insulin lispro (ADMELOG) corrective regimen sliding scale   SubCutaneous every 6 hours  lactobacillus acidophilus 1 Tablet(s) Oral daily  levothyroxine 25 MICROGram(s) Oral daily  pantoprazole    Tablet 40 milliGRAM(s) Oral before breakfast  remdesivir  IVPB   IV Intermittent   remdesivir  IVPB 88 milliGRAM(s) IV Intermittent every 24 hours  tiotropium 2.5 MICROgram(s) Inhaler 2 Puff(s) Inhalation daily    MEDICATIONS  (PRN):  acetaminophen     Tablet .. 650 milliGRAM(s) Oral every 6 hours PRN Temp greater or equal to 38C (100.4F), Mild Pain (1 - 3)  aluminum hydroxide/magnesium hydroxide/simethicone Suspension 30 milliLiter(s) Oral every 4 hours PRN Dyspepsia  benzonatate 100 milliGRAM(s) Oral three times a day PRN Cough  dextrose Oral Gel 15 Gram(s) Oral once PRN Blood Glucose LESS THAN 70 milliGRAM(s)/deciliter  guaifenesin/dextromethorphan Oral Liquid 10 milliLiter(s) Oral every 4 hours PRN Cough  melatonin 3 milliGRAM(s) Oral at bedtime PRN Insomnia  ondansetron Injectable 4 milliGRAM(s) IV Push every 8 hours PRN Nausea and/or Vomiting        LABS:                                     9.1    47.10 )-----------( 160      ( 15 Marcel 2023 08:21 )             29.6   01-15    141  |  109<H>  |  46<H>  ----------------------------<  228<H>  5.1   |  26  |  0.96    Ca    9.1      15 Marcel 2023 08:21    TPro  6.9  /  Alb  2.4<L>  /  TBili  0.3  /  DBili  <0.1  /  AST  20  /  ALT  18  /  AlkPhos  90  01-15         CAPILLARY BLOOD GLUCOSE      POCT Blood Glucose.: 467 mg/dL (15 Marcel 2023 12:04)  POCT Blood Glucose.: 258 mg/dL (15 Marcel 2023 08:12)  POCT Blood Glucose.: 154 mg/dL (15 Marcel 2023 05:42)  POCT Blood Glucose.: 272 mg/dL (14 Jan 2023 23:48)  POCT Blood Glucose.: 367 mg/dL (14 Jan 2023 17:15)            RADIOLOGY:            RADIOLOGY/EKG:  < from: CT Angio Chest PE Protocol w/ IV Cont (01.11.23 @ 19:16) >  ACC: [id number] [facility]                          PROCEDURE DATE:  01/11/2023          INTERPRETATION:  Clinical information: Evaluate for pulmonary embolus.   Exam is compared to previous study of 7/27/2022.    CT angiogram of the chest was obtained following administration of   intravenous contrast. Approximately 90 cc of Omnipaque 350 was   administered and 10 cc was discarded. Coronal, sagittal and MIP images   were submitted for review.    1.7 cm isodense structure is present just to the right of the esophagus   at the level of the thoracic inlet. It has slightly increased in size   when compared to previous exam when it measured approximately 1.2 cm.    No hilar and or mediastinal adenopathy is noted.    Heart is enlarged in size. No pericardial effusion is noted.   Calcification the coronary arteries is noted. Pulmonary arteries are   normal in caliber. No filling defects are noted.    No endobronchial lesions are noted. Mucous/secretions are noted within   few of the distal bronchi in the right lower lobe. Bronchiectasis is   noted within both lungs, more so in the right middle lobe and lingula.   Few ill-defined nodular opacities as well as tree-in-bud opacities are   noted within both lungs, more so in the right lung. Small bilateral   pleural effusions are noted.    Below the diaphragm, visualized portions of the abdomen are unremarkable.    Loss of height of several vertebral bodies is noted.    IMPRESSION: No pulmonary embolus is noted.    Bronchiectasis is noted within both lungs. In addition, few ill-defined   nodular opacities as well as tree-in-bud opacities are noted within both   lungs. Exact etiology of the findings is unclear, however, the   constellation of the above findings suggest possible Mycobacterium avium   complex infection.    < from: Xray Chest 1 View- PORTABLE-Urgent (01.11.23 @ 16:35) >    ACC: [id number] EXAM:  XR CHEST PORTABLE URGENT 1V                          PROCEDURE DATE:  01/11/2023          INTERPRETATION:  INDICATION: Chest pain    COMPARISON: 8/2/2022    FINDINGS:  An AP portable supine radiograph of the chest demonstrates a   reticulonodular pattern throughout both lungs, greatest in the lower   lobes. This has increased from the prior exam. There is no pneumothorax.   There is no definite evidence of pleural effusion. There is no hilar or   mediastinal lesion seen. The cardiac silhouette is likely magnified by   technique, but is at least borderline. The bony thorax remains stable.    IMPRESSION:  1. There is a diffuse bilateral reticulonodular pattern, increased from   the prior exam and greatest in the lower lobes. Infectious or   inflammatory etiologies are favored over CHF. The latter however cannot   be entirely excluded      < from: Xray Chest 1 View- PORTABLE-Routine (Xray Chest 1 View- PORTABLE-Routine in AM.) (01.14.23 @ 09:21) >    clinical history:Covid pneumonia    Stable appearance of the chest with right basilar consolidation. Small   right pleural effusion.    IMPRESSION: Stable right basilar consolidation

## 2023-01-15 NOTE — PROGRESS NOTE ADULT - SUBJECTIVE AND OBJECTIVE BOX
Patient is a 89y old  Female who presents with a chief complaint of Cough (2023 21:34)      HPI:  89 y/o F with PMH CLL, colon cancer, hypothyroidism, emphysema, PNA, anemia presented with cough for 2 days. Pt was not feeling herself and her daughter was sick and tested positive fof COVID. The pt tested positive Monday night. She reports hoarseness, cough productive of mucus, congestion, constipation. Her O2 at home was 88%. She was vaccinated for COVID x 4. Denies fevers, chills, chest pain, abdominal pain, N/V, diarrhea/constipation.   Found to have COVID+   CTA: No pulmonary embolus is noted. Bronchiectasis is noted within both lungs. In addition, few ill-defined nodular opacities as well as tree-in-bud opacities are noted within both lungs. Exact etiology of the findings is unclear, however, the constellation of the above findings suggest possible Mycobacterium avium complex infection.  Pt was given Remdesivir, Decadron.      covering for DR nolasco  seen and examined while in bed  no distress  -1/15  uneventful overnight  data discussed    PAST MEDICAL & SURGICAL HISTORY:  CLL (chronic lymphocytic leukemia)      Colon cancer      Hypothyroidism      History of emphysema      Pneumonia      Anemia      S/P colon resection          PREVIOUS DIAGNOSTIC TESTING:      MEDICATIONS  (PRN):  acetaminophen     Tablet .. 650 milliGRAM(s) Oral every 6 hours PRN Temp greater or equal to 38C (100.4F), Mild Pain (1 - 3)  albuterol    90 MICROgram(s) HFA Inhaler 2 Puff(s) Inhalation every 6 hours PRN Bronchospasm  aluminum hydroxide/magnesium hydroxide/simethicone Suspension 30 milliLiter(s) Oral every 4 hours PRN Dyspepsia  benzonatate 100 milliGRAM(s) Oral three times a day PRN Cough  guaifenesin/dextromethorphan Oral Liquid 10 milliLiter(s) Oral every 4 hours PRN Cough  melatonin 3 milliGRAM(s) Oral at bedtime PRN Insomnia  ondansetron Injectable 4 milliGRAM(s) IV Push every 8 hours PRN Nausea and/or Vomiting      FAMILY HISTORY:  FHx: diabetes mellitus (Mother)    FH: hypertension (Father)        MEDICATIONS  (STANDING):  albuterol    0.083% 2.5 milliGRAM(s) Nebulizer every 4 hours  azithromycin  IVPB 500 milliGRAM(s) IV Intermittent every 24 hours  budesonide 160 MICROgram(s)/formoterol 4.5 MICROgram(s) Inhaler 2 Puff(s) Inhalation two times a day  cefTRIAXone Injectable. 1000 milliGRAM(s) IV Push every 24 hours  cholecalciferol 1000 Unit(s) Oral daily  cyanocobalamin 1000 MICROGram(s) Oral daily  dexAMETHasone  Injectable 6 milliGRAM(s) IV Push daily  dextrose 5%. 1000 milliLiter(s) (50 mL/Hr) IV Continuous <Continuous>  dextrose 5%. 1000 milliLiter(s) (100 mL/Hr) IV Continuous <Continuous>  dextrose 50% Injectable 25 Gram(s) IV Push once  dextrose 50% Injectable 12.5 Gram(s) IV Push once  dextrose 50% Injectable 25 Gram(s) IV Push once  enoxaparin Injectable 30 milliGRAM(s) SubCutaneous every 24 hours  glucagon  Injectable 1 milliGRAM(s) IntraMuscular once  insulin lispro (ADMELOG) corrective regimen sliding scale   SubCutaneous every 6 hours  lactobacillus acidophilus 1 Tablet(s) Oral daily  levothyroxine 25 MICROGram(s) Oral daily  pantoprazole    Tablet 40 milliGRAM(s) Oral before breakfast  remdesivir  IVPB   IV Intermittent   remdesivir  IVPB 88 milliGRAM(s) IV Intermittent every 24 hours  tiotropium 2.5 MICROgram(s) Inhaler 2 Puff(s) Inhalation daily    Vital Signs Last 24 Hrs  T(C): 36.7 (15 Marcel 2023 08:13), Max: 36.7 (15 Marcel 2023 08:13)  T(F): 98.1 (15 Marcel 2023 08:13), Max: 98.1 (15 Marcel 2023 08:13)  HR: 93 (15 Marcel 2023 09:05) (77 - 94)  BP: 120/76 (15 Marcel 2023 08:13) (120/68 - 120/76)  BP(mean): --  RR: 18 (15 Marcel 2023 08:13) (18 - 18)  SpO2: 97% (15 Marcel 2023 08:13) (96% - 97%)    Parameters below as of 15 Marcel 2023 09:05  Patient On (Oxygen Delivery Method): nasal cannula,3 lpm      PHYSICAL EXAM  General Appearance: cooperative, no acute distress,   HEENT: PERRL, conjunctiva clear, EOM's intact, non injected pharynx, no exudate, TM   normal  Neck: Supple, , no adenopathy, thyroid: not enlarged, no carotid bruit or JVD  Back: Symmetric, no  tenderness,no soft tissue tenderness  Lungs: coarse sai, +Wheezing in the LLL and RLL on exhalation , improved  Heart: Regular rate and rhythm, S1, S2 normal, no murmur, rub or gallop  Abdomen: Soft, non-tender, bowel sounds active , no hepatosplenomegaly  Extremities: no cyanosis or edema, no joint swelling  Skin: Skin color, texture normal, no rashes   Neurologic: Alert and oriented X3 , cranial nerves intact, sensory and motor normal,    ECG:    LABS:                                   9.1    47.10 )-----------( 160      ( 15 Marcel 2023 08:21 )             29.6   15    141  |  109<H>  |  46<H>  ----------------------------<  228<H>  5.1   |  26  |  0.96    Ca    9.1      15 Marcel 2023 08:21    TPro  6.9  /  Alb  2.4<L>  /  TBili  0.3  /  DBili  <0.1  /  AST  20  /  ALT  18  /  AlkPhos  90  01-15               10.0   36.67 )-----------( 120      ( 2023 15:29 )             31.3     01-11    135  |  101  |  45<H>  ----------------------------<  172<H>  4.6   |  29  |  1.12    Ca    9.6      2023 15:29  Mg     2.2         TPro  7.4  /  Alb  2.9<L>  /  TBili  0.9  /  DBili  x   /  AST  15  /  ALT  11<L>  /  AlkPhos  96            Pro BNP  950 - @ 15:29  D Dimer  637 11 @ 15:29    PT/INR - ( 2023 15:29 )   PT: 14.0 sec;   INR: 1.20 ratio           Urinalysis Basic - ( 2023 04:55 )    Color: Yellow / Appearance: Clear / S.010 / pH: x  Gluc: x / Ketone: Negative  / Bili: Negative / Urobili: Negative   Blood: x / Protein: Trace mg/dL / Nitrite: Negative   Leuk Esterase: Negative / RBC: 0-2 /HPF / WBC 0-2 /HPF   Sq Epi: x / Non Sq Epi: Few / Bacteria: Few    < from: CT Angio Chest PE Protocol w/ IV Cont (23 @ 19:16) >    1.7 cm isodense structure is present just to the right of the esophagus   at the level of the thoracic inlet. It has slightly increased in size   when compared to previous exam when it measured approximately 1.2 cm.    No hilar and or mediastinal adenopathy is noted.    Heart is enlarged in size. No pericardial effusion is noted.   Calcification the coronary arteries is noted. Pulmonary arteries are   normal in caliber. No filling defects are noted.    No endobronchial lesions are noted. Mucous/secretions are noted within   few of the distal bronchi in the right lower lobe. Bronchiectasis is   noted within both lungs, more so in the right middle lobe and lingula.   Few ill-defined nodular opacities as well as tree-in-bud opacities are   noted within both lungs, more so in the right lung. Small bilateral   pleural effusions are noted.    Below the diaphragm, visualized portions of the abdomen are unremarkable.    Loss of height of several vertebral bodies is noted.    IMPRESSION: No pulmonary embolus is noted.    Bronchiectasis is noted within both lungs. In addition, few ill-defined   nodular opacities as well as tree-in-bud opacities are noted within both   lungs. Exact etiology of the findings is unclear, however, the   constellation of the above findings suggest possible Mycobacterium avium   complex infection.    < end of copied text >          RADIOLOGY & ADDITIONAL STUDIES:

## 2023-01-16 DIAGNOSIS — I48.0 PAROXYSMAL ATRIAL FIBRILLATION: ICD-10-CM

## 2023-01-16 DIAGNOSIS — R06.00 DYSPNEA, UNSPECIFIED: ICD-10-CM

## 2023-01-16 LAB
ADD ON TEST-SPECIMEN IN LAB: SIGNIFICANT CHANGE UP
ALBUMIN SERPL ELPH-MCNC: 2.2 G/DL — LOW (ref 3.3–5)
ALP SERPL-CCNC: 76 U/L — SIGNIFICANT CHANGE UP (ref 40–120)
ALT FLD-CCNC: 17 U/L — SIGNIFICANT CHANGE UP (ref 12–78)
AST SERPL-CCNC: 18 U/L — SIGNIFICANT CHANGE UP (ref 15–37)
BASOPHILS # BLD AUTO: 0 K/UL — SIGNIFICANT CHANGE UP (ref 0–0.2)
BASOPHILS NFR BLD AUTO: 0 % — SIGNIFICANT CHANGE UP (ref 0–2)
BILIRUB DIRECT SERPL-MCNC: 0.1 MG/DL — SIGNIFICANT CHANGE UP (ref 0–0.3)
BILIRUB INDIRECT FLD-MCNC: 0.2 MG/DL — SIGNIFICANT CHANGE UP (ref 0.2–1)
BILIRUB SERPL-MCNC: 0.3 MG/DL — SIGNIFICANT CHANGE UP (ref 0.2–1.2)
CREAT SERPL-MCNC: 0.92 MG/DL — SIGNIFICANT CHANGE UP (ref 0.5–1.3)
CRP SERPL-MCNC: 46 MG/L — HIGH
D DIMER BLD IA.RAPID-MCNC: 698 NG/ML DDU — HIGH
EGFR: 57 ML/MIN/1.73M2 — LOW
EOSINOPHIL # BLD AUTO: 0 K/UL — SIGNIFICANT CHANGE UP (ref 0–0.5)
EOSINOPHIL NFR BLD AUTO: 0 % — SIGNIFICANT CHANGE UP (ref 0–6)
FERRITIN SERPL-MCNC: 745 NG/ML — HIGH (ref 15–150)
HCT VFR BLD CALC: 25.4 % — LOW (ref 34.5–45)
HCT VFR BLD CALC: 31.3 % — LOW (ref 34.5–45)
HGB BLD-MCNC: 10 G/DL — LOW (ref 11.5–15.5)
HGB BLD-MCNC: 7.6 G/DL — LOW (ref 11.5–15.5)
IGA FLD-MCNC: 49 MG/DL — LOW (ref 84–499)
IGG FLD-MCNC: 1455 MG/DL — SIGNIFICANT CHANGE UP (ref 610–1660)
IGM SERPL-MCNC: 25 MG/DL — LOW (ref 35–242)
LYMPHOCYTES # BLD AUTO: 44.95 K/UL — HIGH (ref 1–3.3)
LYMPHOCYTES # BLD AUTO: 91 % — HIGH (ref 13–44)
MCHC RBC-ENTMCNC: 29.9 GM/DL — LOW (ref 32–36)
MCHC RBC-ENTMCNC: 31.9 GM/DL — LOW (ref 32–36)
MCHC RBC-ENTMCNC: 32.2 PG — SIGNIFICANT CHANGE UP (ref 27–34)
MCHC RBC-ENTMCNC: 33.1 PG — SIGNIFICANT CHANGE UP (ref 27–34)
MCV RBC AUTO: 103.6 FL — HIGH (ref 80–100)
MCV RBC AUTO: 107.6 FL — HIGH (ref 80–100)
MONOCYTES # BLD AUTO: 0.49 K/UL — SIGNIFICANT CHANGE UP (ref 0–0.9)
MONOCYTES NFR BLD AUTO: 1 % — LOW (ref 2–14)
NEUTROPHILS # BLD AUTO: 3.95 K/UL — SIGNIFICANT CHANGE UP (ref 1.8–7.4)
NEUTROPHILS NFR BLD AUTO: 8 % — LOW (ref 43–77)
NRBC # BLD: SIGNIFICANT CHANGE UP /100 WBCS (ref 0–0)
PLATELET # BLD AUTO: 168 K/UL — SIGNIFICANT CHANGE UP (ref 150–400)
PLATELET # BLD AUTO: 172 K/UL — SIGNIFICANT CHANGE UP (ref 150–400)
PROT SERPL-MCNC: 6 GM/DL — SIGNIFICANT CHANGE UP (ref 6–8.3)
RBC # BLD: 2.36 M/UL — LOW (ref 3.8–5.2)
RBC # BLD: 3.02 M/UL — LOW (ref 3.8–5.2)
RBC # FLD: 16.1 % — HIGH (ref 10.3–14.5)
RBC # FLD: 17.4 % — HIGH (ref 10.3–14.5)
WBC # BLD: 49.4 K/UL — CRITICAL HIGH (ref 3.8–10.5)
WBC # BLD: 49.77 K/UL — CRITICAL HIGH (ref 3.8–10.5)
WBC # FLD AUTO: 49.4 K/UL — CRITICAL HIGH (ref 3.8–10.5)
WBC # FLD AUTO: 49.77 K/UL — CRITICAL HIGH (ref 3.8–10.5)

## 2023-01-16 PROCEDURE — 93010 ELECTROCARDIOGRAM REPORT: CPT

## 2023-01-16 PROCEDURE — 99233 SBSQ HOSP IP/OBS HIGH 50: CPT | Mod: GC

## 2023-01-16 PROCEDURE — 99223 1ST HOSP IP/OBS HIGH 75: CPT

## 2023-01-16 RX ORDER — SODIUM CHLORIDE 9 MG/ML
500 INJECTION INTRAMUSCULAR; INTRAVENOUS; SUBCUTANEOUS ONCE
Refills: 0 | Status: COMPLETED | OUTPATIENT
Start: 2023-01-16 | End: 2023-01-16

## 2023-01-16 RX ORDER — PANTOPRAZOLE SODIUM 20 MG/1
40 TABLET, DELAYED RELEASE ORAL EVERY 12 HOURS
Refills: 0 | Status: DISCONTINUED | OUTPATIENT
Start: 2023-01-16 | End: 2023-01-19

## 2023-01-16 RX ADMIN — Medication 25 MICROGRAM(S): at 05:57

## 2023-01-16 RX ADMIN — PANTOPRAZOLE SODIUM 40 MILLIGRAM(S): 20 TABLET, DELAYED RELEASE ORAL at 05:57

## 2023-01-16 RX ADMIN — REMDESIVIR 200 MILLIGRAM(S): 5 INJECTION INTRAVENOUS at 09:15

## 2023-01-16 RX ADMIN — PANTOPRAZOLE SODIUM 40 MILLIGRAM(S): 20 TABLET, DELAYED RELEASE ORAL at 10:42

## 2023-01-16 RX ADMIN — BUDESONIDE AND FORMOTEROL FUMARATE DIHYDRATE 2 PUFF(S): 160; 4.5 AEROSOL RESPIRATORY (INHALATION) at 21:12

## 2023-01-16 RX ADMIN — ALBUTEROL 2.5 MILLIGRAM(S): 90 AEROSOL, METERED ORAL at 11:38

## 2023-01-16 RX ADMIN — BUDESONIDE AND FORMOTEROL FUMARATE DIHYDRATE 2 PUFF(S): 160; 4.5 AEROSOL RESPIRATORY (INHALATION) at 11:38

## 2023-01-16 RX ADMIN — SODIUM CHLORIDE 500 MILLILITER(S): 9 INJECTION INTRAMUSCULAR; INTRAVENOUS; SUBCUTANEOUS at 09:15

## 2023-01-16 RX ADMIN — ENOXAPARIN SODIUM 35 MILLIGRAM(S): 100 INJECTION SUBCUTANEOUS at 05:56

## 2023-01-16 RX ADMIN — TIOTROPIUM BROMIDE 2 PUFF(S): 18 CAPSULE ORAL; RESPIRATORY (INHALATION) at 11:39

## 2023-01-16 RX ADMIN — PANTOPRAZOLE SODIUM 40 MILLIGRAM(S): 20 TABLET, DELAYED RELEASE ORAL at 22:48

## 2023-01-16 RX ADMIN — Medication 12.5 MILLIGRAM(S): at 22:48

## 2023-01-16 RX ADMIN — ALBUTEROL 2.5 MILLIGRAM(S): 90 AEROSOL, METERED ORAL at 15:43

## 2023-01-16 RX ADMIN — ALBUTEROL 2.5 MILLIGRAM(S): 90 AEROSOL, METERED ORAL at 21:11

## 2023-01-16 NOTE — PROGRESS NOTE ADULT - SUBJECTIVE AND OBJECTIVE BOX
Patient is a 89y old  Female who presents with a chief complaint of Cough (2023 21:34)      HPI:  89 y/o F with PMH CLL, colon cancer, hypothyroidism, emphysema, PNA, anemia presented with cough for 2 days. Pt was not feeling herself and her daughter was sick and tested positive fof COVID. The pt tested positive Monday night. She reports hoarseness, cough productive of mucus, congestion, constipation. Her O2 at home was 88%. She was vaccinated for COVID x 4. Denies fevers, chills, chest pain, abdominal pain, N/V, diarrhea/constipation.   Found to have COVID+   CTA: No pulmonary embolus is noted. Bronchiectasis is noted within both lungs. In addition, few ill-defined nodular opacities as well as tree-in-bud opacities are noted within both lungs. Exact etiology of the findings is unclear, however, the constellation of the above findings suggest possible Mycobacterium avium complex infection.  Pt was given Remdesivir, Decadron.      covering for DR nolasco  seen and examined while in bed  no distress  -1/15  uneventful overnight  data discussed    seen and examined while in bed  weak and confused  steroids discontinued    PAST MEDICAL & SURGICAL HISTORY:  CLL (chronic lymphocytic leukemia)      Colon cancer      Hypothyroidism      History of emphysema      Pneumonia      Anemia      S/P colon resection        MEDICATIONS  (STANDING):  albuterol    0.083% 2.5 milliGRAM(s) Nebulizer every 4 hours  budesonide 160 MICROgram(s)/formoterol 4.5 MICROgram(s) Inhaler 2 Puff(s) Inhalation two times a day  cholecalciferol 1000 Unit(s) Oral daily  cyanocobalamin 1000 MICROGram(s) Oral daily  dextrose 5%. 1000 milliLiter(s) (50 mL/Hr) IV Continuous <Continuous>  dextrose 5%. 1000 milliLiter(s) (100 mL/Hr) IV Continuous <Continuous>  dextrose 50% Injectable 25 Gram(s) IV Push once  dextrose 50% Injectable 12.5 Gram(s) IV Push once  dextrose 50% Injectable 25 Gram(s) IV Push once  enoxaparin Injectable 35 milliGRAM(s) SubCutaneous every 24 hours  glucagon  Injectable 1 milliGRAM(s) IntraMuscular once  insulin lispro (ADMELOG) corrective regimen sliding scale   SubCutaneous every 6 hours  lactobacillus acidophilus 1 Tablet(s) Oral daily  levothyroxine 25 MICROGram(s) Oral daily  metoprolol tartrate 12.5 milliGRAM(s) Oral two times a day  pantoprazole    Tablet 40 milliGRAM(s) Oral before breakfast  remdesivir  IVPB   IV Intermittent   remdesivir  IVPB 88 milliGRAM(s) IV Intermittent every 24 hours  tiotropium 2.5 MICROgram(s) Inhaler 2 Puff(s) Inhalation daily    FAMILY HISTORY:  FHx: diabetes mellitus (Mother)    FH: hypertension (Father)        Vital Signs Last 24 Hrs  T(C): 36.8 (2023 05:02), Max: 36.8 (15 Marcel 2023 21:45)  T(F): 98.2 (2023 05:02), Max: 98.2 (15 Marcel 2023 21:45)  HR: 75 (2023 06:00) (72 - 161)  BP: 141/82 (2023 05:02) (107/66 - 141/82)  BP(mean): 80 (15 Marcel 2023 21:45) (80 - 80)  RR: 17 (2023 06:00) (16 - 18)  SpO2: 95% (2023 06:00) (93% - 99%)    Parameters below as of 2023 06:00  Patient On (Oxygen Delivery Method): nasal cannula  O2 Flow (L/min): 2        PHYSICAL EXAM  General Appearance: cooperative, no acute distress,   HEENT: PERRL, conjunctiva clear, EOM's intact, non injected pharynx, no exudate, TM   normal  Neck: Supple, , no adenopathy, thyroid: not enlarged, no carotid bruit or JVD  Back: Symmetric, no  tenderness,no soft tissue tenderness  Lungs: coarse sai, +Wheezing in the LLL and RLL on exhalation , improved  Heart: Regular rate and rhythm, S1, S2 normal, no murmur, rub or gallop  Abdomen: Soft, non-tender, bowel sounds active , no hepatosplenomegaly  Extremities: no cyanosis or edema, no joint swelling  Skin: Skin color, texture normal, no rashes   Neurologic: Alert and oriented X3 , cranial nerves intact, sensory and motor normal,    ECG:    LABS:                        7.6    x     )-----------( 172      ( 2023 07:14 )             25.4   01-16    x   |  x   |  x   ----------------------------<  x   x    |  x   |  0.92    Ca    9.2      15 Marcel 2023 18:32  Phos  2.7     -15  Mg     2.1     01-15    TPro  6.0  /  Alb  2.2<L>  /  TBili  0.3  /  DBili  0.1  /  AST  18  /  ALT  17  /  AlkPhos  76                                     9.1    47.10 )-----------( 160      ( 15 Marcel 2023 08:21 )             29.6   0115    141  |  109<H>  |  46<H>  ----------------------------<  228<H>  5.1   |  26  |  0.96    Ca    9.1      15 Marcel 2023 08:21    TPro  6.9  /  Alb  2.4<L>  /  TBili  0.3  /  DBili  <0.1  /  AST  20  /  ALT  18  /  AlkPhos  90  01-15               10.0   36.67 )-----------( 120      ( 2023 15:29 )             31.3     01-11    135  |  101  |  45<H>  ----------------------------<  172<H>  4.6   |  29  |  1.12    Ca    9.6      2023 15:29  Mg     2.2         TPro  7.4  /  Alb  2.9<L>  /  TBili  0.9  /  DBili  x   /  AST  15  /  ALT  11<L>  /  AlkPhos  96  -          Pro BNP  950  @ 15:29  D Dimer  637  @ 15:29    PT/INR - ( 2023 15:29 )   PT: 14.0 sec;   INR: 1.20 ratio           Urinalysis Basic - ( 2023 04:55 )    Color: Yellow / Appearance: Clear / S.010 / pH: x  Gluc: x / Ketone: Negative  / Bili: Negative / Urobili: Negative   Blood: x / Protein: Trace mg/dL / Nitrite: Negative   Leuk Esterase: Negative / RBC: 0-2 /HPF / WBC 0-2 /HPF   Sq Epi: x / Non Sq Epi: Few / Bacteria: Few    < from: CT Angio Chest PE Protocol w/ IV Cont (23 @ 19:16) >    1.7 cm isodense structure is present just to the right of the esophagus   at the level of the thoracic inlet. It has slightly increased in size   when compared to previous exam when it measured approximately 1.2 cm.    No hilar and or mediastinal adenopathy is noted.    Heart is enlarged in size. No pericardial effusion is noted.   Calcification the coronary arteries is noted. Pulmonary arteries are   normal in caliber. No filling defects are noted.    No endobronchial lesions are noted. Mucous/secretions are noted within   few of the distal bronchi in the right lower lobe. Bronchiectasis is   noted within both lungs, more so in the right middle lobe and lingula.   Few ill-defined nodular opacities as well as tree-in-bud opacities are   noted within both lungs, more so in the right lung. Small bilateral   pleural effusions are noted.    Below the diaphragm, visualized portions of the abdomen are unremarkable.    Loss of height of several vertebral bodies is noted.    IMPRESSION: No pulmonary embolus is noted.    Bronchiectasis is noted within both lungs. In addition, few ill-defined   nodular opacities as well as tree-in-bud opacities are noted within both   lungs. Exact etiology of the findings is unclear, however, the   constellation of the above findings suggest possible Mycobacterium avium   complex infection.    < end of copied text >          RADIOLOGY & ADDITIONAL STUDIES:

## 2023-01-16 NOTE — PROGRESS NOTE ADULT - ASSESSMENT
1) COVID  2) Aspiration  3) Bronchiectasis  4) Dyspnea  5) Abnormal CXR    89 y/o F with PMH CLL, colon cancer, hypothyroidism, emphysema, PNA, anemia presented with cough for 2 days. Pt was not feeling herself and her daughter was sick and tested positive fof COVID. The pt tested positive Monday night. She reports hoarseness, cough productive of mucus, congestion, constipation. Her O2 at home was 88%. She was vaccinated for COVID x 4. Denies fevers, chills, chest pain, abdominal pain, N/V, diarrhea/constipation.   Found to have COVID+   CTA: No pulmonary embolus is noted. Bronchiectasis is noted within both lungs. In addition, few ill-defined nodular opacities as well as tree-in-bud opacities are noted within both lungs. Exact etiology of the findings is unclear, however, the constellation of the above findings suggest possible Mycobacterium avium complex infection.  Pt was given Remdesivir, Decadron.  Agree with current management  She is in no acute distress  O2 requirements are not increasing / stable  Will continue to monitor  1/16  seen and examined while in bed  weak and confused  steroids discontinued    fu HCT and labs with drop in HCT noted  workup as per primary team  Dc remedesevir after 5 doses  serial cbc and workup per hematology    overall prognosis remains guarded

## 2023-01-16 NOTE — CONSULT NOTE ADULT - PROBLEM SELECTOR RECOMMENDATION 2
Probably secondary to pulmonary/Covid process. Would not diurese. Continue current treatment. Repeat CXR.

## 2023-01-16 NOTE — PROGRESS NOTE ADULT - ASSESSMENT
89 yo female with history of stage III LEFT sided COLON CA as well as CLL and PARMJIT now admitted for COVID     COVID   - remains on Dex and Remdesivir   - patient also on empiric coverage for CAP   - meets criteria for home O2 as O2 sat dropped to <88% on ambulation     CLL   - no history of hypogamma - added on immunoglobulin panel to see if qualifies for IVIG  - would continue following serial CBCs - WBC 49.4, Hb 7.6, plt 172 today  - to f/u with Dr. Sawant on dc     COLON CA   - no hx of adjuvnat therapy   - stable no evidence of recurrence      Iron deficiency   - resolved - Hb 7.6 today   - edwin has received routine q two month outpatient labs via [place] as patient resides [facility] in [facility]       to follow up with dr sawant outpatient  89 yo female with history of stage III LEFT sided COLON CA as well as CLL and PARMJIT now admitted for COVID     COVID   - completed Dex and Remdesivir   - s/p coverage for CAP as well   - meets criteria for home O2 as O2 sat dropped to <88% on ambulation   - pt lethargic and hypotensive this am - receiving 500 cc bolus currently   - was in afib overnight, c/w tele      CLL   - no history of hypogamma - added on immunoglobulin panel to see if qualifies for IVIG  - WBC 49.4, Hb 7.6, plt 172 today   - pall care recommended     COLON CA   - no hx of adjuvant therapy   - stable no evidence of recurrence      Iron deficiency   - Hb 7.6 today from 9.3- no sign of bleeding   - patient has received routine q two month outpatient labs via [facility] as patient resides [facility] in [place]     pall care discussion with family

## 2023-01-16 NOTE — CONSULT NOTE ADULT - PROBLEM SELECTOR RECOMMENDATION 9
Pt is now in NSR. Recorded PAF ~45 min. Would continue metoprolol as prescribed. Echo to evaluate LV fx. Monitor labs and blood pressure. Would not anticoagulate at this time.

## 2023-01-16 NOTE — CONSULT NOTE ADULT - SUBJECTIVE AND OBJECTIVE BOX
PCP:    REQUESTING PHYSICIAN:    REASON FOR CONSULT:    CHIEF COMPLAINT:    HPI:  89 y/o F with PMH CLL, colon cancer, hypothyroidism, emphysema, PNA, anemia presented with cough for 2 days. Pt was not feeling herself and her daughter was sick and tested positive fof COVID. The pt tested positive Monday night. She reports hoarseness, cough productive of mucus, congestion, constipation. Her O2 at home was 88%. She was vaccinated for COVID x 4. Denies fevers, chills, chest pain, abdominal pain, N/V, diarrhea/constipation.     Prior admission:   - 8/2/22: low BP, SOB -> Hb 5.7 -> s/p 2 units PRBCs     ER course: BP 94/49, RR 22, SpO2 86% -> 97% on 3L nasal cannula. Labs: WBC 36.67, Hb 10 (baseline ~9), , , neutrophils 84%, D-dimer 637, INR 1.20, troponin 58.55 -> 63.37, glucose 172, albumin 2.9, . COVID 19 positive. EKG: NSR with HR 76 bpm, normal intervals, no ST segment changes, T wave inversions in V3 and V4 (personally reviewed).     Imaging:   - CXR: increased opacities bilaterally, consolidation RLL, no pneumothorax, no effusion (personally reviewed). -> official read: 1. There is a diffuse bilateral reticulonodular pattern, increased from the prior exam and greatest in the lower lobes. Infectious or inflammatory etiologies are favored over CHF. The latter however cannot be entirely excluded.  - CTA: No pulmonary embolus is noted. Bronchiectasis is noted within both lungs. In addition, few ill-defined nodular opacities as well as tree-in-bud opacities are noted within both lungs. Exact etiology of the findings is unclear, however, the constellation of the above findings suggest possible Mycobacterium avium complex infection.    Pt was given Remdesivir, Decadron. She is being admitted to med/surg for further management.  (11 Jan 2023 21:34) Cardiology was called to evaluate above symptoms and to evaluate episode of atrial fibrillation. Pt is sleeping but arouses with effort. She is unable to provide a history.       PAST MEDICAL & SURGICAL HISTORY:  CLL (chronic lymphocytic leukemia)      Colon cancer      Hypothyroidism      History of emphysema      Pneumonia      Anemia      S/P colon resection          Allergies    cefdinir (Diarrhea)    Intolerances        SOCIAL HISTORY:    FAMILY HISTORY:  FHx: diabetes mellitus (Mother)    FH: hypertension (Father)        MEDICATIONS:  MEDICATIONS  (STANDING):  albuterol    0.083% 2.5 milliGRAM(s) Nebulizer every 4 hours  budesonide 160 MICROgram(s)/formoterol 4.5 MICROgram(s) Inhaler 2 Puff(s) Inhalation two times a day  cholecalciferol 1000 Unit(s) Oral daily  cyanocobalamin 1000 MICROGram(s) Oral daily  dextrose 5%. 1000 milliLiter(s) (50 mL/Hr) IV Continuous <Continuous>  dextrose 5%. 1000 milliLiter(s) (100 mL/Hr) IV Continuous <Continuous>  dextrose 50% Injectable 25 Gram(s) IV Push once  dextrose 50% Injectable 12.5 Gram(s) IV Push once  dextrose 50% Injectable 25 Gram(s) IV Push once  glucagon  Injectable 1 milliGRAM(s) IntraMuscular once  lactobacillus acidophilus 1 Tablet(s) Oral daily  levothyroxine 25 MICROGram(s) Oral daily  metoprolol tartrate 12.5 milliGRAM(s) Oral two times a day  pantoprazole  Injectable 40 milliGRAM(s) IV Push every 12 hours  tiotropium 2.5 MICROgram(s) Inhaler 2 Puff(s) Inhalation daily    MEDICATIONS  (PRN):  acetaminophen     Tablet .. 650 milliGRAM(s) Oral every 6 hours PRN Temp greater or equal to 38C (100.4F), Mild Pain (1 - 3)  aluminum hydroxide/magnesium hydroxide/simethicone Suspension 30 milliLiter(s) Oral every 4 hours PRN Dyspepsia  benzonatate 100 milliGRAM(s) Oral three times a day PRN Cough  dextrose Oral Gel 15 Gram(s) Oral once PRN Blood Glucose LESS THAN 70 milliGRAM(s)/deciliter  guaifenesin/dextromethorphan Oral Liquid 10 milliLiter(s) Oral every 4 hours PRN Cough  melatonin 3 milliGRAM(s) Oral at bedtime PRN Insomnia  ondansetron Injectable 4 milliGRAM(s) IV Push every 8 hours PRN Nausea and/or Vomiting      REVIEW OF SYSTEMS: unable to provide a history      Vital Signs Last 24 Hrs  T(C): 36.7 (16 Jan 2023 12:27), Max: 36.8 (15 Marcel 2023 21:45)  T(F): 98 (16 Jan 2023 12:27), Max: 98.2 (15 Marcel 2023 21:45)  HR: 62 (16 Jan 2023 12:27) (61 - 161)  BP: 92/54 (16 Jan 2023 12:27) (87/43 - 141/82)  BP(mean): 80 (15 Marcel 2023 21:45) (80 - 80)  RR: 18 (16 Jan 2023 12:27) (16 - 18)  SpO2: 97% (16 Jan 2023 12:27) (93% - 99%)    Parameters below as of 16 Jan 2023 12:27  Patient On (Oxygen Delivery Method): room air        I&O's Summary      PHYSICAL EXAM:    Constitutional: frail and minimally responsive  HEENT: PERR, EOMI,  No oral cyananosis.  Neck:  supple,  No JVD  Respiratory: Breath sounds crackles bases  Cardiovascular: S1 and S2, regular rate and rhythm, no Murmurs, gallops or rubs  Gastrointestinal: Bowel Sounds present, soft, nontender.   Extremities: No peripheral edema. No clubbing or cyanosis.  Vascular: 2+ peripheral pulses  Skin: No rashes. Ecchymoses      LABS: All Labs Reviewed:                        7.6    49.40 )-----------( 172      ( 16 Jan 2023 07:14 )             25.4                         9.3    73.26 )-----------( 224      ( 15 Marcel 2023 18:32 )             30.3                         9.1    47.10 )-----------( 160      ( 15 Marcel 2023 08:21 )             29.6     16 Jan 2023 07:14    x      |  x      |  x      ----------------------------<  x      x       |  x      |  0.92   15 Marcel 2023 18:32    142    |  108    |  58     ----------------------------<  254    4.3     |  26     |  1.13   15 Marcel 2023 08:21    141    |  109    |  46     ----------------------------<  228    5.1     |  26     |  0.96     Ca    9.2        15 Jan 2023 18:32  Ca    9.1        15 Jan 2023 08:21  Ca    8.8        14 Jan 2023 07:34  Phos  2.7       15 Jan 2023 18:32  Mg     2.1       15 Jan 2023 18:32    TPro  6.0    /  Alb  2.2    /  TBili  0.3    /  DBili  0.1    /  AST  18     /  ALT  17     /  AlkPhos  76     16 Jan 2023 07:14  TPro  6.9    /  Alb  2.4    /  TBili  0.3    /  DBili  <0.1   /  AST  20     /  ALT  18     /  AlkPhos  90     15 Jan 2023 08:21  TPro  6.4    /  Alb  2.3    /  TBili  0.2    /  DBili  <0.1   /  AST  16     /  ALT  14     /  AlkPhos  79     14 Jan 2023 07:34          Blood Culture: Organism --  Gram Stain Blood -- Gram Stain --  Specimen Source Clean Catch Clean Catch (Midstream)  Culture-Blood --    Organism --  Gram Stain Blood -- Gram Stain --  Specimen Source .Blood None  Culture-Blood --      01-14 @ 07:34  Pro Bnp 1941        RADIOLOGY/EKG:< from: 12 Lead ECG (01.11.23 @ 14:35) >  Diagnosis Line Normal sinus rhythm  Possible Left atrial enlargement  T wave abnormality, consider anterolateral ischemia  Abnormal ECG  Confirmed by LAURIE CERON, ALEKS ([id number]) on 1/12/2023 7:44:23 AM    < end of copied text >

## 2023-01-16 NOTE — PROGRESS NOTE ADULT - SUBJECTIVE AND OBJECTIVE BOX
PA STUDENT MEDICINE PROGRESS NOTE     89 y/o F with PMH CLL, colon cancer, hypothyroidism, COPD, PNA, anemia presented with cough/SOB for 2 days after testing COVID+ at home and at urgent care. Symptoms reported limited to cough and mild SOB, improved with oxygen administration. Seen with Dr. Medel at bedside this AM; daughter Kat at bedside. Patient responses limited due to lethargic nature.     REVIEW OF SYSTEMS:  CONSTITUTIONAL: No weakness, fevers or chills  EYES/ENT: No visual changes;  No vertigo or throat pain   NECK: No pain or stiffness  RESPIRATORY: No cough, wheezing, hemoptysis; No shortness of breath  CARDIOVASCULAR: No chest pain or palpitations  GASTROINTESTINAL: No abdominal or epigastric pain. No nausea, vomiting; No diarrhea or constipation.   GENITOURINARY: No dysuria, frequency or hematuria  NEUROLOGICAL: No numbness or weakness  SKIN: No itching, burning, rashes, or lesions     PHYSICAL EXAM:  Vital Signs Last 24 Hrs  T(C): 36.6 (16 Jan 2023 08:55), Max: 36.8 (15 Marcel 2023 21:45)  T(F): 97.8 (16 Jan 2023 08:55), Max: 98.2 (15 Marcel 2023 21:45)  HR: 62 (16 Jan 2023 08:55) (62 - 161)  BP: 87/43 (16 Jan 2023 08:55) (87/43 - 141/82)  BP(mean): 80 (15 Marcel 2023 21:45) (80 - 80)  RR: 16 (16 Jan 2023 08:55) (16 - 18)  SpO2: 96% (16 Jan 2023 08:55) (93% - 99%)    Parameters below as of 16 Jan 2023 08:55  Patient On (Oxygen Delivery Method): room air        Constitutional: Pt lying in bed, awake and alert, NAD  HEENT: EOMI, normal hearing, moist mucous membranes  Neck: Soft and supple, no JVD  Respiratory: Mild lower lobe crackles B/L, no wheezing  Cardiovascular: S1S2+, RRR, no M/G/R  Gastrointestinal: BS+, soft, NT/ND, no guarding, no rebound  Extremities: No peripheral edema  Vascular: 2+ peripheral pulses  Neurological: AAOx3, no focal deficits  Musculoskeletal: 5/5 strength b/l upper and lower extremities  Skin: No rashes    MEDICATIONS:  MEDICATIONS  (STANDING):  albuterol    0.083% 2.5 milliGRAM(s) Nebulizer every 4 hours  budesonide 160 MICROgram(s)/formoterol 4.5 MICROgram(s) Inhaler 2 Puff(s) Inhalation two times a day  cholecalciferol 1000 Unit(s) Oral daily  cyanocobalamin 1000 MICROGram(s) Oral daily  dextrose 5%. 1000 milliLiter(s) (50 mL/Hr) IV Continuous <Continuous>  dextrose 5%. 1000 milliLiter(s) (100 mL/Hr) IV Continuous <Continuous>  dextrose 50% Injectable 25 Gram(s) IV Push once  dextrose 50% Injectable 12.5 Gram(s) IV Push once  dextrose 50% Injectable 25 Gram(s) IV Push once  glucagon  Injectable 1 milliGRAM(s) IntraMuscular once  lactobacillus acidophilus 1 Tablet(s) Oral daily  levothyroxine 25 MICROGram(s) Oral daily  metoprolol tartrate 12.5 milliGRAM(s) Oral two times a day  pantoprazole  Injectable 40 milliGRAM(s) IV Push every 12 hours  tiotropium 2.5 MICROgram(s) Inhaler 2 Puff(s) Inhalation daily    MEDICATIONS  (PRN):  acetaminophen     Tablet .. 650 milliGRAM(s) Oral every 6 hours PRN Temp greater or equal to 38C (100.4F), Mild Pain (1 - 3)  aluminum hydroxide/magnesium hydroxide/simethicone Suspension 30 milliLiter(s) Oral every 4 hours PRN Dyspepsia  benzonatate 100 milliGRAM(s) Oral three times a day PRN Cough  dextrose Oral Gel 15 Gram(s) Oral once PRN Blood Glucose LESS THAN 70 milliGRAM(s)/deciliter  guaifenesin/dextromethorphan Oral Liquid 10 milliLiter(s) Oral every 4 hours PRN Cough  melatonin 3 milliGRAM(s) Oral at bedtime PRN Insomnia  ondansetron Injectable 4 milliGRAM(s) IV Push every 8 hours PRN Nausea and/or Vomiting      LABS: All Labs Reviewed:                        7.6    49.40 )-----------( 172      ( 16 Jan 2023 07:14 )             25.4     01-16    x   |  x   |  x   ----------------------------<  x   x    |  x   |  0.92    Ca    9.2      15 Marcel 2023 18:32  Phos  2.7     01-15  Mg     2.1     01-15    TPro  6.0  /  Alb  2.2<L>  /  TBili  0.3  /  DBili  0.1  /  AST  18  /  ALT  17  /  AlkPhos  76  01-16          Blood Culture: 01-12 @ 04:55  Organism --  Gram Stain Blood -- Gram Stain --  Specimen Source Clean Catch Clean Catch (Midstream)  Culture-Blood --    01-11 @ 23:04  Organism --  Gram Stain Blood -- Gram Stain --  Specimen Source .Blood None  Culture-Blood --      I&O's Summary    CAPILLARY BLOOD GLUCOSE      POCT Blood Glucose.: 121 mg/dL (16 Jan 2023 08:55)  POCT Blood Glucose.: 150 mg/dL (15 Marcel 2023 23:44)  POCT Blood Glucose.: 269 mg/dL (15 Marcel 2023 17:21)  POCT Blood Glucose.: 467 mg/dL (15 Marcel 2023 12:04)      RADIOLOGY/EKG:

## 2023-01-16 NOTE — PROGRESS NOTE ADULT - SUBJECTIVE AND OBJECTIVE BOX
INTERVAL HPI/OVERNIGHT EVENTS:  Patient S&E at bedside. No o/n events,   meets criteria for home O2  remains on remdesivir/steroids   Labs reviewed, VSS on 2L NC     PAST MEDICAL & SURGICAL HISTORY:  CLL (chronic lymphocytic leukemia)      Colon cancer      Hypothyroidism      History of emphysema      Pneumonia      Anemia      S/P colon resection          FAMILY HISTORY:  FHx: diabetes mellitus (Mother)    FH: hypertension (Father)        VITAL SIGNS:  T(F): 98.2 (01-16-23 @ 05:02)  HR: 75 (01-16-23 @ 06:00)  BP: 141/82 (01-16-23 @ 05:02)  RR: 17 (01-16-23 @ 06:00)  SpO2: 95% (01-16-23 @ 06:00)  Wt(kg): --    PHYSICAL EXAM:    Constitutional: NAD on NC  Eyes: EOMI  Respiratory: rhonchi, coughing on exam   Cardiovascular: RRR,   Gastrointestinal: soft, NTND,  Extremities: no c/c/e  Neurological: AAOx3      MEDICATIONS  (STANDING):  albuterol    0.083% 2.5 milliGRAM(s) Nebulizer every 4 hours  budesonide 160 MICROgram(s)/formoterol 4.5 MICROgram(s) Inhaler 2 Puff(s) Inhalation two times a day  cholecalciferol 1000 Unit(s) Oral daily  cyanocobalamin 1000 MICROGram(s) Oral daily  dextrose 5%. 1000 milliLiter(s) (50 mL/Hr) IV Continuous <Continuous>  dextrose 5%. 1000 milliLiter(s) (100 mL/Hr) IV Continuous <Continuous>  dextrose 50% Injectable 25 Gram(s) IV Push once  dextrose 50% Injectable 12.5 Gram(s) IV Push once  dextrose 50% Injectable 25 Gram(s) IV Push once  enoxaparin Injectable 35 milliGRAM(s) SubCutaneous every 24 hours  glucagon  Injectable 1 milliGRAM(s) IntraMuscular once  insulin lispro (ADMELOG) corrective regimen sliding scale   SubCutaneous every 6 hours  lactobacillus acidophilus 1 Tablet(s) Oral daily  levothyroxine 25 MICROGram(s) Oral daily  metoprolol tartrate 12.5 milliGRAM(s) Oral two times a day  pantoprazole    Tablet 40 milliGRAM(s) Oral before breakfast  remdesivir  IVPB   IV Intermittent   remdesivir  IVPB 88 milliGRAM(s) IV Intermittent every 24 hours  tiotropium 2.5 MICROgram(s) Inhaler 2 Puff(s) Inhalation daily    MEDICATIONS  (PRN):  acetaminophen     Tablet .. 650 milliGRAM(s) Oral every 6 hours PRN Temp greater or equal to 38C (100.4F), Mild Pain (1 - 3)  aluminum hydroxide/magnesium hydroxide/simethicone Suspension 30 milliLiter(s) Oral every 4 hours PRN Dyspepsia  benzonatate 100 milliGRAM(s) Oral three times a day PRN Cough  dextrose Oral Gel 15 Gram(s) Oral once PRN Blood Glucose LESS THAN 70 milliGRAM(s)/deciliter  guaifenesin/dextromethorphan Oral Liquid 10 milliLiter(s) Oral every 4 hours PRN Cough  melatonin 3 milliGRAM(s) Oral at bedtime PRN Insomnia  ondansetron Injectable 4 milliGRAM(s) IV Push every 8 hours PRN Nausea and/or Vomiting      Allergies    cefdinir (Diarrhea)    Intolerances        LABS:                        7.6    49.40 )-----------( 172      ( 16 Jan 2023 07:14 )             25.4     01-16    x   |  x   |  x   ----------------------------<  x   x    |  x   |  0.92    Ca    9.2      15 Marcel 2023 18:32  Phos  2.7     01-15  Mg     2.1     01-15    TPro  6.0  /  Alb  2.2<L>  /  TBili  0.3  /  DBili  0.1  /  AST  18  /  ALT  17  /  AlkPhos  76  01-16          RADIOLOGY & ADDITIONAL TESTS:  Studies reviewed.   INTERVAL HPI/OVERNIGHT EVENTS:  Patient S&E at bedside. PT was in afib overnight   meets criteria for home O2  remains on remdesivir/steroids   Labs reviewed,   pt hypotensive this am - being bolused 1L  lethargic today     PAST MEDICAL & SURGICAL HISTORY:  CLL (chronic lymphocytic leukemia)      Colon cancer      Hypothyroidism      History of emphysema      Pneumonia      Anemia      S/P colon resection          FAMILY HISTORY:  FHx: diabetes mellitus (Mother)    FH: hypertension (Father)        VITAL SIGNS:  T(F): 98.2 (01-16-23 @ 05:02)  HR: 75 (01-16-23 @ 06:00)  BP: 141/82 (01-16-23 @ 05:02)  RR: 17 (01-16-23 @ 06:00)  SpO2: 95% (01-16-23 @ 06:00)  Wt(kg): --    PHYSICAL EXAM:    Constitutional: lethargic today on NC  Eyes: EOMI  Respiratory: rhonchi, on 2L NC  Cardiovascular: dory,   Gastrointestinal: soft, NTND,  Extremities: no c/c/e  Neurological: AAOx2      MEDICATIONS  (STANDING):  albuterol    0.083% 2.5 milliGRAM(s) Nebulizer every 4 hours  budesonide 160 MICROgram(s)/formoterol 4.5 MICROgram(s) Inhaler 2 Puff(s) Inhalation two times a day  cholecalciferol 1000 Unit(s) Oral daily  cyanocobalamin 1000 MICROGram(s) Oral daily  dextrose 5%. 1000 milliLiter(s) (50 mL/Hr) IV Continuous <Continuous>  dextrose 5%. 1000 milliLiter(s) (100 mL/Hr) IV Continuous <Continuous>  dextrose 50% Injectable 25 Gram(s) IV Push once  dextrose 50% Injectable 12.5 Gram(s) IV Push once  dextrose 50% Injectable 25 Gram(s) IV Push once  enoxaparin Injectable 35 milliGRAM(s) SubCutaneous every 24 hours  glucagon  Injectable 1 milliGRAM(s) IntraMuscular once  insulin lispro (ADMELOG) corrective regimen sliding scale   SubCutaneous every 6 hours  lactobacillus acidophilus 1 Tablet(s) Oral daily  levothyroxine 25 MICROGram(s) Oral daily  metoprolol tartrate 12.5 milliGRAM(s) Oral two times a day  pantoprazole    Tablet 40 milliGRAM(s) Oral before breakfast  remdesivir  IVPB   IV Intermittent   remdesivir  IVPB 88 milliGRAM(s) IV Intermittent every 24 hours  tiotropium 2.5 MICROgram(s) Inhaler 2 Puff(s) Inhalation daily    MEDICATIONS  (PRN):  acetaminophen     Tablet .. 650 milliGRAM(s) Oral every 6 hours PRN Temp greater or equal to 38C (100.4F), Mild Pain (1 - 3)  aluminum hydroxide/magnesium hydroxide/simethicone Suspension 30 milliLiter(s) Oral every 4 hours PRN Dyspepsia  benzonatate 100 milliGRAM(s) Oral three times a day PRN Cough  dextrose Oral Gel 15 Gram(s) Oral once PRN Blood Glucose LESS THAN 70 milliGRAM(s)/deciliter  guaifenesin/dextromethorphan Oral Liquid 10 milliLiter(s) Oral every 4 hours PRN Cough  melatonin 3 milliGRAM(s) Oral at bedtime PRN Insomnia  ondansetron Injectable 4 milliGRAM(s) IV Push every 8 hours PRN Nausea and/or Vomiting      Allergies    cefdinir (Diarrhea)    Intolerances        LABS:                        7.6    49.40 )-----------( 172      ( 16 Jan 2023 07:14 )             25.4     01-16    x   |  x   |  x   ----------------------------<  x   x    |  x   |  0.92    Ca    9.2      15 Marcel 2023 18:32  Phos  2.7     01-15  Mg     2.1     01-15    TPro  6.0  /  Alb  2.2<L>  /  TBili  0.3  /  DBili  0.1  /  AST  18  /  ALT  17  /  AlkPhos  76  01-16          RADIOLOGY & ADDITIONAL TESTS:  Studies reviewed.

## 2023-01-16 NOTE — PROGRESS NOTE ADULT - ASSESSMENT
89 y/o F with PMH CLL, colon cancer, hypothyroidism, COPD, PNA, anemia presented with cough/SOB for 2 days after testing COVID+ at home and at urgent care.     Acute hypoxic respiratory distress and sepsis likely secondary to COVID, bronchiectasis, emphysema, PNA   - CT scan: with findings suggestive of possible mycobacterium avium complex infection   - Keep SpO2 88-92%, supplemental O2 PRN   - D/C Remdesivir (5 days complete)  - D/C dexamethasone (5 days complete)  - On Incruse at home -> start Symbicort and Spiriva while hospitalized   - Albuterol nebs, Mucinex, Tessalon for cough  - D/C Azithromycin (5 days complete)  - Continue Ceftriaxone  - Procalcitonin: 1.21 (01/11)  - UCx, BCx NGTD   - F/u sputum culture  - Trend WBC, monitor for temperatures   - Tylenol for temperatures PRN   - Repeat CXR 1/14/23: stable R basilar consolidation   - ID consult appreciated   - Pulmonology consult appreciated    HFpEF - no evidence of volume overload at this time   Elevated BNP   -  > 1941  - ECHO (7/19/22): mild 1+ AR, EF 60-65%, moderate 2+ MR, mild pulmonic regurgitation 1+, mild to moderate TR, mild pulmonary HTN. HFpEF.  - Strict I+Os, daily weights   - Keep K > 4 and Mg > 2   - c/w home medications   - IV Furosemide 20mg x 1 1/14, monitor and will give additional doses depending on UO  - Monitor renal function and electrolytes closely     Hypotension   - Previously improved. Today 01/16- 87/43 with lethargy   - 500cc NS bolus administered   - Likely secondary to bleed (Hgb-7.9)  - Transfuse 1 unit PRBCs and monitor vitals.    Elevated troponin likely secondary to demand ischemia   - Monitor on telemetry   - levels trended down to normal   - Initial EKG- NSR, no ST elevations.   - Troponin 58.55 > 63.37 > 51.50    Leukocytosis likely 2/2 CLL/PNA  - 73.26 yesterday 01/15 > 49.40 today 01/16  - Monitor closely (has been up to 60 in the past)   - Hematology Dr. Donis following  - D/w Dr. Kumar Heme/Onc today    Macrocytic anemia   - H/H - 7.6/25.4 today.   - 1 unit PRBCs ordered  - Possibly secondary to bleed. Hold Lovenox.  - Continue to monitor    Thrombocytopenia   - Continue to monitor     Elevated D-dimer likely 2/2 infectious cause   - D-dimer 637, CTA negative for PE     Prediabetes, Hyperglycemia   - HbA1c 5.8%, prediabetic   - Hyperglycemia also in setting of current steroid use, POCT glucose q6h, cover with low dose ISS  - BGM noted 01/14> 300, today 121 with d/c decadron. Hold ISS.   - Continue to monitor.    Severe protein-calorie malnutrition  - Nutrition consult noted     VTE ppx  - Hold Lovenox 30mg subQ q24h (2/2 low H&H)  - Plan to resume when hypotension/anemia resolve    Advanced directives  - DNR/DNI      HCP/Daughter: Kat: [phone] . D/w daughter Kat today bedside.

## 2023-01-17 ENCOUNTER — TRANSCRIPTION ENCOUNTER (OUTPATIENT)
Age: 88
End: 2023-01-17

## 2023-01-17 PROBLEM — E03.9 HYPOTHYROIDISM, UNSPECIFIED: Chronic | Status: ACTIVE | Noted: 2023-01-11

## 2023-01-17 PROBLEM — J18.9 PNEUMONIA, UNSPECIFIED ORGANISM: Chronic | Status: ACTIVE | Noted: 2023-01-11

## 2023-01-17 PROBLEM — C18.9 MALIGNANT NEOPLASM OF COLON, UNSPECIFIED: Chronic | Status: ACTIVE | Noted: 2023-01-11

## 2023-01-17 PROBLEM — Z87.09 PERSONAL HISTORY OF OTHER DISEASES OF THE RESPIRATORY SYSTEM: Chronic | Status: ACTIVE | Noted: 2023-01-11

## 2023-01-17 PROBLEM — D64.9 ANEMIA, UNSPECIFIED: Chronic | Status: ACTIVE | Noted: 2023-01-11

## 2023-01-17 LAB
ALBUMIN SERPL ELPH-MCNC: 2.2 G/DL — LOW (ref 3.3–5)
ALP SERPL-CCNC: 97 U/L — SIGNIFICANT CHANGE UP (ref 40–120)
ALT FLD-CCNC: 17 U/L — SIGNIFICANT CHANGE UP (ref 12–78)
ANION GAP SERPL CALC-SCNC: 6 MMOL/L — SIGNIFICANT CHANGE UP (ref 5–17)
AST SERPL-CCNC: 24 U/L — SIGNIFICANT CHANGE UP (ref 15–37)
BILIRUB DIRECT SERPL-MCNC: 0.2 MG/DL — SIGNIFICANT CHANGE UP (ref 0–0.3)
BILIRUB INDIRECT FLD-MCNC: 0.7 MG/DL — SIGNIFICANT CHANGE UP (ref 0.2–1)
BILIRUB SERPL-MCNC: 0.9 MG/DL — SIGNIFICANT CHANGE UP (ref 0.2–1.2)
BUN SERPL-MCNC: 37 MG/DL — HIGH (ref 7–23)
CALCIUM SERPL-MCNC: 8.1 MG/DL — LOW (ref 8.5–10.1)
CHLORIDE SERPL-SCNC: 107 MMOL/L — SIGNIFICANT CHANGE UP (ref 96–108)
CO2 SERPL-SCNC: 27 MMOL/L — SIGNIFICANT CHANGE UP (ref 22–31)
CREAT SERPL-MCNC: 0.84 MG/DL — SIGNIFICANT CHANGE UP (ref 0.5–1.3)
CULTURE RESULTS: SIGNIFICANT CHANGE UP
CULTURE RESULTS: SIGNIFICANT CHANGE UP
EGFR: 64 ML/MIN/1.73M2 — SIGNIFICANT CHANGE UP
GLUCOSE SERPL-MCNC: 146 MG/DL — HIGH (ref 70–99)
HCT VFR BLD CALC: 36 % — SIGNIFICANT CHANGE UP (ref 34.5–45)
HGB BLD-MCNC: 11.3 G/DL — LOW (ref 11.5–15.5)
MAGNESIUM SERPL-MCNC: 1.9 MG/DL — SIGNIFICANT CHANGE UP (ref 1.6–2.6)
MCHC RBC-ENTMCNC: 31.4 GM/DL — LOW (ref 32–36)
MCHC RBC-ENTMCNC: 32.6 PG — SIGNIFICANT CHANGE UP (ref 27–34)
MCV RBC AUTO: 103.7 FL — HIGH (ref 80–100)
PHOSPHATE SERPL-MCNC: 3.4 MG/DL — SIGNIFICANT CHANGE UP (ref 2.5–4.5)
PLATELET # BLD AUTO: 245 K/UL — SIGNIFICANT CHANGE UP (ref 150–400)
POTASSIUM SERPL-MCNC: 5.3 MMOL/L — SIGNIFICANT CHANGE UP (ref 3.5–5.3)
POTASSIUM SERPL-SCNC: 5.3 MMOL/L — SIGNIFICANT CHANGE UP (ref 3.5–5.3)
PROT SERPL-MCNC: 6.6 GM/DL — SIGNIFICANT CHANGE UP (ref 6–8.3)
RBC # BLD: 3.47 M/UL — LOW (ref 3.8–5.2)
RBC # FLD: 18.3 % — HIGH (ref 10.3–14.5)
SODIUM SERPL-SCNC: 140 MMOL/L — SIGNIFICANT CHANGE UP (ref 135–145)
SPECIMEN SOURCE: SIGNIFICANT CHANGE UP
SPECIMEN SOURCE: SIGNIFICANT CHANGE UP
WBC # BLD: 69.37 K/UL — CRITICAL HIGH (ref 3.8–10.5)
WBC # FLD AUTO: 69.37 K/UL — CRITICAL HIGH (ref 3.8–10.5)

## 2023-01-17 PROCEDURE — 99223 1ST HOSP IP/OBS HIGH 75: CPT

## 2023-01-17 PROCEDURE — 99232 SBSQ HOSP IP/OBS MODERATE 35: CPT | Mod: GC

## 2023-01-17 PROCEDURE — 99233 SBSQ HOSP IP/OBS HIGH 50: CPT

## 2023-01-17 PROCEDURE — 93306 TTE W/DOPPLER COMPLETE: CPT | Mod: 26

## 2023-01-17 RX ADMIN — PREGABALIN 1000 MICROGRAM(S): 225 CAPSULE ORAL at 09:29

## 2023-01-17 RX ADMIN — Medication 25 MICROGRAM(S): at 06:05

## 2023-01-17 RX ADMIN — ALBUTEROL 2.5 MILLIGRAM(S): 90 AEROSOL, METERED ORAL at 11:40

## 2023-01-17 RX ADMIN — Medication 100 MILLIGRAM(S): at 09:30

## 2023-01-17 RX ADMIN — ALBUTEROL 2.5 MILLIGRAM(S): 90 AEROSOL, METERED ORAL at 16:14

## 2023-01-17 RX ADMIN — Medication 12.5 MILLIGRAM(S): at 22:50

## 2023-01-17 RX ADMIN — Medication 1000 UNIT(S): at 09:29

## 2023-01-17 RX ADMIN — PANTOPRAZOLE SODIUM 40 MILLIGRAM(S): 20 TABLET, DELAYED RELEASE ORAL at 22:48

## 2023-01-17 RX ADMIN — PANTOPRAZOLE SODIUM 40 MILLIGRAM(S): 20 TABLET, DELAYED RELEASE ORAL at 09:30

## 2023-01-17 RX ADMIN — ALBUTEROL 2.5 MILLIGRAM(S): 90 AEROSOL, METERED ORAL at 21:12

## 2023-01-17 RX ADMIN — Medication 12.5 MILLIGRAM(S): at 09:30

## 2023-01-17 RX ADMIN — BUDESONIDE AND FORMOTEROL FUMARATE DIHYDRATE 2 PUFF(S): 160; 4.5 AEROSOL RESPIRATORY (INHALATION) at 21:13

## 2023-01-17 RX ADMIN — Medication 10 MILLILITER(S): at 09:30

## 2023-01-17 RX ADMIN — Medication 1 TABLET(S): at 09:30

## 2023-01-17 RX ADMIN — ALBUTEROL 2.5 MILLIGRAM(S): 90 AEROSOL, METERED ORAL at 05:57

## 2023-01-17 NOTE — DISCHARGE NOTE NURSING/CASE MANAGEMENT/SOCIAL WORK - NSDCPEFALRISK_GEN_ALL_CORE
For information on Fall & Injury Prevention, visit: [email or web address] OR  [email or web address] OR  https://www.cdc.gov/steadi/patient.html

## 2023-01-17 NOTE — PROGRESS NOTE ADULT - ATTENDING COMMENTS
89 y/o F with PMH CLL, colon cancer, hypothyroidism, COPD, PNA, anemia presented with cough/SOB for 2 days after testing COVID+ at home and at urgent care.     Seen today, doing well, no complaints to offer. Drop in h/h noted, repeat h/h stable, no signs/symptoms of bleeding, Seen today with daughter at bedside.     Acute hypoxic respiratory distress and sepsis likely secondary to COVID, bronchiectasis, emphysema, PNA   - CT scan: with findings suggestive of possible mycobacterium avium complex infection   - SpO2 86% -> 97% on 3L nasal cannula  - Keep SpO2 88-92%, supplemental O2 PRN   - BP 94/49, RR 22, WBC 36.67  - Continue remdesivi - day #2  - Continue dexamethasone, POCT glucose q6h and will cover with ISS, A1C: 5.8%, prediabetic range   - On Incruse at home -> start Symbicort and Spiriva while hospitalized   - Albuterol nebs, Mucinex, Tessalon for cough  - Continue Ceftriaxone and Azithromycin - day 3   - f/u UA, UCx, BCx x 2, sputum culture, procalcitonin  - Trend WBC, monitor for temperatures   - Tylenol for temperatures PRN   - ID consult appreciated   - Pulmonology consult appreciated    HFpEF - no evidence of volume overload at this time   Elevated BNP   -  on admission  - ECHO (7/19/22): mild 1+ AR, EF 60-65%, moderate 2+ MR, mild pulmonic regurgitation 1+, mild to moderate TR, mild pulmonary HTN. HFpEF.  - Strict I+Os, daily weights   - Keep K > 4 and Mg > 2   - c/w home medications   - Monitor closely    Hypotension   - Improving, s/p additional IVF bolus 114gpp8/12/23  - Changed diet to regular   - Continue to monitor   - Caution with IVF given HFpEF    Severe protein-calorie malnutrition  - Nutrition consult noted     Discussed with patient's daughter and grandson today   Kat: [phone] .
Patient seen today on rounds, lethargic, but easily awakened, answers questions, noted drop in h/h and hypotensive this morning, patient on lovenox for VTE ppx with covid-19 infection, increased to 35mg daily yesterday for therapeutic dose for stroke prevention with New onset Afib. [id number]: 4. Likely secondary to acute bleeding, no melena or hematemesis/ other signs of bleeding noted. Patient being transfused. Continues lethargic and hypotensive, too unstable to move for CT imaging at this time. Discussed with daughter Kat, discussed critical condition of patient, we discussed that if likely GI bleed and h/h stabilizes off AC and after transfusion is what is hoped for, but if GI bleed is noted she does not want any invasive procedures done such as EGD/Colonoscopy. Lovenox d/c, pt NPO, started ppi IV BID. She is DNR/DNI. Daughters present.     Acute hypoxic respiratory distress and sepsis likely secondary to COVID, bronchiectasis, emphysema, PNA   - CT scan: with findings suggestive of possible mycobacterium avium complex infection   - Keep SpO2 88-92%, supplemental O2 PRN, 96-97% on RA at rest   - D/C Remdesivir (5 days complete)  - D/C dexamethasone (5 days complete)  - On Incruse at home -> start Symbicort and Spiriva while hospitalized   - Albuterol nebs, Mucinex, Tessalon for cough  - D/C Azithromycin/ Ceftriaxone (5 days complete)  - Procalcitonin: 1.21 (01/11)  - UCx, BCx NGTD   - F/u sputum culture  - Trend WBC, monitor for temperatures   - Tylenol for temperatures PRN   - Repeat CXR 1/14/23: stable R basilar consolidation   - ID consult appreciated   - Pulmonology consult appreciated    Acute symptomatic anemia  Hypotension/ lethargy   - Previously improved. Today 01/16- 87/43 with lethargy   - 500cc NS bolus administered   - Likely secondary to bleed (Hgb-7.9)  - Transfuse 1 unit PRBCs and monitor vitals  - No overt signs of bleeding  - Not stable enough to move at this time   - Transfuse, continue to monitor closely  - Family aware/ at bedside   - Pending stool occult blood   - F/u post-transfusion h/h       DISPO: continue medical management    Prognosis poor - family aware
Patient seen today, doing well, awake, alert and oreinted x3, had no complaints. BP normalized and h/h stable post transfusion.     Acute hypoxic respiratory distress and sepsis likely secondary to COVID, bronchiectasis, emphysema, PNA   - CT scan: with findings suggestive of possible mycobacterium avium complex infection   - Keep SpO2 88-92%, supplemental O2 PRN, 96-97% on RA at rest   - D/C Remdesivir (5 days complete)  - D/C dexamethasone (5 days complete)  - On Incruse at home -> start Symbicort and Spiriva while hospitalized   - Albuterol, Mucinex, Tessalon for cough  - D/C Azithromycin/ Ceftriaxone (5 days complete)  - Procalcitonin: 1.21 (01/11)  - UCx, BCx NGTD   - F/u sputum culture  - Trend WBC, monitor for temperatures   - Tylenol for temperatures PRN   - Repeat CXR 1/14/23: stable R basilar consolidation   - ID consult appreciated   - Pulmonology consult appreciated    Acute symptomatic anemia  Hypotension/ lethargy   - S/p 1u pRBC 1/16/23  - Continue to monitor     New onset Afib  - NSR now   - Lasting < 60m 1/15/23  - Converted to NSR s/p Lopressor 5mg x 1   - Would not recommend AC  - Cardiology consult appreciated     DISPO: D/c home on Thursday, discussed with daughter Kat, needs time to arrange things at home for d/c, would like home PT/ home care
89 y/o F with PMH CLL, colon cancer, hypothyroidism, COPD, PNA, anemia presented with cough/SOB for 2 days after testing COVID+ at home and at urgent care.     Seen today, doing well, no complaints to offer.     Acute hypoxic respiratory distress and sepsis likely secondary to COVID, bronchiectasis, emphysema, PNA   - CT scan: with findings suggestive of possible mycobacterium avium complex infection   - SpO2 86% -> 97% on 3L nasal cannula  - Keep SpO2 88-92%, supplemental O2 PRN   - BP 94/49, RR 22, WBC 36.67  - Started remdesivir today, renal function with GFR >60, dose calculated per weight as pt is underweight  - Continue dexamethasone, POCT glucose q6h and will cover with ISS, A1C: 5.8%, prediabetic range   - On Incruse at home -> start Symbicort and Spiriva while hospitalized   - Albuterol, Mucinex, Tessalon for cough  - Continue Ceftriaxone and Azithromycin - day 2   - f/u UA, UCx, BCx x 2, sputum culture, procalcitonin  - Trend WBC, monitor for temperatures   - Tylenol for temperatures PRN   - ID consult   - Pulmonology consult - Dr. Ramires    Hypotension   - Improving, s/p additional IVF bolus 500cc  - Changed diet to regular   - Continue to monitor   - Caution with IVF given HFpEF    Discussed with patient's daughter today   Kat: [phone]

## 2023-01-17 NOTE — CONSULT NOTE ADULT - NS ATTEND AMEND GEN_ALL_CORE FT
case d/w NP, I agree w/a zulema note  pt w/ advanced age and chronic disease currently w/ hx of COPD admitted w/ cOVID PNA  w/ high risk of mortality this admission d/t all underlying issues  pt w/ capacity but currently wants her daughters to help make decisions  she's currently DNR DNI And w/o acute distress  will continue to follow   symptoms currently managed  please notify palliative team if any changes in comfort levels to assist w/ symptom managment  palliative team continues to follow

## 2023-01-17 NOTE — PROGRESS NOTE ADULT - ASSESSMENT
89 yo female with history of stage III LEFT sided COLON CA as well as CLL and PARMJIT now admitted for COVID     COVID   - completed Dex and Remdesivir   - s/p coverage for CAP as well   - meets criteria for home O2 as O2 sat dropped to <88% on ambulation   - pt lethargic and hypotensive yesterday - received 500 cc bolus w/ improvement in BP  - no longer in afib     CLL   - no history of hypogamma - added on immunoglobulin panel to see if qualifies for IVIG  - WBC 49.4, Hb 7.6, plt 172 1/16 given 1u pRBC with Hb 10 this am     COLON CA   - no hx of adjuvant therapy   - stable no evidence of recurrence      Iron deficiency   - Hb 7.6 1/16 from 9.3- no sign of bleeding - given 1u pRBC and improvement to Hb 10 this am   - patient has received routine q two month outpatient labs via [place] as patient resides [facility] in [place] and [place]     pt to continue to f/u outpatient

## 2023-01-17 NOTE — PROGRESS NOTE ADULT - SUBJECTIVE AND OBJECTIVE BOX
PA STUDENT MEDICINE PROGRESS NOTE     89 y/o F with PMH CLL, colon cancer, hypothyroidism, COPD, PNA, anemia presented with cough/SOB for 2 days after testing COVID+ at home and at urgent care. Symptoms reported limited to cough and mild SOB, improved with oxygen administration. Seen with Dr. Medel at bedside this AM. Reports feeling generally well today and says she does not remember being lethargic yesterday.     REVIEW OF SYSTEMS:  CONSTITUTIONAL: No weakness, fevers or chills  EYES/ENT: No visual changes;  No vertigo or throat pain   NECK: No pain or stiffness  RESPIRATORY: No cough, wheezing, hemoptysis; No shortness of breath  CARDIOVASCULAR: No chest pain or palpitations  GASTROINTESTINAL: No abdominal or epigastric pain. No nausea, vomiting; No diarrhea or constipation.   GENITOURINARY: No dysuria, frequency or hematuria  NEUROLOGICAL: No numbness or weakness  SKIN: No itching, burning, rashes, or lesions     PHYSICAL EXAM:  Vital Signs Last 24 Hrs  T(C): 36.3 (17 Jan 2023 09:11), Max: 36.7 (16 Jan 2023 12:27)  T(F): 97.4 (17 Jan 2023 09:11), Max: 98 (16 Jan 2023 12:27)  HR: 63 (17 Jan 2023 09:11) (61 - 73)  BP: 123/50 (17 Jan 2023 09:11) (92/54 - 123/50)  BP(mean): --  RR: 18 (17 Jan 2023 09:11) (17 - 18)  SpO2: 98% (17 Jan 2023 09:11) (92% - 98%)    Parameters below as of 17 Jan 2023 09:11  Patient On (Oxygen Delivery Method): room air        Constitutional: Pt lying in bed, awake and alert, NAD  HEENT: EOMI, normal hearing, moist mucous membranes  Neck: Soft and supple, no JVD  Respiratory: CTABL, No wheezing, rales or rhonchi  Cardiovascular: S1S2+, RRR, no M/G/R  Gastrointestinal: BS+, soft, NT/ND, no guarding, no rebound  Extremities: No peripheral edema  Vascular: 2+ peripheral pulses  Neurological: AAOx3, no focal deficits  Musculoskeletal: 5/5 strength b/l upper and lower extremities  Skin: No rashes    MEDICATIONS:  MEDICATIONS  (STANDING):  albuterol    0.083% 2.5 milliGRAM(s) Nebulizer every 4 hours  budesonide 160 MICROgram(s)/formoterol 4.5 MICROgram(s) Inhaler 2 Puff(s) Inhalation two times a day  cholecalciferol 1000 Unit(s) Oral daily  cyanocobalamin 1000 MICROGram(s) Oral daily  dextrose 5%. 1000 milliLiter(s) (50 mL/Hr) IV Continuous <Continuous>  dextrose 5%. 1000 milliLiter(s) (100 mL/Hr) IV Continuous <Continuous>  dextrose 50% Injectable 25 Gram(s) IV Push once  dextrose 50% Injectable 12.5 Gram(s) IV Push once  dextrose 50% Injectable 25 Gram(s) IV Push once  glucagon  Injectable 1 milliGRAM(s) IntraMuscular once  lactobacillus acidophilus 1 Tablet(s) Oral daily  levothyroxine 25 MICROGram(s) Oral daily  metoprolol tartrate 12.5 milliGRAM(s) Oral two times a day  pantoprazole  Injectable 40 milliGRAM(s) IV Push every 12 hours  tiotropium 2.5 MICROgram(s) Inhaler 2 Puff(s) Inhalation daily    MEDICATIONS  (PRN):  acetaminophen     Tablet .. 650 milliGRAM(s) Oral every 6 hours PRN Temp greater or equal to 38C (100.4F), Mild Pain (1 - 3)  aluminum hydroxide/magnesium hydroxide/simethicone Suspension 30 milliLiter(s) Oral every 4 hours PRN Dyspepsia  benzonatate 100 milliGRAM(s) Oral three times a day PRN Cough  dextrose Oral Gel 15 Gram(s) Oral once PRN Blood Glucose LESS THAN 70 milliGRAM(s)/deciliter  guaifenesin/dextromethorphan Oral Liquid 10 milliLiter(s) Oral every 4 hours PRN Cough  melatonin 3 milliGRAM(s) Oral at bedtime PRN Insomnia  ondansetron Injectable 4 milliGRAM(s) IV Push every 8 hours PRN Nausea and/or Vomiting      LABS: All Labs Reviewed:                        10.0   49.77 )-----------( 168      ( 16 Jan 2023 18:36 )             31.3     01-16    x   |  x   |  x   ----------------------------<  x   x    |  x   |  0.92    Ca    9.2      15 Marcel 2023 18:32  Phos  2.7     01-15  Mg     2.1     01-15    TPro  6.0  /  Alb  2.2<L>  /  TBili  0.3  /  DBili  0.1  /  AST  18  /  ALT  17  /  AlkPhos  76  01-16          Blood Culture:   I&O's Summary    CAPILLARY BLOOD GLUCOSE      POCT Blood Glucose.: 92 mg/dL (17 Jan 2023 08:35)      RADIOLOGY/EKG:  < from: CT Angio Chest PE Protocol w/ IV Cont (01.11.23 @ 19:16) >  ACC: [id number] EXAM:  CT ANGIO CHEST PULM ART [facility]                          PROCEDURE DATE:  01/11/2023          INTERPRETATION:  Clinical information: Evaluate for pulmonary embolus.   Exam is compared to previous study of 7/27/2022.    CT angiogram of the chest was obtained following administration of   intravenous contrast. Approximately 90 cc of Omnipaque 350 was   administered and 10 cc was discarded. Coronal, sagittal and MIP images   were submitted for review.    1.7 cm isodense structure is present just to the right of the esophagus   at the level of the thoracic inlet. It has slightly increased in size   when compared to previous exam when it measured approximately 1.2 cm.    No hilar and or mediastinal adenopathy is noted.    Heart is enlarged in size. No pericardial effusion is noted.   Calcification the coronary arteries is noted. Pulmonary arteries are   normal in caliber. No filling defects are noted.    No endobronchial lesions are noted. Mucous/secretions are noted within   few of the distal bronchi in the right lower lobe. Bronchiectasis is   noted within both lungs, more so in the right middle lobe and lingula.   Few ill-defined nodular opacities as well as tree-in-bud opacities are   noted within both lungs, more so in the right lung. Small bilateral   pleural effusions are noted.    Below the diaphragm, visualized portions of the abdomen are unremarkable.    Loss of height of several vertebral bodies is noted.    IMPRESSION: No pulmonary embolus is noted.    Bronchiectasis is noted within both lungs. In addition, few ill-defined   nodular opacities as well as tree-in-bud opacities are noted within both   lungs. Exact etiology of the findings is unclear, however, the   constellation of the above findings suggest possible Mycobacterium avium   complex infection.    < from: Xray Chest 1 View- PORTABLE-Urgent (01.11.23 @ 16:35) >    ACC: [id number] EXAM:  XR CHEST PORTABLE URGENT 1V                          PROCEDURE DATE:  01/11/2023          INTERPRETATION:  INDICATION: Chest pain    COMPARISON: 8/2/2022    FINDINGS:  An AP portable supine radiograph of the chest demonstrates a   reticulonodular pattern throughout both lungs, greatest in the lower   lobes. This has increased from the prior exam. There is no pneumothorax.   There is no definite evidence of pleural effusion. There is no hilar or   mediastinal lesion seen. The cardiac silhouette is likely magnified by   technique, but is at least borderline. The bony thorax remains stable.    IMPRESSION:  1. There is a diffuse bilateral reticulonodular pattern, increased from   the prior exam and greatest in the lower lobes. Infectious or   inflammatory etiologies are favored over CHF. The latter however cannot   be entirely excluded      < from: Xray Chest 1 View- PORTABLE-Routine (Xray Chest 1 View- PORTABLE-Routine in AM.) (01.14.23 @ 09:21) >    clinical history:Covid pneumonia    Stable appearance of the chest with right basilar consolidation. Small   right pleural effusion.    IMPRESSION: Stable right basilar consolidation     PA STUDENT MEDICINE PROGRESS NOTE     89 y/o F with PMH CLL, colon cancer, hypothyroidism, COPD, PNA, anemia presented with cough/SOB for 2 days after testing COVID+ at home and at urgent care. Symptoms reported limited to cough and mild SOB, improved with oxygen administration. Seen with Dr. Medel at bedside this AM. Reports feeling generally well today and says she does not remember being lethargic yesterday.     REVIEW OF SYSTEMS:  CONSTITUTIONAL: No weakness, fevers or chills  EYES/ENT: No visual changes;  No vertigo or throat pain   NECK: No pain or stiffness  RESPIRATORY: No cough, wheezing, hemoptysis; No shortness of breath  CARDIOVASCULAR: No chest pain or palpitations  GASTROINTESTINAL: No abdominal or epigastric pain. No nausea, vomiting; No diarrhea or constipation.   GENITOURINARY: No dysuria, frequency or hematuria  NEUROLOGICAL: No numbness or weakness  SKIN: No itching, burning, rashes, or lesions     PHYSICAL EXAM:  Vital Signs Last 24 Hrs  T(C): 36.3 (17 Jan 2023 09:11), Max: 36.7 (16 Jan 2023 12:27)  T(F): 97.4 (17 Jan 2023 09:11), Max: 98 (16 Jan 2023 12:27)  HR: 63 (17 Jan 2023 09:11) (61 - 73)  BP: 123/50 (17 Jan 2023 09:11) (92/54 - 123/50)  RR: 18 (17 Jan 2023 09:11) (17 - 18)  SpO2: 98% (17 Jan 2023 09:11) (92% - 98%)    Parameters below as of 17 Jan 2023 09:11  Patient On (Oxygen Delivery Method): room air      Constitutional: Pt lying in bed, awake and alert, NAD  HEENT: normal hearing, moist mucous membranes  Neck: Soft and supple, no JVD  Respiratory: CTABL, No wheezing, rales or rhonchi  Cardiovascular: S1S2+, RRR, no M/G/R  Gastrointestinal: BS+, soft, NT/ND, no guarding, no rebound  Extremities: No peripheral edema  Vascular: 2+ peripheral pulses  Neurological: AAOx3, no focal deficits  Musculoskeletal: 5/5 strength b/l upper and lower extremities  Skin: No rashes    MEDICATIONS:  MEDICATIONS  (STANDING):  albuterol    0.083% 2.5 milliGRAM(s) Nebulizer every 4 hours  budesonide 160 MICROgram(s)/formoterol 4.5 MICROgram(s) Inhaler 2 Puff(s) Inhalation two times a day  cholecalciferol 1000 Unit(s) Oral daily  cyanocobalamin 1000 MICROGram(s) Oral daily  dextrose 5%. 1000 milliLiter(s) (50 mL/Hr) IV Continuous <Continuous>  dextrose 5%. 1000 milliLiter(s) (100 mL/Hr) IV Continuous <Continuous>  dextrose 50% Injectable 25 Gram(s) IV Push once  dextrose 50% Injectable 12.5 Gram(s) IV Push once  dextrose 50% Injectable 25 Gram(s) IV Push once  glucagon  Injectable 1 milliGRAM(s) IntraMuscular once  lactobacillus acidophilus 1 Tablet(s) Oral daily  levothyroxine 25 MICROGram(s) Oral daily  metoprolol tartrate 12.5 milliGRAM(s) Oral two times a day  pantoprazole  Injectable 40 milliGRAM(s) IV Push every 12 hours  tiotropium 2.5 MICROgram(s) Inhaler 2 Puff(s) Inhalation daily    MEDICATIONS  (PRN):  acetaminophen     Tablet .. 650 milliGRAM(s) Oral every 6 hours PRN Temp greater or equal to 38C (100.4F), Mild Pain (1 - 3)  aluminum hydroxide/magnesium hydroxide/simethicone Suspension 30 milliLiter(s) Oral every 4 hours PRN Dyspepsia  benzonatate 100 milliGRAM(s) Oral three times a day PRN Cough  dextrose Oral Gel 15 Gram(s) Oral once PRN Blood Glucose LESS THAN 70 milliGRAM(s)/deciliter  guaifenesin/dextromethorphan Oral Liquid 10 milliLiter(s) Oral every 4 hours PRN Cough  melatonin 3 milliGRAM(s) Oral at bedtime PRN Insomnia  ondansetron Injectable 4 milliGRAM(s) IV Push every 8 hours PRN Nausea and/or Vomiting      LABS: All Labs Reviewed:                        11.3   69.37 )-----------( 245      ( 17 Jan 2023 10:49 )             36.0   01-17    140  |  107  |  37<H>  ----------------------------<  146<H>  5.3   |  27  |  0.84    Ca    8.1<L>      17 Jan 2023 10:49  Phos  3.4     01-17  Mg     1.9     01-17    TPro  6.6  /  Alb  2.2<L>  /  TBili  0.9  /  DBili  0.2  /  AST  24  /  ALT  17  /  AlkPhos  97  01-17        RADIOLOGY/EKG:    < from: CT Angio Chest PE Protocol w/ IV Cont (01.11.23 @ 19:16) >  ACC: [id number] EXAM:  CT ANGIO CHEST PULM ART [facility]                          PROCEDURE DATE:  01/11/2023      IMPRESSION: No pulmonary embolus is noted.    Bronchiectasis is noted within both lungs. In addition, few ill-defined   nodular opacities as well as tree-in-bud opacities are noted within both   lungs. Exact etiology of the findings is unclear, however, the   constellation of the above findings suggest possible Mycobacterium avium   complex infection.    < from: Xray Chest 1 View- PORTABLE-Urgent (01.11.23 @ 16:35) >    ACC: [id number] EXAM:  XR CHEST PORTABLE URGENT 1V                          PROCEDURE DATE:  01/11/2023      IMPRESSION:  1. There is a diffuse bilateral reticulonodular pattern, increased from   the prior exam and greatest in the lower lobes. Infectious or   inflammatory etiologies are favored over CHF. The latter however cannot   be entirely excluded      < from: Xray Chest 1 View- PORTABLE-Routine (Xray Chest 1 View- PORTABLE-Routine in AM.) (01.14.23 @ 09:21) >    clinical history: Covid pneumonia    Stable appearance of the chest with right basilar consolidation. Small   right pleural effusion.    IMPRESSION: Stable right basilar consolidation

## 2023-01-17 NOTE — PROGRESS NOTE ADULT - SUBJECTIVE AND OBJECTIVE BOX
Patient is a 89y old  Female who presents with a chief complaint of Cough (2023 21:34)      HPI:  89 y/o F with PMH CLL, colon cancer, hypothyroidism, emphysema, PNA, anemia presented with cough for 2 days. Pt was not feeling herself and her daughter was sick and tested positive fof COVID. The pt tested positive Monday night. She reports hoarseness, cough productive of mucus, congestion, constipation. Her O2 at home was 88%. She was vaccinated for COVID x 4. Denies fevers, chills, chest pain, abdominal pain, N/V, diarrhea/constipation.   Found to have COVID+   CTA: No pulmonary embolus is noted. Bronchiectasis is noted within both lungs. In addition, few ill-defined nodular opacities as well as tree-in-bud opacities are noted within both lungs. Exact etiology of the findings is unclear, however, the constellation of the above findings suggest possible Mycobacterium avium complex infection.  Pt was given Remdesivir, Decadron.      covering for DR nolasco  seen and examined while in bed  no distress  -1/15  uneventful overnight  data discussed    seen and examined while in bed  weak and confused  steroids discontinued    events noted  s/p diuresis with hypotension and drop in HCT  now required PRBC transfusion  repeat labs pending  Dr Nolasco will resume in am  already completed her course of steroids and Remedesevir  heme following for leukocytosis    PAST MEDICAL & SURGICAL HISTORY:  CLL (chronic lymphocytic leukemia)      Colon cancer      Hypothyroidism      History of emphysema      Pneumonia      Anemia      S/P colon resection        MEDICATIONS  (STANDING):  albuterol    0.083% 2.5 milliGRAM(s) Nebulizer every 4 hours  budesonide 160 MICROgram(s)/formoterol 4.5 MICROgram(s) Inhaler 2 Puff(s) Inhalation two times a day  cholecalciferol 1000 Unit(s) Oral daily  cyanocobalamin 1000 MICROGram(s) Oral daily  dextrose 5%. 1000 milliLiter(s) (50 mL/Hr) IV Continuous <Continuous>  dextrose 5%. 1000 milliLiter(s) (100 mL/Hr) IV Continuous <Continuous>  dextrose 50% Injectable 25 Gram(s) IV Push once  dextrose 50% Injectable 12.5 Gram(s) IV Push once  dextrose 50% Injectable 25 Gram(s) IV Push once  enoxaparin Injectable 35 milliGRAM(s) SubCutaneous every 24 hours  glucagon  Injectable 1 milliGRAM(s) IntraMuscular once  insulin lispro (ADMELOG) corrective regimen sliding scale   SubCutaneous every 6 hours  lactobacillus acidophilus 1 Tablet(s) Oral daily  levothyroxine 25 MICROGram(s) Oral daily  metoprolol tartrate 12.5 milliGRAM(s) Oral two times a day  pantoprazole    Tablet 40 milliGRAM(s) Oral before breakfast  remdesivir  IVPB   IV Intermittent   remdesivir  IVPB 88 milliGRAM(s) IV Intermittent every 24 hours  tiotropium 2.5 MICROgram(s) Inhaler 2 Puff(s) Inhalation daily    FAMILY HISTORY:  FHx: diabetes mellitus (Mother)    FH: hypertension (Father)        Vital Signs Last 24 Hrs  T(C): 36.6 (2023 05:00), Max: 36.7 (2023 12:27)  T(F): 97.9 (2023 05:00), Max: 98 (2023 12:27)  HR: 63 (2023 05:00) (61 - 73)  BP: 121/54 (2023 05:00) (87/43 - 121/54)  BP(mean): --  RR: 17 (2023 05:00) (16 - 18)  SpO2: 92% (2023 05:00) (92% - 97%)    Parameters below as of 2023 21:29  Patient On (Oxygen Delivery Method): room air        PHYSICAL EXAM  General Appearance: cooperative, no acute distress,   HEENT: PERRL, conjunctiva clear, EOM's intact, non injected pharynx, no exudate, TM   normal  Neck: Supple, , no adenopathy, thyroid: not enlarged, no carotid bruit or JVD  Back: Symmetric, no  tenderness,no soft tissue tenderness  Lungs: coarse sai, +Wheezing in the LLL and RLL on exhalation , improved  Heart: Regular rate and rhythm, S1, S2 normal, no murmur, rub or gallop  Abdomen: Soft, non-tender, bowel sounds active , no hepatosplenomegaly  Extremities: no cyanosis or edema, no joint swelling  Skin: Skin color, texture normal, no rashes   Neurologic: Alert and oriented X3 , cranial nerves intact, sensory and motor normal,    ECG:    LABS:                        7.6    x     )-----------( 172      ( 2023 07:14 )             25.4   01-16    x   |  x   |  x   ----------------------------<  x   x    |  x   |  0.92    Ca    9.2      15 Marcel 2023 18:32  Phos  2.7     -15  Mg     2.1     -15    TPro  6.0  /  Alb  2.2<L>  /  TBili  0.3  /  DBili  0.1  /  AST  18  /  ALT  17  /  AlkPhos  76                                     9.1    47.10 )-----------( 160      ( 15 Marcel 2023 08:21 )             29.6   15    141  |  109<H>  |  46<H>  ----------------------------<  228<H>  5.1   |  26  |  0.96    Ca    9.1      15 Marcel 2023 08:21    TPro  6.9  /  Alb  2.4<L>  /  TBili  0.3  /  DBili  <0.1  /  AST  20  /  ALT  18  /  AlkPhos  90  15               10.0   36.67 )-----------( 120      ( 2023 15:29 )             31.3     01-11    135  |  101  |  45<H>  ----------------------------<  172<H>  4.6   |  29  |  1.12    Ca    9.6      2023 15:29  Mg     2.2     -11    TPro  7.4  /  Alb  2.9<L>  /  TBili  0.9  /  DBili  x   /  AST  15  /  ALT  11<L>  /  AlkPhos  96  -          Pro BNP  950  @ 15:29  D Dimer  637  @ 15:29    PT/INR - ( 2023 15:29 )   PT: 14.0 sec;   INR: 1.20 ratio           Urinalysis Basic - ( 2023 04:55 )    Color: Yellow / Appearance: Clear / S.010 / pH: x  Gluc: x / Ketone: Negative  / Bili: Negative / Urobili: Negative   Blood: x / Protein: Trace mg/dL / Nitrite: Negative   Leuk Esterase: Negative / RBC: 0-2 /HPF / WBC 0-2 /HPF   Sq Epi: x / Non Sq Epi: Few / Bacteria: Few    < from: CT Angio Chest PE Protocol w/ IV Cont (23 @ 19:16) >    1.7 cm isodense structure is present just to the right of the esophagus   at the level of the thoracic inlet. It has slightly increased in size   when compared to previous exam when it measured approximately 1.2 cm.    No hilar and or mediastinal adenopathy is noted.    Heart is enlarged in size. No pericardial effusion is noted.   Calcification the coronary arteries is noted. Pulmonary arteries are   normal in caliber. No filling defects are noted.    No endobronchial lesions are noted. Mucous/secretions are noted within   few of the distal bronchi in the right lower lobe. Bronchiectasis is   noted within both lungs, more so in the right middle lobe and lingula.   Few ill-defined nodular opacities as well as tree-in-bud opacities are   noted within both lungs, more so in the right lung. Small bilateral   pleural effusions are noted.    Below the diaphragm, visualized portions of the abdomen are unremarkable.    Loss of height of several vertebral bodies is noted.    IMPRESSION: No pulmonary embolus is noted.    Bronchiectasis is noted within both lungs. In addition, few ill-defined   nodular opacities as well as tree-in-bud opacities are noted within both   lungs. Exact etiology of the findings is unclear, however, the   constellation of the above findings suggest possible Mycobacterium avium   complex infection.    < end of copied text >          RADIOLOGY & ADDITIONAL STUDIES:

## 2023-01-17 NOTE — PROGRESS NOTE ADULT - PROBLEM SELECTOR PLAN 2
Problem/Recommendation - 2:  ·  Problem: Dyspnea.   ·  Recommendation: Probably secondary to pulmonary/Covid process. Would not diurese. Continue current treatment. Repeat CXR pending. Problem/Recommendation - 2:  ·  Problem: Dyspnea.   ·  Recommendation: Probably secondary to pulmonary/Covid process. Would not diurese. Continue current treatment.

## 2023-01-17 NOTE — PROGRESS NOTE ADULT - SUBJECTIVE AND OBJECTIVE BOX
INTERVAL HPI/OVERNIGHT EVENTS:  Patient S&E at bedside.   pt feeling better after receiving 1u pRBC yesterday- Hb 10 today   BP improved after fluids yesterday         PAST MEDICAL & SURGICAL HISTORY:  CLL (chronic lymphocytic leukemia)      Colon cancer      Hypothyroidism      History of emphysema      Pneumonia      Anemia      S/P colon resection          FAMILY HISTORY:  FHx: diabetes mellitus (Mother)    FH: hypertension (Father)        VITAL SIGNS:  T(F): 97.9 (01-17-23 @ 05:00)  HR: 63 (01-17-23 @ 05:00)  BP: 121/54 (01-17-23 @ 05:00)  RR: 17 (01-17-23 @ 05:00)  SpO2: 92% (01-17-23 @ 05:00)  Wt(kg): --    PHYSICAL EXAM:    Constitutional: NAD, frail appearing  Eyes: EOMI,   Respiratory: rhonchi on 2L NC  Cardiovascular: RRR  Gastrointestinal: soft, NTND,   Extremities: no c/c/e  Neurological: AAOx3      MEDICATIONS  (STANDING):  albuterol    0.083% 2.5 milliGRAM(s) Nebulizer every 4 hours  budesonide 160 MICROgram(s)/formoterol 4.5 MICROgram(s) Inhaler 2 Puff(s) Inhalation two times a day  cholecalciferol 1000 Unit(s) Oral daily  cyanocobalamin 1000 MICROGram(s) Oral daily  dextrose 5%. 1000 milliLiter(s) (50 mL/Hr) IV Continuous <Continuous>  dextrose 5%. 1000 milliLiter(s) (100 mL/Hr) IV Continuous <Continuous>  dextrose 50% Injectable 25 Gram(s) IV Push once  dextrose 50% Injectable 12.5 Gram(s) IV Push once  dextrose 50% Injectable 25 Gram(s) IV Push once  glucagon  Injectable 1 milliGRAM(s) IntraMuscular once  lactobacillus acidophilus 1 Tablet(s) Oral daily  levothyroxine 25 MICROGram(s) Oral daily  metoprolol tartrate 12.5 milliGRAM(s) Oral two times a day  pantoprazole  Injectable 40 milliGRAM(s) IV Push every 12 hours  tiotropium 2.5 MICROgram(s) Inhaler 2 Puff(s) Inhalation daily    MEDICATIONS  (PRN):  acetaminophen     Tablet .. 650 milliGRAM(s) Oral every 6 hours PRN Temp greater or equal to 38C (100.4F), Mild Pain (1 - 3)  aluminum hydroxide/magnesium hydroxide/simethicone Suspension 30 milliLiter(s) Oral every 4 hours PRN Dyspepsia  benzonatate 100 milliGRAM(s) Oral three times a day PRN Cough  dextrose Oral Gel 15 Gram(s) Oral once PRN Blood Glucose LESS THAN 70 milliGRAM(s)/deciliter  guaifenesin/dextromethorphan Oral Liquid 10 milliLiter(s) Oral every 4 hours PRN Cough  melatonin 3 milliGRAM(s) Oral at bedtime PRN Insomnia  ondansetron Injectable 4 milliGRAM(s) IV Push every 8 hours PRN Nausea and/or Vomiting      Allergies    cefdinir (Diarrhea)    Intolerances        LABS:                        10.0   49.77 )-----------( 168      ( 16 Jan 2023 18:36 )             31.3     01-16    x   |  x   |  x   ----------------------------<  x   x    |  x   |  0.92    Ca    9.2      15 Marcel 2023 18:32  Phos  2.7     01-15  Mg     2.1     01-15    TPro  6.0  /  Alb  2.2<L>  /  TBili  0.3  /  DBili  0.1  /  AST  18  /  ALT  17  /  AlkPhos  76  01-16          RADIOLOGY & ADDITIONAL TESTS:  Studies reviewed.

## 2023-01-17 NOTE — PROGRESS NOTE ADULT - SUBJECTIVE AND OBJECTIVE BOX
PCP:    REQUESTING PHYSICIAN:    REASON FOR CONSULT:    CHIEF COMPLAINT:    HPI:  89 y/o F with PMH CLL, colon cancer, hypothyroidism, emphysema, PNA, anemia presented with cough for 2 days. Pt was not feeling herself and her daughter was sick and tested positive fof COVID. The pt tested positive Monday night. She reports hoarseness, cough productive of mucus, congestion, constipation. Her O2 at home was 88%. She was vaccinated for COVID x 4. Denies fevers, chills, chest pain, abdominal pain, N/V, diarrhea/constipation.     Prior admission:   - 8/2/22: low BP, SOB -> Hb 5.7 -> s/p 2 units PRBCs     ER course: BP 94/49, RR 22, SpO2 86% -> 97% on 3L nasal cannula. Labs: WBC 36.67, Hb 10 (baseline ~9), , , neutrophils 84%, D-dimer 637, INR 1.20, troponin 58.55 -> 63.37, glucose 172, albumin 2.9, . COVID 19 positive. EKG: NSR with HR 76 bpm, normal intervals, no ST segment changes, T wave inversions in V3 and V4 (personally reviewed).     Imaging:   - CXR: increased opacities bilaterally, consolidation RLL, no pneumothorax, no effusion (personally reviewed). -> official read: 1. There is a diffuse bilateral reticulonodular pattern, increased from the prior exam and greatest in the lower lobes. Infectious or inflammatory etiologies are favored over CHF. The latter however cannot be entirely excluded.  - CTA: No pulmonary embolus is noted. Bronchiectasis is noted within both lungs. In addition, few ill-defined nodular opacities as well as tree-in-bud opacities are noted within both lungs. Exact etiology of the findings is unclear, however, the constellation of the above findings suggest possible Mycobacterium avium complex infection.    Pt was given Remdesivir, Decadron. She is being admitted to med/surg for further management.  (11 Jan 2023 21:34) Cardiology was called to evaluate above symptoms and to evaluate episode of atrial fibrillation. Pt is sleeping but arouses with effort. She is unable to provide a history.     1/17/23: Patient without complaints of chest pain or sob.  +cough however improving.  Tele: sinus rhythm, PVC's and bigemeny      PAST MEDICAL & SURGICAL HISTORY:  CLL (chronic lymphocytic leukemia)      Colon cancer      Hypothyroidism      History of emphysema      Pneumonia      Anemia      S/P colon resection          Allergies    cefdinir (Diarrhea)    Intolerances        SOCIAL HISTORY:    FAMILY HISTORY:  FHx: diabetes mellitus (Mother)    FH: hypertension (Father)    MEDICATIONS  (STANDING):  albuterol    0.083% 2.5 milliGRAM(s) Nebulizer every 4 hours  budesonide 160 MICROgram(s)/formoterol 4.5 MICROgram(s) Inhaler 2 Puff(s) Inhalation two times a day  cholecalciferol 1000 Unit(s) Oral daily  cyanocobalamin 1000 MICROGram(s) Oral daily  dextrose 5%. 1000 milliLiter(s) (100 mL/Hr) IV Continuous <Continuous>  dextrose 5%. 1000 milliLiter(s) (50 mL/Hr) IV Continuous <Continuous>  dextrose 50% Injectable 25 Gram(s) IV Push once  dextrose 50% Injectable 12.5 Gram(s) IV Push once  dextrose 50% Injectable 25 Gram(s) IV Push once  glucagon  Injectable 1 milliGRAM(s) IntraMuscular once  lactobacillus acidophilus 1 Tablet(s) Oral daily  levothyroxine 25 MICROGram(s) Oral daily  metoprolol tartrate 12.5 milliGRAM(s) Oral two times a day  pantoprazole  Injectable 40 milliGRAM(s) IV Push every 12 hours  tiotropium 2.5 MICROgram(s) Inhaler 2 Puff(s) Inhalation daily    MEDICATIONS  (PRN):  acetaminophen     Tablet .. 650 milliGRAM(s) Oral every 6 hours PRN Temp greater or equal to 38C (100.4F), Mild Pain (1 - 3)  aluminum hydroxide/magnesium hydroxide/simethicone Suspension 30 milliLiter(s) Oral every 4 hours PRN Dyspepsia  benzonatate 100 milliGRAM(s) Oral three times a day PRN Cough  dextrose Oral Gel 15 Gram(s) Oral once PRN Blood Glucose LESS THAN 70 milliGRAM(s)/deciliter  guaifenesin/dextromethorphan Oral Liquid 10 milliLiter(s) Oral every 4 hours PRN Cough  melatonin 3 milliGRAM(s) Oral at bedtime PRN Insomnia  ondansetron Injectable 4 milliGRAM(s) IV Push every 8 hours PRN Nausea and/or Vomiting    Vital Signs Last 24 Hrs  T(C): 36.3 (17 Jan 2023 09:11), Max: 36.7 (16 Jan 2023 12:27)  T(F): 97.4 (17 Jan 2023 09:11), Max: 98 (16 Jan 2023 12:27)  HR: 63 (17 Jan 2023 09:11) (61 - 73)  BP: 123/50 (17 Jan 2023 09:11) (92/54 - 123/50)  BP(mean): --  RR: 18 (17 Jan 2023 09:11) (17 - 18)  SpO2: 98% (17 Jan 2023 09:11) (92% - 98%)    Parameters below as of 17 Jan 2023 09:11  Patient On (Oxygen Delivery Method): room air        I&O's Summary      PHYSICAL EXAM:    Constitutional: NAD, appears frail   HEENT: PERR, EOMI,  No oral cyananosis.  Neck:  supple,  No JVD  Respiratory: Breath sounds with rales at LLL  Cardiovascular: S1 and S2, regular rate and rhythm, no Murmurs, gallops or rubs  Gastrointestinal: Bowel Sounds present, soft, nontender.   Extremities: No peripheral edema. No clubbing or cyanosis.  Vascular: 2+ peripheral pulses  Skin: No rashes. Ecchymoses      LABS: All Labs Reviewed:                                               7.6    49.40 )-----------( 172      ( 16 Jan 2023 07:14 )             25.4                         9.3    73.26 )-----------( 224      ( 15 Marcel 2023 18:32 )             30.3                         9.1    47.10 )-----------( 160      ( 15 Marcel 2023 08:21 )             29.6       16 Jan 2023 07:14    x      |  x      |  x      ----------------------------<  x      x       |  x      |  0.92   15 Marcel 2023 18:32    142    |  108    |  58     ----------------------------<  254    4.3     |  26     |  1.13   15 Marcel 2023 08:21    141    |  109    |  46     ----------------------------<  228    5.1     |  26     |  0.96     Ca    9.2        15 Jan 2023 18:32  Ca    9.1        15 Jan 2023 08:21  Ca    8.8        14 Jan 2023 07:34  Phos  2.7       15 Jan 2023 18:32  Mg     2.1       15 Jan 2023 18:32    TPro  6.0    /  Alb  2.2    /  TBili  0.3    /  DBili  0.1    /  AST  18     /  ALT  17     /  AlkPhos  76     16 Jan 2023 07:14  TPro  6.9    /  Alb  2.4    /  TBili  0.3    /  DBili  <0.1   /  AST  20     /  ALT  18     /  AlkPhos  90     15 Jan 2023 08:21  TPro  6.4    /  Alb  2.3    /  TBili  0.2    /  DBili  <0.1   /  AST  16     /  ALT  14     /  AlkPhos  79     14 Jan 2023 07:34          Blood Culture: Organism --  Gram Stain Blood -- Gram Stain --  Specimen Source Clean Catch Clean Catch (Midstream)  Culture-Blood --    Organism --  Gram Stain Blood -- Gram Stain --  Specimen Source .Blood None  Culture-Blood --      01-14 @ 07:34  Pro Bnp 1941        RADIOLOGY/EKG:< from: 12 Lead ECG (01.11.23 @ 14:35) >  Diagnosis Line Normal sinus rhythm  Possible Left atrial enlargement  T wave abnormality, consider anterolateral ischemia  Abnormal ECG  Confirmed by LAURIE CERON, ALEKS ([id number]) on 1/12/2023 7:44:23 AM    < end of copied text >       PCP:    REQUESTING PHYSICIAN:    REASON FOR CONSULT:    CHIEF COMPLAINT:    HPI:  89 y/o F with PMH CLL, colon cancer, hypothyroidism, emphysema, PNA, anemia presented with cough for 2 days. Pt was not feeling herself and her daughter was sick and tested positive fof COVID. The pt tested positive Monday night. She reports hoarseness, cough productive of mucus, congestion, constipation. Her O2 at home was 88%. She was vaccinated for COVID x 4. Denies fevers, chills, chest pain, abdominal pain, N/V, diarrhea/constipation.     Prior admission:   - 8/2/22: low BP, SOB -> Hb 5.7 -> s/p 2 units PRBCs     ER course: BP 94/49, RR 22, SpO2 86% -> 97% on 3L nasal cannula. Labs: WBC 36.67, Hb 10 (baseline ~9), , , neutrophils 84%, D-dimer 637, INR 1.20, troponin 58.55 -> 63.37, glucose 172, albumin 2.9, . COVID 19 positive. EKG: NSR with HR 76 bpm, normal intervals, no ST segment changes, T wave inversions in V3 and V4 (personally reviewed).     Imaging:   - CXR: increased opacities bilaterally, consolidation RLL, no pneumothorax, no effusion (personally reviewed). -> official read: 1. There is a diffuse bilateral reticulonodular pattern, increased from the prior exam and greatest in the lower lobes. Infectious or inflammatory etiologies are favored over CHF. The latter however cannot be entirely excluded.  - CTA: No pulmonary embolus is noted. Bronchiectasis is noted within both lungs. In addition, few ill-defined nodular opacities as well as tree-in-bud opacities are noted within both lungs. Exact etiology of the findings is unclear, however, the constellation of the above findings suggest possible Mycobacterium avium complex infection.    Pt was given Remdesivir, Decadron. She is being admitted to med/surg for further management.  (11 Jan 2023 21:34) Cardiology was called to evaluate above symptoms and to evaluate episode of atrial fibrillation. Pt is sleeping but arouses with effort. She is unable to provide a history.     1/17/23: Patient without complaints of chest pain or sob.  +cough however improving.  Tele: sinus rhythm, PVC's and bigemeny      PAST MEDICAL & SURGICAL HISTORY:  CLL (chronic lymphocytic leukemia)      Colon cancer      Hypothyroidism      History of emphysema      Pneumonia      Anemia      S/P colon resection          Allergies    cefdinir (Diarrhea)    Intolerances        SOCIAL HISTORY:    FAMILY HISTORY:  FHx: diabetes mellitus (Mother)    FH: hypertension (Father)    MEDICATIONS  (STANDING):  albuterol    0.083% 2.5 milliGRAM(s) Nebulizer every 4 hours  budesonide 160 MICROgram(s)/formoterol 4.5 MICROgram(s) Inhaler 2 Puff(s) Inhalation two times a day  cholecalciferol 1000 Unit(s) Oral daily  cyanocobalamin 1000 MICROGram(s) Oral daily  dextrose 5%. 1000 milliLiter(s) (100 mL/Hr) IV Continuous <Continuous>  dextrose 5%. 1000 milliLiter(s) (50 mL/Hr) IV Continuous <Continuous>  dextrose 50% Injectable 25 Gram(s) IV Push once  dextrose 50% Injectable 12.5 Gram(s) IV Push once  dextrose 50% Injectable 25 Gram(s) IV Push once  glucagon  Injectable 1 milliGRAM(s) IntraMuscular once  lactobacillus acidophilus 1 Tablet(s) Oral daily  levothyroxine 25 MICROGram(s) Oral daily  metoprolol tartrate 12.5 milliGRAM(s) Oral two times a day  pantoprazole  Injectable 40 milliGRAM(s) IV Push every 12 hours  tiotropium 2.5 MICROgram(s) Inhaler 2 Puff(s) Inhalation daily    MEDICATIONS  (PRN):  acetaminophen     Tablet .. 650 milliGRAM(s) Oral every 6 hours PRN Temp greater or equal to 38C (100.4F), Mild Pain (1 - 3)  aluminum hydroxide/magnesium hydroxide/simethicone Suspension 30 milliLiter(s) Oral every 4 hours PRN Dyspepsia  benzonatate 100 milliGRAM(s) Oral three times a day PRN Cough  dextrose Oral Gel 15 Gram(s) Oral once PRN Blood Glucose LESS THAN 70 milliGRAM(s)/deciliter  guaifenesin/dextromethorphan Oral Liquid 10 milliLiter(s) Oral every 4 hours PRN Cough  melatonin 3 milliGRAM(s) Oral at bedtime PRN Insomnia  ondansetron Injectable 4 milliGRAM(s) IV Push every 8 hours PRN Nausea and/or Vomiting    Vital Signs Last 24 Hrs  T(C): 36.3 (17 Jan 2023 09:11), Max: 36.7 (16 Jan 2023 12:27)  T(F): 97.4 (17 Jan 2023 09:11), Max: 98 (16 Jan 2023 12:27)  HR: 63 (17 Jan 2023 09:11) (61 - 73)  BP: 123/50 (17 Jan 2023 09:11) (92/54 - 123/50)  BP(mean): --  RR: 18 (17 Jan 2023 09:11) (17 - 18)  SpO2: 98% (17 Jan 2023 09:11) (92% - 98%)    Parameters below as of 17 Jan 2023 09:11  Patient On (Oxygen Delivery Method): room air        I&O's Summary      PHYSICAL EXAM:    Constitutional: NAD, appears frail   HEENT: PERR, EOMI,  No oral cyananosis.  Neck:  supple,  No JVD  Respiratory: Breath sounds with rales at LLL  Cardiovascular: S1 and S2, regular rate and rhythm, no Murmurs, gallops or rubs  Gastrointestinal: Bowel Sounds present, soft, nontender.   Extremities: No peripheral edema. No clubbing or cyanosis.  Vascular: 2+ peripheral pulses  Skin: No rashes. Ecchymoses      LABS: All Labs Reviewed:                                               7.6    49.40 )-----------( 172      ( 16 Jan 2023 07:14 )             25.4                         9.3    73.26 )-----------( 224      ( 15 Marcel 2023 18:32 )             30.3                         9.1    47.10 )-----------( 160      ( 15 Marcel 2023 08:21 )             29.6       16 Jan 2023 07:14    x      |  x      |  x      ----------------------------<  x      x       |  x      |  0.92   15 Marcel 2023 18:32    142    |  108    |  58     ----------------------------<  254    4.3     |  26     |  1.13   15 Marcel 2023 08:21    141    |  109    |  46     ----------------------------<  228    5.1     |  26     |  0.96     Ca    9.2        15 Jan 2023 18:32  Ca    9.1        15 Jan 2023 08:21  Ca    8.8        14 Jan 2023 07:34  Phos  2.7       15 Jan 2023 18:32  Mg     2.1       15 Jan 2023 18:32    TPro  6.0    /  Alb  2.2    /  TBili  0.3    /  DBili  0.1    /  AST  18     /  ALT  17     /  AlkPhos  76     16 Jan 2023 07:14  TPro  6.9    /  Alb  2.4    /  TBili  0.3    /  DBili  <0.1   /  AST  20     /  ALT  18     /  AlkPhos  90     15 Jan 2023 08:21  TPro  6.4    /  Alb  2.3    /  TBili  0.2    /  DBili  <0.1   /  AST  16     /  ALT  14     /  AlkPhos  79     14 Jan 2023 07:34          Blood Culture: Organism --  Gram Stain Blood -- Gram Stain --  Specimen Source Clean Catch Clean Catch (Midstream)  Culture-Blood --    Organism --  Gram Stain Blood -- Gram Stain --  Specimen Source .Blood None  Culture-Blood --      01-14 @ 07:34  Pro Bnp 1941        RADIOLOGY/EKG:< from: 12 Lead ECG (01.11.23 @ 14:35) >  Diagnosis Line Normal sinus rhythm  Possible Left atrial enlargement  T wave abnormality, consider anterolateral ischemia  Abnormal ECG  Confirmed by LAURIE CERON, [facility] ([id number]) on 1/12/2023 7:44:23 AM    < end of copied text >    < from: TTE Echo Complete w/o Contrast w/ Doppler (07.19.22 @ 14:27) >  The aortic valve is well visualized, appears mildly calcified.   Valve opening seems to be normal.   Mild (1+) aortic regurgitation is present.   The left atrium is mildly dilated.   The left ventricle is normal in size, wall thickness, wall motion and   contractility.   Estimated left ventricular ejection fraction is 60-65% %.   The IVC appears underdistended.   The mitral valve leaflets appear slightly thickened.   Minimal mitral annular calcification is present.   At least moderate (2+) eccentric mitral regurgitation is present.   EA reversal of the mitral inflow consistent with reduced compliance of   the   leftventricle.   No evidence of pericardial effusion.   No evidence of pleural effusion.   Normal appearing pulmonic valve structure.   Mild pulmonic valvular regurgitation (1+) is present.   Normal appearing right atrium.   Normal appearing right ventricle structure and function.   Normal appearing tricuspid valve structure.   Mild to Moderate Tricuspid regurgitation is present.   Mild pulmonary hypertension.    < end of copied text >

## 2023-01-17 NOTE — CONSULT NOTE ADULT - ASSESSMENT
89 yo female with history of stage III LEFT sided COLON CA as well as CLL and PARMJIT now admitted for COVID     COVID   - now on Dex and Remdesivir   - patient also on empiric coverage for CAP     CLL   - no history of hypogamma   - would continue following serial CBCs     COLON CA   - no hx of adjuvnat therapy   - stable no evidence of recurrnece      Iron deficiency ]  - resolved   - rupalin has received routine q two month outpatient labs via [facility] as patient resides [facility] in [facility]   
Pt is a 89y old Female with hx of CLL, colon cancer, hypothyroidism, COPD, PNA, anemia presented with cough/SOB for 2 days after testing COVID+ at home and at urgent care. Symptoms reported limited to cough and mild SOB, improved with oxygen administration. Palliative medicine consulted to help establish GOC and advance care planning     1) Acute Hypoxic respiratory failure   - COVID   - history of Heart Failure - no evidence of volume overload at this time   - CT chest   - monitor O2 sats   - c/w supplemental O2 PRN   - s/p remdesivir and dexamethasone, azithromycin   - c/w Symbicort, Spiriva, Albuterol nebs, Mucinex, Tessalon for cough  - c/w ceftriaxone   - pulmonology consult appreciated   - ID consult appreciated     2) Severe Protein calorie malnutrition   - registered dietician consult appreciated   - encourage PO intake      3) CLL   - monitor labs  - oncology consult appreciated  - history of colon cancer - no sign of reoccurrence per oncology      --Reviewed dx/treatment problems and alignment with Goals of Care    Physical Aspects of Care  --Pain  patient denies at this time  c/w current management    --Bowel Regimen  denies constipation  risk for constipation d/t immobility  daily dulcolax    --Dyspnea  No SOB at this time  - c/w supplemental O2    --Nausea Vomiting  denies    --Weakness  PT as tolerated     Psychological and Psychiatric Aspects of Care:   --Greif/Bereavment: emotional support provided  -Pastoral Care Available PRN     Social Aspects of Care  -SW involved     Cultural Aspects  -Primary Language: English    Goals of Care: will reach out to patients daughters     We discussed Palliative Care team being a supportive team when a patient has ongoing illnesses.  We also discussed that it is not an end of life care service, but can help navigate symptoms and emotional support through out their hospital stay here.    Ethical and Legal Aspects:   NA    Capacity- patient appears to have capacity but defers to her daughters     HCP/Surrogate: HCP on one content naming Nata named as HCP with alternate miller     Code Status- DNR/I   MOLST- on chart   Dispo Plan-     Discussed With: Dr. Campos coordinated with attending and SW and RN   
89 y/o F with PMH CLL, colon cancer, hypothyroidism, emphysema, PNA, anemia admitted on 1/11 for evaluation of cough over preceding 2 days; has been COVID-19 vaccinated and boosted; Is on supplemental oxygen via nasal cannula; is awake and alert.     1. Patient admitted with COVID-19 infection superimposed on bronchiectasis; patient is immunocompromised given CLL; noted with leukocytosis most likely due to CLL  - follow up cultures   - serial cbc and monitor temperature   - reviewed prior medical records to evaluate for resistant or atypical pathogens   - oxygen and nebs as needed   - iv hydration and supportive care   - continue isolation  - started on lower dose remdesivir given her weight, monitor renal and hepatic function  - continue dexamethasone as ordered  - started on ceftriaxone and zithromax, which can continue  2. other issues; per medicine
1) COVID  2) Aspiration  3) Bronchiectasis  4) Dyspnea  5) Abnormal CXR    89 y/o F with PMH CLL, colon cancer, hypothyroidism, emphysema, PNA, anemia presented with cough for 2 days. Pt was not feeling herself and her daughter was sick and tested positive fof COVID. The pt tested positive Monday night. She reports hoarseness, cough productive of mucus, congestion, constipation. Her O2 at home was 88%. She was vaccinated for COVID x 4. Denies fevers, chills, chest pain, abdominal pain, N/V, diarrhea/constipation.   Found to have COVID+   CTA: No pulmonary embolus is noted. Bronchiectasis is noted within both lungs. In addition, few ill-defined nodular opacities as well as tree-in-bud opacities are noted within both lungs. Exact etiology of the findings is unclear, however, the constellation of the above findings suggest possible Mycobacterium avium complex infection.  Pt was given Remdesivir, Decadron.  Agree with current management  She is in no acute distress  O2 requirements are not increasing  Will continue to monito r

## 2023-01-17 NOTE — CONSULT NOTE ADULT - SUBJECTIVE AND OBJECTIVE BOX
HPI: Pt is a 89y old Female with hx of CLL, colon cancer, hypothyroidism, COPD, PNA, anemia presented with cough/SOB for 2 days after testing COVID+ at home and at urgent care. Symptoms reported limited to cough and mild SOB, improved with oxygen administration. Palliative medicine consulted to help establish GOC and advance care planning     2023 pt seen and examined with no family at bedside, patient alert and oriented, stating that she feels weak Nasal Cannula in place, patient denies other complaints at this time.      PAIN: ( )Yes   (x )No  DYSPNEA: (x ) Yes  ( ) No  Level: resolving - still on nasal cannula     PAST MEDICAL & SURGICAL HISTORY:  CLL (chronic lymphocytic leukemia)  Colon cancer  Hypothyroidism  History of emphysema  Pneumonia  Anemia  S/P colon resection    SOCIAL HX:    Hx opiate tolerance ( )YES  (x )NO    Baseline ADLs  (Prior to Admission)  ( ) Independent   ( )Dependent    FAMILY HISTORY:  FHx: diabetes mellitus (Mother)    FH: hypertension (Father)    Review of Systems:    Dyspnea- yes, worse on exertion   Anorexia- eating small amounts   Weakness- yes     All other systems reviewed and negative    PHYSICAL EXAM:    Vital Signs Last 24 Hrs  T(C): 36.3 (2023 09:11), Max: 36.7 (2023 12:27)  T(F): 97.4 (2023 09:11), Max: 98 (2023 12:27)  HR: 63 (2023 09:11) (61 - 73)  BP: 123/50 (2023 09:11) (92/54 - 123/50)  RR: 18 (2023 09:11) (17 - 18)  SpO2: 98% (2023 09:11) (92% - 98%)    Parameters below as of 2023 09:11  Patient On (Oxygen Delivery Method): room air    Daily Weight in k.5 (2023 05:00)    PPSV2: 30  %    General: frail cachetic elderly female in bed, NAD   Mental Status: alert and oriented   HEENT: nasal cannula in place  Lungs: diminished breath sounds b/l   Cardiac: S1S2 +   GI: nontender, nondistended, +BS   : +voiding  Ext: no edema   Neuro: intact, weakness       LABS:                        10.0   49.77 )-----------( 168      ( 2023 18:36 )             31.3     -16    x   |  x   |  x   ----------------------------<  x   x    |  x   |  0.92    Ca    9.2      15 Marcel 2023 18:32  Phos  2.7     01-15  Mg     2.1     01-15    TPro  6.0  /  Alb  2.2<L>  /  TBili  0.3  /  DBili  0.1  /  AST  18  /  ALT  17  /  AlkPhos  76  -      Albumin: Albumin, Serum: 2.2 g/dL ( @ 07:14)      Allergies    cefdinir (Diarrhea)    Intolerances      MEDICATIONS  (STANDING):  albuterol    0.083% 2.5 milliGRAM(s) Nebulizer every 4 hours  budesonide 160 MICROgram(s)/formoterol 4.5 MICROgram(s) Inhaler 2 Puff(s) Inhalation two times a day  cholecalciferol 1000 Unit(s) Oral daily  cyanocobalamin 1000 MICROGram(s) Oral daily  dextrose 5%. 1000 milliLiter(s) (50 mL/Hr) IV Continuous <Continuous>  dextrose 5%. 1000 milliLiter(s) (100 mL/Hr) IV Continuous <Continuous>  dextrose 50% Injectable 25 Gram(s) IV Push once  dextrose 50% Injectable 12.5 Gram(s) IV Push once  dextrose 50% Injectable 25 Gram(s) IV Push once  glucagon  Injectable 1 milliGRAM(s) IntraMuscular once  lactobacillus acidophilus 1 Tablet(s) Oral daily  levothyroxine 25 MICROGram(s) Oral daily  metoprolol tartrate 12.5 milliGRAM(s) Oral two times a day  pantoprazole  Injectable 40 milliGRAM(s) IV Push every 12 hours  tiotropium 2.5 MICROgram(s) Inhaler 2 Puff(s) Inhalation daily    MEDICATIONS  (PRN):  acetaminophen     Tablet .. 650 milliGRAM(s) Oral every 6 hours PRN Temp greater or equal to 38C (100.4F), Mild Pain (1 - 3)  aluminum hydroxide/magnesium hydroxide/simethicone Suspension 30 milliLiter(s) Oral every 4 hours PRN Dyspepsia  benzonatate 100 milliGRAM(s) Oral three times a day PRN Cough  dextrose Oral Gel 15 Gram(s) Oral once PRN Blood Glucose LESS THAN 70 milliGRAM(s)/deciliter  guaifenesin/dextromethorphan Oral Liquid 10 milliLiter(s) Oral every 4 hours PRN Cough  melatonin 3 milliGRAM(s) Oral at bedtime PRN Insomnia  ondansetron Injectable 4 milliGRAM(s) IV Push every 8 hours PRN Nausea and/or Vomiting      RADIOLOGY/ADDITIONAL STUDIES:    ACC: [id number] EXAM:  XR CHEST PORTABLE ROUTINE 1V                        PROCEDURE DATE:  2023    INTERPRETATION:  Exam:XR CHEST  clinical history:Covid pneumonia  Stable appearance of the chest with right basilar consolidation. Small   right pleural effusion.    IMPRESSION: Stable right basilar consolidation        ACC: [id number] EXAM:  CT ANGIO CHEST PULM ART [facility]                        PROCEDURE DATE:  2023    INTERPRETATION:  Clinical information: Evaluate for pulmonary embolus.   Exam is compared to previous study of 2022.  CT angiogram of the chest was obtained following administration of   intravenous contrast. Approximately 90 cc of Omnipaque 350 was   administered and 10 cc was discarded. Coronal, sagittal and MIP images   were submitted for review.  1.7 cm isodense structure is present just to the right of the esophagus   at the level of the thoracic inlet. It has slightly increased in size   when compared to previous exam when it measured approximately 1.2 cm.  No hilar and or mediastinal adenopathy is noted.  Heart is enlarged in size. No pericardial effusion is noted.   Calcification the coronary arteries is noted. Pulmonary arteries are   normal in caliber. No filling defects are noted.  No endobronchial lesions are noted. Mucous/secretions are noted within   few of the distal bronchi in the right lower lobe. Bronchiectasis is   noted within both lungs, more so in the right middle lobe and lingula.   Few ill-defined nodular opacities as well as tree-in-bud opacities are   noted within both lungs, more so in the right lung. Small bilateral   pleural effusions are noted.  Below the diaphragm, visualized portions of the abdomen are unremarkable.  Loss of height of several vertebral bodies is noted.  IMPRESSION: No pulmonary embolus is noted.  Bronchiectasis is noted within both lungs. In addition, few ill-defined   nodular opacities as well as tree-in-bud opacities are noted within both   lungs. Exact etiology of the findings is unclear, however, the   constellation of the above findings suggest possible Mycobacterium avium   complex infection.      ACC: [id number] EXAM:  XR CHEST PORTABLE URGENT 1V                        PROCEDURE DATE:  2023    INTERPRETATION:  INDICATION: Chest pain  COMPARISON: 2022    FINDINGS:  An AP portable supine radiograph of the chest demonstrates a   reticulonodular pattern throughout both lungs, greatest in the lower   lobes. This has increased from the prior exam. There is no pneumothorax.   There is no definite evidence of pleural effusion. There is no hilar or   mediastinal lesion seen. The cardiac silhouette is likely magnified by   technique, but is at least borderline. The bony thorax remains stable.    IMPRESSION:  1. There is a diffuse bilateral reticulonodular pattern, increased from   the prior exam and greatest in the lower lobes. Infectious or   inflammatory etiologies are favored over CHF. The latter however cannot   be entirely excluded.

## 2023-01-17 NOTE — DISCHARGE NOTE NURSING/CASE MANAGEMENT/SOCIAL WORK - PATIENT PORTAL LINK FT
You can access the FollowMyHealth Patient Portal offered by [facility] by registering at the following website: [email or web address]. By joining [facility]’s FollowMyHealth portal, you will also be able to view your health information using other applications (apps) compatible with our system.

## 2023-01-17 NOTE — PROGRESS NOTE ADULT - ASSESSMENT
89 y/o F with PMH CLL, colon cancer, hypothyroidism, COPD, PNA, anemia presented with cough/SOB for 2 days after testing COVID+.     Acute hypoxic respiratory distress and sepsis likely secondary to COVID, bronchiectasis, emphysema, PNA   - CT scan: with findings suggestive of possible mycobacterium avium complex infection   - Keep SpO2 88-92%, supplemental O2 PRN   - D/C Remdesivir (5 days complete)  - D/C dexamethasone (5 days complete)  - On Incruse at home -> start Symbicort and Spiriva while hospitalized   - Albuterol nebs, Mucinex, Tessalon for cough  - D/C Azithromycin (5 days complete)  - Continue Ceftriaxone  - Procalcitonin: 1.21 (01/11)  - UCx, BCx NGTD   - F/u sputum culture  - Trend WBC, monitor for temperatures   - Tylenol for temperatures PRN   - Repeat CXR 1/14/23: stable R basilar consolidation   - ID consult appreciated   - Pulmonology consult appreciated    HFpEF - no evidence of volume overload at this time   Elevated BNP   -  > 1941  - ECHO (7/19/22): mild 1+ AR, EF 60-65%, moderate 2+ MR, mild pulmonic regurgitation 1+, mild to moderate TR, mild pulmonary HTN. HFpEF.  - Strict I+Os, daily weights   - Keep K > 4 and Mg > 2   - c/w home medications   - IV Furosemide 20mg x 1 1/14, monitor and will give additional doses depending on UO  - Monitor renal function and electrolytes closely     Acute symptomatic anemia  Hypotension/ lethargy   - Previously improved. Yesterday 01/16- 87/43 with lethargy   - 500cc NS bolus administered   - Family aware/ at bedside   - Pending stool occult blood   - Post-transfusion h/h: 10/31.3    Elevated troponin likely secondary to demand ischemia   - Monitor on telemetry   - levels trended down to normal   - Initial EKG- NSR, no ST elevations.   - Troponin 58.55 > 63.37 > 51.50    Leukocytosis likely 2/2 CLL/PNA  - 73.26 01/15 > 49.40 yesterday 01/16  - Monitor closely (has been up to 60 in the past)   - Hematology Dr. Donis following  - D/w Dr. Kumar Heme/Onc     Thrombocytopenia   - Continue to monitor     Elevated D-dimer likely 2/2 infectious cause   - D-dimer 637, CTA negative for PE     VTE ppx  - Resume Lovenox     Advanced directives  - DNR/DNI    Dispo   - Continue with medical management    HCP/Daughter: Kat: [phone] . D/w daughter Kat today bedside.     89 y/o F with PMH CLL, colon cancer, hypothyroidism, COPD, PNA, anemia presented with cough/SOB for 2 days after testing COVID+.     Acute hypoxic respiratory distress and sepsis likely secondary to COVID, bronchiectasis, emphysema, PNA   - CT scan: with findings suggestive of possible mycobacterium avium complex infection   - Keep SpO2 88-92%, supplemental O2 PRN   - D/C Remdesivir (5 days complete)  - D/C dexamethasone (5 days complete)  - On Incruse at home -> start Symbicort and Spiriva while hospitalized   - Albuterol nebs, Mucinex, Tessalon for cough  - D/C Azithromycin (5 days complete)  - Continue Ceftriaxone  - Procalcitonin: 1.21 (01/11)  - UCx, BCx NGTD   - F/u sputum culture  - Trend WBC, monitor for temperatures   - Tylenol for temperatures PRN   - Repeat CXR 1/14/23: stable R basilar consolidation   - ID consult appreciated   - Pulmonology consult appreciated    HFpEF - no evidence of volume overload at this time   Elevated BNP   -  > 1941  - ECHO (7/19/22): mild 1+ AR, EF 60-65%, moderate 2+ MR, mild pulmonic regurgitation 1+, mild to moderate TR, mild pulmonary HTN. HFpEF.  - Strict I+Os, daily weights   - Keep K > 4 and Mg > 2   - c/w home medications   - IV Furosemide 20mg x 1 1/14, monitor and will give additional doses depending on UO  - Monitor renal function and electrolytes closely     Acute symptomatic anemia  Hypotension/ lethargy   - Previously improved. Yesterday 01/16- 87/43 with lethargy   - 500cc NS bolus administered   - Family aware/ at bedside   - Pending stool occult blood   - Post-transfusion h/h: 10/31.3    Elevated troponin likely secondary to demand ischemia   - Monitor on telemetry   - levels trended down to normal   - Initial EKG- NSR, no ST elevations.   - Troponin 58.55 > 63.37 > 51.50    Leukocytosis likely 2/2 CLL/PNA  - 73.26 01/15 > 49.40 yesterday 01/16  - Monitor closely (has been up to 60 in the past)   - Hematology Dr. Donis following  - D/w Dr. Kumar Heme/Onc     Thrombocytopenia   - Continue to monitor     Elevated D-dimer likely 2/2 infectious cause   - D-dimer 637, CTA negative for PE     VTE ppx  - Resume Lovenox     Advanced directives  - DNR/DNI    Dispo   - Continue with medical management    HCP/Daughter: Kat: [phone] .     89 y/o F with PMH CLL, colon cancer, hypothyroidism, COPD, PNA, anemia presented with cough/SOB for 2 days after testing COVID+.     Acute hypoxic respiratory distress and sepsis likely secondary to COVID, bronchiectasis, emphysema, PNA   - CT scan: with findings suggestive of possible mycobacterium avium complex infection   - Keep SpO2 88-92%, supplemental O2 PRN   - D/C Remdesivir (5 days complete)  - D/C dexamethasone (5 days complete)  - On Incruse at home -> start Symbicort and Spiriva while hospitalized   - Albuterol nebs, Mucinex, Tessalon for cough  - D/C Azithromycin (5 days complete)  - D/C Ceftriaxone  - Procalcitonin: 1.21 (01/11)  - UCx, BCx NGTD   - F/u sputum culture  - Trend WBC, monitor for temperatures   - Tylenol for temperatures PRN   - Repeat CXR 1/14/23: stable R basilar consolidation   - ID consult appreciated   - Pulmonology consult appreciated  -Pulse Ox exercise ordered   -Pt does not use O2 at home, now on RA with SpO2: >96%    HFpEF - no evidence of volume overload at this time   Elevated BNP   -  > 1941  - ECHO (7/19/22): mild 1+ AR, EF 60-65%, moderate 2+ MR, mild pulmonic regurgitation 1+, mild to moderate TR, mild pulmonary HTN. HFpEF.  - Strict I+Os, daily weights   - Keep K > 4 and Mg > 2   - c/w home medications   - IV Furosemide 20mg x 1 1/14, monitor and will give additional doses depending on UO  - Monitor renal function and electrolytes closely     Acute symptomatic anemia  Hypotension/ lethargy   - Previously improved. Yesterday 01/16- 87/43 with lethargy   - 500cc NS bolus administered   - Family aware/ at bedside   - Pending stool occult blood   - Post-transfusion h/h: 10/31.3    Elevated troponin likely secondary to demand ischemia   - Monitor on telemetry   - levels trended down to normal   - Initial EKG- NSR, no ST elevations.   - Troponin 58.55 > 63.37 > 51.50    Leukocytosis likely 2/2 CLL/PNA  - 73.26 01/15 > 49.40 yesterday 01/16  - Monitor closely (has been up to 60 in the past)   - Hematology Dr. Donis following  - D/w Dr. Kumar Heme/Onc     Thrombocytopenia   - Continue to monitor     Elevated D-dimer likely 2/2 infectious cause   - D-dimer 637, CTA negative for PE     VTE ppx  - Resume Lovenox     Advanced directives  - DNR/DNI    Dispo   - Continue with medical management    HCP/Daughter: Kat: [phone] .

## 2023-01-17 NOTE — PROGRESS NOTE ADULT - ASSESSMENT
1) COVID  2) Aspiration  3) Bronchiectasis  4) Dyspnea  5) Abnormal CXR    89 y/o F with PMH CLL, colon cancer, hypothyroidism, emphysema, PNA, anemia presented with cough for 2 days. Pt was not feeling herself and her daughter was sick and tested positive fof COVID. The pt tested positive Monday night. She reports hoarseness, cough productive of mucus, congestion, constipation. Her O2 at home was 88%. She was vaccinated for COVID x 4. Denies fevers, chills, chest pain, abdominal pain, N/V, diarrhea/constipation.   Found to have COVID+   CTA: No pulmonary embolus is noted. Bronchiectasis is noted within both lungs. In addition, few ill-defined nodular opacities as well as tree-in-bud opacities are noted within both lungs. Exact etiology of the findings is unclear, however, the constellation of the above findings suggest possible Mycobacterium avium complex infection.  Pt was given Remdesivir, Decadron.  Agree with current management  She is in no acute distress  O2 requirements are not increasing / stable  Will continue to monitor  1/16  seen and examined while in bed  weak and confused  steroids discontinued    fu HCT and labs with drop in HCT noted  workup as per primary team  Dc remedesevir after 5 doses  serial cbc and workup per hematology  1/17  events noted  s/p diuresis with hypotension and drop in HCT  now required PRBC transfusion  repeat labs pending  Dr Ramires will resume in am  already completed her course of steroids and Remedesevir  heme following for leukocytosis    overall prognosis remains guarded

## 2023-01-18 LAB
ALBUMIN SERPL ELPH-MCNC: 2.4 G/DL — LOW (ref 3.3–5)
ALP SERPL-CCNC: 91 U/L — SIGNIFICANT CHANGE UP (ref 40–120)
ALT FLD-CCNC: 15 U/L — SIGNIFICANT CHANGE UP (ref 12–78)
ANION GAP SERPL CALC-SCNC: 4 MMOL/L — LOW (ref 5–17)
AST SERPL-CCNC: 16 U/L — SIGNIFICANT CHANGE UP (ref 15–37)
BILIRUB DIRECT SERPL-MCNC: 0.2 MG/DL — SIGNIFICANT CHANGE UP (ref 0–0.3)
BILIRUB INDIRECT FLD-MCNC: 0.6 MG/DL — SIGNIFICANT CHANGE UP (ref 0.2–1)
BILIRUB SERPL-MCNC: 0.8 MG/DL — SIGNIFICANT CHANGE UP (ref 0.2–1.2)
BUN SERPL-MCNC: 47 MG/DL — HIGH (ref 7–23)
CALCIUM SERPL-MCNC: 8.8 MG/DL — SIGNIFICANT CHANGE UP (ref 8.5–10.1)
CHLORIDE SERPL-SCNC: 108 MMOL/L — SIGNIFICANT CHANGE UP (ref 96–108)
CO2 SERPL-SCNC: 31 MMOL/L — SIGNIFICANT CHANGE UP (ref 22–31)
CREAT SERPL-MCNC: 0.9 MG/DL — SIGNIFICANT CHANGE UP (ref 0.5–1.3)
EGFR: 59 ML/MIN/1.73M2 — LOW
GLUCOSE SERPL-MCNC: 168 MG/DL — HIGH (ref 70–99)
HCT VFR BLD CALC: 32.4 % — LOW (ref 34.5–45)
HGB BLD-MCNC: 10.3 G/DL — LOW (ref 11.5–15.5)
MCHC RBC-ENTMCNC: 31.8 GM/DL — LOW (ref 32–36)
MCHC RBC-ENTMCNC: 32.7 PG — SIGNIFICANT CHANGE UP (ref 27–34)
MCV RBC AUTO: 102.9 FL — HIGH (ref 80–100)
PLATELET # BLD AUTO: 202 K/UL — SIGNIFICANT CHANGE UP (ref 150–400)
POTASSIUM SERPL-MCNC: 4.3 MMOL/L — SIGNIFICANT CHANGE UP (ref 3.5–5.3)
POTASSIUM SERPL-SCNC: 4.3 MMOL/L — SIGNIFICANT CHANGE UP (ref 3.5–5.3)
PROT SERPL-MCNC: 6.4 GM/DL — SIGNIFICANT CHANGE UP (ref 6–8.3)
RBC # BLD: 3.15 M/UL — LOW (ref 3.8–5.2)
RBC # FLD: 17.5 % — HIGH (ref 10.3–14.5)
SODIUM SERPL-SCNC: 143 MMOL/L — SIGNIFICANT CHANGE UP (ref 135–145)
WBC # BLD: 44.51 K/UL — CRITICAL HIGH (ref 3.8–10.5)
WBC # FLD AUTO: 44.51 K/UL — CRITICAL HIGH (ref 3.8–10.5)

## 2023-01-18 PROCEDURE — 99232 SBSQ HOSP IP/OBS MODERATE 35: CPT

## 2023-01-18 PROCEDURE — 99233 SBSQ HOSP IP/OBS HIGH 50: CPT

## 2023-01-18 RX ADMIN — ALBUTEROL 2.5 MILLIGRAM(S): 90 AEROSOL, METERED ORAL at 06:00

## 2023-01-18 RX ADMIN — ALBUTEROL 2.5 MILLIGRAM(S): 90 AEROSOL, METERED ORAL at 00:58

## 2023-01-18 RX ADMIN — PANTOPRAZOLE SODIUM 40 MILLIGRAM(S): 20 TABLET, DELAYED RELEASE ORAL at 21:07

## 2023-01-18 RX ADMIN — ALBUTEROL 2.5 MILLIGRAM(S): 90 AEROSOL, METERED ORAL at 11:31

## 2023-01-18 RX ADMIN — Medication 1000 UNIT(S): at 11:33

## 2023-01-18 RX ADMIN — Medication 12.5 MILLIGRAM(S): at 21:06

## 2023-01-18 RX ADMIN — Medication 3 MILLIGRAM(S): at 21:07

## 2023-01-18 RX ADMIN — ALBUTEROL 2.5 MILLIGRAM(S): 90 AEROSOL, METERED ORAL at 20:30

## 2023-01-18 RX ADMIN — PANTOPRAZOLE SODIUM 40 MILLIGRAM(S): 20 TABLET, DELAYED RELEASE ORAL at 11:34

## 2023-01-18 RX ADMIN — ALBUTEROL 2.5 MILLIGRAM(S): 90 AEROSOL, METERED ORAL at 15:26

## 2023-01-18 RX ADMIN — Medication 12.5 MILLIGRAM(S): at 11:34

## 2023-01-18 RX ADMIN — Medication 1 TABLET(S): at 11:34

## 2023-01-18 RX ADMIN — Medication 25 MICROGRAM(S): at 05:53

## 2023-01-18 RX ADMIN — BUDESONIDE AND FORMOTEROL FUMARATE DIHYDRATE 2 PUFF(S): 160; 4.5 AEROSOL RESPIRATORY (INHALATION) at 20:33

## 2023-01-18 RX ADMIN — PREGABALIN 1000 MICROGRAM(S): 225 CAPSULE ORAL at 11:34

## 2023-01-18 NOTE — PROGRESS NOTE ADULT - PROBLEM SELECTOR PLAN 2
Problem/Recommendation - 2:  ·  Problem: Dyspnea.   ·  Recommendation: Probably secondary to pulmonary/Covid process. Would not diurese. Continue current treatment.

## 2023-01-18 NOTE — PROGRESS NOTE ADULT - ASSESSMENT
89 y/o F with PMH CLL, colon cancer, hypothyroidism, COPD, PNA, anemia presented with cough/SOB for 2 days after testing COVID+.     Acute hypoxic respiratory distress and sepsis likely secondary to COVID, bronchiectasis, emphysema, PNA   - CT scan: with findings suggestive of possible mycobacterium avium complex infection   - Keep SpO2 88-92%, supplemental O2 PRN   - D/C Remdesivir (5 days complete)  - D/C dexamethasone (5 days complete)  - On Incruse at home -> start Symbicort and Spiriva while hospitalized   - Albuterol nebs, Mucinex, Tessalon for cough  - D/C Azithromycin (5 days complete)  - D/C Ceftriaxone  - Procalcitonin: 1.21 (01/11)  - UCx, BCx NGTD   - F/u sputum culture  - Trend WBC, monitor for temperatures   - Tylenol for temperatures PRN   - Repeat CXR 1/14/23: stable R basilar consolidation   - ID consult appreciated   - Pulmonology consult appreciated  -Pulse Ox exercise ordered   -Pt does not use O2 at home, now on RA with SpO2: >96%    HFpEF - no evidence of volume overload at this time   Elevated BNP   -  > 1941  - ECHO (7/19/22): mild 1+ AR, EF 60-65%, moderate 2+ MR, mild pulmonic regurgitation 1+, mild to moderate TR, mild pulmonary HTN. HFpEF.  - Strict I+Os, daily weights   - Keep K > 4 and Mg > 2   - c/w home medications   - IV Furosemide 20mg x 1 1/14, monitor and will give additional doses depending on UO  - Monitor renal function and electrolytes closely     Acute symptomatic anemia  Hypotension/ lethargy   - Previously improved. Yesterday 01/16- 87/43 with lethargy   - 500cc NS bolus administered   - Family aware/ at bedside   - Pending stool occult blood   - Post-transfusion h/h: 10/31.3    Elevated troponin likely secondary to demand ischemia   - Monitor on telemetry   - levels trended down to normal   - Initial EKG- NSR, no ST elevations.   - Troponin 58.55 > 63.37 > 51.50    Leukocytosis likely 2/2 CLL/PNA  - 73.26 01/15 > 49.40 yesterday 01/16  - Monitor closely (has been up to 60 in the past)   - Hematology Dr. Donis following  - D/w Dr. Kumar Heme/Onc     Thrombocytopenia   - Continue to monitor     Elevated D-dimer likely 2/2 infectious cause   - D-dimer 637, CTA negative for PE     VTE ppx  - Resume Lovenox     Advanced directives  - DNR/DNI    Dispo   - Continue with medical management    HCP/Daughter: Kat: [phone] . 89 y/o F with PMH CLL, colon cancer, hypothyroidism, COPD, PNA, anemia presented with cough/SOB for 2 days after testing COVID+.     Acute hypoxic respiratory failure  and sepsis likely secondary to COVID PNA,  bronchiectasis,  baseline emphysema   - CT scan: reviewed  - Repeat CXR 1/14/23: stable R basilar consolidation   - Keep SpO2 > 92%, supplemental O2 PRN, pulse ox stable at rest but desats with exertion w/o O2    - completed  Remdesivir and  dexamethasone 5 days  - completed Azithromycin and Ceftriaxone 5 days   - On Incruse at home -> c/w Symbicort and Spiriva while hospitalized   - Albuterol nebs, Mucinex, Tessalon for cough  - Procalcitonin: 1.21 (01/11)  - UCx, BCx NGTD   - F/u sputum culture  - will ask RT to evaluate for home O2   - ID and Pilm recs noted     HFpEF - no evidence of volume overload at this time   Elevated BNP   - s/p 1 dose of  lasix  trial. No further doses administered   - ECHO (7/19/22): mild 1+ AR, EF 60-65%, moderate 2+ MR, mild pulmonic regurgitation 1+, mild to moderate TR, mild pulmonary HTN.  IVC not dilated   - I+Os, daily weights   - Keep K > 4 and Mg > 2   - c/w home medications   - Monitor renal function and electrolytes closely     Acute symptomatic anemia  Hypotension/ lethargy   - improved  -  S/p 500cc NS bolus and 1U PRBCs  - Pending stool occult blood   - H/H stable  - no signs of acute bleeding     Elevated troponin likely secondary to demand ischemia   - levels trended down to normal   - Initial EKG- NSR, no ST elevations.   - Troponin 58.55 > 63.37 > 51.50    Leukocytosis likely 2/2 CLL/PNA  - improved   - Monitor closely (has been up to 60 in the past)   - Hematology Dr. Donis following    Thrombocytopenia   - Continue to monitor     Elevated D-dimer likely 2/2 infectious cause/COVID   - D-dimer 637, CTA negative for PE   C/w DVt PPX     VTE ppx  - Resume Lovenox     Advanced directives  - DNR/DNI    Dispo   D/w Planning home with HC, RT to evaluate for home O2     HCP/Daughter: Kat: [phone] .

## 2023-01-18 NOTE — PROGRESS NOTE ADULT - REASON FOR ADMISSION
Cough
Cough / SOB / COVID +
Cough/ Hypoxia/ COVID
Cough

## 2023-01-18 NOTE — PROGRESS NOTE ADULT - SUBJECTIVE AND OBJECTIVE BOX
Patient is a 89y old  Female who presents with a chief complaint of Cough (2023 21:34)      HPI:  89 y/o F with PMH CLL, colon cancer, hypothyroidism, emphysema, PNA, anemia presented with cough for 2 days. Pt was not feeling herself and her daughter was sick and tested positive fof COVID. The pt tested positive Monday night. She reports hoarseness, cough productive of mucus, congestion, constipation. Her O2 at home was 88%. She was vaccinated for COVID x 4. Denies fevers, chills, chest pain, abdominal pain, N/V, diarrhea/constipation.   Found to have COVID+   CTA: No pulmonary embolus is noted. Bronchiectasis is noted within both lungs. In addition, few ill-defined nodular opacities as well as tree-in-bud opacities are noted within both lungs. Exact etiology of the findings is unclear, however, the constellation of the above findings suggest possible Mycobacterium avium complex infection.  Pt was given Remdesivir, Decadron.      No acute pulmonary events occurred overnight     MEDICATIONS  (STANDING):  albuterol    0.083% 2.5 milliGRAM(s) Nebulizer every 4 hours  budesonide 160 MICROgram(s)/formoterol 4.5 MICROgram(s) Inhaler 2 Puff(s) Inhalation two times a day  cholecalciferol 1000 Unit(s) Oral daily  cyanocobalamin 1000 MICROGram(s) Oral daily  lactobacillus acidophilus 1 Tablet(s) Oral daily  levothyroxine 25 MICROGram(s) Oral daily  metoprolol tartrate 12.5 milliGRAM(s) Oral two times a day  pantoprazole  Injectable 40 milliGRAM(s) IV Push every 12 hours  tiotropium 2.5 MICROgram(s) Inhaler 2 Puff(s) Inhalation daily    MEDICATIONS  (PRN):  acetaminophen     Tablet .. 650 milliGRAM(s) Oral every 6 hours PRN Temp greater or equal to 38C (100.4F), Mild Pain (1 - 3)  aluminum hydroxide/magnesium hydroxide/simethicone Suspension 30 milliLiter(s) Oral every 4 hours PRN Dyspepsia  benzonatate 100 milliGRAM(s) Oral three times a day PRN Cough  guaifenesin/dextromethorphan Oral Liquid 10 milliLiter(s) Oral every 4 hours PRN Cough  melatonin 3 milliGRAM(s) Oral at bedtime PRN Insomnia  ondansetron Injectable 4 milliGRAM(s) IV Push every 8 hours PRN Nausea and/or Vomiting      Vital Signs Last 24 Hrs  T(C): 36.6 (2023 08:45), Max: 36.6 (2023 08:45)  T(F): 97.9 (2023 08:45), Max: 97.9 (2023 08:45)  HR: 76 (2023 08:45) (63 - 76)  BP: 121/71 (2023 08:45) (95/51 - 123/50)  BP(mean): --  RR: 18 (2023 08:45) (18 - 18)  SpO2: 96% (2023 08:45) (94% - 98%)    Parameters below as of 2023 08:45  Patient On (Oxygen Delivery Method): nasal cannula        PHYSICAL EXAM  General Appearance: cooperative, no acute distress,   HEENT: PERRL, conjunctiva clear, EOM's intact, non injected pharynx, no exudate, TM   normal  Neck: Supple, , no adenopathy, thyroid: not enlarged, no carotid bruit or JVD  Back: Symmetric, no  tenderness,no soft tissue tenderness  Lungs: coarse sai, +Wheezing in the LLL and RLL on exhalation , improved  Heart: Regular rate and rhythm, S1, S2 normal, no murmur, rub or gallop  Abdomen: Soft, non-tender, bowel sounds active , no hepatosplenomegaly  Extremities: no cyanosis or edema, no joint swelling  Skin: Skin color, texture normal, no rashes   Neurologic: Alert and oriented X3 , cranial nerves intact, sensory and motor normal,    ECG:    LABS:                        7.6    x     )-----------( 172      ( 2023 07:14 )             25.4   01-16    x   |  x   |  x   ----------------------------<  x   x    |  x   |  0.92    Ca    9.2      15 Marcel 2023 18:32  Phos  2.7     01-15  Mg     2.1     01-15    TPro  6.0  /  Alb  2.2<L>  /  TBili  0.3  /  DBili  0.1  /  AST  18  /  ALT  17  /  AlkPhos  76                                     9.1    47.10 )-----------( 160      ( 15 Marcel 2023 08:21 )             29.6   15    141  |  109<H>  |  46<H>  ----------------------------<  228<H>  5.1   |  26  |  0.96    Ca    9.1      15 Marcel 2023 08:21    TPro  6.9  /  Alb  2.4<L>  /  TBili  0.3  /  DBili  <0.1  /  AST  20  /  ALT  18  /  AlkPhos  90  01-15               10.0   36.67 )-----------( 120      ( 2023 15:29 )             31.3     0111    135  |  101  |  45<H>  ----------------------------<  172<H>  4.6   |  29  |  1.12    Ca    9.6      2023 15:29  Mg     2.2         TPro  7.4  /  Alb  2.9<L>  /  TBili  0.9  /  DBili  x   /  AST  15  /  ALT  11<L>  /  AlkPhos  96            Pro BNP  950 -11 @ 15:29  D Dimer  637 11 @ 15:29    PT/INR - ( 2023 15:29 )   PT: 14.0 sec;   INR: 1.20 ratio           Urinalysis Basic - ( 2023 04:55 )    Color: Yellow / Appearance: Clear / S.010 / pH: x  Gluc: x / Ketone: Negative  / Bili: Negative / Urobili: Negative   Blood: x / Protein: Trace mg/dL / Nitrite: Negative   Leuk Esterase: Negative / RBC: 0-2 /HPF / WBC 0-2 /HPF   Sq Epi: x / Non Sq Epi: Few / Bacteria: Few    < from: CT Angio Chest PE Protocol w/ IV Cont (23 @ 19:16) >    1.7 cm isodense structure is present just to the right of the esophagus   at the level of the thoracic inlet. It has slightly increased in size   when compared to previous exam when it measured approximately 1.2 cm.    No hilar and or mediastinal adenopathy is noted.    Heart is enlarged in size. No pericardial effusion is noted.   Calcification the coronary arteries is noted. Pulmonary arteries are   normal in caliber. No filling defects are noted.    No endobronchial lesions are noted. Mucous/secretions are noted within   few of the distal bronchi in the right lower lobe. Bronchiectasis is   noted within both lungs, more so in the right middle lobe and lingula.   Few ill-defined nodular opacities as well as tree-in-bud opacities are   noted within both lungs, more so in the right lung. Small bilateral   pleural effusions are noted.    Below the diaphragm, visualized portions of the abdomen are unremarkable.    Loss of height of several vertebral bodies is noted.    IMPRESSION: No pulmonary embolus is noted.    Bronchiectasis is noted within both lungs. In addition, few ill-defined   nodular opacities as well as tree-in-bud opacities are noted within both   lungs. Exact etiology of the findings is unclear, however, the   constellation of the above findings suggest possible Mycobacterium avium   complex infection.    < end of copied text >          RADIOLOGY & ADDITIONAL STUDIES:

## 2023-01-18 NOTE — PROGRESS NOTE ADULT - NS ATTEND AMEND GEN_ALL_CORE FT
Patient with acute covid infection , new onset atrial fibrillation , converted to sinus , improved blood pressure with IV fluid intake  PRBC transfusion,   No AC due to severe recurrent anemia , short duration of atrial fibrillation in acute covid infection , continue monitor  continue metoprolol 12.5 mg pO BID , echo to follow up
Patient with acute covid infection , new onset atrial fibrillation , converted to sinus , improved blood pressure with IV fluid intake  PRBC transfusion,   No AC due to severe recurrent anemia , short duration of atrial fibrillation in acute covid infection , continue monitor  continue metoprolol 12.5 mg pO BID , echo to follow up

## 2023-01-18 NOTE — PROGRESS NOTE ADULT - PROBLEM SELECTOR PLAN 1
Problem: Paroxysmal atrial fibrillation.   ·  Recommendation: Recorded PAF ~45 min. Tele shows RSR with PVC's and ventricular bigemeny, No further episodes afib noted.  Continue metoprolol as prescribed. Echo and labs from today pending.  Patient s/p transfusion 1 unit PRBC 1/16/23  Would not anticoagulate at this time.
Problem: Paroxysmal atrial fibrillation.   ·  Recommendation: Recorded PAF ~45 min. Tele shows RSR with PVC's and ventricular bigemeny, No further episodes afib noted.  Continue metoprolol as prescribed.   Patient s/p transfusion 1 unit PRBC 1/16/23  Would not anticoagulate at this time.    pt. is stable from cardiac standpoint, Palliative was consulted, will sign off, consult prn

## 2023-01-18 NOTE — PROGRESS NOTE ADULT - SUBJECTIVE AND OBJECTIVE BOX
CC: Cough (18 Jan 2023 08:56)    HPI: 89 y/o F with PMH CLL, colon cancer, hypothyroidism, emphysema, PNA, anemia presented with cough for 2 days. Pt was not feeling herself and her daughter was sick and tested positive fof COVID. The pt tested positive Monday night. She reports hoarseness, cough productive of mucus, congestion, constipation. Her O2 at home was 88%. She was vaccinated for COVID x 4. Denies fevers, chills, chest pain, abdominal pain, N/V, diarrhea/constipation.     Prior admission:   - 8/2/22: low BP, SOB -> Hb 5.7 -> s/p 2 units PRBCs     ER course: BP 94/49, RR 22, SpO2 86% -> 97% on 3L nasal cannula. Labs: WBC 36.67, Hb 10 (baseline ~9), , , neutrophils 84%, D-dimer 637, INR 1.20, troponin 58.55 -> 63.37, glucose 172, albumin 2.9, . COVID 19 positive. EKG: NSR with HR 76 bpm, normal intervals, no ST segment changes, T wave inversions in V3 and V4 (personally reviewed).     Imaging:   - CXR: increased opacities bilaterally, consolidation RLL, no pneumothorax, no effusion (personally reviewed). -> official read: 1. There is a diffuse bilateral reticulonodular pattern, increased from the prior exam and greatest in the lower lobes. Infectious or inflammatory etiologies are favored over CHF. The latter however cannot be entirely excluded.  - CTA: No pulmonary embolus is noted. Bronchiectasis is noted within both lungs. In addition, few ill-defined nodular opacities as well as tree-in-bud opacities are noted within both lungs. Exact etiology of the findings is unclear, however, the constellation of the above findings suggest possible Mycobacterium avium complex infection.    Pt was given Remdesivir, Decadron. She is being admitted to med/surg for further management.      INTERVAL HPI/ OVERNIGHT EVENTS:    Vital Signs Last 24 Hrs  T(C): 36.6 (18 Jan 2023 08:45), Max: 36.6 (18 Jan 2023 08:45)  T(F): 97.9 (18 Jan 2023 08:45), Max: 97.9 (18 Jan 2023 08:45)  HR: 101 (18 Jan 2023 11:34) (67 - 101)  BP: 121/71 (18 Jan 2023 08:45) (95/51 - 121/71)  BP(mean): --  RR: 18 (18 Jan 2023 08:45) (18 - 18)  SpO2: 84% (18 Jan 2023 12:30) (84% - 96%)    Parameters below as of 18 Jan 2023 12:30  Patient On (Oxygen Delivery Method): room air      I&O's Detail    REVIEW OF SYSTEMS:    CONSTITUTIONAL: No weakness, fevers or chills  EYES/ENT: No visual changes;  No vertigo or throat pain   NECK: No pain or stiffness  RESPIRATORY: No cough, wheezing, hemoptysis; No shortness of breath  CARDIOVASCULAR: No chest pain or palpitations  GASTROINTESTINAL: No abdominal or epigastric pain. No nausea, vomiting, or hematemesis; No diarrhea or constipation. No melena or hematochezia.  GENITOURINARY: No dysuria, frequency or hematuria  NEUROLOGICAL: No numbness or weakness  SKIN: No itching, burning, rashes, or lesions   All other review of systems is negative unless indicated above.  PHYSICAL EXAM:    General: Well developed; well nourished; in no acute distress  Eyes: PERRLA, EOMI; conjunctiva and sclera clear  Head: Normocephalic; atraumatic  ENMT: No nasal discharge; airway clear  Neck: Supple; non tender; no masses  Respiratory: No wheezes, rales or rhonchi  Cardiovascular: Regular rate and rhythm. S1 and S2 Normal; No murmurs, gallops or rubs  Gastrointestinal: Soft non-tender non-distended; Normal bowel sounds  Genitourinary: No  suprapubic  tenderness  Extremities: Normal range of motion, No clubbing, cyanosis or edema  Vascular: Peripheral pulses palpable 2+ bilaterally  Neurological: Alert and oriented x4  Skin: Warm and dry. No acute rash  Lymph Nodes: No acute cervical adenopathy  Musculoskeletal: Normal muscle tone, without deformities  Psychiatric: Cooperative and appropriate                            10.3   44.51 )-----------( 202      ( 18 Jan 2023 08:01 )             32.4     18 Jan 2023 08:01    143    |  108    |  47     ----------------------------<  168    4.3     |  31     |  0.90     Ca    8.8        18 Jan 2023 08:01  Phos  3.4       17 Jan 2023 10:49  Mg     1.9       17 Jan 2023 10:49    TPro  6.4    /  Alb  2.4    /  TBili  0.8    /  DBili  0.2    /  AST  16     /  ALT  15     /  AlkPhos  91     18 Jan 2023 08:01      CAPILLARY BLOOD GLUCOSE        LIVER FUNCTIONS - ( 18 Jan 2023 08:01 )  Alb: 2.4 g/dL / Pro: 6.4 gm/dL / ALK PHOS: 91 U/L / ALT: 15 U/L / AST: 16 U/L / GGT: x               MEDICATIONS  (STANDING):  albuterol    0.083% 2.5 milliGRAM(s) Nebulizer every 4 hours  budesonide 160 MICROgram(s)/formoterol 4.5 MICROgram(s) Inhaler 2 Puff(s) Inhalation two times a day  cholecalciferol 1000 Unit(s) Oral daily  cyanocobalamin 1000 MICROGram(s) Oral daily  lactobacillus acidophilus 1 Tablet(s) Oral daily  levothyroxine 25 MICROGram(s) Oral daily  metoprolol tartrate 12.5 milliGRAM(s) Oral two times a day  pantoprazole  Injectable 40 milliGRAM(s) IV Push every 12 hours  tiotropium 2.5 MICROgram(s) Inhaler 2 Puff(s) Inhalation daily    MEDICATIONS  (PRN):  acetaminophen     Tablet .. 650 milliGRAM(s) Oral every 6 hours PRN Temp greater or equal to 38C (100.4F), Mild Pain (1 - 3)  aluminum hydroxide/magnesium hydroxide/simethicone Suspension 30 milliLiter(s) Oral every 4 hours PRN Dyspepsia  benzonatate 100 milliGRAM(s) Oral three times a day PRN Cough  guaifenesin/dextromethorphan Oral Liquid 10 milliLiter(s) Oral every 4 hours PRN Cough  melatonin 3 milliGRAM(s) Oral at bedtime PRN Insomnia  ondansetron Injectable 4 milliGRAM(s) IV Push every 8 hours PRN Nausea and/or Vomiting      RADIOLOGY & ADDITIONAL TESTS:  < from: CT Angio Chest PE Protocol w/ IV Cont (01.11.23 @ 19:16) >    ACC: [id number] EXAM:  CT ANGIO CHEST PULM ART WAWIC                          PROCEDURE DATE:  01/11/2023          INTERPRETATION:  Clinical information: Evaluate for pulmonary embolus.   Exam is compared to previous study of 7/27/2022.    CT angiogram of the chest was obtained following administration of   intravenous contrast. Approximately 90 cc of Omnipaque 350 was   administered and 10 cc was discarded. Coronal, sagittal and MIP images   were submitted for review.    1.7 cm isodense structure is present just to the right of the esophagus   at the level of the thoracic inlet. It has slightly increased in size   when compared to previous exam when it measured approximately 1.2 cm.    No hilar and or mediastinal adenopathy is noted.    Heart is enlarged in size. No pericardial effusion is noted.   Calcification the coronary arteries is noted. Pulmonary arteries are   normal in caliber. No filling defects are noted.    No endobronchial lesions are noted. Mucous/secretions are noted within   few of the distal bronchi in the right lower lobe. Bronchiectasis is   noted within both lungs, more so in the right middle lobe and lingula.   Few ill-defined nodular opacities as well as tree-in-bud opacities are   noted within both lungs, more so in the right lung. Small bilateral   pleural effusions are noted.    Below the diaphragm, visualized portions of the abdomen are unremarkable.    Loss of height of several vertebral bodies is noted.    IMPRESSION: No pulmonary embolus is noted.    Bronchiectasis is noted within both lungs. In addition, few ill-defined   nodular opacities as well as tree-in-bud opacities are noted within both   lungs. Exact etiology of the findings is unclear, however, the   constellation of the above findings suggest possible Mycobacterium avium   complex infection.    < end of copied text >     CC: Cough (18 Jan 2023 08:56)    HPI: 89 y/o F with PMH CLL, colon cancer, hypothyroidism, emphysema, PNA, anemia presented with cough for 2 days. Pt was not feeling herself and her daughter was sick and tested positive fof COVID. The pt tested positive Monday night. She reports hoarseness, cough productive of mucus, congestion, constipation. Her O2 at home was 88%. She was vaccinated for COVID x 4. Denies fevers, chills, chest pain, abdominal pain, N/V, diarrhea/constipation.     Prior admission:   - 8/2/22: low BP, SOB -> Hb 5.7 -> s/p 2 units PRBCs     ER course: BP 94/49, RR 22, SpO2 86% -> 97% on 3L nasal cannula. Labs: WBC 36.67, Hb 10 (baseline ~9), , , neutrophils 84%, D-dimer 637, INR 1.20, troponin 58.55 -> 63.37, glucose 172, albumin 2.9, . COVID 19 positive. EKG: NSR with HR 76 bpm, normal intervals, no ST segment changes, T wave inversions in V3 and V4 (personally reviewed).     Imaging:   - CXR: increased opacities bilaterally, consolidation RLL, no pneumothorax, no effusion (personally reviewed). -> official read: 1. There is a diffuse bilateral reticulonodular pattern, increased from the prior exam and greatest in the lower lobes. Infectious or inflammatory etiologies are favored over CHF. The latter however cannot be entirely excluded.  - CTA: No pulmonary embolus is noted. Bronchiectasis is noted within both lungs. In addition, few ill-defined nodular opacities as well as tree-in-bud opacities are noted within both lungs. Exact etiology of the findings is unclear, however, the constellation of the above findings suggest possible Mycobacterium avium complex infection.    Pt was given Remdesivir, Decadron. She is being admitted to med/surg for further management.      INTERVAL HPI/ OVERNIGHT EVENTS: chart reviewed, Pt was seen and examined, OOB to chair, overall feels much better, denies SOB, no CP. Plan discussed. As per RN,  O2 sts drop to 80s with moving OOb to chair     Vital Signs Last 24 Hrs  T(C): 36.6 (18 Jan 2023 08:45), Max: 36.6 (18 Jan 2023 08:45)  T(F): 97.9 (18 Jan 2023 08:45), Max: 97.9 (18 Jan 2023 08:45)  HR: 101 (18 Jan 2023 11:34) (67 - 101)  BP: 121/71 (18 Jan 2023 08:45) (95/51 - 121/71)  RR: 18 (18 Jan 2023 08:45) (18 - 18)  SpO2: 84% (18 Jan 2023 12:30) (84% - 96%)      REVIEW OF SYSTEMS:  All other review of systems is negative unless indicated above.      PHYSICAL EXAM:  General: Well developed; frail elderly female,  in no acute distress  Eyes: EOMI; conjunctiva and sclera clear  Head: Normocephalic; atraumatic  ENMT: No nasal discharge; airway clear  Neck: Supple; non tender; no JVD  Respiratory: Decreased BS b/l, No wheezes  Cardiovascular: Regular rate and rhythm. S1 and S2 Normal;  Gastrointestinal: Soft non-tender non-distended; Normal bowel sounds  Genitourinary: No  suprapubic  tenderness  Extremities: Nor  edema  Vascular: Peripheral pulses palpable 2+ bilaterally  Neurological: Alert and oriented x2, non focal   Skin: Warm and dry. No acute rash  Musculoskeletal: Normal muscle tone, without deformities  Psychiatric: Cooperative and appropriate      LABS:                         10.3   44.51 )-----------( 202      ( 18 Jan 2023 08:01 )             32.4     18 Jan 2023 08:01    143    |  108    |  47     ----------------------------<  168    4.3     |  31     |  0.90     Ca    8.8        18 Jan 2023 08:01  Phos  3.4       17 Jan 2023 10:49  Mg     1.9       17 Jan 2023 10:49    TPro  6.4    /  Alb  2.4    /  TBili  0.8    /  DBili  0.2    /  AST  16     /  ALT  15     /  AlkPhos  91     18 Jan 2023 08:01    LIVER FUNCTIONS - ( 18 Jan 2023 08:01 )  Alb: 2.4 g/dL / Pro: 6.4 gm/dL / ALK PHOS: 91 U/L / ALT: 15 U/L / AST: 16 U/L / GGT: x               MEDICATIONS  (STANDING):  albuterol    0.083% 2.5 milliGRAM(s) Nebulizer every 4 hours  budesonide 160 MICROgram(s)/formoterol 4.5 MICROgram(s) Inhaler 2 Puff(s) Inhalation two times a day  cholecalciferol 1000 Unit(s) Oral daily  cyanocobalamin 1000 MICROGram(s) Oral daily  lactobacillus acidophilus 1 Tablet(s) Oral daily  levothyroxine 25 MICROGram(s) Oral daily  metoprolol tartrate 12.5 milliGRAM(s) Oral two times a day  pantoprazole  Injectable 40 milliGRAM(s) IV Push every 12 hours  tiotropium 2.5 MICROgram(s) Inhaler 2 Puff(s) Inhalation daily    MEDICATIONS  (PRN):  acetaminophen     Tablet .. 650 milliGRAM(s) Oral every 6 hours PRN Temp greater or equal to 38C (100.4F), Mild Pain (1 - 3)  aluminum hydroxide/magnesium hydroxide/simethicone Suspension 30 milliLiter(s) Oral every 4 hours PRN Dyspepsia  benzonatate 100 milliGRAM(s) Oral three times a day PRN Cough  guaifenesin/dextromethorphan Oral Liquid 10 milliLiter(s) Oral every 4 hours PRN Cough  melatonin 3 milliGRAM(s) Oral at bedtime PRN Insomnia  ondansetron Injectable 4 milliGRAM(s) IV Push every 8 hours PRN Nausea and/or Vomiting      RADIOLOGY & ADDITIONAL TESTS:      ACC: [id number] EXAM:  CT ANGIO CHEST PULM ART WAWIC                        PROCEDURE DATE:  01/11/2023    INTERPRETATION:  Clinical information: Evaluate for pulmonary embolus.   Exam is compared to previous study of 7/27/2022.    CT angiogram of the chest was obtained following administration of   intravenous contrast. Approximately 90 cc of Omnipaque 350 was   administered and 10 cc was discarded. Coronal, sagittal and MIP images   were submitted for review.    1.7 cm isodense structure is present just to the right of the esophagus   at the level of the thoracic inlet. It has slightly increased in size   when compared to previous exam when it measured approximately 1.2 cm.    No hilar and or mediastinal adenopathy is noted.    Heart is enlarged in size. No pericardial effusion is noted.   Calcification the coronary arteries is noted. Pulmonary arteries are   normal in caliber. No filling defects are noted.    No endobronchial lesions are noted. Mucous/secretions are noted within   few of the distal bronchi in the right lower lobe. Bronchiectasis is   noted within both lungs, more so in the right middle lobe and lingula.   Few ill-defined nodular opacities as well as tree-in-bud opacities are   noted within both lungs, more so in the right lung. Small bilateral   pleural effusions are noted.    Below the diaphragm, visualized portions of the abdomen are unremarkable.    Loss of height of several vertebral bodies is noted.    IMPRESSION: No pulmonary embolus is noted.    Bronchiectasis is noted within both lungs. In addition, few ill-defined   nodular opacities as well as tree-in-bud opacities are noted within both   lungs. Exact etiology of the findings is unclear, however, the   constellation of the above findings suggest possible Mycobacterium avium   complex infection.

## 2023-01-18 NOTE — PROGRESS NOTE ADULT - NUTRITIONAL ASSESSMENT
This patient has been assessed with a concern for Malnutrition and has been determined to have a diagnosis/diagnoses of Severe protein-calorie malnutrition and Underweight (BMI < 19).    This patient is being managed with:   Diet Easy to Chew-  Entered: Jan 16 2023  5:50PM    
This patient has been assessed with a concern for Malnutrition and has been determined to have a diagnosis/diagnoses of Severe protein-calorie malnutrition and Underweight (BMI < 19).    This patient is being managed with:   Diet Easy to Chew-  Entered: Jan 16 2023  5:50PM    
This patient has been assessed with a concern for Malnutrition and has been determined to have a diagnosis/diagnoses of Severe protein-calorie malnutrition and Underweight (BMI < 19).    This patient is being managed with:   Diet Regular-  Prosource Gelatein 20 Sugar Free     Qty per Day:  2  Supplement Feeding Modality:  Oral  Ensure Plus High Protein Cans or Servings Per Day:  1       Frequency:  Daily  Entered: Jan 13 2023 10:54PM    
This patient has been assessed with a concern for Malnutrition and has been determined to have a diagnosis/diagnoses of Severe protein-calorie malnutrition and Underweight (BMI < 19).    This patient is being managed with:   Diet Regular-  Prosource Gelatein 20 Sugar Free     Qty per Day:  2  Supplement Feeding Modality:  Oral  Ensure Plus High Protein Cans or Servings Per Day:  1       Frequency:  Daily  Entered: Jan 13 2023 10:54PM    
This patient has been assessed with a concern for Malnutrition and has been determined to have a diagnosis/diagnoses of Severe protein-calorie malnutrition and Underweight (BMI < 19).    This patient is being managed with:   Diet Easy to Chew-  Entered: Jan 16 2023  5:50PM

## 2023-01-18 NOTE — PROGRESS NOTE ADULT - SUBJECTIVE AND OBJECTIVE BOX
CHIEF COMPLAINT: cough     HPI:  89 y/o F with PMH CLL, colon cancer, hypothyroidism, emphysema, PNA, anemia presented with cough for 2 days. Pt was not feeling herself and her daughter was sick and tested positive fof COVID. The pt tested positive Monday night. She reports hoarseness, cough productive of mucus, congestion, constipation. Her O2 at home was 88%. She was vaccinated for COVID x 4. Denies fevers, chills, chest pain, abdominal pain, N/V, diarrhea/constipation.     Prior admission:   - 22: low BP, SOB -> Hb 5.7 -> s/p 2 units PRBCs     ER course: BP 94/49, RR 22, SpO2 86% -> 97% on 3L nasal cannula. Labs: WBC 36.67, Hb 10 (baseline ~9), , , neutrophils 84%, D-dimer 637, INR 1.20, troponin 58.55 -> 63.37, glucose 172, albumin 2.9, . COVID 19 positive. EKG: NSR with HR 76 bpm, normal intervals, no ST segment changes, T wave inversions in V3 and V4 (personally reviewed).     Imaging:   - CXR: increased opacities bilaterally, consolidation RLL, no pneumothorax, no effusion (personally reviewed). -> official read: 1. There is a diffuse bilateral reticulonodular pattern, increased from the prior exam and greatest in the lower lobes. Infectious or inflammatory etiologies are favored over CHF. The latter however cannot be entirely excluded.  - CTA: No pulmonary embolus is noted. Bronchiectasis is noted within both lungs. In addition, few ill-defined nodular opacities as well as tree-in-bud opacities are noted within both lungs. Exact etiology of the findings is unclear, however, the constellation of the above findings suggest possible Mycobacterium avium complex infection.    Pt was given Remdesivir, Decadron. She is being admitted to med/surg for further management.  (2023 21:34) Cardiology was called to evaluate above symptoms and to evaluate episode of atrial fibrillation. Pt is sleeping but arouses with effort. She is unable to provide a history.     23: Patient without complaints of chest pain or sob.  +cough however improving.  Tele: sinus rhythm, PVC's and bigemeny  23: Pt. denies chest pain/SOB/palpitations, sp 1 unit prbc yesterday. Tele: NSR with PVCs and bigeminy, pt is DNR/DNI/Pallaitive consulted, stable from cardiac standpoint, will sign off       MEDICATIONS  (STANDING):  albuterol    0.083% 2.5 milliGRAM(s) Nebulizer every 4 hours  budesonide 160 MICROgram(s)/formoterol 4.5 MICROgram(s) Inhaler 2 Puff(s) Inhalation two times a day  cholecalciferol 1000 Unit(s) Oral daily  cyanocobalamin 1000 MICROGram(s) Oral daily  dextrose 5%. 1000 milliLiter(s) (100 mL/Hr) IV Continuous <Continuous>  dextrose 5%. 1000 milliLiter(s) (50 mL/Hr) IV Continuous <Continuous>  dextrose 50% Injectable 25 Gram(s) IV Push once  dextrose 50% Injectable 12.5 Gram(s) IV Push once  dextrose 50% Injectable 25 Gram(s) IV Push once  glucagon  Injectable 1 milliGRAM(s) IntraMuscular once  lactobacillus acidophilus 1 Tablet(s) Oral daily  levothyroxine 25 MICROGram(s) Oral daily  metoprolol tartrate 12.5 milliGRAM(s) Oral two times a day  pantoprazole  Injectable 40 milliGRAM(s) IV Push every 12 hours  tiotropium 2.5 MICROgram(s) Inhaler 2 Puff(s) Inhalation daily    MEDICATIONS  (PRN):  acetaminophen     Tablet .. 650 milliGRAM(s) Oral every 6 hours PRN Temp greater or equal to 38C (100.4F), Mild Pain (1 - 3)  aluminum hydroxide/magnesium hydroxide/simethicone Suspension 30 milliLiter(s) Oral every 4 hours PRN Dyspepsia  benzonatate 100 milliGRAM(s) Oral three times a day PRN Cough  dextrose Oral Gel 15 Gram(s) Oral once PRN Blood Glucose LESS THAN 70 milliGRAM(s)/deciliter  guaifenesin/dextromethorphan Oral Liquid 10 milliLiter(s) Oral every 4 hours PRN Cough  melatonin 3 milliGRAM(s) Oral at bedtime PRN Insomnia  ondansetron Injectable 4 milliGRAM(s) IV Push every 8 hours PRN Nausea and/or Vomiting    Vital Signs Last 24 Hrs  T(C): 36.5 (2023 21:04), Max: 36.5 (2023 16:33)  T(F): 97.7 (2023 21:04), Max: 97.7 (2023 16:33)  HR: 68 (2023 05:30) (63 - 73)  BP: 108/55 (2023 22:51) (95/51 - 123/50)  BP(mean): --  RR: 18 (2023 21:04) (18 - 18)  SpO2: 95% (2023 05:30) (94% - 98%)    Parameters below as of 2023 05:30  Patient On (Oxygen Delivery Method): nasal cannula        PHYSICAL EXAM:    Constitutional: NAD, appears frail   HEENT: PERR, EOMI,  No oral cyananosis.  Neck:  supple,  No JVD  Respiratory: Breath sounds with rales at LLL  Cardiovascular: S1 and S2, regular rate and rhythm, no Murmurs, gallops or rubs  Gastrointestinal: Bowel Sounds present, soft, nontender.   Extremities: No peripheral edema. No clubbing or cyanosis.  Vascular: 2+ peripheral pulses  Skin: No rashes. Ecchymoses      LABS: All Labs Reviewed:    Daily     Daily Weight in k.1 (2023 04:50)                          11.3   69.37 )-----------( 245      ( 2023 10:49 )             36.0       -17    140  |  107  |  37<H>  ----------------------------<  146<H>  5.3   |  27  |  0.84    Ca    8.1<L>      2023 10:49  Phos  3.4     -  Mg     1.9     -    TPro  6.6  /  Alb  2.2<L>  /  TBili  0.9  /  DBili  0.2  /  AST  24  /  ALT  17  /  AlkPhos  97  -17        CAPILLARY BLOOD GLUCOSE      POCT Blood Glucose.: 121 mg/dL (2023 12:40)      LIVER FUNCTIONS - ( 2023 10:49 )  Alb: 2.2 g/dL / Pro: 6.6 gm/dL / ALK PHOS: 97 U/L / ALT: 17 U/L / AST: 24 U/L / GGT: x             Ca    9.2        15 2023 18:32  Ca    9.1        15 2023 08:21  Ca    8.8        2023 07:34  Phos  2.7       15 2023 18:32  Mg     2.1       15 2023 18:32    TPro  6.0    /  Alb  2.2    /  TBili  0.3    /  DBili  0.1    /  AST  18     /  ALT  17     /  AlkPhos  76     2023 07:14  TPro  6.9    /  Alb  2.4    /  TBili  0.3    /  DBili  <0.1   /  AST  20     /  ALT  18     /  AlkPhos  90     15 2023 08:21  TPro  6.4    /  Alb  2.3    /  TBili  0.2    /  DBili  <0.1   /  AST  16     /  ALT  14     /  AlkPhos  79     2023 07:34          Blood Culture: Organism --  Gram Stain Blood -- Gram Stain --  Specimen Source Clean Catch Clean Catch (Midstream)  Culture-Blood --    Organism --  Gram Stain Blood -- Gram Stain --  Specimen Source .Blood None  Culture-Blood --       @ 07:34  Pro Bnp         RADIOLOGY/EKG:< from: 12 Lead ECG (23 @ 14:35) >  Diagnosis Line Normal sinus rhythm  Possible Left atrial enlargement  T wave abnormality, consider anterolateral ischemia  Abnormal ECG  Confirmed by LAURIE CERON, [facility] ([id number]) on 2023 7:44:23 AM    < end of copied text >    < from: TTE Echo Complete w/o Contrast w/ Doppler (22 @ 14:27) >  The aortic valve is well visualized, appears mildly calcified.   Valve opening seems to be normal.   Mild (1+) aortic regurgitation is present.   The left atrium is mildly dilated.   The left ventricle is normal in size, wall thickness, wall motion and   contractility.   Estimated left ventricular ejection fraction is 60-65% %.   The IVC appears underdistended.   The mitral valve leaflets appear slightly thickened.   Minimal mitral annular calcification is present.   At least moderate (2+) eccentric mitral regurgitation is present.   EA reversal of the mitral inflow consistent with reduced compliance of   the   leftventricle.   No evidence of pericardial effusion.   No evidence of pleural effusion.   Normal appearing pulmonic valve structure.   Mild pulmonic valvular regurgitation (1+) is present.   Normal appearing right atrium.   Normal appearing right ventricle structure and function.   Normal appearing tricuspid valve structure.   Mild to Moderate Tricuspid regurgitation is present.   Mild pulmonary hypertension.    < end of copied text >

## 2023-01-18 NOTE — PROGRESS NOTE ADULT - ASSESSMENT
1) COVID  2) Aspiration  3) Bronchiectasis  4) Dyspnea  5) Abnormal CXR    89 y/o F with PMH CLL, colon cancer, hypothyroidism, emphysema, PNA, anemia presented with cough for 2 days. Pt was not feeling herself and her daughter was sick and tested positive fof COVID. The pt tested positive Monday night. She reports hoarseness, cough productive of mucus, congestion, constipation. Her O2 at home was 88%. She was vaccinated for COVID x 4. Denies fevers, chills, chest pain, abdominal pain, N/V, diarrhea/constipation.   Found to have COVID+   CTA: No pulmonary embolus is noted. Bronchiectasis is noted within both lungs. In addition, few ill-defined nodular opacities as well as tree-in-bud opacities are noted within both lungs. Exact etiology of the findings is unclear, however, the constellation of the above findings suggest possible Mycobacterium avium complex infection.  Completed Remdesivir, Decadron.  Agree with current management  She is in no acute distress  O2 requirements are not increasing / stable  Will continue to monitor  overall prognosis remains guarded  Will continue to follow   Appreciate palliative care recommendations

## 2023-01-19 ENCOUNTER — TRANSCRIPTION ENCOUNTER (OUTPATIENT)
Age: 88
End: 2023-01-19

## 2023-01-19 VITALS — OXYGEN SATURATION: 96 %

## 2023-01-19 LAB
ALBUMIN SERPL ELPH-MCNC: 2.3 G/DL — LOW (ref 3.3–5)
ALP SERPL-CCNC: 81 U/L — SIGNIFICANT CHANGE UP (ref 40–120)
ALT FLD-CCNC: 15 U/L — SIGNIFICANT CHANGE UP (ref 12–78)
ANION GAP SERPL CALC-SCNC: 4 MMOL/L — LOW (ref 5–17)
AST SERPL-CCNC: 15 U/L — SIGNIFICANT CHANGE UP (ref 15–37)
BILIRUB DIRECT SERPL-MCNC: 0.2 MG/DL — SIGNIFICANT CHANGE UP (ref 0–0.3)
BILIRUB INDIRECT FLD-MCNC: 0.4 MG/DL — SIGNIFICANT CHANGE UP (ref 0.2–1)
BILIRUB SERPL-MCNC: 0.6 MG/DL — SIGNIFICANT CHANGE UP (ref 0.2–1.2)
BUN SERPL-MCNC: 48 MG/DL — HIGH (ref 7–23)
CALCIUM SERPL-MCNC: 8.5 MG/DL — SIGNIFICANT CHANGE UP (ref 8.5–10.1)
CHLORIDE SERPL-SCNC: 108 MMOL/L — SIGNIFICANT CHANGE UP (ref 96–108)
CO2 SERPL-SCNC: 30 MMOL/L — SIGNIFICANT CHANGE UP (ref 22–31)
CREAT SERPL-MCNC: 0.84 MG/DL — SIGNIFICANT CHANGE UP (ref 0.5–1.3)
EGFR: 64 ML/MIN/1.73M2 — SIGNIFICANT CHANGE UP
GLUCOSE SERPL-MCNC: 129 MG/DL — HIGH (ref 70–99)
HCT VFR BLD CALC: 29.8 % — LOW (ref 34.5–45)
HGB BLD-MCNC: 9.1 G/DL — LOW (ref 11.5–15.5)
MCHC RBC-ENTMCNC: 30.5 GM/DL — LOW (ref 32–36)
MCHC RBC-ENTMCNC: 32 PG — SIGNIFICANT CHANGE UP (ref 27–34)
MCV RBC AUTO: 104.9 FL — HIGH (ref 80–100)
PLATELET # BLD AUTO: 186 K/UL — SIGNIFICANT CHANGE UP (ref 150–400)
POTASSIUM SERPL-MCNC: 4.3 MMOL/L — SIGNIFICANT CHANGE UP (ref 3.5–5.3)
POTASSIUM SERPL-SCNC: 4.3 MMOL/L — SIGNIFICANT CHANGE UP (ref 3.5–5.3)
PROT SERPL-MCNC: 6.1 GM/DL — SIGNIFICANT CHANGE UP (ref 6–8.3)
RBC # BLD: 2.84 M/UL — LOW (ref 3.8–5.2)
RBC # FLD: 17 % — HIGH (ref 10.3–14.5)
SODIUM SERPL-SCNC: 142 MMOL/L — SIGNIFICANT CHANGE UP (ref 135–145)
WBC # BLD: 40.97 K/UL — CRITICAL HIGH (ref 3.8–10.5)
WBC # FLD AUTO: 40.97 K/UL — CRITICAL HIGH (ref 3.8–10.5)

## 2023-01-19 PROCEDURE — 99239 HOSP IP/OBS DSCHRG MGMT >30: CPT

## 2023-01-19 RX ORDER — SODIUM CHLORIDE 9 MG/ML
1 INJECTION INTRAMUSCULAR; INTRAVENOUS; SUBCUTANEOUS
Qty: 0 | Refills: 0 | DISCHARGE

## 2023-01-19 RX ORDER — METOPROLOL TARTRATE 50 MG
0.5 TABLET ORAL
Qty: 30 | Refills: 0
Start: 2023-01-19 | End: 2023-02-17

## 2023-01-19 RX ORDER — PANTOPRAZOLE SODIUM 20 MG/1
1 TABLET, DELAYED RELEASE ORAL
Qty: 30 | Refills: 0
Start: 2023-01-19 | End: 2023-02-17

## 2023-01-19 RX ORDER — PREGABALIN 225 MG/1
1 CAPSULE ORAL
Qty: 0 | Refills: 0 | DISCHARGE
Start: 2023-01-19

## 2023-01-19 RX ORDER — ACETAMINOPHEN 500 MG
2 TABLET ORAL
Qty: 0 | Refills: 0 | DISCHARGE
Start: 2023-01-19

## 2023-01-19 RX ADMIN — ALBUTEROL 2.5 MILLIGRAM(S): 90 AEROSOL, METERED ORAL at 15:05

## 2023-01-19 RX ADMIN — TIOTROPIUM BROMIDE 2 PUFF(S): 18 CAPSULE ORAL; RESPIRATORY (INHALATION) at 07:41

## 2023-01-19 RX ADMIN — Medication 25 MICROGRAM(S): at 05:13

## 2023-01-19 RX ADMIN — ALBUTEROL 2.5 MILLIGRAM(S): 90 AEROSOL, METERED ORAL at 07:30

## 2023-01-19 RX ADMIN — ALBUTEROL 2.5 MILLIGRAM(S): 90 AEROSOL, METERED ORAL at 11:25

## 2023-01-19 RX ADMIN — PANTOPRAZOLE SODIUM 40 MILLIGRAM(S): 20 TABLET, DELAYED RELEASE ORAL at 11:02

## 2023-01-19 RX ADMIN — Medication 1 TABLET(S): at 10:59

## 2023-01-19 RX ADMIN — BUDESONIDE AND FORMOTEROL FUMARATE DIHYDRATE 2 PUFF(S): 160; 4.5 AEROSOL RESPIRATORY (INHALATION) at 07:40

## 2023-01-19 RX ADMIN — Medication 1000 UNIT(S): at 10:59

## 2023-01-19 RX ADMIN — PREGABALIN 1000 MICROGRAM(S): 225 CAPSULE ORAL at 10:59

## 2023-01-19 NOTE — DISCHARGE NOTE PROVIDER - HOSPITAL COURSE
89 y/o F with PMH CLL, colon cancer, hypothyroidism, emphysema, PNA, anemia presented to ED c/o  cough for 2 days. Pt was not feeling herself and her daughter was sick and tested positive fof COVID. Pt  tested positive  COVID 19 as well. She reported  hoarseness, cough productive of mucus, congestion, constipation. Her O2 at home was 88%. She was vaccinated for COVID x 4. No fevers, chills, chest pain, abdominal pain, N/V, diarrhea/constipation.   ER course: BP 94/49, RR 22, SpO2 86% -> 97% on 3L nasal cannula. Labs: WBC 36.67, Hb 10 (baseline ~9), , , neutrophils 84%, D-dimer 637, INR 1.20, troponin 58.55 -> 63.37, glucose 172, albumin 2.9, . COVID 19 positive. EKG: NSR with HR 76 bpm, normal intervals, no ST segment changes, T wave inversions in V3 and V4   CXR: increased opacities bilaterally, consolidation RLL   CTA: No pulmonary embolus.  Bronchiectasis is noted within both lungs. In addition, few ill-defined nodular opacities as well as tree-in-bud opacities are noted within both lungs.   Pt was started on Remdesivir,  Decadron. Also was evaluated by ID and Pulm, completed course of IV Abxs. Her pulmonary status improved, she tolerates RA at rest but still requires O2 supplementation with exertion. Home O2 arranged.   Hospital course significant for Drop in H/H and hypotension, received IVF and PRBCs, Now H/H and BP stable. A/c on hold.  Pt will need to f/u with hem/onc outPt   Pt also had brief  episode of PAF,  On metoprolol. No A/c for now due to anemia and h/h drop, Pt to f/u outPt to further discuss Tx   Today feels little anxious, denies SOB. Family at bedside, D/c planning, meds and f/u discussed     Vital Signs Last 24 Hrs  T(C): 36.5 (01-19-23 @ 08:13), Max: 36.9 (01-18-23 @ 20:46)  T(F): 97.7 (01-19-23 @ 08:13), Max: 98.5 (01-18-23 @ 20:46)  HR: 91 (01-19-23 @ 15:11) (64 - 94)  BP: 97/46 (01-19-23 @ 08:13) (97/46 - 137/69)  RR: 18 (01-19-23 @ 08:13) (18 - 18)    PHYSICAL EXAM:  General: Well developed; frail elderly female,  in no acute distress  Eyes: EOMI; conjunctiva and sclera clear  Head: Normocephalic; atraumatic  ENMT: No nasal discharge; airway clear  Neck: Supple; non tender; no JVD  Respiratory: Decreased BS b/l, No wheezes  Cardiovascular: Regular rate and rhythm. S1 and S2 Normal;  Gastrointestinal: Soft non-tender non-distended; Normal bowel sounds  Genitourinary: No  suprapubic  tenderness  Extremities: No  edema  Vascular: Peripheral pulses palpable 2+ bilaterally  Neurological: Alert and oriented x2, non focal   Skin: Warm and dry. No acute rash  Musculoskeletal: Normal muscle tone, without deformities  Psychiatric: Cooperative and appropriate    89 y/o F with PMH CLL, colon cancer, hypothyroidism, COPD, PNA, anemia presented with cough/SOB for 2 days after testing COVID+.     Acute hypoxic respiratory failure  and sepsis likely secondary to COVID PNA,  bronchiectasis,  baseline emphysema   - CTA: neg lesa PE, 1.7 cm isodense structure is present just to the right of the esophagus   at the level of the thoracic inlet. It has slightly increased in size   when compared to previous exam when it measured approximately 1.2 cm. D/w Dr Gao, will need repeat CT chest in 1 months   - Repeat CXR 1/14/23: stable R basilar consolidation   - completed  Remdesivir and  dexamethasone 5 days  - completed Azithromycin and Ceftriaxone 5 days   - On Incruse at home,   resume   -  C/w Albuterol nebs, Mucinex, Tessalon for cough  - UCx, BCx NGTD   - Qualified for  home O2   - F/u with Dr Gao      HFpEF - no evidence of volume overload at this time   Elevated BNP   - s/p 1 dose of  lasix  trial. No further doses administered   - ECHO (7/19/22): mild 1+ AR, EF 60-65%, moderate 2+ MR, mild pulmonic regurgitation 1+, mild to moderate TR, mild pulmonary HTN.  IVC not dilated   - I+Os, daily weights   - Keep K > 4 and Mg > 2   - c/w home medications   - Monitor renal function and electrolytes closely     Acute symptomatic anemia  with Hypotension/ lethargy, resolved   -  S/p 500cc NS bolus and 1U PRBCs  - Pending stool occult blood   - H/H stable  - no signs of acute bleeding   - F/u with hem/onc outPt     Elevated troponin likely secondary to demand ischemia   - levels trended down to normal   - Initial EKG- NSR, no ST elevations.   - Troponin 58.55 > 63.37 > 51.50  - F/u with cardio outPt     Leukocytosis likely 2/2 CLL/PNA  - improved   - Monitor closely (has been up to 60 in the past)   - Hematology Dr. Donis following    Thrombocytopenia, resolved   - Continue to monitor     Elevated D-dimer likely 2/2 infectious cause /COVID   - D-dimer 637, CTA negative for PE     Advanced directives  - DNR/DNI  D/w Pts daughter and Grandson at bedside     Dispo: stable for d/c home with HC and Home O2   Total time 50 min   Fax d/c summary to PCP

## 2023-01-19 NOTE — DISCHARGE NOTE PROVIDER - DETAILS OF MALNUTRITION DIAGNOSIS/DIAGNOSES
This patient has been assessed with a concern for Malnutrition and was treated during this hospitalization for the following Nutrition diagnosis/diagnoses:     -  01/12/2023: Severe protein-calorie malnutrition   -  01/12/2023: Underweight (BMI < 19)

## 2023-01-19 NOTE — CHART NOTE - NSCHARTNOTEFT_GEN_A_CORE
Due to diagnosis of COVID PNA and Baseline COPD/emphysema, patient requires home oxygen. Acute respiratory illness is now resolved with medical therapy to optimize respiratory status. The patient's COPD is now in a chronic stable state. O2 at rest on room air  is  95  %. O2 ambulating on room air is  87 %. Ambulation on  LNC O2 is 97 %. The oxygen plan has been discussed with the patient and the patient is agreeable to home oxygen use.
HPI:  89 y/o F with PMH CLL, colon cancer, hypothyroidism, emphysema, PNA, anemia presented with cough for 2 days. Pt was not feeling herself and her daughter was sick and tested positive fof COVID. .  (11 Jan 2023 21:34)      PERTINENT PMH REVIEWED:  [ X ] YES [ ] NO           Mental Status: Pt. alert and oriented but deferred  to her daughter Nata  Concerns of Depression [  ]- in the past family has discussed concern of pt. feeling depressed at times and emotional about her declining   Anxiety [   ]- anxious at times   Baseline ADLs (prior to admission):  Independent [ ] moderately [ ] fully   Dependent   [ X ] moderately [  ]fully    Anticipated Grief: Patient [  ] Family [ X ]    Caregiver [place] Assessed: Yes [  X ] No [  ]    [employer]: Decline     Spiritual Concerns: Not Identified     Goals of Care: Not ready for comfort or hospice at this time. Still want pt. to come back and forth to the hospital if needed    Previous Services: [facility]    ADVANCE DIRECTIVES: MOLST DNR/DNI     Anticipated D/C Plan: Home with [facility] VS [facility]- to be determined                     Summary:    This SW spoke with pts daughters to discuss goals of care, assist with planning and provide supportive counseling. Pt. and family known to our team from a previous admission. Pt. was residing between two of her daughters homes. Daughter Nata who lives here and her other daughter who lives in the [place]. Pts daughter reports that pt. was doing ok since her last admission. She reports that she has not been getting further treatment for the CLL except having home blood draws. She reports they were doing more of home palliative approach. Feelings explored and support provided.    This SW discussed with pts daughter the difference between home hospice and home palliative. Home hospice services explained in detail as well as philosophy of care. Pts daughter is clear that at this time they are still not ready for home hospice services. She was open to the information for the future but they would either like pt. to return home with regular home care or go to [facility] depending on PT recommendation.       Plan is either for home with home care VS [facility] , awaiting PT recommendation. They are not ready for a comfort focus or hospice at this time. Emotional support proved. Our team will continue to follow.
Preliminary TTE result given to nurse by echo tech of [facility]. cardiology following. no acute tele events overnight, per tele techs. Asymptomatic.

## 2023-01-19 NOTE — DISCHARGE NOTE PROVIDER - NSDCMRMEDTOKEN_GEN_ALL_CORE_FT
acetaminophen 325 mg oral tablet: 2 tab(s) orally every 6 hours, As needed, Temp greater or equal to 38C (100.4F), Mild Pain (1 - 3)  aspirin 81 mg oral delayed release tablet: 1 tab(s) orally once a day  benzonatate 100 mg oral capsule: 1 cap(s) orally 3 times a day, As needed, Cough  cyanocobalamin 1000 mcg oral tablet: 1 tab(s) orally once a day  Incruse Ellipta 62.5 mcg/inh inhalation powder: 1 inhaler(s) inhaled once a day   levothyroxine 25 mcg (0.025 mg) oral tablet: 1 tab(s) orally once a day  Metoprolol Tartrate 25 mg oral tablet: 0.5 tab(s) orally 2 times a day   pantoprazole 40 mg oral delayed release tablet: 1 tab(s) orally once a day   ProAir HFA 90 mcg/inh inhalation aerosol: 2 puff(s) inhaled every 4 hours, As Needed -for bronchospasm   Vitamin D3 25 mcg (1000 intl units) oral tablet: 1 tab(s) orally once a day

## 2023-01-19 NOTE — PROVIDER CONTACT NOTE (OTHER) - REASON
faxed d/c paperwork to Dr. Bora John
Pt. uncooperative / combative during blood gas draw.
95.3 Rectal temp

## 2023-01-19 NOTE — DISCHARGE NOTE PROVIDER - NSDCFUSCHEDAPPT_GEN_ALL_CORE_FT
Katerina Brown  [facility] Physician [facility]  CARDIOLOGY [address]  Scheduled Appointment: 01/25/2023

## 2023-01-19 NOTE — PROVIDER CONTACT NOTE (OTHER) - SITUATION
Pt is fighting RT during blood gas draw. Sample is not enough because pt is combative and pulling arm away. RT unable to collect enough blood sample
faxed d/c paperwork to Dr. Bora John
Pt BP: 98/58, HR: 52, T:95.3, RR: 18, SPO2: 100% 3L nc

## 2023-01-19 NOTE — DISCHARGE NOTE PROVIDER - PROVIDER TOKENS
PROVIDER:[TOKEN:[[id number]],FOLLOWUP:[2 weeks]],PROVIDER:[TOKEN:[[id number]],FOLLOWUP:[2 weeks]],PROVIDER:[TOKEN:[[id number]],FOLLOWUP:[2 weeks]],PROVIDER:[TOKEN:[[id number]],FOLLOWUP:[1 week]]

## 2023-01-19 NOTE — DISCHARGE NOTE PROVIDER - NSDCCPCAREPLAN_GEN_ALL_CORE_FT
PRINCIPAL DISCHARGE DIAGNOSIS  Diagnosis: Acute COVID-19  Assessment and Plan of Treatment: with acute respiratory failure   S/p course of Remdesivir and dexamethasone   C/w o2 supplementation   F/u with Pulm      SECONDARY DISCHARGE DIAGNOSES  Diagnosis: Elevated troponin  Assessment and Plan of Treatment: take low dose Aspirin   F/u with cardiology    Diagnosis: Paroxysmal atrial fibrillation  Assessment and Plan of Treatment: had one episode  C/w Metoprolol   No anticoagulation du eto recent drop in Hemoglobin   F/u with CArdio to further discuss managemet    Diagnosis: Abnormal chest CT  Assessment and Plan of Treatment: need to f/u with Dr Ramires to repeat CT chest in 1 month

## 2023-01-19 NOTE — DISCHARGE NOTE PROVIDER - CARE PROVIDER_API CALL
Everardo Lyles (MD)  Cardiovascular Disease  [address], [address]  [place] [address]  Phone: [phone]  Fax: [phone]  Follow Up Time: 2 weeks    Juan Ramires)  Internal Medicine  [address] [place] [address]  Phone: [phone]  Fax: [phone]  Follow Up Time: 2 weeks    Brian Donis)  Internal Medicine  [address] [place] [address]  Phone: [phone]  Fax: [phone]  Follow Up Time: 2 weeks    Bora John)  Family Medicine  [address] [place] [address]  Phone: [phone]  Fax: [phone]  Follow Up Time: 1 week

## 2023-01-24 DIAGNOSIS — A31.9 MYCOBACTERIAL INFECTION, UNSPECIFIED: ICD-10-CM

## 2023-01-24 DIAGNOSIS — Z90.49 ACQUIRED ABSENCE OF OTHER SPECIFIED PARTS OF DIGESTIVE TRACT: ICD-10-CM

## 2023-01-24 DIAGNOSIS — D84.9 IMMUNODEFICIENCY, UNSPECIFIED: ICD-10-CM

## 2023-01-24 DIAGNOSIS — R73.9 HYPERGLYCEMIA, UNSPECIFIED: ICD-10-CM

## 2023-01-24 DIAGNOSIS — I48.0 PAROXYSMAL ATRIAL FIBRILLATION: ICD-10-CM

## 2023-01-24 DIAGNOSIS — J12.82 PNEUMONIA DUE TO CORONAVIRUS DISEASE 2019: ICD-10-CM

## 2023-01-24 DIAGNOSIS — C91.10 CHRONIC LYMPHOCYTIC LEUKEMIA OF B-CELL TYPE NOT HAVING ACHIEVED REMISSION: ICD-10-CM

## 2023-01-24 DIAGNOSIS — I08.1 RHEUMATIC DISORDERS OF BOTH MITRAL AND TRICUSPID VALVES: ICD-10-CM

## 2023-01-24 DIAGNOSIS — U07.1 COVID-19: ICD-10-CM

## 2023-01-24 DIAGNOSIS — J43.9 EMPHYSEMA, UNSPECIFIED: ICD-10-CM

## 2023-01-24 DIAGNOSIS — A41.89 OTHER SPECIFIED SEPSIS: ICD-10-CM

## 2023-01-24 DIAGNOSIS — I27.20 PULMONARY HYPERTENSION, UNSPECIFIED: ICD-10-CM

## 2023-01-24 DIAGNOSIS — Z66 DO NOT RESUSCITATE: ICD-10-CM

## 2023-01-24 DIAGNOSIS — J47.9 BRONCHIECTASIS, UNCOMPLICATED: ICD-10-CM

## 2023-01-24 DIAGNOSIS — Z79.890 HORMONE REPLACEMENT THERAPY: ICD-10-CM

## 2023-01-24 DIAGNOSIS — J96.01 ACUTE RESPIRATORY FAILURE WITH HYPOXIA: ICD-10-CM

## 2023-01-24 DIAGNOSIS — Z87.01 PERSONAL HISTORY OF PNEUMONIA (RECURRENT): ICD-10-CM

## 2023-01-24 DIAGNOSIS — I50.32 CHRONIC DIASTOLIC (CONGESTIVE) HEART FAILURE: ICD-10-CM

## 2023-01-24 DIAGNOSIS — E88.09 OTHER DISORDERS OF PLASMA-PROTEIN METABOLISM, NOT ELSEWHERE CLASSIFIED: ICD-10-CM

## 2023-01-24 DIAGNOSIS — I24.8 OTHER FORMS OF ACUTE ISCHEMIC HEART DISEASE: ICD-10-CM

## 2023-01-24 DIAGNOSIS — E03.9 HYPOTHYROIDISM, UNSPECIFIED: ICD-10-CM

## 2023-01-24 DIAGNOSIS — E43 UNSPECIFIED SEVERE PROTEIN-CALORIE MALNUTRITION: ICD-10-CM

## 2023-01-24 DIAGNOSIS — D69.6 THROMBOCYTOPENIA, UNSPECIFIED: ICD-10-CM

## 2023-01-24 DIAGNOSIS — D50.9 IRON DEFICIENCY ANEMIA, UNSPECIFIED: ICD-10-CM

## 2023-01-24 DIAGNOSIS — Z88.8 ALLERGY STATUS TO OTHER DRUGS, MEDICAMENTS AND BIOLOGICAL SUBSTANCES: ICD-10-CM

## 2023-01-24 DIAGNOSIS — Z85.038 PERSONAL HISTORY OF OTHER MALIGNANT NEOPLASM OF LARGE INTESTINE: ICD-10-CM

## 2023-01-24 DIAGNOSIS — D64.9 ANEMIA, UNSPECIFIED: ICD-10-CM

## 2023-01-25 ENCOUNTER — NON-APPOINTMENT (OUTPATIENT)
Age: 88
End: 2023-01-25

## 2023-01-25 ENCOUNTER — APPOINTMENT (OUTPATIENT)
Dept: CARDIOLOGY | Facility: CLINIC | Age: 88
End: 2023-01-25
Payer: MEDICARE

## 2023-01-25 VITALS — DIASTOLIC BLOOD PRESSURE: 60 MMHG | SYSTOLIC BLOOD PRESSURE: 100 MMHG

## 2023-01-25 VITALS — SYSTOLIC BLOOD PRESSURE: 100 MMHG | DIASTOLIC BLOOD PRESSURE: 44 MMHG | OXYGEN SATURATION: 97 % | HEART RATE: 58 BPM

## 2023-01-25 DIAGNOSIS — I48.0 PAROXYSMAL ATRIAL FIBRILLATION: ICD-10-CM

## 2023-01-25 PROCEDURE — 93000 ELECTROCARDIOGRAM COMPLETE: CPT

## 2023-01-25 PROCEDURE — 99214 OFFICE O/P EST MOD 30 MIN: CPT

## 2023-01-25 NOTE — PHYSICAL EXAM
[Frail] : frail [Ill-Appearing] : ill-appearing [Cachectic] : cachexia was observed [Normal Conjunctiva] : normal conjunctiva [Normal Venous Pressure] : normal venous pressure [Normal S1, S2] : normal S1, S2 [No Rub] : no rub [No Gallop] : no gallop [Murmur] : murmur [Soft] : abdomen soft [Non Tender] : non-tender [Abnormal Gait] : abnormal gait [No Edema] : no edema [Moves all extremities] : moves all extremities [de-identified] : I/VI SM  [de-identified] : diminished bases [de-identified] : multiple echymosis

## 2023-01-25 NOTE — HISTORY OF PRESENT ILLNESS
[FreeTextEntry1] : 89 year old female with PMH of  CLL , ? stage colon carcinoma, hypothyroidism, emphysema, PNA anemia, hospitalization 8/2022 for low BP, SOB, anemia, s/p 2 units PRBCs mildly elevated troponin felt to be due to demand ischemia presents today for hospital follow up.  She recently presented to  with cough x 2 days. Patient tested positive for COVID. O2 sat was 86% -> 97% on 3L nasal cannula,  WBC 36.67.   D-dimer 637.   CXR: increased opacities bilaterally, consolidation RLL.  CTA: Negative for PE. Bronchiectasis is noted within both lungs. In addition, few ill-defined nodular opacities as well as tree-in-bud opacities are noted within both lungs. Exact etiology of the findings is unclear, however, the constellation of the above findings suggest possible Mycobacterium avium complex infection.  Pt was given Remdesivir, Decadron.   She was transfused with 1unit PRBC on 1/18/23 during that admission.  Patient was noted to have one episode of afib on tele that lasted approx 45 minutes.  \par \par She presents today with her 2 daughters.  She offers no complaints of chest pain/sob/palpitations.  According to her daughter patient had a tough day yesterday with congestion.  They have not been able to follow up with pulmonology yet due to availability in the office however PCP office prescribed abuterol via nebulizer (has not taken it yet). She is reluctant to take nebulizer and is requesting refill of albuterol inhaler which was prescribed in August when she had PNA.  She denies PND/orthopnea/LE edema\par  \par Patient's echo in hospital showed normal EF, moderate MR , mild to moderate TR mild elevated RSVP 32 mm hg , \par \par \par \par \par \par

## 2023-01-25 NOTE — ASSESSMENT
[FreeTextEntry1] : Patient with above hx\par \par PAF: one episode of PAF recorded during recent hospitalization which lasted approx 45 minutes; possibly due to anemia/ active covid infection.  No further episodes of afib noted.  EKG today shows sinus bradycardia.  Patient tx 1 unit PRBC in the hospital.  Recommend 2 week zio monitor to eval afib burden. She will continue metoprolol 12.5mg BID with parameters.  Continue to monitor H&H closely.  Labs ordered.  Patient not on anticoagulation due to severe recurrent anemia.  She will continue baby aspirin for now and closely monitor H&H  EKG done to check for arrhythmias\par \par Mild elevated troponin in setting of severe anemia , pneumonia , normal ventricular systolic function , possible due to demand related ischemia not ACS   \par \par Valvular heart disease : Moderate MR , mild to moderate TR , aortic sclerosis Mild AI \par \par Dyspnea: Probably secondary to pulmonary/Covid process. Continue current medications.  Echo shows normal LVF.  Labs ordered. Patient scheduled for repeat Chest CT 2/13/23.  She will follow up with pulmonary\par \par follow up one month\par \par \par

## 2023-01-25 NOTE — REVIEW OF SYSTEMS
[Cough] : cough [Negative] : ENT [Fever] : no fever [Chills] : no chills [Seeing Double (Diplopia)] : no diplopia [Discharge From Ears] : no discharge from the ears [SOB] : no shortness of breath [Dyspnea on exertion] : not dyspnea during exertion [Chest Discomfort] : no chest discomfort [Lower Ext Edema] : no extremity edema [Palpitations] : no palpitations [Orthopnea] : no orthopnea [PND] : no PND [Syncope] : no syncope [Wheezing] : no wheezing [Coughing Up Blood] : no hemoptysis [Snoring] : no snoring [Abdominal Pain] : no abdominal pain [Change in Appetite] : no change in appetite [Dysuria] : no dysuria [Rash] : no rash [Dizziness] : no dizziness [Confusion] : no confusion was observed

## 2023-01-26 ENCOUNTER — LABORATORY RESULT (OUTPATIENT)
Age: 88
End: 2023-01-26

## 2023-02-01 ENCOUNTER — NON-APPOINTMENT (OUTPATIENT)
Age: 88
End: 2023-02-01

## 2023-02-08 ENCOUNTER — LABORATORY RESULT (OUTPATIENT)
Age: 88
End: 2023-02-08

## 2023-02-22 ENCOUNTER — NON-APPOINTMENT (OUTPATIENT)
Age: 88
End: 2023-02-22

## 2023-02-27 ENCOUNTER — APPOINTMENT (OUTPATIENT)
Dept: CARDIOLOGY | Facility: CLINIC | Age: 88
End: 2023-02-27
Payer: MEDICARE

## 2023-02-27 ENCOUNTER — NON-APPOINTMENT (OUTPATIENT)
Age: 88
End: 2023-02-27

## 2023-02-27 VITALS
WEIGHT: 74.34 LBS | OXYGEN SATURATION: 96 % | DIASTOLIC BLOOD PRESSURE: 64 MMHG | SYSTOLIC BLOOD PRESSURE: 100 MMHG | BODY MASS INDEX: 14.99 KG/M2 | HEIGHT: 59.13 IN | HEART RATE: 69 BPM | TEMPERATURE: 98 F

## 2023-02-27 DIAGNOSIS — I34.0 NONRHEUMATIC MITRAL (VALVE) INSUFFICIENCY: ICD-10-CM

## 2023-02-27 DIAGNOSIS — D61.9 APLASTIC ANEMIA, UNSPECIFIED: ICD-10-CM

## 2023-02-27 DIAGNOSIS — R01.1 CARDIAC MURMUR, UNSPECIFIED: ICD-10-CM

## 2023-02-27 PROCEDURE — 93000 ELECTROCARDIOGRAM COMPLETE: CPT

## 2023-02-27 PROCEDURE — 99214 OFFICE O/P EST MOD 30 MIN: CPT

## 2023-02-27 RX ORDER — PANTOPRAZOLE 40 MG/1
40 TABLET, DELAYED RELEASE ORAL DAILY
Refills: 0 | Status: DISCONTINUED | COMMUNITY
End: 2023-02-27

## 2023-02-27 RX ORDER — ALBUTEROL SULFATE 90 UG/1
108 (90 BASE) INHALANT RESPIRATORY (INHALATION) EVERY 6 HOURS
Qty: 1 | Refills: 0 | Status: DISCONTINUED | COMMUNITY
Start: 2022-08-08 | End: 2023-02-27

## 2023-02-27 RX ORDER — ASPIRIN ENTERIC COATED TABLETS 81 MG 81 MG/1
81 TABLET, DELAYED RELEASE ORAL
Qty: 90 | Refills: 3 | Status: DISCONTINUED | COMMUNITY
Start: 2023-01-25 | End: 2023-02-27

## 2023-02-27 RX ORDER — METOPROLOL TARTRATE 25 MG/1
25 TABLET, FILM COATED ORAL TWICE DAILY
Qty: 30 | Refills: 1 | Status: DISCONTINUED | COMMUNITY
Start: 2023-01-25 | End: 2023-02-27

## 2023-02-27 RX ORDER — MULTIVITAMIN
TABLET ORAL DAILY
Refills: 0 | Status: DISCONTINUED | COMMUNITY
Start: 2022-08-08 | End: 2023-02-27

## 2023-02-27 NOTE — HISTORY OF PRESENT ILLNESS
[FreeTextEntry1] : 89 year old female with PMH of  CLL , ? stage colon carcinoma, hypothyroidism, emphysema, PNA anemia, hospitalization 8/2022 for low BP, SOB, anemia, s/p 2 units PRBCs,  COVID, Bronchiectasis Pt was given Remdesivir, Decadron.   She was transfused with 1unit PRBC on 1/18/23 during that admission.  Patient was noted to have one episode of afib on tele that lasted approx 45 minutes.  Subsequent telemetry showed no evidence of AF, no significant/sustained arrhythmias \par \par She presents today with her 2 daughters.  She offers no complaints of CP/SOB/Palpitations \par  \par Patient's echo in hospital showed normal EF, moderate MR , mild to moderate TR mild elevated RSVP 32 mm hg , \par \par \par \par \par \par

## 2023-02-27 NOTE — CARDIOLOGY SUMMARY
[de-identified] : 1/25/23 sinus bradycardia, nonspecific ST changes\par 8/16/22 sinus rhythm  possible lateral infarct , non specific T changes in anterior leads  [de-identified] : 7/19/22 EF 60-65% mild DD , moderate MR , mild to moderate TR RVSp 32 mm hg , aortic sclerosis  mild AI

## 2023-02-27 NOTE — ASSESSMENT
[FreeTextEntry1] : PAF: No reoccurrence on telemetry  EKG today SR shows sinus bradycardia.  Change BB to Long acting.  Patient not on anticoagulation due to severe recurrent anemia. \par \par Valvular heart disease : Moderate MR , mild to moderate TR , aortic sclerosis Mild AI \par \par F/U 2 months\par \par Plan DW Daughters and Dr Lyles \par \par \par

## 2023-02-27 NOTE — PHYSICAL EXAM
[Normal S1, S2] : normal S1, S2 [Soft] : abdomen soft [Non Tender] : non-tender [No Edema] : no edema [Normal] : moves all extremities, no focal deficits, normal speech [Alert and Oriented] : alert and oriented [de-identified] : Frail cachetic appearing  [de-identified] : Use of walker

## 2023-04-27 ENCOUNTER — INPATIENT (INPATIENT)
Facility: HOSPITAL | Age: 88
LOS: 6 days | Discharge: HOME HEALTH | DRG: 177 | End: 2023-05-04
Attending: INTERNAL MEDICINE | Admitting: INTERNAL MEDICINE
Payer: MEDICARE

## 2023-04-27 VITALS
HEART RATE: 88 BPM | SYSTOLIC BLOOD PRESSURE: 92 MMHG | WEIGHT: 70.11 LBS | RESPIRATION RATE: 22 BRPM | OXYGEN SATURATION: 89 % | DIASTOLIC BLOOD PRESSURE: 58 MMHG

## 2023-04-27 DIAGNOSIS — R06.89 OTHER ABNORMALITIES OF BREATHING: ICD-10-CM

## 2023-04-27 DIAGNOSIS — Z90.49 ACQUIRED ABSENCE OF OTHER SPECIFIED PARTS OF DIGESTIVE TRACT: Chronic | ICD-10-CM

## 2023-04-27 LAB
ALBUMIN SERPL ELPH-MCNC: 3.4 G/DL — SIGNIFICANT CHANGE UP (ref 3.3–5)
ALP SERPL-CCNC: 90 U/L — SIGNIFICANT CHANGE UP (ref 40–120)
ALT FLD-CCNC: 17 U/L — SIGNIFICANT CHANGE UP (ref 10–45)
ANION GAP SERPL CALC-SCNC: 13 MMOL/L — SIGNIFICANT CHANGE UP (ref 5–17)
ANISOCYTOSIS BLD QL: SIGNIFICANT CHANGE UP
AST SERPL-CCNC: 24 U/L — SIGNIFICANT CHANGE UP (ref 10–40)
BASE EXCESS BLDV CALC-SCNC: 2.1 MMOL/L — SIGNIFICANT CHANGE UP (ref -2–3)
BASOPHILS # BLD AUTO: 0 K/UL — SIGNIFICANT CHANGE UP (ref 0–0.2)
BASOPHILS NFR BLD AUTO: 0 % — SIGNIFICANT CHANGE UP (ref 0–2)
BILIRUB SERPL-MCNC: 1.1 MG/DL — SIGNIFICANT CHANGE UP (ref 0.2–1.2)
BUN SERPL-MCNC: 46 MG/DL — HIGH (ref 7–23)
CA-I SERPL-SCNC: 1.34 MMOL/L — HIGH (ref 1.15–1.33)
CALCIUM SERPL-MCNC: 9.7 MG/DL — SIGNIFICANT CHANGE UP (ref 8.4–10.5)
CHLORIDE BLDV-SCNC: 107 MMOL/L — SIGNIFICANT CHANGE UP (ref 96–108)
CHLORIDE SERPL-SCNC: 105 MMOL/L — SIGNIFICANT CHANGE UP (ref 96–108)
CO2 BLDV-SCNC: 30 MMOL/L — HIGH (ref 22–26)
CO2 SERPL-SCNC: 25 MMOL/L — SIGNIFICANT CHANGE UP (ref 22–31)
CREAT SERPL-MCNC: 0.83 MG/DL — SIGNIFICANT CHANGE UP (ref 0.5–1.3)
DACRYOCYTES BLD QL SMEAR: SLIGHT — SIGNIFICANT CHANGE UP
EGFR: 65 ML/MIN/1.73M2 — SIGNIFICANT CHANGE UP
EOSINOPHIL # BLD AUTO: 0 K/UL — SIGNIFICANT CHANGE UP (ref 0–0.5)
EOSINOPHIL NFR BLD AUTO: 0 % — SIGNIFICANT CHANGE UP (ref 0–6)
FLUAV AG NPH QL: SIGNIFICANT CHANGE UP
FLUBV AG NPH QL: SIGNIFICANT CHANGE UP
GAS PNL BLDV: 142 MMOL/L — SIGNIFICANT CHANGE UP (ref 136–145)
GAS PNL BLDV: SIGNIFICANT CHANGE UP
GAS PNL BLDV: SIGNIFICANT CHANGE UP
GIANT PLATELETS BLD QL SMEAR: PRESENT — SIGNIFICANT CHANGE UP
GLUCOSE BLDV-MCNC: 123 MG/DL — HIGH (ref 70–99)
GLUCOSE SERPL-MCNC: 126 MG/DL — HIGH (ref 70–99)
HCO3 BLDV-SCNC: 28 MMOL/L — SIGNIFICANT CHANGE UP (ref 22–29)
HCT VFR BLD CALC: 31 % — LOW (ref 34.5–45)
HCT VFR BLDA CALC: 24 % — LOW (ref 34.5–46.5)
HGB BLD CALC-MCNC: 7.9 G/DL — LOW (ref 11.7–16.1)
HGB BLD-MCNC: 9.5 G/DL — LOW (ref 11.5–15.5)
LACTATE BLDV-MCNC: 1.2 MMOL/L — SIGNIFICANT CHANGE UP (ref 0.5–2)
LYMPHOCYTES # BLD AUTO: 60.66 K/UL — HIGH (ref 1–3.3)
LYMPHOCYTES # BLD AUTO: 95.8 % — HIGH (ref 13–44)
MACROCYTES BLD QL: SIGNIFICANT CHANGE UP
MANUAL SMEAR VERIFICATION: SIGNIFICANT CHANGE UP
MCHC RBC-ENTMCNC: 30.6 GM/DL — LOW (ref 32–36)
MCHC RBC-ENTMCNC: 32.5 PG — SIGNIFICANT CHANGE UP (ref 27–34)
MCV RBC AUTO: 106.2 FL — HIGH (ref 80–100)
MONOCYTES # BLD AUTO: 0 K/UL — SIGNIFICANT CHANGE UP (ref 0–0.9)
MONOCYTES NFR BLD AUTO: 0 % — LOW (ref 2–14)
NEUTROPHILS # BLD AUTO: 2.66 K/UL — SIGNIFICANT CHANGE UP (ref 1.8–7.4)
NEUTROPHILS NFR BLD AUTO: 4.2 % — LOW (ref 43–77)
PCO2 BLDV: 54 MMHG — HIGH (ref 39–42)
PH BLDV: 7.33 — SIGNIFICANT CHANGE UP (ref 7.32–7.43)
PLAT MORPH BLD: ABNORMAL
PLATELET # BLD AUTO: 224 K/UL — SIGNIFICANT CHANGE UP (ref 150–400)
PO2 BLDV: 24 MMHG — LOW (ref 25–45)
POIKILOCYTOSIS BLD QL AUTO: SLIGHT — SIGNIFICANT CHANGE UP
POLYCHROMASIA BLD QL SMEAR: SLIGHT — SIGNIFICANT CHANGE UP
POTASSIUM BLDV-SCNC: 4 MMOL/L — SIGNIFICANT CHANGE UP (ref 3.5–5.1)
POTASSIUM SERPL-MCNC: 4.4 MMOL/L — SIGNIFICANT CHANGE UP (ref 3.5–5.3)
POTASSIUM SERPL-SCNC: 4.4 MMOL/L — SIGNIFICANT CHANGE UP (ref 3.5–5.3)
PROT SERPL-MCNC: 6.9 G/DL — SIGNIFICANT CHANGE UP (ref 6–8.3)
RBC # BLD: 2.92 M/UL — LOW (ref 3.8–5.2)
RBC # FLD: 15.9 % — HIGH (ref 10.3–14.5)
RBC BLD AUTO: ABNORMAL
RSV RNA NPH QL NAA+NON-PROBE: SIGNIFICANT CHANGE UP
SAO2 % BLDV: 31.8 % — LOW (ref 67–88)
SARS-COV-2 RNA SPEC QL NAA+PROBE: SIGNIFICANT CHANGE UP
SODIUM SERPL-SCNC: 143 MMOL/L — SIGNIFICANT CHANGE UP (ref 135–145)
WBC # BLD: 63.32 K/UL — CRITICAL HIGH (ref 3.8–10.5)
WBC # FLD AUTO: 63.32 K/UL — CRITICAL HIGH (ref 3.8–10.5)

## 2023-04-27 PROCEDURE — 71046 X-RAY EXAM CHEST 2 VIEWS: CPT | Mod: 26

## 2023-04-27 PROCEDURE — 71275 CT ANGIOGRAPHY CHEST: CPT | Mod: 26,MA

## 2023-04-27 PROCEDURE — 99285 EMERGENCY DEPT VISIT HI MDM: CPT | Mod: GC

## 2023-04-27 RX ORDER — SODIUM CHLORIDE 9 MG/ML
1000 INJECTION, SOLUTION INTRAVENOUS
Refills: 0 | Status: DISCONTINUED | OUTPATIENT
Start: 2023-04-27 | End: 2023-04-27

## 2023-04-27 RX ORDER — AZITHROMYCIN 500 MG/1
500 TABLET, FILM COATED ORAL EVERY 24 HOURS
Refills: 0 | Status: DISCONTINUED | OUTPATIENT
Start: 2023-04-28 | End: 2023-04-28

## 2023-04-27 RX ORDER — HEPARIN SODIUM 5000 [USP'U]/ML
5000 INJECTION INTRAVENOUS; SUBCUTANEOUS EVERY 12 HOURS
Refills: 0 | Status: DISCONTINUED | OUTPATIENT
Start: 2023-04-27 | End: 2023-05-04

## 2023-04-27 RX ORDER — LEVOTHYROXINE SODIUM 125 MCG
50 TABLET ORAL DAILY
Refills: 0 | Status: DISCONTINUED | OUTPATIENT
Start: 2023-04-27 | End: 2023-05-04

## 2023-04-27 RX ORDER — METOPROLOL TARTRATE 50 MG
25 TABLET ORAL
Refills: 0 | Status: DISCONTINUED | OUTPATIENT
Start: 2023-04-27 | End: 2023-04-28

## 2023-04-27 RX ORDER — METOPROLOL TARTRATE 50 MG
12.5 TABLET ORAL
Refills: 0 | Status: DISCONTINUED | OUTPATIENT
Start: 2023-04-27 | End: 2023-04-27

## 2023-04-27 RX ORDER — PREGABALIN 225 MG/1
1000 CAPSULE ORAL DAILY
Refills: 0 | Status: DISCONTINUED | OUTPATIENT
Start: 2023-04-27 | End: 2023-05-04

## 2023-04-27 RX ORDER — PANTOPRAZOLE SODIUM 20 MG/1
40 TABLET, DELAYED RELEASE ORAL
Refills: 0 | Status: DISCONTINUED | OUTPATIENT
Start: 2023-04-27 | End: 2023-04-27

## 2023-04-27 RX ORDER — TIOTROPIUM BROMIDE 18 UG/1
2 CAPSULE ORAL; RESPIRATORY (INHALATION) DAILY
Refills: 0 | Status: DISCONTINUED | OUTPATIENT
Start: 2023-04-27 | End: 2023-04-27

## 2023-04-27 RX ORDER — AZITHROMYCIN 500 MG/1
TABLET, FILM COATED ORAL
Refills: 0 | Status: DISCONTINUED | OUTPATIENT
Start: 2023-04-27 | End: 2023-04-28

## 2023-04-27 RX ORDER — ALBUTEROL 90 UG/1
2 AEROSOL, METERED ORAL EVERY 6 HOURS
Refills: 0 | Status: DISCONTINUED | OUTPATIENT
Start: 2023-04-27 | End: 2023-05-04

## 2023-04-27 RX ORDER — SODIUM CHLORIDE 9 MG/ML
500 INJECTION INTRAMUSCULAR; INTRAVENOUS; SUBCUTANEOUS ONCE
Refills: 0 | Status: COMPLETED | OUTPATIENT
Start: 2023-04-27 | End: 2023-04-27

## 2023-04-27 RX ORDER — BUDESONIDE AND FORMOTEROL FUMARATE DIHYDRATE 160; 4.5 UG/1; UG/1
2 AEROSOL RESPIRATORY (INHALATION)
Refills: 0 | Status: DISCONTINUED | OUTPATIENT
Start: 2023-04-27 | End: 2023-05-04

## 2023-04-27 RX ORDER — AZITHROMYCIN 500 MG/1
500 TABLET, FILM COATED ORAL ONCE
Refills: 0 | Status: COMPLETED | OUTPATIENT
Start: 2023-04-27 | End: 2023-04-27

## 2023-04-27 RX ORDER — ASPIRIN/CALCIUM CARB/MAGNESIUM 324 MG
81 TABLET ORAL DAILY
Refills: 0 | Status: DISCONTINUED | OUTPATIENT
Start: 2023-04-27 | End: 2023-04-27

## 2023-04-27 RX ORDER — LEVOTHYROXINE SODIUM 125 MCG
25 TABLET ORAL DAILY
Refills: 0 | Status: DISCONTINUED | OUTPATIENT
Start: 2023-04-27 | End: 2023-04-27

## 2023-04-27 RX ORDER — IPRATROPIUM/ALBUTEROL SULFATE 18-103MCG
3 AEROSOL WITH ADAPTER (GRAM) INHALATION EVERY 6 HOURS
Refills: 0 | Status: DISCONTINUED | OUTPATIENT
Start: 2023-04-27 | End: 2023-05-03

## 2023-04-27 RX ORDER — SODIUM CHLORIDE 9 MG/ML
1000 INJECTION, SOLUTION INTRAVENOUS
Refills: 0 | Status: DISCONTINUED | OUTPATIENT
Start: 2023-04-27 | End: 2023-04-28

## 2023-04-27 RX ADMIN — Medication 3 MILLILITER(S): at 23:33

## 2023-04-27 RX ADMIN — Medication 50 MICROGRAM(S): at 18:14

## 2023-04-27 RX ADMIN — HEPARIN SODIUM 5000 UNIT(S): 5000 INJECTION INTRAVENOUS; SUBCUTANEOUS at 18:17

## 2023-04-27 RX ADMIN — Medication 100 MILLIGRAM(S): at 14:51

## 2023-04-27 RX ADMIN — SODIUM CHLORIDE 75 MILLILITER(S): 9 INJECTION, SOLUTION INTRAVENOUS at 18:17

## 2023-04-27 RX ADMIN — AZITHROMYCIN 500 MILLIGRAM(S): 500 TABLET, FILM COATED ORAL at 12:59

## 2023-04-27 RX ADMIN — Medication 3 MILLILITER(S): at 18:13

## 2023-04-27 RX ADMIN — Medication 25 MILLIGRAM(S): at 18:16

## 2023-04-27 RX ADMIN — SODIUM CHLORIDE 500 MILLILITER(S): 9 INJECTION INTRAMUSCULAR; INTRAVENOUS; SUBCUTANEOUS at 12:58

## 2023-04-27 NOTE — H&P ADULT - HISTORY OF PRESENT ILLNESS
89-year-old female PMH of hypertension, hypothyroidism, colon cancer status postresection of the colon, CLL, COPD presenting with shortness of breath. Patient was diagnosed with pneumonia last week, subsequently put on doxycycline. Patient was told that if she continued to not feel well she should come to the emergency department. Patient woke up this morning saying she just did not feel right. Patient denies chest pain, fevers, vomiting, abdominal pain. Patient had COVID back in January and since has had a intermittent oxygen requirement. Over the last week patient has been using oxygen due to low oxygen saturation while being treated for pneumonia.  Patient also states her appetite has been very poor recently.

## 2023-04-27 NOTE — H&P ADULT - NSHPLABSRESULTS_GEN_ALL_CORE
LABS:                        9.5    63.32 )-----------( 224      ( 27 Apr 2023 13:42 )             31.0     04-27    143  |  105  |  46<H>  ----------------------------<  126<H>  4.4   |  25  |  0.83    Ca    9.7      27 Apr 2023 13:42    TPro  6.9  /  Alb  3.4  /  TBili  1.1  /  DBili  x   /  AST  24  /  ALT  17  /  AlkPhos  90  04-27            RADIOLOGY & ADDITIONAL TESTS:

## 2023-04-27 NOTE — ED ADULT TRIAGE NOTE - NSWEIGHTCALCTOOLDRUG_GEN_A_CORE
Health Maintenance Due   Topic Date Due   • Cervical Cancer Screening  07/22/2019   • Influenza Vaccine (1) 09/01/2019       Patient is due for topics as listed above but is not proceeding with Immunization(s) Influenza and Cervical cancer screening at this time.         
Here for nexplanon placement, discussed at visit last week.  Switching from depo shot.  Patient's last menstrual period was 11/07/2019 (exact date).   No unprotected intercourse since LMP.  Reviewed again risks, benefits, MOA, side effects of nexplanon, patient wished to proceed. Verbal and written consent obtained.    Visit Vitals  /88 (BP Location: LUE - Left upper extremity, Patient Position: Sitting, Cuff Size: Regular)   Pulse 72   Temp 98.3 °F (36.8 °C) (Temporal)   Resp 16   Ht 4' 11\" (1.499 m)   Wt 74.1 kg   LMP 11/07/2019 (Exact Date)   BMI 33.00 kg/m²     Looks well.    PROCEDURE:  Nexplanon placement.   Location - left upper inner arm, 10 cm above medial epicondyle below the groove of bicep/tricep.  Patient placed in supine position with elbow at 90 degrees exposing inner upper arm.   Skin prepped with alcohol and anesthestized with 1% lidocaine without epinephrine.   Skin then cleansed with chlorhexidine. Sterile drape placed under arm.   Applicator device placed on the skin and inserted in the appropriate fashion, deployed and device then removed from skin. Confirmed Nexplanon no longer in canula, purple obturator visualized. Implant palpated in arm as expected. Steri strips placed, covered with nonstick dressing and coban applied. Aftercare reviewed. To follow up as needed.     imms per orders.   
 used

## 2023-04-27 NOTE — ED PROVIDER NOTE - CLINICAL SUMMARY MEDICAL DECISION MAKING FREE TEXT BOX
89-year-old female with a past medical history of hypertension, hypothyroidism, colon cancer s/p colon resection, CLL presenting with worsening shortness of breath and not feeling well after being diagnosed with pneumonia last week. Patient has been on doxycycline and prednisone without improvement. Patient woke up feeling not well today, which prompted her to come to the emergency department. Lung sounds coarse bilaterally. Concern for continued pneumonia. CBC, CMP, VBG, blood cultures, urinalysis, urine culture, CTA of the chest, Empiric antibiotic therapy. 89-year-old female with a past medical history of hypertension, hypothyroidism, colon cancer s/p colon resection, CLL presenting with worsening shortness of breath and not feeling well after being diagnosed with pneumonia last week. Patient has been on doxycycline and prednisone without improvement. Patient woke up feeling not well today, which prompted her to come to the emergency department. Lung sounds coarse bilaterally. Concern for continued pneumonia. CBC, CMP, VBG, blood cultures, urinalysis, urine culture, CTA of the chest, Empiric antibiotic therapy.    pettet attending- see attending attestation for my mdm

## 2023-04-27 NOTE — PATIENT PROFILE ADULT - FUNCTIONAL SCREEN CURRENT LEVEL: COMMUNICATION, MLM
7 page fax received with home health certification and plan of care for doctor to sign.   0 = understands/communicates without difficulty

## 2023-04-27 NOTE — ED PROVIDER NOTE - OBJECTIVE STATEMENT
89-year-old female with a past medical history hypertension, hypothyroidism, colon cancer status postresection of the colon, CLL presenting with shortness of breath. Patient was diagnosed with pneumonia last week, subsequently put on doxycycline. Patient was told that if she continued to not feel well she should come to the emergency department. Patient woke up this morning saying she just did not feel right. Patient denies chest pain, fevers, vomiting, abdominal pain. Patient had COVID back in January and since has had a intermittent oxygen requirement. Over the last week patient has been using oxygen due to low oxygen saturation while being treated for pneumonia. patient also states her appetite has been very poor recently.

## 2023-04-27 NOTE — H&P ADULT - NSHPPHYSICALEXAM_GEN_ALL_CORE
PHYSICAL EXAMINATION:  Vital Signs Last 24 Hrs  T(C): 36.4 (27 Apr 2023 14:53), Max: 36.5 (27 Apr 2023 12:25)  T(F): 97.6 (27 Apr 2023 14:53), Max: 97.7 (27 Apr 2023 12:25)  HR: 61 (27 Apr 2023 14:53) (61 - 88)  BP: 115/48 (27 Apr 2023 14:53) (92/58 - 115/48)  BP(mean): 70 (27 Apr 2023 14:53) (70 - 70)  RR: 30 (27 Apr 2023 14:53) (22 - 30)  SpO2: 96% (27 Apr 2023 14:53) (89% - 98%)    Parameters below as of 27 Apr 2023 14:53  Patient On (Oxygen Delivery Method): nasal cannula  O2 Flow (L/min): 2    CAPILLARY BLOOD GLUCOSE          GENERAL: NAD, well-groomed, well-developed  HEAD:  atraumatic, normocephalic  EYES: sclera anicteric  ENMT: mucous membranes moist  NECK: supple, No JVD  CHEST/LUNG: clear to auscultation bilaterally; no rales, rhonchi, or wheezing b/l  HEART: normal S1, S2  ABDOMEN: BS+, soft, ND, NT   EXTREMITIES:  pulses palpable; no clubbing, cyanosis, or edema b/l LEs  NEURO: awake, alert, interactive; moves all extremities  SKIN: no rashes or lesions PHYSICAL EXAMINATION:  Vital Signs Last 24 Hrs  T(C): 36.4 (27 Apr 2023 14:53), Max: 36.5 (27 Apr 2023 12:25)  T(F): 97.6 (27 Apr 2023 14:53), Max: 97.7 (27 Apr 2023 12:25)  HR: 61 (27 Apr 2023 14:53) (61 - 88)  BP: 115/48 (27 Apr 2023 14:53) (92/58 - 115/48)  BP(mean): 70 (27 Apr 2023 14:53) (70 - 70)  RR: 30 (27 Apr 2023 14:53) (22 - 30)  SpO2: 96% (27 Apr 2023 14:53) (89% - 98%)    Parameters below as of 27 Apr 2023 14:53  Patient On (Oxygen Delivery Method): nasal cannula  O2 Flow (L/min): 2    CAPILLARY BLOOD GLUCOSE          GENERAL: NAD, seen in ER, tired, thick cough, no fevers, no CP   ENMT: mucous membranes moist  NECK: supple, No JVD  CHEST/LUNG: clear to auscultation bilaterally; no rales, rhonchi, or wheezing b/l  HEART: normal S1, S2  ABDOMEN: BS+, soft, ND, NT   EXTREMITIES:  pulses palpable; no clubbing, cyanosis, or edema b/l LEs  NEURO: awake, alert, interactive; moves all extremities  SKIN: no rashes or lesions

## 2023-04-27 NOTE — H&P ADULT - ASSESSMENT
89-year-old female PMH of hypertension, hypothyroidism, colon cancer status postresection of the colon, CLL, COPD presenting with shortness of breath. Patient was diagnosed with pneumonia last week, subsequently put on doxycycline. Patient was told that if she continued to not feel well she should come to the emergency department. Patient woke up this morning saying she just did not feel right. Patient denies chest pain, fevers, vomiting, abdominal pain. Patient had COVID back in January and since has had a intermittent oxygen requirement. Over the last week patient has been using oxygen due to low oxygen saturation while being treated for pneumonia.  Patient also states her appetite has been very poor recently.    Plan:  89-year-old female PMH of hypertension, hypothyroidism, colon cancer status postresection of the colon, CLL, COPD presenting with shortness of breath. Patient was diagnosed with pneumonia last week, subsequently put on doxycycline. Patient was told that if she continued to not feel well she should come to the emergency department. Patient woke up this morning saying she just did not feel right. Patient denies chest pain, fevers, vomiting, abdominal pain. Patient had COVID back in January and since has had a intermittent oxygen requirement. Over the last week patient has been using oxygen due to low oxygen saturation while being treated for pneumonia.  Patient also states her appetite has been very poor recently.      Plan: Admit to medicine for weakness and failure to thrive. Has thick cough, no fevers recently. Completed 7 days of Doxy and prednisone in past week. No wheeze at rest   or CP. Family not interested in bronch procedure, sputum culture sent. CTA notes no PE, bilateral pattern concerning for ORESTES. ID consult was called by ER. Will start IV   Zithromycin for now, defer to ID in AM.  Added oxygen 2.5 lpm.     Will continue home Duonebs every 6 hours, Symbicort bid and Albuterol prn every 6 hours. Continue Lopressor and Synthyroid at home dose.  TSH is AM.     CLL is noted, WBC 63,000 with high lymph count. Family has declined treatment.     Has full DNR, MOLST form signed from home.  Family will consider Palliative Care eval in AM.    89-year-old female PMH of hypertension, hypothyroidism, colon cancer status postresection of the colon, CLL, COPD presenting with shortness of breath. Patient was diagnosed with pneumonia last week, subsequently put on doxycycline. Patient was told that if she continued to not feel well she should come to the emergency department. Patient woke up this morning saying she just did not feel right. Patient denies chest pain, fevers, vomiting, abdominal pain. Patient had COVID back in January and since has had a intermittent oxygen requirement. Over the last week patient has been using oxygen due to low oxygen saturation while being treated for pneumonia.  Patient also states her appetite has been very poor recently.      Plan: Admit to medicine for weakness and failure to thrive. Has thick cough, no fevers recently. Completed 7 days of Doxy and prednisone in past week. No wheeze at rest   or CP. Family not interested in bronch procedure, sputum culture sent. CTA notes no PE, bilateral pattern concerning for ORESTES. ID consult was called by ER. Will start IV   Zithromycin for now, defer to ID in AM.  Added oxygen 2.5 lpm. No prior cardiac stents, no DM, no CHF.     Will continue home Duonebs every 6 hours, Symbicort bid and Albuterol prn every 6 hours. Continue Lopressor and Synthyroid at home dose.  TSH is AM.     CLL is noted, WBC 63,000 with high lymph count. Family has declined treatment.     Has full DNR, MOLST form signed from home.  Family will consider Palliative Care eval in AM.    89-year-old female PMH of hypertension, hypothyroidism, colon cancer status postresection of the colon, CLL, COPD presenting with shortness of breath. Patient was diagnosed with pneumonia last week, subsequently put on doxycycline. Patient was told that if she continued to not feel well she should come to the emergency department. Patient woke up this morning saying she just did not feel right. Patient denies chest pain, fevers, vomiting, abdominal pain. Patient had COVID back in January and since has had a intermittent oxygen requirement. Over the last week patient has been using oxygen due to low oxygen saturation while being treated for pneumonia.  Patient also states her appetite has been very poor recently.      Plan: Admit to medicine for weakness and failure to thrive. Has thick cough, no fevers recently. Completed 7 days of Doxy and prednisone in past week. No wheeze at rest   or CP. Family not interested in bronch procedure, sputum culture sent. CTA notes no PE, bilateral pattern concerning for ORESTES. ID consult was called by ER. Will start IV   Zithromycin for now, defer to ID in AM.  Added oxygen 2.5 lpm. No prior cardiac stents, no DM, no CHF.  Needs 1-2 days of IVF.     Will continue home Duonebs every 6 hours, Symbicort bid and Albuterol prn every 6 hours. Continue Lopressor and Synthyroid at home dose.  TSH is AM.     CLL is noted, WBC 63,000 with high lymph count. Family has declined treatment.     Has full DNR, MOLST form signed from home.  Family will consider Palliative Care eval in AM.

## 2023-04-27 NOTE — ED PROVIDER NOTE - PHYSICAL EXAMINATION
General: Appears Short of breath, emaciated  Mentation: AAO x 3  psych: mood appropriate  HEENT: airway patent, conjunctivae clear bilaterally, JAYLEN  Resp: Coarse breath sounds bilaterally.  Cardio: RRR, no m/r/g  GI: soft/nondistended/nontender  Neuro: sensation and motor function grossly intact  Skin: no cyanosis, no jaundice  MSK: normal movement of all extremities  Lymph/Vasc: no LE edema

## 2023-04-27 NOTE — ED PROVIDER NOTE - ATTENDING CONTRIBUTION TO CARE
I, Hector Dotson, performed a history and physical exam of the patient and discussed their management with the resident and/or advanced care provider. I reviewed the resident and/or advanced care provider's note and agree with the documented findings and plan of care. I was present and available for all procedures.    89-year-old female with a past medical history hypertension, hypothyroidism, colon cancer status postresection of the colon, CLL presenting with shortness of breath. Patient was diagnosed with pneumonia last week, subsequently put on doxycycline. Patient was told that if she continued to not feel well she should come to the emergency department. Patient woke up this morning saying she just did not feel right. Patient denies chest pain, fevers, vomiting, abdominal pain. Patient had COVID back in January and since has had a intermittent oxygen requirement. Over the last week patient has been using oxygen due to low oxygen saturation while being treated for pneumonia. patient also states her appetite has been very poor recently.    Physical Exam:  Gen: ill appearing  Head: normal appearing  HEENT: normal conjunctiva, oral mucosa dry  Lung: mild respiratory distress, speaking in partial sentences, crackles throughout  CV: tachycardic  Abd: soft, ND, NT  MSK: no visible deformities  Neuro: No focal deficits  Skin: Warm  Psych: calm      Concerning for worsening shortness of breath in the setting of cancer and recent pneumonia on Doxy concerning for possible PE versus multifocal pneumonia requiring further admission and IV antibiotics otherwise mild respiratory distress no need for BiPAP at this point but will consider if patient worsens otherwise full code daughter at bedside discussed plan with her and agreeable with plan for admission with antibiotics CTA rule out PE otherwise unlikely ACS pneumothorax dissection AAA close monitoring screening blood work I, Hector Dotson, performed a history and physical exam of the patient and discussed their management with the resident and/or advanced care provider. I reviewed the resident and/or advanced care provider's note and agree with the documented findings and plan of care. I was present and available for all procedures.    89-year-old female with a past medical history hypertension, hypothyroidism, colon cancer status postresection of the colon, CLL presenting with shortness of breath. Patient was diagnosed with pneumonia last week, subsequently put on doxycycline. Patient was told that if she continued to not feel well she should come to the emergency department. Patient woke up this morning saying she just did not feel right. Patient denies chest pain, fevers, vomiting, abdominal pain. Patient had COVID back in January and since has had a intermittent oxygen requirement. Over the last week patient has been using oxygen due to low oxygen saturation while being treated for pneumonia. patient also states her appetite has been very poor recently.    Physical Exam:  Gen: ill appearing  Head: normal appearing  HEENT: normal conjunctiva, oral mucosa dry  Lung: mild respiratory distress, speaking in partial sentences, crackles throughout  CV: tachycardic  Abd: soft, ND, NT  MSK: no visible deformities  Neuro: No focal deficits  Skin: Warm  Psych: calm      Concerning for worsening shortness of breath in the setting of cancer and recent pneumonia on Doxy concerning for possible PE versus multifocal pneumonia requiring further admission and IV antibiotics otherwise mild respiratory distress no need for BiPAP at this point but will consider if patient worsens otherwise daughter at bedside discussed plan with her and agreeable with plan for admission with antibiotics CTA rule out PE otherwise unlikely ACS pneumothorax dissection AAA close monitoring screening blood work

## 2023-04-27 NOTE — ED ADULT NURSE NOTE - OBJECTIVE STATEMENT
Pt 89 y/o female, AxOx3, presents to ED from home complaining of worsening SOB. PMH of CLL, COPD on PRN 2L NC at home, PNA. Pt daughter at bedside endorsing that pt has needed home O2 more frequently recently, associated with hypoxia w/ pulse ox 88% at home. Daughter reporting weight loss of 10-15 lbs in 1 year. Pt is uncomfortable appearing, speaking full sentences without difficulty associated w/ unproductive cough. Breathing spontaneous, tachypneic and labored, on 2L NC in ED. Upon assessment, abdomen soft and nontender, +strong peripheral pulses, moving all extremities without difficulty, crackles auscultated at baseline. Pt placed on continuous pulse ox and cardiac monitor, NSR noted. Safety and comfort measures initiated- bed placed in lowest position and side rails raised. Pt oriented to call bell system. Pt 89 y/o female, AxOx3, presents to ED from home complaining of worsening SOB. PMH of CLL, colon CA w/ resection ( no known mets), COPD on PRN 2L NC at home, PNA. Pt daughter at bedside endorsing that pt has needed home O2 more frequently recently, associated with hypoxia w/ pulse ox 88% at home. Daughter reporting weight loss of 10-15 lbs in 1 year. Pt is uncomfortable appearing, speaking full sentences without difficulty associated w/ unproductive cough. Breathing spontaneous, tachypneic and labored, on 2L NC in ED. Upon assessment, abdomen soft and nontender, +strong peripheral pulses, moving all extremities without difficulty, crackles auscultated at baseline. Pt placed on continuous pulse ox and cardiac monitor, NSR noted. Safety and comfort measures initiated- bed placed in lowest position and side rails raised. Pt oriented to call bell system.

## 2023-04-27 NOTE — PATIENT PROFILE ADULT - FALL HARM RISK - HARM RISK INTERVENTIONS
Assistance with ambulation/Assistance OOB with selected safe patient handling equipment/Communicate Risk of Fall with Harm to all staff/Discuss with provider need for PT consult/Monitor gait and stability/Orthostatic vital signs/Provide patient with walking aids - walker, cane, crutches/Reinforce activity limits and safety measures with patient and family/Tailored Fall Risk Interventions/Visual Cue: Yellow wristband and red socks/Bed in lowest position, wheels locked, appropriate side rails in place/Call bell, personal items and telephone in reach/Instruct patient to call for assistance before getting out of bed or chair/Non-slip footwear when patient is out of bed/Peerless to call system/Physically safe environment - no spills, clutter or unnecessary equipment/Purposeful Proactive Rounding/Room/bathroom lighting operational, light cord in reach

## 2023-04-28 LAB
ANION GAP SERPL CALC-SCNC: 11 MMOL/L — SIGNIFICANT CHANGE UP (ref 5–17)
BASOPHILS # BLD AUTO: 0 K/UL — SIGNIFICANT CHANGE UP (ref 0–0.2)
BASOPHILS NFR BLD AUTO: 0 % — SIGNIFICANT CHANGE UP (ref 0–2)
BUN SERPL-MCNC: 40 MG/DL — HIGH (ref 7–23)
CALCIUM SERPL-MCNC: 8.6 MG/DL — SIGNIFICANT CHANGE UP (ref 8.4–10.5)
CHLORIDE SERPL-SCNC: 106 MMOL/L — SIGNIFICANT CHANGE UP (ref 96–108)
CO2 SERPL-SCNC: 24 MMOL/L — SIGNIFICANT CHANGE UP (ref 22–31)
CREAT SERPL-MCNC: 0.71 MG/DL — SIGNIFICANT CHANGE UP (ref 0.5–1.3)
EGFR: 78 ML/MIN/1.73M2 — SIGNIFICANT CHANGE UP
EOSINOPHIL # BLD AUTO: 0 K/UL — SIGNIFICANT CHANGE UP (ref 0–0.5)
EOSINOPHIL NFR BLD AUTO: 0 % — SIGNIFICANT CHANGE UP (ref 0–6)
GLUCOSE SERPL-MCNC: 148 MG/DL — HIGH (ref 70–99)
HCT VFR BLD CALC: 26.2 % — LOW (ref 34.5–45)
HGB BLD-MCNC: 7.8 G/DL — LOW (ref 11.5–15.5)
LYMPHOCYTES # BLD AUTO: 50.36 K/UL — HIGH (ref 1–3.3)
LYMPHOCYTES # BLD AUTO: 98.2 % — HIGH (ref 13–44)
MANUAL SMEAR VERIFICATION: SIGNIFICANT CHANGE UP
MCHC RBC-ENTMCNC: 29.8 GM/DL — LOW (ref 32–36)
MCHC RBC-ENTMCNC: 32.2 PG — SIGNIFICANT CHANGE UP (ref 27–34)
MCV RBC AUTO: 108.3 FL — HIGH (ref 80–100)
MONOCYTES # BLD AUTO: 0 K/UL — SIGNIFICANT CHANGE UP (ref 0–0.9)
MONOCYTES NFR BLD AUTO: 0 % — LOW (ref 2–14)
NEUTROPHILS # BLD AUTO: 0.92 K/UL — LOW (ref 1.8–7.4)
NEUTROPHILS NFR BLD AUTO: 1.8 % — LOW (ref 43–77)
PLAT MORPH BLD: NORMAL — SIGNIFICANT CHANGE UP
PLATELET # BLD AUTO: 166 K/UL — SIGNIFICANT CHANGE UP (ref 150–400)
POTASSIUM SERPL-MCNC: 3.8 MMOL/L — SIGNIFICANT CHANGE UP (ref 3.5–5.3)
POTASSIUM SERPL-SCNC: 3.8 MMOL/L — SIGNIFICANT CHANGE UP (ref 3.5–5.3)
RBC # BLD: 2.42 M/UL — LOW (ref 3.8–5.2)
RBC # FLD: 16 % — HIGH (ref 10.3–14.5)
RBC BLD AUTO: SIGNIFICANT CHANGE UP
SMUDGE CELLS # BLD: PRESENT — SIGNIFICANT CHANGE UP
SODIUM SERPL-SCNC: 141 MMOL/L — SIGNIFICANT CHANGE UP (ref 135–145)
TSH SERPL-MCNC: 0.78 UIU/ML — SIGNIFICANT CHANGE UP (ref 0.27–4.2)
WBC # BLD: 51.28 K/UL — CRITICAL HIGH (ref 3.8–10.5)
WBC # FLD AUTO: 51.28 K/UL — CRITICAL HIGH (ref 3.8–10.5)

## 2023-04-28 PROCEDURE — 99223 1ST HOSP IP/OBS HIGH 75: CPT

## 2023-04-28 RX ORDER — IPRATROPIUM/ALBUTEROL SULFATE 18-103MCG
3 AEROSOL WITH ADAPTER (GRAM) INHALATION
Refills: 0 | DISCHARGE

## 2023-04-28 RX ORDER — ASCORBIC ACID 60 MG
500 TABLET,CHEWABLE ORAL DAILY
Refills: 0 | Status: DISCONTINUED | OUTPATIENT
Start: 2023-04-28 | End: 2023-04-29

## 2023-04-28 RX ORDER — ASPIRIN/CALCIUM CARB/MAGNESIUM 324 MG
1 TABLET ORAL
Qty: 0 | Refills: 0 | DISCHARGE

## 2023-04-28 RX ORDER — CEFTRIAXONE 500 MG/1
1000 INJECTION, POWDER, FOR SOLUTION INTRAMUSCULAR; INTRAVENOUS EVERY 24 HOURS
Refills: 0 | Status: COMPLETED | OUTPATIENT
Start: 2023-04-28 | End: 2023-05-02

## 2023-04-28 RX ORDER — CHOLECALCIFEROL (VITAMIN D3) 125 MCG
1 CAPSULE ORAL
Qty: 0 | Refills: 0 | DISCHARGE

## 2023-04-28 RX ADMIN — Medication 500 MILLIGRAM(S): at 12:44

## 2023-04-28 RX ADMIN — Medication 3 MILLILITER(S): at 17:06

## 2023-04-28 RX ADMIN — Medication 3 MILLILITER(S): at 23:57

## 2023-04-28 RX ADMIN — Medication 600 MILLIGRAM(S): at 05:29

## 2023-04-28 RX ADMIN — Medication 1 TABLET(S): at 12:45

## 2023-04-28 RX ADMIN — PREGABALIN 1000 MICROGRAM(S): 225 CAPSULE ORAL at 12:28

## 2023-04-28 RX ADMIN — CEFTRIAXONE 100 MILLIGRAM(S): 500 INJECTION, POWDER, FOR SOLUTION INTRAMUSCULAR; INTRAVENOUS at 17:10

## 2023-04-28 RX ADMIN — Medication 50 MICROGRAM(S): at 05:29

## 2023-04-28 RX ADMIN — Medication 600 MILLIGRAM(S): at 17:06

## 2023-04-28 RX ADMIN — HEPARIN SODIUM 5000 UNIT(S): 5000 INJECTION INTRAVENOUS; SUBCUTANEOUS at 17:07

## 2023-04-28 RX ADMIN — Medication 3 MILLILITER(S): at 12:29

## 2023-04-28 RX ADMIN — BUDESONIDE AND FORMOTEROL FUMARATE DIHYDRATE 2 PUFF(S): 160; 4.5 AEROSOL RESPIRATORY (INHALATION) at 05:29

## 2023-04-28 RX ADMIN — BUDESONIDE AND FORMOTEROL FUMARATE DIHYDRATE 2 PUFF(S): 160; 4.5 AEROSOL RESPIRATORY (INHALATION) at 17:06

## 2023-04-28 RX ADMIN — AZITHROMYCIN 255 MILLIGRAM(S): 500 TABLET, FILM COATED ORAL at 12:28

## 2023-04-28 RX ADMIN — SODIUM CHLORIDE 75 MILLILITER(S): 9 INJECTION, SOLUTION INTRAVENOUS at 05:34

## 2023-04-28 RX ADMIN — HEPARIN SODIUM 5000 UNIT(S): 5000 INJECTION INTRAVENOUS; SUBCUTANEOUS at 05:28

## 2023-04-28 RX ADMIN — Medication 3 MILLILITER(S): at 05:29

## 2023-04-28 NOTE — CONSULT NOTE ADULT - ASSESSMENT
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

## 2023-04-28 NOTE — PHARMACOTHERAPY INTERVENTION NOTE - COMMENTS
Medication reconciliation completed. Please refer to specifics in home medication list (outpatient medication review). Medications verified with patient, surescripts, and daughter Nata via phone call.  -------------------------------------------------------------------------------------------  Incruse Ellipta 62.5 mcg/inh inhalation powder: 1 inhaler(s) inhaled once a day   levothyroxine 25 mcg (0.025 mg) oral tablet: 1 tab(s) orally once a day  ProAir HFA 90 mcg/inh inhalation aerosol: 2 puff(s) inhaled every 4 hours, As Needed -for bronchospasm     Added:  ipratropium-albuterol 0.5 mg-2.5 mg/3 mL inhalation solution: 3 milliliter(s) by nebulizer 4 times a day  sodium chloride 7% inhalation solution: 4 milliliter(s) inhaled 2 times a day    Changed:  metoprolol succinate 25 mg oral tablet, extended release: 1 tab(s) orally once a day (was previously Metoprolol Tartrate 25 mg 1/2 T bid)    Removed:  Tylenol  Aspirin  Cyanocobalamin  Melatonin  Protonix  Vit D3  -------------------------------------------------------------------------------------------  Recommendations:    Time spent: 20 minutes    Lena Williamson, PharmD Candidate 2023  [facility], [employer]    Rainer Gonzalez, ChandlerD, BCPS  [phone]/Teams   Medication reconciliation completed. Please refer to specifics in home medication list (outpatient medication review). Medications verified with patient, surescripts, and daughter Nata via phone call.  -------------------------------------------------------------------------------------------  Incruse Ellipta 62.5 mcg/inh inhalation powder: 1 inhaler(s) inhaled once a day   levothyroxine 25 mcg (0.025 mg) oral tablet: 1 tab(s) orally once a day  ProAir HFA 90 mcg/inh inhalation aerosol: 2 puff(s) inhaled every 4 hours, As Needed -for bronchospasm     Added:  ipratropium-albuterol 0.5 mg-2.5 mg/3 mL inhalation solution: 3 milliliter(s) by nebulizer 4 times a day  sodium chloride 7% inhalation solution: 4 milliliter(s) inhaled 2 times a day  doxycycline hyclate 100 mg oral capsule: 1 cap(s) orally 2 times a day 10 day course started on 4/21/2023 prior to admission    Changed:  metoprolol succinate 25 mg oral tablet, extended release: 1 tab(s) orally once a day (was previously Metoprolol Tartrate 25 mg 1/2 T bid)    Removed:  Tylenol  Aspirin  Cyanocobalamin  Melatonin  Protonix  Vit D3  -------------------------------------------------------------------------------------------  Recommendations:    Time spent: 20 minutes    Lena Williamson, PharmD Candidate 2023  [facility], [employer]    Chandler GallowayD, BCPS  [phone]/Teams   Medication reconciliation completed. Please refer to specifics in home medication list (outpatient medication review). Medications verified with patient, surescripts, and daughter Nata via phone call.  -------------------------------------------------------------------------------------------  Incruse Ellipta 62.5 mcg/inh inhalation powder: 1 inhaler(s) inhaled once a day   levothyroxine 25 mcg (0.025 mg) oral tablet: 1 tab(s) orally once a day  ProAir HFA 90 mcg/inh inhalation aerosol: 2 puff(s) inhaled every 4 hours, As Needed -for bronchospasm     Added:  ipratropium-albuterol 0.5 mg-2.5 mg/3 mL inhalation solution: 3 milliliter(s) by nebulizer 4 times a day  sodium chloride 7% inhalation solution: 4 milliliter(s) inhaled 2 times a day  doxycycline hyclate 100 mg oral capsule: 1 cap(s) orally 2 times a day 10 day course started on 4/21/2023 prior to admission    Changed:  metoprolol succinate 25 mg oral tablet, extended release: 1 tab(s) orally once a day (was previously Metoprolol Tartrate 25 mg 1/2 T bid)    Removed:  Tylenol  Aspirin  Cyanocobalamin  Melatonin  Protonix  Vit D3  -------------------------------------------------------------------------------------------  Recommendations:     Time spent: 20 minutes    Lena Williamson, PharmD Candidate 2023  [facility], [employer]    Chandler GallowayD, BCPS  [phone]/Teams   Medication reconciliation completed. Please refer to specifics in home medication list (outpatient medication review). Medications verified with patient, surescripts, and daughter Nata via phone call.  -------------------------------------------------------------------------------------------  Incruse Ellipta 62.5 mcg/inh inhalation powder: 1 inhaler(s) inhaled once a day   levothyroxine 25 mcg (0.025 mg) oral tablet: 1 tab(s) orally once a day  ProAir HFA 90 mcg/inh inhalation aerosol: 2 puff(s) inhaled every 4 hours, As Needed -for bronchospasm     Added:  ipratropium-albuterol 0.5 mg-2.5 mg/3 mL inhalation solution: 3 milliliter(s) by nebulizer 4 times a day  sodium chloride 7% inhalation solution: 4 milliliter(s) inhaled 2 times a day  doxycycline hyclate 100 mg oral capsule: 1 cap(s) orally 2 times a day 10 day course started on 4/21/2023 prior to admission    Changed:  metoprolol succinate 25 mg oral tablet, extended release: 1 tab(s) orally once a day (was previously Metoprolol Tartrate 25 mg 1/2 T bid)    Removed:  Tylenol  Aspirin  Cyanocobalamin  Melatonin  Protonix  Vit D3  -------------------------------------------------------------------------------------------  Time spent: 20 minutes    Lena Williamson PharmD Candidate 2023  [facility], [employer]    Rainer Gonzalez PharmD, BCPS  [phone]/Teams   Medication reconciliation completed. Please refer to specifics in home medication list (outpatient medication review). Medications verified with patient, surescripts, and daughter Nata via phone call.  -------------------------------------------------------------------------------------------  Incruse Ellipta 62.5 mcg/inh inhalation powder: 1 inhaler(s) inhaled once a day   levothyroxine 25 mcg (0.025 mg) oral tablet: 1 tab(s) orally once a day  ProAir HFA 90 mcg/inh inhalation aerosol: 2 puff(s) inhaled every 4 hours, As Needed -for bronchospasm     Added:  ipratropium-albuterol 0.5 mg-2.5 mg/3 mL inhalation solution: 3 milliliter(s) by nebulizer 4 times a day  sodium chloride 7% inhalation solution: 4 milliliter(s) inhaled 2 times a day  doxycycline hyclate 100 mg oral capsule: 1 cap(s) orally 2 times a day 10 day course started on 4/21/2023 prior to admission    Changed:  metoprolol succinate 25 mg oral tablet, extended release: 1 tab(s) orally once a day (was previously Metoprolol Tartrate 25 mg 1/2 T bid)    Removed:  Tylenol  Aspirin  Cyanocobalamin  Melatonin  Protonix  Vit D3  -------------------------------------------------------------------------------------------  Of note, patient's daughter described that patient is on levothyroxine 25mcg daily at home however [facility] records lists levothyroxine 50mcg daily is the most recent dosage (filled 2/1/23, refill available 4/26/23).  Time spent: 20 minutes    Lena Williamson PharmD Candidate 2023  [facility], [employer]    Chandler GallowayD, BCPS  [phone]/Teams

## 2023-04-28 NOTE — ADVANCED PRACTICE NURSE CONSULT - RECOMMEDATIONS
Impression:    urinary incontinence  fecal incontinence  incontinence associated dermatitis  B/L buttocks/sacral deep tissue injury, present on admission  B/L ischium deep tissue injuries, present on admission  B/L heel deep tissue injuries, present on admission    Recommendations:    1) continue turning and positioning q2 and PRN utilizing offloading assistive devices  2) continue with routine pericare daily and PRN soiling  3) encourage optimal nutrition  4) waffle cushion when oob to chair  5) B/L LE complete cair air fluidized boots to offload heels/feet  6) B/L buttocks/sacrum- cleanse skin and pat dry then apply triad to allevyn foam and place on sacral skin, change daily and/or PRN soiling  7) incontinence management - continue external female urinary catheter to divert urine from skin  8) podiatry for treatment recommendations regarding feet  9) cover all jennifer prominences with cavilon then apply allevyn foam dressing     Plan discussed with UMER Tucker on unit

## 2023-04-28 NOTE — PHYSICAL THERAPY INITIAL EVALUATION ADULT - PERTINENT HX OF CURRENT PROBLEM, REHAB EVAL
89-year-old female with a past medical history hypertension, hypothyroidism, colon cancer status postresection of the colon, CLL presenting with shortness of breath. Patient was diagnosed with pneumonia last week, subsequently put on doxycycline. Patient was told that if she continued to not feel well she should come to the emergency department. Over the last week patient has been using oxygen due to low oxygen saturation while being treated for pneumonia. patient also states her appetite has been very poor recently.    CXR: Small B/L pleural effusions  CTA Chest: No PE. 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 MRI abdomen with IV contrast can be performed for further characterization if warranted.

## 2023-04-28 NOTE — CONSULT NOTE ADULT - SUBJECTIVE AND OBJECTIVE BOX
Patient is a 89y old  Female who presents with a chief complaint of Weakness and failure to thrive (28 Apr 2023 08:06)      HPI:  89-year-old female PMH of hypertension, hypothyroidism, colon cancer status postresection of the colon, CLL, COPD presenting with shortness of breath. Patient was diagnosed with pneumonia last week, subsequently put on doxycycline. Patient was told that if she continued to not feel well she should come to the emergency department. Patient woke up this morning saying she just did not feel right. Patient denies chest pain, fevers, vomiting, abdominal pain. Patient had COVID back in January and since has had a intermittent oxygen requirement. Over the last week patient has been using oxygen due to low oxygen saturation while being treated for pneumonia.  Patient also states her appetite has been very poor recently. (27 Apr 2023 16:44)      PAST MEDICAL & SURGICAL HISTORY:  CLL (chronic lymphocytic leukemia)      Colon cancer      Hypothyroidism      History of emphysema      Pneumonia      Anemia      S/P colon resection    h/o C diff    h/o Covid       Social history:     Occupation: retired   [employer] work  Lives with: alternates with daughters- one in [place] and one in [place]    Substance Use : denies  Tobacco Usage:  (  x ) never smoked   (   ) former smoker   (   ) current smoker  (     ) pack year  (        ) last tobacco use date  Alcohol Usage: denies  Travel: denies   Pets: denies          FAMILY HISTORY:  FHx: diabetes mellitus (Mother)    FH: hypertension (Father)    daughter with Bronchiectasis and ORESTES    REVIEW OF SYSTEMS  General:	anorexia, weight loss no fever     Skin:No rash  	  Ophthalmologic:Denies any visual complaints,discharge redness or photophobia  	  ENMT: Hard of hearing     Respiratory and Thorax: SOB since Covid   	  Cardiovascular:	No chest pain,palpitaions or dizziness    Gastrointestinal:	NO nausea,abdominal pain or diarrhea.  Notes, can't swallow large pills     Genitourinary:	No dysuria,frequency. No flank pain    Musculoskeletal:	No joint swelling or pain.    Neurological:No confusion,diziness.No extremity weakness.No bladder or bowel incontinence	    Psychiatric:No delusions or hallucinations	    Hematology/Lymphatics:	No LN swelling.No gum bleeding     Endocrine:	No recent weight gain or loss.No abnormal heat/cold intolerance    Allergic/Immunologic:	No hives or rash     Allergies    cefdinir (Diarrhea)    Intolerances        Antimicrobials:       MEDICATIONS  (prior antimicrobials ):  azithromycin  IVPB   255 mL/Hr IV Intermittent (04-28-23 @ 12:28)    azithromycin  IVPB   500 milliGRAM(s) IV Intermittent (04-27-23 @ 12:59)    doxycycline IVPB   100 mL/Hr IV Intermittent (04-27-23 @ 14:51)             azithromycin  IVPB      azithromycin  IVPB 500 milliGRAM(s) IV Intermittent every 24 hours      MEDICATIONS  (STANDING):  albuterol/ipratropium for Nebulization 3 milliLiter(s) Nebulizer every 6 hours  ascorbic acid 500 milliGRAM(s) Oral daily  azithromycin  IVPB      azithromycin  IVPB 500 milliGRAM(s) IV Intermittent every 24 hours  budesonide 160 MICROgram(s)/formoterol 4.5 MICROgram(s) Inhaler 2 Puff(s) Inhalation two times a day  cyanocobalamin 1000 MICROGram(s) Oral daily  guaiFENesin  milliGRAM(s) Oral every 12 hours  heparin   Injectable 5000 Unit(s) SubCutaneous every 12 hours  levothyroxine 50 MICROGram(s) Oral daily  multivitamin 1 Tablet(s) Oral daily    MEDICATIONS  (PRN):  albuterol    90 MICROgram(s) HFA Inhaler 2 Puff(s) Inhalation every 6 hours PRN Shortness of Breath and/or Wheezing        Vital Signs Last 24 Hrs  T(C): 36.3 (28 Apr 2023 05:31), Max: 36.8 (27 Apr 2023 20:00)  T(F): 97.4 (28 Apr 2023 05:31), Max: 98.2 (27 Apr 2023 20:00)  HR: 60 (28 Apr 2023 05:31) (60 - 78)  BP: 105/55 (28 Apr 2023 05:31) (102/64 - 115/48)  BP(mean): 70 (27 Apr 2023 19:17) (70 - 70)  RR: 19 (28 Apr 2023 05:31) (19 - 30)  SpO2: 97% (28 Apr 2023 05:31) (95% - 98%)    Parameters below as of 28 Apr 2023 05:31  Patient On (Oxygen Delivery Method): nasal cannula  O2 Flow (L/min): 2      PHYSICAL EXAM:Pleasant patient in no acute distress.      Constitutional:Comfortable.Awake and alert  markedly cachectic     Eyes:PERRL EOMI.NO discharge or conjunctival injection    ENMT:No sinus tenderness.No thrush.No pharyngeal exudate or erythema.Fair dental hygiene    Neck:Supple,No LN,no JVD      Respiratory:Good air entry bilaterally,CTA    Cardiovascular:S1 S2 wnl,     Gastrointestinal:Soft BS(+) no tenderness     Extremities: arthritic changes, ulnar deviation  multiple bruises     Neurological:AAO X 3,No grossly focal deficits    Skin: multiple bruises     Lymph Nodes:No palpable LNs    Musculoskeletal:No joint swelling or LOM    Psychiatric:Affect normal.                                7.8    51.28 )-----------( 166      ( 28 Apr 2023 06:53 )             26.2     LIVER FUNCTIONS - ( 27 Apr 2023 13:42 )  Alb: 3.4 g/dL / Pro: 6.9 g/dL / ALK PHOS: 90 U/L / ALT: 17 U/L / AST: 24 U/L / GGT: x             04-28    141  |  106  |  40<H>  ----------------------------<  148<H>  3.8   |  24  |  0.71    Ca    8.6      28 Apr 2023 06:50    TPro  6.9  /  Alb  3.4  /  TBili  1.1  /  DBili  x   /  AST  24  /  ALT  17  /  AlkPhos  90  04-27    Procalcitonin, Serum (01.11.23 @ 15:29)    Procalcitonin, Serum: 1.21: Note: Concentrations <0.5 ng/mL do not exclude an infection, on account  of localized infections (without systemic signs) which can be associated  with such low concentrations, or a systemic infection in its initial  stages (<6 hours). Furthermore, increased procalcitonin may occur without  infection. PCT concentrations between 0.5 and 2.0 ng/mL should be  interpreted taking into account the patient’s history. It is recommended  to pretest PCT within 6-24 hours if any concentrations <2.0 mg/mL are  obtained. ng/mL                Radiology:      < from: CT Angio Chest PE Protocol w/ IV Cont (04.27.23 @ 15:27) >  ACC: [id number] EXAM:  CT ANGIO CHEST PULM ART [facility]   ORDERED BY: NIEVES SANCHEZ     PROCEDURE DATE:  04/27/2023          INTERPRETATION:  CLINICAL INFORMATION: Shortness of breath and lethargy    COMPARISON: CTA chest 1/11/2023    CONTRAST/COMPLICATIONS:  IV Contrast: Omnipaque 350  90 cc administered   10 cc discarded  Oral Contrast: NONE  Complications: None reported at time of study completion    PROCEDURE:  CT Angiography of the Chest.  Sagittal and coronal reformats were performed as well as 3D (MIP)   reconstructions.    FINDINGS:  LUNGS, AIRWAYS AND PLEURA: There is unchanged bilateral bronchiectasis   with opacification of a few of the right lower lobe segmental airways.   Multilobar groundglass, consolidation, and tree-in-bud,some areas   increased (such as the left lower lobe on 2-77), other areas unchanged,   and other areas decreased.  MEDIASTINUM AND KIRSTEN: No lymphadenopathy. The esophagus is patulous with   debris noted within the upper/mid portions. A previously noted isodense   structure to the right of the esophagus above the level of thoracic inlet   has decreased in size measuring 1.1 cm in thickness (2-21), previously   1.7 cm.  HEART AND VESSELS: No pulmonary embolism. Heart size is normal. No   pericardial effusion. Coronary artery and mitral annular calcifications.  VISUALIZED UPPER ABDOMEN: There is a heterogeneous left hepatic lesion   measuring 3.7 x 1.7 cm, previously 1.4 x 0.9 cm on 7/28/2022. As a small   cyst in the right hepatic lobe and hypodensities in both kidneys that are   too small to characterize.  BONES: Chronic multilevel vertebral compression deformities most severe   at the levels of T10 and T12. Chronic rib fractures.    IMPRESSION:  *  No pulmonary embolism.  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  *  Multilobar consolidation and other opacities, some of which is   increased and likely infectious.  *  There is an enlarging indeterminate left hepatic lesion. MRI abdomen   with IV contrast can be performed for further characterization if   warranted.        --- End of Report ---           TRI MORALES MD; Resident Radiology    < end of copied text >         Patient is a 89y old  Female who presents with a chief complaint of Weakness and failure to thrive (28 Apr 2023 08:06)      HPI:  89-year-old female PMH of hypertension, hypothyroidism, colon cancer status postresection of the colon, CLL, COPD presenting with shortness of breath. Patient was diagnosed with pneumonia last week, subsequently put on doxycycline. Patient was told that if she continued to not feel well she should come to the emergency department. Patient woke up this morning saying she just did not feel right. Patient denies chest pain, fevers, vomiting, abdominal pain. Patient had COVID back in January and since has had a intermittent oxygen requirement. Over the last week patient has been using oxygen due to low oxygen saturation while being treated for pneumonia.  Patient also states her appetite has been very poor recently. (27 Apr 2023 16:44)      PAST MEDICAL & SURGICAL HISTORY:  CLL (chronic lymphocytic leukemia)      Colon cancer      Hypothyroidism      History of emphysema      Pneumonia      Anemia      S/P colon resection    h/o C diff    h/o Covid       Social history:     Occupation: retired   [employer] work  Lives with: alternates with daughters- one in [place] and one in [place]    Substance Use : denies  Tobacco Usage:  (  x ) never smoked   (   ) former smoker   (   ) current smoker  (     ) pack year  (        ) last tobacco use date  Alcohol Usage: denies  Travel: denies   Pets: denies          FAMILY HISTORY:  FHx: diabetes mellitus (Mother)    FH: hypertension (Father)    daughter with Bronchiectasis and ORESTES    REVIEW OF SYSTEMS  General:	anorexia, weight loss no fever     Skin:No rash  	  Ophthalmologic:Denies any visual complaints,discharge redness or photophobia  	  ENMT: Hard of hearing     Respiratory and Thorax: SOB since Covid   	  Cardiovascular:	No chest pain,palpitaions or dizziness    Gastrointestinal:	NO nausea,abdominal pain or diarrhea.  Notes, can't swallow large pills     Genitourinary:	No dysuria,frequency. No flank pain    Musculoskeletal:	No joint swelling or pain.    Neurological:No confusion,diziness.No extremity weakness.No bladder or bowel incontinence	    Psychiatric:No delusions or hallucinations	    Hematology/Lymphatics:	No LN swelling.No gum bleeding     Endocrine:	No recent weight gain or loss.No abnormal heat/cold intolerance    Allergic/Immunologic:	No hives or rash     Allergies    cefdinir (Diarrhea)    Intolerances        Antimicrobials:       MEDICATIONS  (prior antimicrobials ):  azithromycin  IVPB   255 mL/Hr IV Intermittent (04-28-23 @ 12:28)    azithromycin  IVPB   500 milliGRAM(s) IV Intermittent (04-27-23 @ 12:59)    doxycycline IVPB   100 mL/Hr IV Intermittent (04-27-23 @ 14:51)             azithromycin  IVPB      azithromycin  IVPB 500 milliGRAM(s) IV Intermittent every 24 hours      MEDICATIONS  (STANDING):  albuterol/ipratropium for Nebulization 3 milliLiter(s) Nebulizer every 6 hours  ascorbic acid 500 milliGRAM(s) Oral daily  azithromycin  IVPB      azithromycin  IVPB 500 milliGRAM(s) IV Intermittent every 24 hours  budesonide 160 MICROgram(s)/formoterol 4.5 MICROgram(s) Inhaler 2 Puff(s) Inhalation two times a day  cyanocobalamin 1000 MICROGram(s) Oral daily  guaiFENesin  milliGRAM(s) Oral every 12 hours  heparin   Injectable 5000 Unit(s) SubCutaneous every 12 hours  levothyroxine 50 MICROGram(s) Oral daily  multivitamin 1 Tablet(s) Oral daily    MEDICATIONS  (PRN):  albuterol    90 MICROgram(s) HFA Inhaler 2 Puff(s) Inhalation every 6 hours PRN Shortness of Breath and/or Wheezing        Vital Signs Last 24 Hrs  T(C): 36.3 (28 Apr 2023 05:31), Max: 36.8 (27 Apr 2023 20:00)  T(F): 97.4 (28 Apr 2023 05:31), Max: 98.2 (27 Apr 2023 20:00)  HR: 60 (28 Apr 2023 05:31) (60 - 78)  BP: 105/55 (28 Apr 2023 05:31) (102/64 - 115/48)  BP(mean): 70 (27 Apr 2023 19:17) (70 - 70)  RR: 19 (28 Apr 2023 05:31) (19 - 30)  SpO2: 97% (28 Apr 2023 05:31) (95% - 98%)    Parameters below as of 28 Apr 2023 05:31  Patient On (Oxygen Delivery Method): nasal cannula  O2 Flow (L/min): 2      PHYSICAL EXAM:Pleasant patient in no acute distress.      Constitutional:Comfortable.Awake and alert  markedly cachectic     Eyes:PERRL EOMI.NO discharge or conjunctival injection    ENMT:No sinus tenderness.No thrush.No pharyngeal exudate or erythema.Fair dental hygiene    Neck:Supple,No LN,no JVD      Respiratory:Good air entry bilaterally,CTA    Cardiovascular:S1 S2 wnl,     Gastrointestinal:Soft BS(+) no tenderness     Extremities: arthritic changes, ulnar deviation  multiple bruises     Neurological:AAO X 3,No grossly focal deficits    Skin: multiple bruises     Lymph Nodes:No palpable LNs    Musculoskeletal:No joint swelling or LOM    Psychiatric:Affect normal.                                7.8    51.28 )-----------( 166      ( 28 Apr 2023 06:53 )             26.2     LIVER FUNCTIONS - ( 27 Apr 2023 13:42 )  Alb: 3.4 g/dL / Pro: 6.9 g/dL / ALK PHOS: 90 U/L / ALT: 17 U/L / AST: 24 U/L / GGT: x             04-28    141  |  106  |  40<H>  ----------------------------<  148<H>  3.8   |  24  |  0.71    Ca    8.6      28 Apr 2023 06:50    TPro  6.9  /  Alb  3.4  /  TBili  1.1  /  DBili  x   /  AST  24  /  ALT  17  /  AlkPhos  90  04-27    Procalcitonin, Serum (01.11.23 @ 15:29)    Procalcitonin, Serum: 1.21: Note: Concentrations <0.5 ng/mL do not exclude an infection, on account  of localized infections (without systemic signs) which can be associated  with such low concentrations, or a systemic infection in its initial  stages (<6 hours). Furthermore, increased procalcitonin may occur without  infection. PCT concentrations between 0.5 and 2.0 ng/mL should be  interpreted taking into account the patient’s history. It is recommended  to pretest PCT within 6-24 hours if any concentrations <2.0 mg/mL are  obtained. ng/mL      Flu With COVID-19 By OLIVER (04.27.23 @ 13:43)    SARS-CoV-2 Result: NotDetec: This Respiratory Panel uses polymerase chain reaction (PCR) to detect for  influenza A; influenza B; respiratory syncytial virus; and SARS-CoV-2.  This test was validated by [employer] and is in use under the FDA  Emergency Use Authorization (EUA) for clinical labs CLIA-certified to  perform high complexity testing. Test results should be correlated with  clinical presentation, patient history, and epidemiology.   Influenza A Result: NotDetec   Influenza B Result: NotDetec   Resp Syn Virus Result: NotDetec            Radiology:      < from: CT Angio Chest PE Protocol w/ IV Cont (04.27.23 @ 15:27) >  ACC: [id number] EXAM:  CT ANGIO CHEST PULM [facility]   ORDERED BY: NIEVES SANCHEZ     PROCEDURE DATE:  04/27/2023          INTERPRETATION:  CLINICAL INFORMATION: Shortness of breath and lethargy    COMPARISON: CTA chest 1/11/2023    CONTRAST/COMPLICATIONS:  IV Contrast: Omnipaque 350  90 cc administered   10 cc discarded  Oral Contrast: NONE  Complications: None reported at time of study completion    PROCEDURE:  CT Angiography of the Chest.  Sagittal and coronal reformats were performed as well as 3D (MIP)   reconstructions.    FINDINGS:  LUNGS, AIRWAYS AND PLEURA: There is unchanged bilateral bronchiectasis   with opacification of a few of the right lower lobe segmental airways.   Multilobar groundglass, consolidation, and tree-in-bud,some areas   increased (such as the left lower lobe on 2-77), other areas unchanged,   and other areas decreased.  MEDIASTINUM AND KIRSTEN: No lymphadenopathy. The esophagus is patulous with   debris noted within the upper/mid portions. A previously noted isodense   structure to the right of the esophagus above the level of thoracic inlet   has decreased in size measuring 1.1 cm in thickness (2-21), previously   1.7 cm.  HEART AND VESSELS: No pulmonary embolism. Heart size is normal. No   pericardial effusion. Coronary artery and mitral annular calcifications.  VISUALIZED UPPER ABDOMEN: There is a heterogeneous left hepatic lesion   measuring 3.7 x 1.7 cm, previously 1.4 x 0.9 cm on 7/28/2022. As a small   cyst in the right hepatic lobe and hypodensities in both kidneys that are   too small to characterize.  BONES: Chronic multilevel vertebral compression deformities most severe   at the levels of T10 and T12. Chronic rib fractures.    IMPRESSION:  *  No pulmonary embolism.  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  *  Multilobar consolidation and other opacities, some of which is   increased and likely infectious.  *  There is an enlarging indeterminate left hepatic lesion. MRI abdomen   with IV contrast can be performed for further characterization if   warranted.        --- End of Report ---           TRI MORALES MD; Resident Radiology    < end of copied text >

## 2023-04-28 NOTE — CONSULT NOTE ADULT - ASSESSMENT
89-year-old female PMH of hypertension, hypothyroidism, colon cancer status postresection of the colon, CLL, COPD presenting with shortness of breath. Patient was diagnosed with pneumonia last week, subsequently put on doxycycline. Patient was told that if she continued to not feel well she should come to the emergency department. Patient woke up this morning saying she just did not feel right. Patient denies chest pain, fevers, vomiting, abdominal pain. Patient had COVID back in January and since has had a intermittent oxygen requirement. Over the last week patient has been using oxygen due to low oxygen saturation while being treated for pneumonia.  Patient also states her appetite has been very poor recently. (27 Apr 2023 16:44)    A/P    # Pneumonia  Old CTs reviewed. 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   Please obtain a PULMONARY consult for their input  check urine legionella ( unlikely)  If NTM we don't even know if MAC, M abscessus or other ?    #Liver lesion  seems to be new   suggest MRI of liver    # cachexia / weight loss  although could be related to ORESTES , multiple possible explanations  pt notes early satiety   on thyroid replacement , check TFTS      Carolyn Duffy M.D. ,   please reach via teams   If no answer, or after 5PM/ weekends,  then please call  [phone]    Assessment and plan discussed with the primary team .    ID service will be available  over the weekend. Please call for acute issues or questions. [phone]

## 2023-04-28 NOTE — ADVANCED PRACTICE NURSE CONSULT - REASON FOR CONSULT
Wound care consult initiated by RN to assess patient's skin for a possible deep tissue injury on B/L heels and sacrum    Reason for Admission: Weakness and failure to thrive  History of Present Illness:   89-year-old female PMH of hypertension, hypothyroidism, colon cancer status postresection of the colon, CLL, COPD presenting with shortness of breath. Patient was diagnosed with pneumonia last week, subsequently put on doxycycline. Patient was told that if she continued to not feel well she should come to the emergency department. Patient woke up this morning saying she just did not feel right. Patient denies chest pain, fevers, vomiting, abdominal pain. Patient had COVID back in January and since has had a intermittent oxygen requirement. Over the last week patient has been using oxygen due to low oxygen saturation while being treated for pneumonia.  Patient also states her appetite has been very poor recently.

## 2023-04-28 NOTE — PROGRESS NOTE ADULT - SUBJECTIVE AND OBJECTIVE BOX
INTERVAL HPI/OVERNIGHT EVENTS:  Pt seen and examined at bedside.     Allergies/Intolerance: cefdinir (Diarrhea)      MEDICATIONS  (STANDING):  albuterol/ipratropium for Nebulization 3 milliLiter(s) Nebulizer every 6 hours  azithromycin  IVPB 500 milliGRAM(s) IV Intermittent every 24 hours  azithromycin  IVPB      budesonide 160 MICROgram(s)/formoterol 4.5 MICROgram(s) Inhaler 2 Puff(s) Inhalation two times a day  cyanocobalamin 1000 MICROGram(s) Oral daily  dextrose 5% + sodium chloride 0.45%. 1000 milliLiter(s) (75 mL/Hr) IV Continuous <Continuous>  guaiFENesin  milliGRAM(s) Oral every 12 hours  heparin   Injectable 5000 Unit(s) SubCutaneous every 12 hours  levothyroxine 50 MICROGram(s) Oral daily  metoprolol tartrate 25 milliGRAM(s) Oral two times a day    MEDICATIONS  (PRN):  albuterol    90 MICROgram(s) HFA Inhaler 2 Puff(s) Inhalation every 6 hours PRN Shortness of Breath and/or Wheezing        ROS: all systems reviewed and wnl      PHYSICAL EXAMINATION:  Vital Signs Last 24 Hrs  T(C): 36.3 (28 Apr 2023 05:31), Max: 36.8 (27 Apr 2023 20:00)  T(F): 97.4 (28 Apr 2023 05:31), Max: 98.2 (27 Apr 2023 20:00)  HR: 60 (28 Apr 2023 05:31) (60 - 88)  BP: 105/55 (28 Apr 2023 05:31) (92/58 - 115/48)  BP(mean): 70 (27 Apr 2023 19:17) (70 - 70)  RR: 19 (28 Apr 2023 05:31) (19 - 30)  SpO2: 97% (28 Apr 2023 05:31) (89% - 98%)    Parameters below as of 28 Apr 2023 05:31  Patient On (Oxygen Delivery Method): nasal cannula  O2 Flow (L/min): 2    CAPILLARY BLOOD GLUCOSE          04-27 @ 07:01  -  04-28 @ 07:00  --------------------------------------------------------  IN: 900 mL / OUT: 0 mL / NET: 900 mL        GENERAL:   NECK: supple, No JVD  CHEST/LUNG: clear to auscultation bilaterally; no rales, rhonchi, or wheezing b/l  HEART: normal S1, S2  ABDOMEN: BS+, soft, ND, NT   EXTREMITIES:  pulses palpable; no clubbing, cyanosis, or edema b/l LEs  SKIN: no rashes or lesions      LABS:                        7.8    51.28 )-----------( 166      ( 28 Apr 2023 06:53 )             26.2     04-28    141  |  106  |  40<H>  ----------------------------<  148<H>  3.8   |  24  |  0.71    Ca    8.6      28 Apr 2023 06:50    TPro  6.9  /  Alb  3.4  /  TBili  1.1  /  DBili  x   /  AST  24  /  ALT  17  /  AlkPhos  90  04-27               INTERVAL HPI/OVERNIGHT EVENTS:  Pt seen and examined at bedside.     Allergies/Intolerance: cefdinir (Diarrhea)      MEDICATIONS  (STANDING):  albuterol/ipratropium for Nebulization 3 milliLiter(s) Nebulizer every 6 hours  azithromycin  IVPB 500 milliGRAM(s) IV Intermittent every 24 hours  azithromycin  IVPB      budesonide 160 MICROgram(s)/formoterol 4.5 MICROgram(s) Inhaler 2 Puff(s) Inhalation two times a day  cyanocobalamin 1000 MICROGram(s) Oral daily  dextrose 5% + sodium chloride 0.45%. 1000 milliLiter(s) (75 mL/Hr) IV Continuous <Continuous>  guaiFENesin  milliGRAM(s) Oral every 12 hours  heparin   Injectable 5000 Unit(s) SubCutaneous every 12 hours  levothyroxine 50 MICROGram(s) Oral daily  metoprolol tartrate 25 milliGRAM(s) Oral two times a day    MEDICATIONS  (PRN):  albuterol    90 MICROgram(s) HFA Inhaler 2 Puff(s) Inhalation every 6 hours PRN Shortness of Breath and/or Wheezing        ROS: all systems reviewed and wnl      PHYSICAL EXAMINATION:  Vital Signs Last 24 Hrs  T(C): 36.3 (28 Apr 2023 05:31), Max: 36.8 (27 Apr 2023 20:00)  T(F): 97.4 (28 Apr 2023 05:31), Max: 98.2 (27 Apr 2023 20:00)  HR: 60 (28 Apr 2023 05:31) (60 - 88)  BP: 105/55 (28 Apr 2023 05:31) (92/58 - 115/48)  BP(mean): 70 (27 Apr 2023 19:17) (70 - 70)  RR: 19 (28 Apr 2023 05:31) (19 - 30)  SpO2: 97% (28 Apr 2023 05:31) (89% - 98%)    Parameters below as of 28 Apr 2023 05:31  Patient On (Oxygen Delivery Method): nasal cannula  O2 Flow (L/min): 2    CAPILLARY BLOOD GLUCOSE          04-27 @ 07:01  -  04-28 @ 07:00  --------------------------------------------------------  IN: 900 mL / OUT: 0 mL / NET: 900 mL        GENERAL: stable, in bed, comfortable on NC  NECK: supple, No JVD  CHEST/LUNG: clear to auscultation bilaterally; no rales, rhonchi, or wheezing b/l  HEART: normal S1, S2  ABDOMEN: BS+, soft, ND, NT   EXTREMITIES:  pulses palpable; no clubbing, cyanosis, or edema b/l LEs    LABS:                        7.8    51.28 )-----------( 166      ( 28 Apr 2023 06:53 )             26.2     04-28    141  |  106  |  40<H>  ----------------------------<  148<H>  3.8   |  24  |  0.71    Ca    8.6      28 Apr 2023 06:50    TPro  6.9  /  Alb  3.4  /  TBili  1.1  /  DBili  x   /  AST  24  /  ALT  17  /  AlkPhos  90  04-27

## 2023-04-28 NOTE — CONSULT NOTE ADULT - SUBJECTIVE AND OBJECTIVE BOX
Patient is a 89y old  Female who presents with a chief complaint of Weakness and failure to thrive (28 Apr 2023 13:21)      HPI:  89-year-old female PMH of hypertension, hypothyroidism, colon cancer status postresection of the colon, CLL, COPD presenting with shortness of breath. Patient was diagnosed with pneumonia last week, subsequently put on doxycycline. Patient was told that if she continued to not feel well she should come to the emergency department. Patient woke up this morning saying she just did not feel right. Patient denies chest pain, fevers, vomiting, abdominal pain. Patient had COVID back in January and since has had a intermittent oxygen requirement. Over the last week patient has been using oxygen due to low oxygen saturation while being treated for pneumonia.  Patient also states her appetite has been very poor recently. (27 Apr 2023 16:44)    Podiatry consulted for bilat heel DTIs.    PAST MEDICAL & SURGICAL HISTORY:  CLL (chronic lymphocytic leukemia)      Colon cancer      Hypothyroidism      History of emphysema      Pneumonia      Anemia      S/P colon resection          MEDICATIONS  (STANDING):  albuterol/ipratropium for Nebulization 3 milliLiter(s) Nebulizer every 6 hours  ascorbic acid 500 milliGRAM(s) Oral daily  budesonide 160 MICROgram(s)/formoterol 4.5 MICROgram(s) Inhaler 2 Puff(s) Inhalation two times a day  cefTRIAXone   IVPB 1000 milliGRAM(s) IV Intermittent every 24 hours  cyanocobalamin 1000 MICROGram(s) Oral daily  guaiFENesin  milliGRAM(s) Oral every 12 hours  heparin   Injectable 5000 Unit(s) SubCutaneous every 12 hours  levothyroxine 50 MICROGram(s) Oral daily  multivitamin 1 Tablet(s) Oral daily    MEDICATIONS  (PRN):  albuterol    90 MICROgram(s) HFA Inhaler 2 Puff(s) Inhalation every 6 hours PRN Shortness of Breath and/or Wheezing      Allergies    cefdinir (Diarrhea)    Intolerances        VITALS:    Vital Signs Last 24 Hrs  T(C): 36.6 (28 Apr 2023 13:31), Max: 36.8 (27 Apr 2023 20:00)  T(F): 97.8 (28 Apr 2023 13:31), Max: 98.2 (27 Apr 2023 20:00)  HR: 62 (28 Apr 2023 16:18) (60 - 78)  BP: 98/60 (28 Apr 2023 13:31) (98/60 - 105/55)  BP(mean): 70 (27 Apr 2023 19:17) (70 - 70)  RR: 19 (28 Apr 2023 16:18) (19 - 21)  SpO2: 98% (28 Apr 2023 16:18) (96% - 98%)    Parameters below as of 28 Apr 2023 16:18  Patient On (Oxygen Delivery Method): nasal cannula  O2 Flow (L/min): 2      LABS:                          7.8    51.28 )-----------( 166      ( 28 Apr 2023 06:53 )             26.2       04-28    141  |  106  |  40<H>  ----------------------------<  148<H>  3.8   |  24  |  0.71    Ca    8.6      28 Apr 2023 06:50    TPro  6.9  /  Alb  3.4  /  TBili  1.1  /  DBili  x   /  AST  24  /  ALT  17  /  AlkPhos  90  04-27      CAPILLARY BLOOD GLUCOSE              LOWER EXTREMITY PHYSICAL EXAM:    Vasular: DP/PT _/4, B/L, CFT <_ seconds B/L, Temperature gradient _, B/L.   Neuro: Epicritic sensation _ to the level of _, B/L.  Musculoskeletal/Ortho:  Skin: bilat heel DTIs secondary to pressure present on admission, no open wounds noted, no signs of infection   Patient is a 89y old  Female who presents with a chief complaint of Weakness and failure to thrive (28 Apr 2023 13:21)      HPI:  89-year-old female PMH of hypertension, hypothyroidism, colon cancer status postresection of the colon, CLL, COPD presenting with shortness of breath. Patient was diagnosed with pneumonia last week, subsequently put on doxycycline. Patient was told that if she continued to not feel well she should come to the emergency department. Patient woke up this morning saying she just did not feel right. Patient denies chest pain, fevers, vomiting, abdominal pain. Patient had COVID back in January and since has had a intermittent oxygen requirement. Over the last week patient has been using oxygen due to low oxygen saturation while being treated for pneumonia.  Patient also states her appetite has been very poor recently. (27 Apr 2023 16:44)    Podiatry consulted for bilat heel DTIs.    PAST MEDICAL & SURGICAL HISTORY:  CLL (chronic lymphocytic leukemia)      Colon cancer      Hypothyroidism      History of emphysema      Pneumonia      Anemia      S/P colon resection          MEDICATIONS  (STANDING):  albuterol/ipratropium for Nebulization 3 milliLiter(s) Nebulizer every 6 hours  ascorbic acid 500 milliGRAM(s) Oral daily  budesonide 160 MICROgram(s)/formoterol 4.5 MICROgram(s) Inhaler 2 Puff(s) Inhalation two times a day  cefTRIAXone   IVPB 1000 milliGRAM(s) IV Intermittent every 24 hours  cyanocobalamin 1000 MICROGram(s) Oral daily  guaiFENesin  milliGRAM(s) Oral every 12 hours  heparin   Injectable 5000 Unit(s) SubCutaneous every 12 hours  levothyroxine 50 MICROGram(s) Oral daily  multivitamin 1 Tablet(s) Oral daily    MEDICATIONS  (PRN):  albuterol    90 MICROgram(s) HFA Inhaler 2 Puff(s) Inhalation every 6 hours PRN Shortness of Breath and/or Wheezing      Allergies    cefdinir (Diarrhea)    Intolerances        VITALS:    Vital Signs Last 24 Hrs  T(C): 36.6 (28 Apr 2023 13:31), Max: 36.8 (27 Apr 2023 20:00)  T(F): 97.8 (28 Apr 2023 13:31), Max: 98.2 (27 Apr 2023 20:00)  HR: 62 (28 Apr 2023 16:18) (60 - 78)  BP: 98/60 (28 Apr 2023 13:31) (98/60 - 105/55)  BP(mean): 70 (27 Apr 2023 19:17) (70 - 70)  RR: 19 (28 Apr 2023 16:18) (19 - 21)  SpO2: 98% (28 Apr 2023 16:18) (96% - 98%)    Parameters below as of 28 Apr 2023 16:18  Patient On (Oxygen Delivery Method): nasal cannula  O2 Flow (L/min): 2      LABS:                          7.8    51.28 )-----------( 166      ( 28 Apr 2023 06:53 )             26.2       04-28    141  |  106  |  40<H>  ----------------------------<  148<H>  3.8   |  24  |  0.71    Ca    8.6      28 Apr 2023 06:50    TPro  6.9  /  Alb  3.4  /  TBili  1.1  /  DBili  x   /  AST  24  /  ALT  17  /  AlkPhos  90  04-27      CAPILLARY BLOOD GLUCOSE              LOWER EXTREMITY PHYSICAL EXAM:    Vasular: DP/PT 0/4, B/L, CFT <3 seconds B/L, Temperature gradient WNL, B/L.   Neuro: Epicritic sensation intact to the level of foot, B/L.  Musculoskeletal/Ortho: hammertoes 2-5 bilat  Skin: bilat heel DTIs secondary to pressure present on admission, no open wounds noted, no signs of infection

## 2023-04-28 NOTE — ADVANCED PRACTICE NURSE CONSULT - ASSESSMENT
When wound care RN arrived on unit, patient was found lying in a low air loss pressure redistribution support surface style bed while on the phone with her daughter. Patient was alert and oriented and gave consent to skin consult. Scattered areas of ecchymosis noted on upper extremities. Ms Culver is able to turn with staff assistance, x 1 was provided. Thin appearance is noted immediately, patient reports that this is the thinnest she has ever been and states "I have no meat on my bones." Jae sacrum/ileac crest/trochanters apparent. Once turned, wound care RN was able to visualize an area of persistent nonblanchable deep red erythema noted on B/L buttocks/sacral skin measuring approximately 8cm x 8cm x 0cm. On B/L ischium there is similar nonblanchable deep red erythema noted to measure 3cm x 3cm x 0cm. On B/L heels there is maroon colored redness noted to measure approximately 4cm x 4cm x 0cm. All injuries appear consistent with deep tissue injuries, present on admission. Once consult was complete, patient was educated regarding the need for routine turning and positioning to prevent pressure injuries and patient was placed in a left side-lying position utilizing pillow positioner assistive devices.

## 2023-04-28 NOTE — PROGRESS NOTE ADULT - ASSESSMENT
89-year-old female PMH of hypertension, hypothyroidism, colon cancer status postresection of the colon, CLL, COPD presenting with shortness of breath. Patient was diagnosed with pneumonia last week, subsequently put on doxycycline. Patient was told that if she continued to not feel well she should come to the emergency department. Patient woke up this morning saying she just did not feel right. Patient denies chest pain, fevers, vomiting, abdominal pain. Patient had COVID back in January and since has had a intermittent oxygen requirement. Over the last week patient has been using oxygen due to low oxygen saturation while being treated for pneumonia.  Patient also states her appetite has been very poor recently.      Plan: Admit to medicine for weakness and failure to thrive. Has thick cough, no fevers recently. Completed 7 days of Doxy and prednisone in past week. No wheeze at rest   or CP. Family not interested in bronch procedure, sputum culture sent. CTA notes no PE, bilateral pattern concerning for ORESTES. ID consult was called by ER. Will start IV   Zithromycin for now, defer to ID in AM.  Added oxygen 2.5 lpm. No prior cardiac stents, no DM, no CHF.  Needs 1-2 days of IVF.     Will continue home Duonebs every 6 hours, Symbicort bid and Albuterol prn every 6 hours. Continue Lopressor and Synthyroid at home dose.  TSH is AM.     CLL is noted, WBC 63,000 with high lymph count. Family has declined treatment.     Has full DNR, MOLST form signed from home.  Family will consider Palliative Care eval in AM.    89-year-old female PMH of hypertension, hypothyroidism, colon cancer status postresection of the colon, CLL, COPD presenting with shortness of breath. Patient was diagnosed with pneumonia last week, subsequently put on doxycycline. Patient was told that if she continued to not feel well she should come to the emergency department. Patient woke up this morning saying she just did not feel right. Patient denies chest pain, fevers, vomiting, abdominal pain. Patient had COVID back in January and since has had a intermittent oxygen requirement. Over the last week patient has been using oxygen due to low oxygen saturation while being treated for pneumonia.  Patient also states her appetite has been very poor recently.      Plan: Admit to medicine for weakness and failure to thrive. Has thick cough, no fevers recently. Completed 7 days of Doxy and prednisone in past week. No wheeze at rest or CP.     Family not interested in bronch procedure, sputum culture sent. CTA notes no PE, bilateral pattern concerning for ORESTES. ID consult was called by ER. Will start IV Zithromycin for now, defer to ID in AM.  Added oxygen 2.5 lpm. No prior cardiac stents, no DM, no CHF.  Needs 1-2 days of IVF, stop tomorrow.      Will continue home Duonebs every 6 hours, Symbicort bid and Albuterol prn every 6 hours. Continue Lopressor and Synthyroid at home dose.  TSH is AM.     CLL is noted, WBC 63,000 with high lymph count. Family has declined treatment.     Has full DNR, MOLST form signed from home.  Palliative Care consult was called.

## 2023-04-29 LAB
GAMMA INTERFERON BACKGROUND BLD IA-ACNC: 0.02 IU/ML — SIGNIFICANT CHANGE UP
HCT VFR BLD CALC: 28.6 % — LOW (ref 34.5–45)
HGB BLD-MCNC: 8.6 G/DL — LOW (ref 11.5–15.5)
M TB IFN-G BLD-IMP: NEGATIVE — SIGNIFICANT CHANGE UP
M TB IFN-G CD4+ BCKGRND COR BLD-ACNC: -0.01 IU/ML — SIGNIFICANT CHANGE UP
M TB IFN-G CD4+CD8+ BCKGRND COR BLD-ACNC: -0.01 IU/ML — SIGNIFICANT CHANGE UP
MCHC RBC-ENTMCNC: 30.1 GM/DL — LOW (ref 32–36)
MCHC RBC-ENTMCNC: 33 PG — SIGNIFICANT CHANGE UP (ref 27–34)
MCV RBC AUTO: 109.6 FL — HIGH (ref 80–100)
NRBC # BLD: 0 /100 WBCS — SIGNIFICANT CHANGE UP (ref 0–0)
PLATELET # BLD AUTO: 160 K/UL — SIGNIFICANT CHANGE UP (ref 150–400)
PROCALCITONIN SERPL-MCNC: 0.31 NG/ML — HIGH (ref 0.02–0.1)
QUANT TB PLUS MITOGEN MINUS NIL: 3.29 IU/ML — SIGNIFICANT CHANGE UP
RBC # BLD: 2.61 M/UL — LOW (ref 3.8–5.2)
RBC # FLD: 16.2 % — HIGH (ref 10.3–14.5)
WBC # BLD: 47.75 K/UL — CRITICAL HIGH (ref 3.8–10.5)
WBC # FLD AUTO: 47.75 K/UL — CRITICAL HIGH (ref 3.8–10.5)

## 2023-04-29 PROCEDURE — 99232 SBSQ HOSP IP/OBS MODERATE 35: CPT

## 2023-04-29 PROCEDURE — 99223 1ST HOSP IP/OBS HIGH 75: CPT | Mod: GC

## 2023-04-29 PROCEDURE — 74181 MRI ABDOMEN W/O CONTRAST: CPT | Mod: 26

## 2023-04-29 RX ORDER — LACTOBACILLUS ACIDOPHILUS 100MM CELL
1 CAPSULE ORAL DAILY
Refills: 0 | Status: DISCONTINUED | OUTPATIENT
Start: 2023-04-29 | End: 2023-04-29

## 2023-04-29 RX ORDER — POLYETHYLENE GLYCOL 3350 17 G/17G
17 POWDER, FOR SOLUTION ORAL DAILY
Refills: 0 | Status: DISCONTINUED | OUTPATIENT
Start: 2023-04-29 | End: 2023-05-01

## 2023-04-29 RX ADMIN — BUDESONIDE AND FORMOTEROL FUMARATE DIHYDRATE 2 PUFF(S): 160; 4.5 AEROSOL RESPIRATORY (INHALATION) at 06:15

## 2023-04-29 RX ADMIN — Medication 1 TABLET(S): at 11:59

## 2023-04-29 RX ADMIN — HEPARIN SODIUM 5000 UNIT(S): 5000 INJECTION INTRAVENOUS; SUBCUTANEOUS at 19:08

## 2023-04-29 RX ADMIN — Medication 3 MILLILITER(S): at 06:15

## 2023-04-29 RX ADMIN — CEFTRIAXONE 100 MILLIGRAM(S): 500 INJECTION, POWDER, FOR SOLUTION INTRAMUSCULAR; INTRAVENOUS at 20:08

## 2023-04-29 RX ADMIN — Medication 600 MILLIGRAM(S): at 06:14

## 2023-04-29 RX ADMIN — Medication 3 MILLILITER(S): at 19:07

## 2023-04-29 RX ADMIN — Medication 3 MILLILITER(S): at 11:59

## 2023-04-29 RX ADMIN — POLYETHYLENE GLYCOL 3350 17 GRAM(S): 17 POWDER, FOR SOLUTION ORAL at 19:08

## 2023-04-29 RX ADMIN — Medication 600 MILLIGRAM(S): at 19:08

## 2023-04-29 RX ADMIN — PREGABALIN 1000 MICROGRAM(S): 225 CAPSULE ORAL at 11:59

## 2023-04-29 RX ADMIN — HEPARIN SODIUM 5000 UNIT(S): 5000 INJECTION INTRAVENOUS; SUBCUTANEOUS at 06:15

## 2023-04-29 RX ADMIN — BUDESONIDE AND FORMOTEROL FUMARATE DIHYDRATE 2 PUFF(S): 160; 4.5 AEROSOL RESPIRATORY (INHALATION) at 19:18

## 2023-04-29 RX ADMIN — Medication 50 MICROGRAM(S): at 06:15

## 2023-04-29 NOTE — CONSULT NOTE ADULT - SUBJECTIVE AND OBJECTIVE BOX
[facility]  Jerod Waddell PA-C  [address] [place] [id number]  [phone]      Chief Complaint:  Patient is a 89y old  Female who presents with a chief complaint of Chart Reviewed, Events Noted  "89-year-old female PMH of hypertension, hypothyroidism, colon cancer status postresection of the colon, CLL, COPD presenting with shortness of breath. Patient was diagnosed with pneumonia last week, subsequently put on doxycycline. Patient was told that if she continued to not feel well she should come to the emergency department. Patient woke up this morning saying she just did not feel right. Patient denies chest pain, fevers, vomiting, abdominal pain. Patient had COVID back in January and since has had a intermittent oxygen requirement. Over the last week patient has been using oxygen due to low oxygen saturation while being treated for pneumonia.  Patient also states her appetite has been very poor recently."   (29 Apr 2023 10:11)      HPI: 89-year-old female PMH of hypertension, hypothyroidism, colon cancer status postresection of the colon, CLL, COPD presenting with shortness of breath. Patient was diagnosed with pneumonia last week, subsequently put on doxycycline. Patient was told that if she continued to not feel well she should come to the emergency department. Patient woke up this morning saying she just did not feel right. Patient denies chest pain, fevers, vomiting, abdominal pain. Patient had COVID back in January and since has had a intermittent oxygen requirement. Over the last week patient has been using oxygen due to low oxygen saturation while being treated for pneumonia.  Patient also states her appetite has been very poor recently.      Allergies:  cefdinir (Diarrhea)      Medications:  albuterol    90 MICROgram(s) HFA Inhaler 2 Puff(s) Inhalation every 6 hours PRN  albuterol/ipratropium for Nebulization 3 milliLiter(s) Nebulizer every 6 hours  ascorbic acid 500 milliGRAM(s) Oral daily  budesonide 160 MICROgram(s)/formoterol 4.5 MICROgram(s) Inhaler 2 Puff(s) Inhalation two times a day  cefTRIAXone   IVPB 1000 milliGRAM(s) IV Intermittent every 24 hours  cyanocobalamin 1000 MICROGram(s) Oral daily  guaiFENesin  milliGRAM(s) Oral every 12 hours  heparin   Injectable 5000 Unit(s) SubCutaneous every 12 hours  lactobacillus acidophilus 1 Tablet(s) Oral daily  levothyroxine 50 MICROGram(s) Oral daily  multivitamin 1 Tablet(s) Oral daily      PMHX/PSHX:  CLL (chronic lymphocytic leukemia)    Colon cancer    Hypothyroidism    History of emphysema    Pneumonia    Anemia    S/P colon resection        Family history:  Known health problems: none    FHx: diabetes mellitus (Mother)    FH: hypertension (Father)        Social History:     ROS:     General:  No wt loss, fevers, chills, night sweats, fatigue,   Eyes:  Good vision, no reported pain  ENT:  No sore throat, pain, runny nose, dysphagia  CV:  No pain, palpitations, hypo/hypertension  Resp:  No dyspnea, +cough, tachypnea, wheezing  GI:  No pain, No nausea, No vomiting, No diarrhea, No constipation, No weight loss, No fever, No pruritis, No rectal bleeding, No tarry stools, No dysphagia,  :  No pain, bleeding, incontinence, nocturia  Muscle:  No pain, weakness  Neuro:  No weakness, tingling, memory problems  Psych:  No fatigue, insomnia, mood problems, depression  Endocrine:  No polyuria, polydipsia, cold/heat intolerance  Heme:  No petechiae, ecchymosis, easy bruisability  Skin:  No rash, tattoos, scars, edema      PHYSICAL EXAM:   Vital Signs:  Vital Signs Last 24 Hrs  T(C): 37.2 (29 Apr 2023 12:12), Max: 37.2 (29 Apr 2023 12:12)  T(F): 99 (29 Apr 2023 12:12), Max: 99 (29 Apr 2023 12:12)  HR: 70 (29 Apr 2023 12:12) (60 - 70)  BP: 107/60 (29 Apr 2023 12:12) (98/60 - 108/55)  BP(mean): --  RR: 18 (29 Apr 2023 12:12) (18 - 19)  SpO2: 99% (29 Apr 2023 12:12) (97% - 99%)    Parameters below as of 29 Apr 2023 12:12  Patient On (Oxygen Delivery Method): nasal cannula  O2 Flow (L/min): 2    Daily     Daily     GENERAL:  Appears stated age,   HEENT:  NC/AT,    CHEST:  Full & symmetric excursion,   HEART:  Regular rhythm  ABDOMEN:  Soft, non-tender, non-distended,   EXTEREMITIES:  no cyanosis,clubbing or edema  SKIN:  No rash  NEURO:  Alert,    LABS:                        8.6    47.75 )-----------( 160      ( 29 Apr 2023 11:39 )             28.6     04-28    141  |  106  |  40<H>  ----------------------------<  148<H>  3.8   |  24  |  0.71    Ca    8.6      28 Apr 2023 06:50    TPro  6.9  /  Alb  3.4  /  TBili  1.1  /  DBili  x   /  AST  24  /  ALT  17  /  AlkPhos  90  04-27    LIVER FUNCTIONS - ( 27 Apr 2023 13:42 )  Alb: 3.4 g/dL / Pro: 6.9 g/dL / ALK PHOS: 90 U/L / ALT: 17 U/L / AST: 24 U/L / GGT: x                   Imaging:

## 2023-04-29 NOTE — PROGRESS NOTE ADULT - ASSESSMENT
89-year-old female PMH of hypertension, hypothyroidism, colon cancer status postresection of the colon, CLL, COPD presenting with shortness of breath. Patient was diagnosed with pneumonia last week, subsequently put on doxycycline. Patient was told that if she continued to not feel well she should come to the emergency department. Patient woke up this morning saying she just did not feel right. Patient denies chest pain, fevers, vomiting, abdominal pain. Patient had COVID back in January and since has had a intermittent oxygen requirement. Over the last week patient has been using oxygen due to low oxygen saturation while being treated for pneumonia.  Patient also states her appetite has been very poor recently. (27 Apr 2023 16:44)    A/P    # Pneumonia  Old CTs reviewed. 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   Please obtain a PULMONARY consult for their input  check urine legionella ( unlikely)  If NTM we don't even know if MAC, M abscessus or other ?    #Liver lesion  seems to be new   suggest MRI of liver    # cachexia / weight loss  although could be related to ORESTES , multiple possible explanations  pt notes early satiety   on thyroid replacement , check TFTS  ? related to malignancy       Carolyn Duffy M.D. ,   please reach via teams   If no answer, or after 5PM/ weekends,  then please call  [phone]    Assessment and plan discussed with the primary team .    ID service will be available  over the weekend. Please call for acute issues or questions. [phone]

## 2023-04-29 NOTE — CONSULT NOTE ADULT - SUBJECTIVE AND OBJECTIVE BOX
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 Of note, pt recently treated with doxycycline in the outpatient setting for pneumonia. Day of presentation noted to have worsening symptoms of dyspnea prompting in person evaluation. Has been on intermittent oxygen since COVID infection in January though only primarily in mornings, not using majority of day. ROS negative for fevers, chest pain, nausea, vomiting, abdominal pain. Initial labs notable for leukocytosis (CLL) with negative RVP. CT chest obtained with chronic stable bronchiectasis and multilobar GGO/consolidations as well as underlying TIB. Admitted to medicine for management of pneumonia. Per primary team pt with history of difficulty swallowing - per pt was told previously to eat in small portions, unclear if ever had hx of aspiration pneumonia; denies significant coughing with meals. Evaluated by ID with concern for underlying MAC. Pulmonary consulted for evaluation of NTM and possible aspiration pneumonia.     PAST MEDICAL & SURGICAL HISTORY:  CLL (chronic lymphocytic leukemia)      Colon cancer      Hypothyroidism      History of emphysema      Pneumonia      Anemia      S/P colon resection          FAMILY HISTORY:  FHx: diabetes mellitus (Mother)    FH: hypertension (Father)        SOCIAL HISTORY:  Smoking: [x] Never Smoked [ ] Former Smoker (__ packs x ___ years) [ ] Current Smoker  (__ packs x ___ years)  Substance Use: [x] Never Used [ ] Used ____  EtOH Use: Denies  Marital Status: [ ] Single [ ]  [ ]  [ ]   Sexual History:   Occupation:  Recent Travel:  Country of Birth:  Advance Directives:    Allergies    cefdinir (Diarrhea)    Intolerances        HOME MEDICATIONS:  Home Medications:  doxycycline hyclate 100 mg oral capsule: 1 cap(s) orally 2 times a day 10 day course started on 4/21/2023 prior to admission (28 Apr 2023 11:13)  ipratropium-albuterol 0.5 mg-2.5 mg/3 mL inhalation solution: 3 milliliter(s) by nebulizer 4 times a day (28 Apr 2023 10:59)  metoprolol succinate 25 mg oral tablet, extended release: 1 tab(s) orally once a day (28 Apr 2023 11:01)  sodium chloride 7% inhalation solution: 4 milliliter(s) inhaled 2 times a day (28 Apr 2023 11:01)      REVIEW OF SYSTEMS:  Constitutional: [x] negative [ ] fevers [ ] chills [ ] weight loss [ ] weight gain  HEENT: [x] negative [ ] dry eyes [ ] eye irritation [ ] postnasal drip [ ] nasal congestion  CV: [x] negative  [ ] chest pain [ ] orthopnea [ ] palpitations [ ] murmur  Resp: [ ] negative [x] cough [ ] shortness of breath [ ] dyspnea [ ] wheezing [ ] sputum [ ] hemoptysis  GI: [x] negative [ ] nausea [ ] vomiting [ ] diarrhea [ ] constipation [ ] abd pain [ ] dysphagia   : [x] negative [ ] dysuria [ ] nocturia [ ] hematuria [ ] increased urinary frequency  Musculoskeletal: [x] negative [ ] back pain [ ] myalgias [ ] arthralgias [ ] fracture  Skin: [x] negative [ ] rash [ ] itch  Neurological: [x] negative [ ] headache [ ] dizziness [ ] syncope [ ] weakness [ ] numbness  Psychiatric: [ ] negative [ ] anxiety [ ] depression  Endocrine: [ ] negative [ ] diabetes [ ] thyroid problem  Hematologic/Lymphatic: [ ] negative [ ] anemia [ ] bleeding problem  Allergic/Immunologic: [ ] negative [ ] itchy eyes [ ] nasal discharge [ ] hives [ ] angioedema  [x] All other systems negative  [ ] Unable to assess ROS because ________    OBJECTIVE:  ICU Vital Signs Last 24 Hrs  T(C): 37.1 (29 Apr 2023 16:48), Max: 37.2 (29 Apr 2023 12:12)  T(F): 98.7 (29 Apr 2023 16:48), Max: 99 (29 Apr 2023 12:12)  HR: 81 (29 Apr 2023 16:48) (60 - 81)  BP: 117/68 (29 Apr 2023 16:48) (106/60 - 117/68)  BP(mean): --  ABP: --  ABP(mean): --  RR: 18 (29 Apr 2023 16:48) (18 - 18)  SpO2: 95% (29 Apr 2023 16:48) (95% - 99%)    O2 Parameters below as of 29 Apr 2023 16:48  Patient On (Oxygen Delivery Method): nasal cannula  O2 Flow (L/min): 2            04-28 @ 07:01 - 04-29 @ 07:00  --------------------------------------------------------  IN: 170 mL / OUT: 0 mL / NET: 170 mL      CAPILLARY BLOOD GLUCOSE          PHYSICAL EXAM:  General: Female, NAD  HEENT: EOMI  Neck: Supple  Respiratory: No audible wheezes anteriorly, no respiratory distress  Cardiovascular: RRR no mrg  Abdomen: Soft, NT, ND  Extremities: WWP, no edema  Skin: Scattered ecchymoses  Neurological: AOx3  Psychiatry: Normal affect    HOSPITAL MEDICATIONS:  Standing Meds:  albuterol/ipratropium for Nebulization 3 milliLiter(s) Nebulizer every 6 hours  budesonide 160 MICROgram(s)/formoterol 4.5 MICROgram(s) Inhaler 2 Puff(s) Inhalation two times a day  cefTRIAXone   IVPB 1000 milliGRAM(s) IV Intermittent every 24 hours  cyanocobalamin 1000 MICROGram(s) Oral daily  guaiFENesin  milliGRAM(s) Oral every 12 hours  heparin   Injectable 5000 Unit(s) SubCutaneous every 12 hours  levothyroxine 50 MICROGram(s) Oral daily  multivitamin 1 Tablet(s) Oral daily  polyethylene glycol 3350 17 Gram(s) Oral daily      PRN Meds:  albuterol    90 MICROgram(s) HFA Inhaler 2 Puff(s) Inhalation every 6 hours PRN      LABS:                        8.6    47.75 )-----------( 160      ( 29 Apr 2023 11:39 )             28.6     Hgb Trend: 8.6<--, 7.8<--, 9.5<--  04-28    141  |  106  |  40<H>  ----------------------------<  148<H>  3.8   |  24  |  0.71    Ca    8.6      28 Apr 2023 06:50      Creatinine Trend: 0.71<--, 0.83<--            MICROBIOLOGY:     Culture - Blood (collected 27 Apr 2023 12:55)  Source: .Blood Blood-Peripheral  Preliminary Report (28 Apr 2023 19:02):    No growth to date.    Culture - Blood (collected 27 Apr 2023 12:45)  Source: .Blood Blood-Peripheral  Preliminary Report (28 Apr 2023 19:02):    No growth to date.        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 Of note, pt recently treated with doxycycline in the outpatient setting for pneumonia. Day of presentation noted to have worsening symptoms of dyspnea prompting in person evaluation. Has been on intermittent oxygen since COVID infection in January though only primarily in mornings, not using majority of day. ROS negative for fevers, chest pain, nausea, vomiting, abdominal pain. Initial labs notable for leukocytosis (CLL) with negative RVP. CT chest obtained with chronic stable bronchiectasis and multilobar GGO/consolidations as well as underlying TIB. Admitted to medicine for management of pneumonia. Per primary team pt with history of difficulty swallowing - per pt was told previously to eat in small portions, unclear if ever had hx of aspiration pneumonia; denies significant coughing with meals; CT w/ esophageal debris and TIB. Evaluated by ID with concern for underlying MAC. Pulmonary consulted for evaluation of NTM and possible aspiration pneumonia.     PAST MEDICAL & SURGICAL HISTORY:  CLL (chronic lymphocytic leukemia)      Colon cancer      Hypothyroidism      History of emphysema      Pneumonia      Anemia      S/P colon resection          FAMILY HISTORY:  FHx: diabetes mellitus (Mother)    FH: hypertension (Father)        SOCIAL HISTORY:  Smoking: [x] Never Smoked [ ] Former Smoker (__ packs x ___ years) [ ] Current Smoker  (__ packs x ___ years)  Substance Use: [x] Never Used [ ] Used ____  EtOH Use: Denies  Marital Status: [ ] Single [ ]  [ ]  [ ]   Sexual History:   Occupation:  Recent Travel:  Country of Birth:  Advance Directives:    Allergies    cefdinir (Diarrhea)    Intolerances        HOME MEDICATIONS:  Home Medications:  doxycycline hyclate 100 mg oral capsule: 1 cap(s) orally 2 times a day 10 day course started on 4/21/2023 prior to admission (28 Apr 2023 11:13)  ipratropium-albuterol 0.5 mg-2.5 mg/3 mL inhalation solution: 3 milliliter(s) by nebulizer 4 times a day (28 Apr 2023 10:59)  metoprolol succinate 25 mg oral tablet, extended release: 1 tab(s) orally once a day (28 Apr 2023 11:01)  sodium chloride 7% inhalation solution: 4 milliliter(s) inhaled 2 times a day (28 Apr 2023 11:01)      REVIEW OF SYSTEMS:  Constitutional: [x] negative [ ] fevers [ ] chills [ ] weight loss [ ] weight gain  HEENT: [x] negative [ ] dry eyes [ ] eye irritation [ ] postnasal drip [ ] nasal congestion  CV: [x] negative  [ ] chest pain [ ] orthopnea [ ] palpitations [ ] murmur  Resp: [ ] negative [x] cough [ ] shortness of breath [ ] dyspnea [ ] wheezing [ ] sputum [ ] hemoptysis  GI: [x] negative [ ] nausea [ ] vomiting [ ] diarrhea [ ] constipation [ ] abd pain [ ] dysphagia   : [x] negative [ ] dysuria [ ] nocturia [ ] hematuria [ ] increased urinary frequency  Musculoskeletal: [x] negative [ ] back pain [ ] myalgias [ ] arthralgias [ ] fracture  Skin: [x] negative [ ] rash [ ] itch  Neurological: [x] negative [ ] headache [ ] dizziness [ ] syncope [ ] weakness [ ] numbness  Psychiatric: [ ] negative [ ] anxiety [ ] depression  Endocrine: [ ] negative [ ] diabetes [ ] thyroid problem  Hematologic/Lymphatic: [ ] negative [ ] anemia [ ] bleeding problem  Allergic/Immunologic: [ ] negative [ ] itchy eyes [ ] nasal discharge [ ] hives [ ] angioedema  [x] All other systems negative  [ ] Unable to assess ROS because ________    OBJECTIVE:  ICU Vital Signs Last 24 Hrs  T(C): 37.1 (29 Apr 2023 16:48), Max: 37.2 (29 Apr 2023 12:12)  T(F): 98.7 (29 Apr 2023 16:48), Max: 99 (29 Apr 2023 12:12)  HR: 81 (29 Apr 2023 16:48) (60 - 81)  BP: 117/68 (29 Apr 2023 16:48) (106/60 - 117/68)  BP(mean): --  ABP: --  ABP(mean): --  RR: 18 (29 Apr 2023 16:48) (18 - 18)  SpO2: 95% (29 Apr 2023 16:48) (95% - 99%)    O2 Parameters below as of 29 Apr 2023 16:48  Patient On (Oxygen Delivery Method): nasal cannula  O2 Flow (L/min): 2            04-28 @ 07:01  -  04-29 @ 07:00  --------------------------------------------------------  IN: 170 mL / OUT: 0 mL / NET: 170 mL      CAPILLARY BLOOD GLUCOSE          PHYSICAL EXAM:  General: Female, NAD  HEENT: EOMI  Neck: Supple  Respiratory: No audible wheezes anteriorly, no respiratory distress  Cardiovascular: RRR no mrg  Abdomen: Soft, NT, ND  Extremities: WWP, no edema  Skin: Scattered ecchymoses  Neurological: AOx3  Psychiatry: Normal affect    HOSPITAL MEDICATIONS:  Standing Meds:  albuterol/ipratropium for Nebulization 3 milliLiter(s) Nebulizer every 6 hours  budesonide 160 MICROgram(s)/formoterol 4.5 MICROgram(s) Inhaler 2 Puff(s) Inhalation two times a day  cefTRIAXone   IVPB 1000 milliGRAM(s) IV Intermittent every 24 hours  cyanocobalamin 1000 MICROGram(s) Oral daily  guaiFENesin  milliGRAM(s) Oral every 12 hours  heparin   Injectable 5000 Unit(s) SubCutaneous every 12 hours  levothyroxine 50 MICROGram(s) Oral daily  multivitamin 1 Tablet(s) Oral daily  polyethylene glycol 3350 17 Gram(s) Oral daily      PRN Meds:  albuterol    90 MICROgram(s) HFA Inhaler 2 Puff(s) Inhalation every 6 hours PRN      LABS:                        8.6    47.75 )-----------( 160      ( 29 Apr 2023 11:39 )             28.6     Hgb Trend: 8.6<--, 7.8<--, 9.5<--  04-28    141  |  106  |  40<H>  ----------------------------<  148<H>  3.8   |  24  |  0.71    Ca    8.6      28 Apr 2023 06:50      Creatinine Trend: 0.71<--, 0.83<--            MICROBIOLOGY:     Culture - Blood (collected 27 Apr 2023 12:55)  Source: .Blood Blood-Peripheral  Preliminary Report (28 Apr 2023 19:02):    No growth to date.    Culture - Blood (collected 27 Apr 2023 12:45)  Source: .Blood Blood-Peripheral  Preliminary Report (28 Apr 2023 19:02):    No growth to date.        RADIOLOGY:  [x] Reviewed and interpreted by me    PULMONARY FUNCTION TESTS:    EKG:

## 2023-04-29 NOTE — DIETITIAN INITIAL EVALUATION ADULT - NSFNSPHYEXAMSKINFT_GEN_A_CORE
As per nursing wound care documentation 4/28/23:  --B/L buttocks/sacral deep tissue injury, present on admission  --B/L ischium deep tissue injuries, present on admission  --B/L heel deep tissue injuries, present on admission

## 2023-04-29 NOTE — DIETITIAN INITIAL EVALUATION ADULT - OTHER CALCULATIONS
-- Defer fluid needs to medical team  -- Estimated calorie/protein needs based on IBW of 100 pounds

## 2023-04-29 NOTE — DIETITIAN INITIAL EVALUATION ADULT - EDUCATION DIETARY MODIFICATIONS
Provided recommendations to optimize PO and protein intake, Discussed importance of adequate consumption of meals/supplements to optimize protein-energy intake. Encouraged small/frequent meals, nutrient dense snacks, prioritizing protein foods at meal time and sips of supplement throughout the day to maximize caloric/protein intake./teach back/(1) partially meets; needs review/practice/verbalization

## 2023-04-29 NOTE — CONSULT NOTE ADULT - ASSESSMENT
anemia  hx colon CA s/p resection   CLL  COPD    monitor cbc daily   no overt bleeding  transfuse prn   gentle bowel regimen with miralax  monitor stool count   reg diet as tolerated  poor candidate for endoscopic eval; pt and daughters in agreement   palliative consult pending   will follow  d/w pt and daughters at bedside    I reviewed the overnight course of events on the unit, re-confirming the patient history. I discussed the care with the patient and their family  The plan of care was discussed with the physician assistant and modifications were made to the notation where appropriate.   Differential diagnosis and plan of care discussed with patient after the evaluation  35 minutes spent on total encounter of which more than fifty percent of the encounter was spent counseling and/or coordinating care by the attending physician.  Advanced care planning was discussed with patient and family.  Advanced care planning forms were reviewed and discussed.  Risks, benefits and alternatives of gastroenterologic procedures were discussed in detail and all questions were answered.

## 2023-04-29 NOTE — DIETITIAN INITIAL EVALUATION ADULT - OTHER INFO
Weights:  --Drug Dosing Weight:31.8 kg/ 70.1 pounds (4/27/23)  -- Pt reports her recent UBW was about 70 pounds  -- [facility] 77.8 pounds (1/11/23)  -- IBW: 100% %IBW: 70.1%   -- Pt noted with a significant weight loss X 3 months (see data below for further details)

## 2023-04-29 NOTE — DIETITIAN INITIAL EVALUATION ADULT - PERTINENT LABORATORY DATA
04-28    141  |  106  |  40<H>  ----------------------------<  148<H>  3.8   |  24  |  0.71    Ca    8.6      28 Apr 2023 06:50    TPro  6.9  /  Alb  3.4  /  TBili  1.1  /  DBili  x   /  AST  24  /  ALT  17  /  AlkPhos  90  04-27  A1C with Estimated Average Glucose Result: 5.8 % (01-12-23 @ 07:56)

## 2023-04-29 NOTE — DIETITIAN INITIAL EVALUATION ADULT - PERTINENT MEDS FT
MEDICATIONS  (STANDING):  albuterol/ipratropium for Nebulization 3 milliLiter(s) Nebulizer every 6 hours  ascorbic acid 500 milliGRAM(s) Oral daily  budesonide 160 MICROgram(s)/formoterol 4.5 MICROgram(s) Inhaler 2 Puff(s) Inhalation two times a day  cefTRIAXone   IVPB 1000 milliGRAM(s) IV Intermittent every 24 hours  cyanocobalamin 1000 MICROGram(s) Oral daily  guaiFENesin  milliGRAM(s) Oral every 12 hours  heparin   Injectable 5000 Unit(s) SubCutaneous every 12 hours  lactobacillus acidophilus 1 Tablet(s) Oral daily  levothyroxine 50 MICROGram(s) Oral daily  multivitamin 1 Tablet(s) Oral daily    MEDICATIONS  (PRN):  albuterol    90 MICROgram(s) HFA Inhaler 2 Puff(s) Inhalation every 6 hours PRN Shortness of Breath and/or Wheezing

## 2023-04-29 NOTE — DIETITIAN INITIAL EVALUATION ADULT - SIGNS/SYMPTOMS
multiple deep tissue injuries, present on admission >7.5% weight loss X 3 month, severe muscle wasting and fat loss, BMI<18.5

## 2023-04-29 NOTE — PROGRESS NOTE ADULT - SUBJECTIVE AND OBJECTIVE BOX
Patient is a 89y old  Female who presents with a chief complaint of Weakness and failure to thrive (29 Apr 2023 15:27)    Being followed by ID for        Interval history:  No other acute events      ROS:  No cough,SOB,CP  No N/V/D  No abd pain  No urinary complaints  No HA  No joint or limb pain  No other complaints    PAST MEDICAL & SURGICAL HISTORY:  CLL (chronic lymphocytic leukemia)      Colon cancer      Hypothyroidism      History of emphysema      Pneumonia      Anemia      S/P colon resection        Allergies    cefdinir (Diarrhea)    Intolerances      Antimicrobials:    cefTRIAXone   IVPB 1000 milliGRAM(s) IV Intermittent every 24 hours    MEDICATIONS  (STANDING):  albuterol/ipratropium for Nebulization 3 milliLiter(s) Nebulizer every 6 hours  budesonide 160 MICROgram(s)/formoterol 4.5 MICROgram(s) Inhaler 2 Puff(s) Inhalation two times a day  cefTRIAXone   IVPB 1000 milliGRAM(s) IV Intermittent every 24 hours  cyanocobalamin 1000 MICROGram(s) Oral daily  guaiFENesin  milliGRAM(s) Oral every 12 hours  heparin   Injectable 5000 Unit(s) SubCutaneous every 12 hours  levothyroxine 50 MICROGram(s) Oral daily  multivitamin 1 Tablet(s) Oral daily  polyethylene glycol 3350 17 Gram(s) Oral daily      Vital Signs Last 24 Hrs  T(C): 37.2 (04-29-23 @ 12:12), Max: 37.2 (04-29-23 @ 12:12)  T(F): 99 (04-29-23 @ 12:12), Max: 99 (04-29-23 @ 12:12)  HR: 70 (04-29-23 @ 12:12) (60 - 70)  BP: 107/60 (04-29-23 @ 12:12) (106/60 - 108/55)  BP(mean): --  RR: 18 (04-29-23 @ 12:12) (18 - 19)  SpO2: 99% (04-29-23 @ 12:12) (97% - 99%)    Physical Exam:    Constitutional well preserved,comfortable,pleasant    HEENT PERRLA EOMI,No pallor or icterus    No oral exudate or erythema    Neck supple no JVD or LN    Chest Good AE,CTA    CVS RRR S1 S2 WNl No murmur or rub or gallop    Abd soft BS normal No tenderness no masses    Ext No cyanosis clubbing or edema    IV site no erythema tenderness or discharge    Joints no swelling or LOM    CNS AAO X 3 no focal    Lab Data:                          8.6    47.75 )-----------( 160      ( 29 Apr 2023 11:39 )             28.6       04-28    141  |  106  |  40<H>  ----------------------------<  148<H>  3.8   |  24  |  0.71    Ca    8.6      28 Apr 2023 06:50            .Blood Blood-Peripheral  04-27-23   No growth to date.  --  --      .Blood Blood-Peripheral  04-27-23   No growth to date.  --  --                    WBC Count: 47.75 (04-29-23 @ 11:39)  WBC Count: 51.28 (04-28-23 @ 06:53)  WBC Count: 63.32 (04-27-23 @ 13:42)             Patient is a 89y old  Female who presents with a chief complaint of Weakness and failure to thrive (29 Apr 2023 15:27)    Being followed by ID for        Interval history:  cough unchanged  daughter believes patient looks a little better  for MRI later today   No other acute events        PAST MEDICAL & SURGICAL HISTORY:  CLL (chronic lymphocytic leukemia)      Colon cancer      Hypothyroidism      History of emphysema      Pneumonia      Anemia      S/P colon resection        Allergies    cefdinir (Diarrhea)    Intolerances      Antimicrobials:    cefTRIAXone   IVPB 1000 milliGRAM(s) IV Intermittent every 24 hours    MEDICATIONS  (STANDING):  albuterol/ipratropium for Nebulization 3 milliLiter(s) Nebulizer every 6 hours  budesonide 160 MICROgram(s)/formoterol 4.5 MICROgram(s) Inhaler 2 Puff(s) Inhalation two times a day  cefTRIAXone   IVPB 1000 milliGRAM(s) IV Intermittent every 24 hours  cyanocobalamin 1000 MICROGram(s) Oral daily  guaiFENesin  milliGRAM(s) Oral every 12 hours  heparin   Injectable 5000 Unit(s) SubCutaneous every 12 hours  levothyroxine 50 MICROGram(s) Oral daily  multivitamin 1 Tablet(s) Oral daily  polyethylene glycol 3350 17 Gram(s) Oral daily      Vital Signs Last 24 Hrs  T(C): 37.2 (04-29-23 @ 12:12), Max: 37.2 (04-29-23 @ 12:12)  T(F): 99 (04-29-23 @ 12:12), Max: 99 (04-29-23 @ 12:12)  HR: 70 (04-29-23 @ 12:12) (60 - 70)  BP: 107/60 (04-29-23 @ 12:12) (106/60 - 108/55)  BP(mean): --  RR: 18 (04-29-23 @ 12:12) (18 - 19)  SpO2: 99% (04-29-23 @ 12:12) (97% - 99%)    Physical Exam:    Constitutional well preserved,comfortable,pleasant    HEENT PERRLA EOMI,No pallor or icterus    No oral exudate or erythema    Neck supple no JVD or LN    Chest occasional rhonchi    CVS  S1 S2     Abd soft BS normal No tenderness    Ext cachectic    IV site no erythema tenderness or discharge    Joints no swelling or LOM    CNS AAO X 3 no focal    Lab Data:                          8.6    47.75 )-----------( 160      ( 29 Apr 2023 11:39 )             28.6       04-28    141  |  106  |  40<H>  ----------------------------<  148<H>  3.8   |  24  |  0.71    Ca    8.6      28 Apr 2023 06:50        .Blood Blood-Peripheral  04-27-23   No growth to date.  --  --      .Blood Blood-Peripheral  04-27-23   No growth to date.  --  --      WBC Count: 47.75 (04-29-23 @ 11:39)  WBC Count: 51.28 (04-28-23 @ 06:53)  WBC Count: 63.32 (04-27-23 @ 13:42)

## 2023-04-29 NOTE — PROGRESS NOTE ADULT - ASSESSMENT
89-year-old female           PMH of hypertension, hypothyroidism, colon cancer status postresection of the colon,         CLL, COPD          presenting with shortness of breath., had  pneumonia last week, was s on  doxycycline.    d denies chest pain, fevers, vomiting, abdominal pain..   had COVID    in January and since has had a intermittent oxygen requirement.                 admitted  for  for weakness,  cough, ftt  Family not interested in bronch/ procedure,   CTA notes no PE, bilateral pattern concerning for ORESTES,  seen by  ID       HTN/  Hypothyroid,   Lopressor and synthyroid       CLL         with  high  wbc, . Family has declined treatment.     anemia.  follwo  hb    wbc  is  51,000     on iv  rocephin           pt  is   DNR, MOLST form signed from home.  Palliative Care consult was called.     89-year-old female           PMH of hypertension, hypothyroidism, colon cancer status postresection of the colon,         CLL, COPD          presenting with shortness of breath., had  pneumonia last week, was s on  doxycycline.    d denies chest pain, fevers, vomiting, abdominal pain..   had COVID    in January and since has had a intermittent oxygen requirement.                 admitted  for  for weakness,  cough, ftt  Family not interested in bronch/ procedure,   CTA notes no PE, bilateral pattern concerning for ORESTES,  seen by  ID       HTN/  Hypothyroid,   Lopressor and synthyroid       CLL         with  high  wbc, . Family has declined treatment.     anemia.  follow   hb    wbc  is  51,000/  heme  d r ohri     on iv  rocephin           pt  is   DNR, MOLST form signed from home.  Palliative Care consult was called.     89-year-old female           h/o HTN,  hypothyroidism, colon cancer status postresection of the colon,            CLL          presenting with shortness of breath., had  pneumonia last week, was s on  doxycycline.            denies chest pain, fevers, vomiting, abdominal pain,  covid   in January 2023             admitted  for SOB, weakness,  cough, ftt           CTA notes no PE, bilateral pattern concerning for ORESTES,  seen by  ID      *   pna/  CAP  and  probable   ORESTES             pt with cachexia.  wt is   31  kg.  suspect  from ORESTES    *    HTN/  Hypothyroid              lopressor, synthroid          had  h/o  brief  Afib, per  family/  card  to  f.p /  not  on  a/c    *     h/o  CLL                wbc  is  51,000/  heme   ohri/   rpt  hb is 8          CT  , with   L  hepatic  lesion.                  MRI  abd   *   h/o  prior Ca   colon/ s/p colectomy          now  with nausea/  anemia/  pt has  been receiving  prbc. occasionally  outside/ last was few  months ago            gi  d r mary ellen          Pna.    on iv  rocephin . per  ID           pt  is dnr          per  2  daughters,   they  do  not wish  to  pursue  palliative/  hospice  care         rad< from: CT Angio Chest PE Protocol w/ IV Cont (04.27.23 @ 15:27) >  IMPRESSION:  *  No pulmonary embolism.  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  *  Multilobar consolidation and other opacities, some of which is   increased and likely infectious.  *  There is an enlarging indeterminate left hepatic lesion. MRI abdomen   with IV contrast can be performed for further characterization if   warranted.  --- End of Report ---           89-year-old female           h/o HTN,  hypothyroidism, colon cancer status postresection of the colon,            CLL          presenting with shortness of breath., had  pneumonia last week, was s on  doxycycline.            denies chest pain, fevers, vomiting, abdominal pain,  covid   in January 2023             admitted  for SOB, weakness,  cough, ftt           CTA notes no PE, bilateral pattern concerning for ORESTES,  seen by  ID      *   pna/  CAP  and  probable   ORESTES             pt with cachexia.  wt is   31  kg.  suspect  from ORESTES    *    HTN/  Hypothyroid              lopressor, synthroid          had  h/o  brief  Afib, per  family/  card  to  f.p /  not  on  a/c    *     h/o  CLL                wbc  is  51,000/  heme   ohri/   rpt  hb is 8          CT  , with   L  hepatic  lesion.                  MRI  abd   *   h/o  prior Ca   colon/ s/p colectomy          now  with nausea/  anemia/  pt has  been receiving  prbc.  occasionally  outside/ last was few  months ago            gi  d r mary ellen          Pna. ,  on iv  rocephin . per  ID           pt  is dnr          per   daughters,   they  do  not wish  to  pursue  palliative/  hospice  care  , at present        rad< from: CT Angio Chest PE Protocol w/ IV Cont (04.27.23 @ 15:27) >  IMPRESSION:  *  No pulmonary embolism.  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  *  Multilobar consolidation and other opacities, some of which is   increased and likely infectious.  *  There is an enlarging indeterminate left hepatic lesion. MRI abdomen   with IV contrast can be performed for further characterization if   warranted.  --- End of Report ---

## 2023-04-29 NOTE — CONSULT NOTE ADULT - ASSESSMENT
89-year-old female PMH of hypertension, hypothyroidism, colon cancer status postresection of the colon, CLL, COPD presenting with shortness of breath. Patient was diagnosed with pneumonia last week, subsequently put on doxycycline. Patient was told that if she continued to not feel well she should come to the emergency department. Patient woke up this morning saying she just did not feel right. Patient denies chest pain, fevers, vomiting, abdominal pain. Patient had COVID back in January and since has had a intermittent oxygen requirement. Over the last week patient has been using oxygen due to low oxygen saturation while being treated for pneumonia.  Patient also states her appetite has been very poor recently  pt with sig medical hx with FTT, sig weight loss and sob  pt was admitted with COVID and had a episode of a,fib >45 minutes  i discussed with daughters doubt pt is a candidate for AC  agree to continue beta blocker  echo noted, no need to repeat echo  may add Remeron 7.5 mg qhs to increase appetite  nutrition eval  sever protein defficiency

## 2023-04-29 NOTE — CONSULT NOTE ADULT - CONSULT REQUESTED DATE/TIME
29-Apr-2023
29-Apr-2023 18:17
28-Apr-2023 13:21
29-Apr-2023 13:51
28-Apr-2023 19:05
29-Apr-2023 12:18

## 2023-04-29 NOTE — CONSULT NOTE ADULT - REASON FOR ADMISSION
Weakness and failure to thrive

## 2023-04-29 NOTE — CONSULT NOTE ADULT - SUBJECTIVE AND OBJECTIVE BOX
HPI:  89-year-old female PMH of hypertension, hypothyroidism, colon cancer status postresection of the colon, CLL, COPD admitted 4/27/23 with shortness of breath. Patient was diagnosed with pneumonia one  week back, put on doxycycline. Patient continued to not feel well and came  to ER. She had COVID 19 in Jan 2023 and needs oxygen sometimes since then. Appetite has been poor  PAST MEDICAL & SURGICAL HISTORY:  CLL (chronic lymphocytic leukemia)      Colon cancer      Hypothyroidism      History of emphysema      Pneumonia      Anemia      S/P colon resection          cefdinir (Diarrhea)      FAMILY HISTORY:  FHx: diabetes mellitus (Mother)    FH: hypertension (Father)      Social History: No smoking or ETOH    Medications:  albuterol    90 MICROgram(s) HFA Inhaler 2 Puff(s) Inhalation every 6 hours PRN Shortness of Breath and/or Wheezing  albuterol/ipratropium for Nebulization 3 milliLiter(s) Nebulizer every 6 hours  budesonide 160 MICROgram(s)/formoterol 4.5 MICROgram(s) Inhaler 2 Puff(s) Inhalation two times a day  cefTRIAXone   IVPB 1000 milliGRAM(s) IV Intermittent every 24 hours  cyanocobalamin 1000 MICROGram(s) Oral daily  guaiFENesin  milliGRAM(s) Oral every 12 hours  heparin   Injectable 5000 Unit(s) SubCutaneous every 12 hours  levothyroxine 50 MICROGram(s) Oral daily  multivitamin 1 Tablet(s) Oral daily  polyethylene glycol 3350 17 Gram(s) Oral daily    Labs:                        8.6    47.75 )-----------( 160      ( 29 Apr 2023 11:39 )             28.6   WBC Count: 47.75 K/uL (04.29.23 @ 11:39)   WBC Count: 51.28 K/uL (04.28.23 @ 06:53)   WBC Count: 63.32 K/uL (04.27.23 @ 13:42)   WBC Count: 40.97: Test Name:WBC Called by:JAVIER Called to:KP VÁSQUEZ   Read back 2 Pt IDs:Y Read back values:Y Date/Tm:01/19/2023  Hemoglobin: 8.6 g/dL (04.29.23 @ 11:39)   Hemoglobin: 7.8 g/dL (04.28.23 @ 06:53)   Hemoglobin: 9.5 g/dL (04.27.23 @ 13:42)   Hemoglobin: 9.1 g/dL (01.19.23 @ 07:30)   Hemoglobin: 7.6 g/dL (07.28.22 @ 12:45)   Hemoglobin: 5.7: Test Name:wbc/hgb/hct Called by:china Called to:s carty rn     Mean Cell Volume: 109.6 fl (04.29.23 @ 11:39)   Mean Cell Volume: 108.3 fl (04.28.23 @ 06:53)   Manual Differential (04.28.23 @ 06:53)   Manual Smear Verification: Performed  Red Cell Morphology: See previous  Platelet Morphology: Normal  Smudge Cells: Present  Auto Neutrophil #: 0.92 K/uL  Auto Lymphocyte #: 50.36 K/uL  Auto Neutrophil #: 0.92 K/uL (04.28.23 @ 06:53)   Auto Neutrophil #: 2.66 K/uL (04.27.23 @ 13:42)   Auto Neutrophil #: 3.95 K/uL (01.16.23 @ 07:14)   Auto Neutrophil #: 2.12 K/uL (01.14.23 @ 07:34)   Auto Neutrophil #: 2.73 K/uL (01.13.23 @ 07:32)   Auto Neutrophil #: 1.60 K/uL (01.12.23 @ 07:56)   04-28    141  |  106  |  40<H>  ----------------------------<  148<H>  3.8   |  24  |  0.71    Ca    8.6      28 Apr 2023 06:50      Radiology:     < from: CT Angio Chest PE Protocol w/ IV Cont (04.27.23 @ 15:27) >  IMPRESSION:  *  No pulmonary embolism.  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  *  Multilobar consolidation and other opacities, some of which is   increased and likely infectious.  *  There is an enlarging indeterminate left hepatic lesion. MRI abdomen   with IV contrast can be performed for further characterization if   warranted.    < end of copied text >          ROS:  No pain, weak  No lumps in neck or pain  No Sob or chest pain at rest  No palpitations   No abdominal pain. No change in bowel habit. No blood in stools  No weakness in extremities  No leg swelling  Rest of comprehensive ROS was negative    Vital Signs Last 24 Hrs  T(C): 37.2 (29 Apr 2023 12:12), Max: 37.2 (29 Apr 2023 12:12)  T(F): 99 (29 Apr 2023 12:12), Max: 99 (29 Apr 2023 12:12)  HR: 70 (29 Apr 2023 12:12) (60 - 70)  BP: 107/60 (29 Apr 2023 12:12) (106/60 - 108/55)  BP(mean): --  RR: 18 (29 Apr 2023 12:12) (18 - 19)  SpO2: 99% (29 Apr 2023 12:12) (97% - 99%)    Parameters below as of 29 Apr 2023 12:12  Patient On (Oxygen Delivery Method): nasal cannula  O2 Flow (L/min): 2      Physical Exam:  Patient comfortable  Alert, cachectic  Neck supple, no LN's  Chest: bilateral breath sounds, no wheeze or rales  CVS: regular heart rate without murmur  Abdomen: soft, BS+, no massess or organomegaly  CNS: no gross deficit  Musculoskeletal: Normal range of motion  Skin: no rash

## 2023-04-29 NOTE — CONSULT NOTE ADULT - ATTENDING COMMENTS
Agree with above. Recommend airway clearance measures with flutter valve, consider hypertonic saline and start chest PT. Encourage mobilization. Bronchodilators. Overall, need to address goals of care and how much of airway clearance regimen patient is interested in. MAC treatment requires risk/benefit discussion. Will set up follow up with our outpatient MAC team. Antibiotics while avoiding creating resistance. Titrate off oxygen as tolerated.

## 2023-04-29 NOTE — DIETITIAN INITIAL EVALUATION ADULT - ORAL INTAKE PTA/DIET HISTORY
Pt reports no known food allergies/intolerances. Reports her appetite has been "No good" for a couple of weeks. No restrictions reported in her diet. Denies difficulty chewing/swallowing. Pt denies nausea, vomiting, diarrhea, or constipation. Took Vitamin B12 1000 mcg and Vitamin D3 25 mcg at home.  Attempts to consume 1 ensure daily.

## 2023-04-29 NOTE — CONSULT NOTE ADULT - SUBJECTIVE AND OBJECTIVE BOX
Date of Service, 04-29-23 @ 15:29  CHIEF COMPLAINT:Patient is a 89y old  Female who presents with a chief complaint of Weakness and failure to thrive (29 Apr 2023 13:50)      HPI:  89-year-old female PMH of hypertension, hypothyroidism, colon cancer status postresection of the colon, CLL, COPD presenting with shortness of breath. Patient was diagnosed with pneumonia last week, subsequently put on doxycycline. Patient was told that if she continued to not feel well she should come to the emergency department. Patient woke up this morning saying she just did not feel right. Patient denies chest pain, fevers, vomiting, abdominal pain. Patient had COVID back in January and since has had a intermittent oxygen requirement. Over the last week patient has been using oxygen due to low oxygen saturation while being treated for pneumonia.  Patient also states her appetite has been very poor recently      PAST MEDICAL & SURGICAL HISTORY:  CLL (chronic lymphocytic leukemia)      Colon cancer      Hypothyroidism      History of emphysema      Pneumonia      Anemia      S/P colon resection      MEDICATIONS  (STANDING):  albuterol/ipratropium for Nebulization 3 milliLiter(s) Nebulizer every 6 hours  budesonide 160 MICROgram(s)/formoterol 4.5 MICROgram(s) Inhaler 2 Puff(s) Inhalation two times a day  cefTRIAXone   IVPB 1000 milliGRAM(s) IV Intermittent every 24 hours  cyanocobalamin 1000 MICROGram(s) Oral daily  guaiFENesin  milliGRAM(s) Oral every 12 hours  heparin   Injectable 5000 Unit(s) SubCutaneous every 12 hours  levothyroxine 50 MICROGram(s) Oral daily  multivitamin 1 Tablet(s) Oral daily  polyethylene glycol 3350 17 Gram(s) Oral daily    MEDICATIONS  (PRN):  albuterol    90 MICROgram(s) HFA Inhaler 2 Puff(s) Inhalation every 6 hours PRN Shortness of Breath and/or Wheezing      FAMILY HISTORY:  FHx: diabetes mellitus (Mother)    FH: hypertension (Father)        SOCIAL HISTORY:    [x ] Non-smoker  [ ] Smoker  [ ] Alcohol    Allergies    cefdinir (Diarrhea)    Intolerances    	    REVIEW OF SYSTEMS:  CONSTITUTIONAL: No fever,+ weight loss, no  fatigue  EYES: No eye pain, visual disturbances, or discharge  ENT:  No difficulty hearing, tinnitus, vertigo; No sinus or throat pain  NECK: No pain or stiffness  RESPIRATORY: No cough, wheezing, chills or hemoptysis; + Shortness of Breath  CARDIOVASCULAR: No chest pain, palpitations, passing out, dizziness, or leg swelling  GASTROINTESTINAL: No abdominal or epigastric pain. No nausea, vomiting, or hematemesis; No diarrhea or constipation. No melena or hematochezia.  GENITOURINARY: No dysuria, frequency, hematuria, or incontinence  NEUROLOGICAL: No headaches, memory loss, loss of strength, numbness, or tremors  SKIN: No itching, burning, rashes, or lesions   LYMPH Nodes: No enlarged glands  ENDOCRINE: No heat or cold intolerance; No hair loss  MUSCULOSKELETAL: No joint pain or swelling; No muscle, back, or extremity pain  PSYCHIATRIC: No depression, anxiety, mood swings, or difficulty sleeping  HEME/LYMPH: No easy bruising, or bleeding gums  ALLERGY AND IMMUNOLOGIC: No hives or eczema	    [x ] All others negative	  [ ] Unable to obtain    PHYSICAL EXAM:  T(C): 37.2 (04-29-23 @ 12:12), Max: 37.2 (04-29-23 @ 12:12)  HR: 70 (04-29-23 @ 12:12) (60 - 70)  BP: 107/60 (04-29-23 @ 12:12) (106/60 - 108/55)  RR: 18 (04-29-23 @ 12:12) (18 - 19)  SpO2: 99% (04-29-23 @ 12:12) (97% - 99%)  Wt(kg): --  I&O's Summary    28 Apr 2023 07:01  -  29 Apr 2023 07:00  --------------------------------------------------------  IN: 170 mL / OUT: 0 mL / NET: 170 mL        Appearance: Normal	  HEENT:   Normal oral mucosa, PERRL, EOMI	  Lymphatic: No lymphadenopathy  Cardiovascular: Normal S1 S2, No JVD, + murmurs, No edema  Respiratory: rhonchi  Psychiatry: A & O x 3, Mood & affect appropriate  Gastrointestinal:  Soft, Non-tender, + BS	  Skin: No rashes, No ecchymoses, No cyanosis	  Neurologic: Non-focal  Extremities: Normal range of motion, No clubbing, cyanosis or edema  Vascular: Peripheral pulses palpable 2+ bilaterally    TELEMETRY: 	    ECG:  	  RADIOLOGY:  OTHER: 	  	  LABS:	 	    CARDIAC MARKERS:                              8.6    47.75 )-----------( 160      ( 29 Apr 2023 11:39 )             28.6     04-28    141  |  106  |  40<H>  ----------------------------<  148<H>  3.8   |  24  |  0.71    Ca    8.6      28 Apr 2023 06:50      proBNP:   Lipid Profile:   HgA1c:   TSH:       PREVIOUS DIAGNOSTIC TESTING:      < from: 12 Lead ECG (04.27.23 @ 13:43) >  Diagnosis Line NORMAL SINUS RHYTHM WITH SINUS ARRHYTHMIA  NONSPECIFIC T WAVE ABNORMALITY  ABNORMAL ECG  NO PREVIOUS ECGS AVAILABLE    < from: TTE Echo Complete w/o Contrast w/ Doppler (01.17.23 @ 20:40) >   Prolapse of Mitral valve posterior leaflet.   Moderate mitral regurgitation is present.   Mitral annular calcification is present.   EA reversal of themitral inflow consistent with reduced compliance of   the   left ventricle.   The aortic valve is well visualized, appears moderately calcified.   Moderate (2+) aortic regurgitation is present.   Mild (1+)to moderate eccentric tricuspid valve regurgitation is present.   Normal appearing pulmonic valve structure. Trace to mild pulmonic   valvular   regurgitation is present.   The left atrium is mildly dilated.   The left ventricle is normal in size, wall thickness, wall motion and   contractility.   Estimated left ventricular ejection fraction is 60 %.   Normal appearing right ventricle structure and function.   Normal appearing right atrium.      ·  Recommendation: Recorded PAF ~45 min. Tele shows RSR with PVC's and ventricular bigemeny, No further episodes afib noted.  Continue metoprolol as prescribed.   Patient s/p transfusion 1 unit PRBC 1/16/23  Would not anticoagulate at this time.

## 2023-04-29 NOTE — PROGRESS NOTE ADULT - SUBJECTIVE AND OBJECTIVE BOX
date of service: 04-29-23 @ 07:39  afbeirle  REVIEW OF SYSTEMS:  CONSTITUTIONAL: No fever,  no  weight loss  ENT:  No  tinnitus,   no   vertigo  NECK: No pain or stiffness  RESPIRATORY: No cough, wheezing, chills or hemoptysis;    No Shortness of Breath  CARDIOVASCULAR: No chest pain, palpitations, dizziness  GASTROINTESTINAL: No abdominal or epigastric pain. No nausea, vomiting, or hematemesis; No diarrhea  No melena or hematochezia.  GENITOURINARY: No dysuria, frequency, hematuria, or incontinence  NEUROLOGICAL: No headaches  SKIN: No itching,  no   rash  LYMPH Nodes: No enlarged glands  ENDOCRINE: No heat or cold intolerance  MUSCULOSKELETAL: No joint pain or swelling  PSYCHIATRIC: No depression, anxiety  HEME/LYMPH: No easy bruising, or bleeding gums  ALLERGY AND IMMUNOLOGIC: No hives or eczema	    MEDICATIONS  (STANDING):  albuterol/ipratropium for Nebulization 3 milliLiter(s) Nebulizer every 6 hours  ascorbic acid 500 milliGRAM(s) Oral daily  budesonide 160 MICROgram(s)/formoterol 4.5 MICROgram(s) Inhaler 2 Puff(s) Inhalation two times a day  cefTRIAXone   IVPB 1000 milliGRAM(s) IV Intermittent every 24 hours  cyanocobalamin 1000 MICROGram(s) Oral daily  guaiFENesin  milliGRAM(s) Oral every 12 hours  heparin   Injectable 5000 Unit(s) SubCutaneous every 12 hours  levothyroxine 50 MICROGram(s) Oral daily  multivitamin 1 Tablet(s) Oral daily    MEDICATIONS  (PRN):  albuterol    90 MICROgram(s) HFA Inhaler 2 Puff(s) Inhalation every 6 hours PRN Shortness of Breath and/or Wheezing      Vital Signs Last 24 Hrs  T(C): 36.4 (29 Apr 2023 06:24), Max: 36.8 (28 Apr 2023 20:52)  T(F): 97.6 (29 Apr 2023 06:24), Max: 98.2 (28 Apr 2023 20:52)  HR: 60 (29 Apr 2023 06:24) (60 - 67)  BP: 106/60 (29 Apr 2023 06:24) (98/60 - 108/55)  BP(mean): --  RR: 18 (29 Apr 2023 06:24) (18 - 19)  SpO2: 97% (29 Apr 2023 06:24) (97% - 99%)    Parameters below as of 29 Apr 2023 06:24  Patient On (Oxygen Delivery Method): nasal cannula  O2 Flow (L/min): 2    CAPILLARY BLOOD GLUCOSE        I&O's Summary    28 Apr 2023 07:01  -  29 Apr 2023 07:00  --------------------------------------------------------  IN: 170 mL / OUT: 0 mL / NET: 170 mL          Appearance: Normal	  HEENT:   Normal oral mucosa, PERRL, EOMI	  Lymphatic: No lymphadenopathy  Cardiovascular: Normal S1 S2, No JVD  Respiratory: Lungs clear to auscultation	  Gastrointestinal:  Soft, Non-tender, + BS	  Skin: No rash, No ecchymoses	  Extremities:     LABS:                        7.8    51.28 )-----------( 166      ( 28 Apr 2023 06:53 )             26.2     04-28    141  |  106  |  40<H>  ----------------------------<  148<H>  3.8   |  24  |  0.71    Ca    8.6      28 Apr 2023 06:50    TPro  6.9  /  Alb  3.4  /  TBili  1.1  /  DBili  x   /  AST  24  /  ALT  17  /  AlkPhos  90  04-27 04-27 @ 12:55  4.0  24      Thyroid Stimulating Hormone, Serum: 0.78 uIU/mL (04-28 @ 06:54)          Consultant(s) Notes Reviewed:      Care Discussed with Consultants/Other Providers:

## 2023-04-29 NOTE — DIETITIAN INITIAL EVALUATION ADULT - ETIOLOGY
Increased demand for nutrient  Decreased ability to consume sufficient energy in the setting of increased nutrient needs

## 2023-04-29 NOTE — CONSULT NOTE ADULT - ASSESSMENT
89-year-old female PMH of hypertension, hypothyroidism, colon cancer status postresection of the colon, CLL, COPD admitted 4/27/23 with shortness of breath. Patient was diagnosed with pneumonia one  week back, put on doxycycline. Patient continued to not feel well and came  to ER. She had COVID 19 in Jan 2023 and needs oxygen sometimes since then. Appetite has been poor  She has been seen by ID and is on abx    Pt was diagnosed with CLL 5/2014 and has been on surveillance but has been more anemic since Jan 2021. 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  Liver lesion may represent met but further w/u ? as no systemic therapy will be given  Patient is DNR and suggest PRBC as needed. Palliative care 89-year-old female PMH of hypertension, hypothyroidism, colon cancer status postresection of the colon, CLL, COPD admitted 4/27/23 with shortness of breath. Patient was diagnosed with pneumonia one  week back, put on doxycycline. Patient continued to not feel well and came  to ER. She had COVID 19 in Jan 2023 and needs oxygen sometimes since then. Appetite has been poor  She has been seen by ID and is on abx    Pt was diagnosed with CLL 5/2014 and has been on surveillance but has been more anemic since Jan 2021. 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  Liver lesion may represent met, patient had MRI, will follow result. However patient is not a candidate for systemic therapy by age and PS  Patient is DNR and suggest PRBC as needed. Palliative care

## 2023-04-29 NOTE — DIETITIAN INITIAL EVALUATION ADULT - REASON INDICATOR FOR ASSESSMENT
Consult for Stage 2 Pressure Injury or Greater  Information obtained from: Review of pt's current medical record, interview with pt in her assigned room on [facility], previous RD documentation from prior admission to OSH in January 2023.

## 2023-04-29 NOTE — CONSULT NOTE ADULT - ASSESSMENT
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 Pulmonary consulted for evaluation of aspiration risk as well as NTM. Constellation of findings thus far are likely representative of chronic lung disease (emphysema +/- NTM ) with superimposed acute viral/bacterial pneumonia. Agree with non-invasive evaluation including bacterial and AFB sputum cultures as well as empiric antibiotics. It is difficult to assess the overall role possible NTM is playing in her decompensation as underlying weight loss and hypoxemia may be related to decreased PO intake/dysphagia as well as pneumonia. Furthermore, therapy for NTM would be multi-drug therapy for months/year+ and does not appear to be in line with pt's overall goals (when discussing possible S&S evaluation for aspiration risk, pt reports that she is not interested in further testing and would like to go home and "whatever happens, happens.")     - recommend AFBx2 and bacterial sputum cultures  - can use normal saline nebs, aerobika/acapella, albuterol, and chest PT to aid in airway clearance  - saturating high 90s/100s on 2LNC, wean as tolerated  - agree with empiric abx; avoid macrolide/fluoroquinolone  - would strongly recommend clear overall GOC discussion to assess risks/benefits of further testing if patient is not a candidate or not interested in aggressive intervention  - appreciate ID input  - if interested in further bronchiectasis/COPD management patient can follow up with outpatient pulmonary  - pulmonary will sign off at this time. 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 Pulmonary consulted for evaluation of aspiration risk as well as NTM. Constellation of findings thus far are likely representative of chronic lung disease (emphysema +/- NTM ) with superimposed acute viral/bacterial pneumonia. Pt is also at risk for aspiration given history of difficulty swallowing and TIB/esophageal debris seen on CT scan. Agree with non-invasive evaluation including bacterial and AFB sputum cultures as well as empiric antibiotics. It is difficult to assess the overall role possible NTM is playing in her decompensation as underlying weight loss and hypoxemia may be related to decreased PO intake/dysphagia as well as pneumonia. Furthermore, therapy for NTM would be multi-drug therapy for months/year+, may not be tolerable 2/2 difficulty swallowing pills, and does not appear to be in line with pt's overall goals (when discussing possible S&S evaluation for aspiration risk, pt reports that she is not interested in further testing and would like to go home and "whatever happens, happens.")     - recommend AFBx2 and bacterial sputum cultures  - can use normal saline nebs, aerobika/acapella, albuterol, and chest PT to aid in airway clearance  - saturating high 90s/100s on 2LNC, wean as tolerated  - agree with empiric abx; avoid macrolide/fluoroquinolone  - would strongly recommend clear overall GOC discussion to assess risks/benefits of further testing if patient is not a candidate or not interested in aggressive intervention  - appreciate ID input  - if interested in further bronchiectasis/COPD evaluation and management patient can follow up with outpatient pulmonary  - pulmonary will sign off at this time. Please reconsult if further questions    Sha Enrique MD  PGY-6  PCCM Fellow  Pager [phone]

## 2023-04-29 NOTE — DIETITIAN INITIAL EVALUATION ADULT - REASON FOR ADMISSION
Chart Reviewed, Events Noted  "89-year-old female PMH of hypertension, hypothyroidism, colon cancer status postresection of the colon, CLL, COPD presenting with shortness of breath. Patient was diagnosed with pneumonia last week, subsequently put on doxycycline. Patient was told that if she continued to not feel well she should come to the emergency department. Patient woke up this morning saying she just did not feel right. Patient denies chest pain, fevers, vomiting, abdominal pain. Patient had COVID back in January and since has had a intermittent oxygen requirement. Over the last week patient has been using oxygen due to low oxygen saturation while being treated for pneumonia.  Patient also states her appetite has been very poor recently."

## 2023-04-30 RX ORDER — MIRTAZAPINE 45 MG/1
7.5 TABLET, ORALLY DISINTEGRATING ORAL AT BEDTIME
Refills: 0 | Status: DISCONTINUED | OUTPATIENT
Start: 2023-04-30 | End: 2023-05-04

## 2023-04-30 RX ADMIN — Medication 3 MILLILITER(S): at 13:00

## 2023-04-30 RX ADMIN — Medication 600 MILLIGRAM(S): at 17:48

## 2023-04-30 RX ADMIN — POLYETHYLENE GLYCOL 3350 17 GRAM(S): 17 POWDER, FOR SOLUTION ORAL at 13:00

## 2023-04-30 RX ADMIN — Medication 50 MICROGRAM(S): at 06:06

## 2023-04-30 RX ADMIN — Medication 3 MILLILITER(S): at 00:33

## 2023-04-30 RX ADMIN — MIRTAZAPINE 7.5 MILLIGRAM(S): 45 TABLET, ORALLY DISINTEGRATING ORAL at 21:57

## 2023-04-30 RX ADMIN — BUDESONIDE AND FORMOTEROL FUMARATE DIHYDRATE 2 PUFF(S): 160; 4.5 AEROSOL RESPIRATORY (INHALATION) at 17:48

## 2023-04-30 RX ADMIN — Medication 3 MILLILITER(S): at 17:46

## 2023-04-30 RX ADMIN — HEPARIN SODIUM 5000 UNIT(S): 5000 INJECTION INTRAVENOUS; SUBCUTANEOUS at 06:06

## 2023-04-30 RX ADMIN — HEPARIN SODIUM 5000 UNIT(S): 5000 INJECTION INTRAVENOUS; SUBCUTANEOUS at 17:48

## 2023-04-30 RX ADMIN — BUDESONIDE AND FORMOTEROL FUMARATE DIHYDRATE 2 PUFF(S): 160; 4.5 AEROSOL RESPIRATORY (INHALATION) at 06:05

## 2023-04-30 RX ADMIN — PREGABALIN 1000 MICROGRAM(S): 225 CAPSULE ORAL at 13:01

## 2023-04-30 RX ADMIN — Medication 600 MILLIGRAM(S): at 06:06

## 2023-04-30 RX ADMIN — Medication 3 MILLILITER(S): at 06:05

## 2023-04-30 RX ADMIN — CEFTRIAXONE 100 MILLIGRAM(S): 500 INJECTION, POWDER, FOR SOLUTION INTRAMUSCULAR; INTRAVENOUS at 17:48

## 2023-04-30 NOTE — PROGRESS NOTE ADULT - SUBJECTIVE AND OBJECTIVE BOX
date of service: 04-30-23 @ 06:54  afbeirle  REVIEW OF SYSTEMS:  CONSTITUTIONAL: No fever,  no  weight loss  ENT:  No  tinnitus,   no   vertigo  NECK: No pain or stiffness  RESPIRATORY: No cough, wheezing, chills or hemoptysis;    No Shortness of Breath  CARDIOVASCULAR: No chest pain, palpitations, dizziness  GASTROINTESTINAL: No abdominal or epigastric pain. No nausea, vomiting, or hematemesis; No diarrhea  No melena or hematochezia.  GENITOURINARY: No dysuria, frequency, hematuria, or incontinence  NEUROLOGICAL: No headaches  SKIN: No itching,  no   rash  LYMPH Nodes: No enlarged glands  ENDOCRINE: No heat or cold intolerance  MUSCULOSKELETAL: No joint pain or swelling  PSYCHIATRIC: No depression, anxiety  HEME/LYMPH: No easy bruising, or bleeding gums  ALLERGY AND IMMUNOLOGIC: No hives or eczema	    MEDICATIONS  (STANDING):  albuterol/ipratropium for Nebulization 3 milliLiter(s) Nebulizer every 6 hours  budesonide 160 MICROgram(s)/formoterol 4.5 MICROgram(s) Inhaler 2 Puff(s) Inhalation two times a day  cefTRIAXone   IVPB 1000 milliGRAM(s) IV Intermittent every 24 hours  cyanocobalamin 1000 MICROGram(s) Oral daily  guaiFENesin  milliGRAM(s) Oral every 12 hours  heparin   Injectable 5000 Unit(s) SubCutaneous every 12 hours  levothyroxine 50 MICROGram(s) Oral daily  multivitamin 1 Tablet(s) Oral daily  polyethylene glycol 3350 17 Gram(s) Oral daily    MEDICATIONS  (PRN):  albuterol    90 MICROgram(s) HFA Inhaler 2 Puff(s) Inhalation every 6 hours PRN Shortness of Breath and/or Wheezing      Vital Signs Last 24 Hrs  T(C): 36.6 (30 Apr 2023 05:08), Max: 37.2 (29 Apr 2023 12:12)  T(F): 97.8 (30 Apr 2023 05:08), Max: 99 (29 Apr 2023 12:12)  HR: 68 (30 Apr 2023 05:08) (68 - 83)  BP: 110/64 (30 Apr 2023 05:08) (106/62 - 117/68)  BP(mean): --  RR: 18 (30 Apr 2023 05:08) (18 - 18)  SpO2: 98% (30 Apr 2023 05:08) (94% - 99%)    Parameters below as of 30 Apr 2023 05:08  Patient On (Oxygen Delivery Method): nasal cannula  O2 Flow (L/min): 2    CAPILLARY BLOOD GLUCOSE        I&O's Summary    28 Apr 2023 07:01  -  29 Apr 2023 07:00  --------------------------------------------------------  IN: 170 mL / OUT: 0 mL / NET: 170 mL          Appearance: Normal	  HEENT:   Normal oral mucosa, PERRL, EOMI	  Lymphatic: No lymphadenopathy  Cardiovascular: Normal S1 S2, No JVD  Respiratory: Lungs clear to auscultation	  Gastrointestinal:  Soft, Non-tender, + BS	  Skin: No rash, No ecchymoses	  Extremities:     LABS:                        8.6    47.75 )-----------( 160      ( 29 Apr 2023 11:39 )             28.6                           Thyroid Stimulating Hormone, Serum: 0.78 uIU/mL (04-28 @ 06:54)          Consultant(s) Notes Reviewed:      Care Discussed with Consultants/Other Providers:

## 2023-04-30 NOTE — PROGRESS NOTE ADULT - ASSESSMENT
89-year-old female           h/o HTN,  hypothyroidism, colon cancer status postresection of the colon,            CLL          presenting with shortness of breath., had  pneumonia last week, was s on  doxycycline.            denies chest pain, fevers, vomiting, abdominal pain,  covid   in January 2023             admitted  for SOB, weakness,  cough, ftt           CTA notes no PE, bilateral pattern concerning for ORESTES,  seen by  ID      *   pna/  CAP  and  probable   ORESTES             pt with cachexia.  wt is   31  kg.  suspect  from ORESTES    *    HTN/  Hypothyroid              lopressor, synthroid          had  h/o  brief  Afib, per  family/  card  to  f.p /  not  on  a/c    *     h/o  CLL                wbc  is  51,000/  heme   ohri/   rpt  hb is 8          CT  , with   L  hepatic  lesion.                  MRI  abd   *   h/o  prior Ca   colon/ s/p colectomy          now  with nausea/  anemia/  pt has  been receiving  prbc. ,/ last was few  months ago            gi  d r mary ellen          Pna. ,  on iv  rocephin . per  ID     labs  pending,  seen by id  and pulm           pt  is dnr          per   daughters,   they  do  not wish  to  pursue  palliative/  hospice  care  , at present        rad< from: CT Angio Chest PE Protocol w/ IV Cont (04.27.23 @ 15:27) >  IMPRESSION:  *  No pulmonary embolism.  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  *  Multilobar consolidation and other opacities, some of which is   increased and likely infectious.  *  There is an enlarging indeterminate left hepatic lesion. MRI abdomen   with IV contrast can be performed for further characterization if   warranted.  --- End of Report ---

## 2023-04-30 NOTE — PROGRESS NOTE ADULT - SUBJECTIVE AND OBJECTIVE BOX
Date of Service: 04-30-23 @ 08:49           CARDIOLOGY     PROGRESS  NOTE   ________________________________________________    CHIEF COMPLAINT:Patient is a 89y old  Female who presents with a chief complaint of Weakness and failure to thrive (30 Apr 2023 06:53)  no complain  	  REVIEW OF SYSTEMS:  CONSTITUTIONAL: No fever, weight loss, or fatigue  EYES: No eye pain, visual disturbances, or discharge  ENT:  No difficulty hearing, tinnitus, vertigo; No sinus or throat pain  NECK: No pain or stiffness  RESPIRATORY: No cough, wheezing, chills or hemoptysis; + Shortness of Breath  CARDIOVASCULAR: No chest pain, palpitations, passing out, dizziness, or leg swelling  GASTROINTESTINAL: No abdominal or epigastric pain. No nausea, vomiting, or hematemesis; No diarrhea or constipation. No melena or hematochezia.  GENITOURINARY: No dysuria, frequency, hematuria, or incontinence  NEUROLOGICAL: No headaches, memory loss, loss of strength, numbness, or tremors  SKIN: No itching, burning, rashes, or lesions   LYMPH Nodes: No enlarged glands  ENDOCRINE: No heat or cold intolerance; No hair loss  MUSCULOSKELETAL: No joint pain or swelling; No muscle, back, or extremity pain  PSYCHIATRIC: No depression, anxiety, mood swings, or difficulty sleeping  HEME/LYMPH: No easy bruising, or bleeding gums  ALLERGY AND IMMUNOLOGIC: No hives or eczema	    [x ] All others negative	  [ ] Unable to obtain    PHYSICAL EXAM:  T(C): 36.6 (04-30-23 @ 05:08), Max: 37.2 (04-29-23 @ 12:12)  HR: 68 (04-30-23 @ 05:08) (68 - 83)  BP: 110/64 (04-30-23 @ 05:08) (106/62 - 117/68)  RR: 18 (04-30-23 @ 05:08) (18 - 18)  SpO2: 98% (04-30-23 @ 05:08) (94% - 99%)  Wt(kg): --  I&O's Summary    29 Apr 2023 07:01  -  30 Apr 2023 07:00  --------------------------------------------------------  IN: 480 mL / OUT: 0 mL / NET: 480 mL        Appearance: Normal	  HEENT:   Normal oral mucosa, PERRL, EOMI	  Lymphatic: No lymphadenopathy  Cardiovascular: Normal S1 S2, No JVD, +murmurs, No edema  Respiratory: rhonchi  Psychiatry: A & O x 3, Mood & affect appropriate  Gastrointestinal:  Soft, Non-tender, + BS	  Skin: No rashes, No ecchymoses, No cyanosis	  Neurologic: Non-focal  Extremities: Normal range of motion, No clubbing, cyanosis or edema  Vascular: Peripheral pulses palpable 2+ bilaterally    MEDICATIONS  (STANDING):  albuterol/ipratropium for Nebulization 3 milliLiter(s) Nebulizer every 6 hours  budesonide 160 MICROgram(s)/formoterol 4.5 MICROgram(s) Inhaler 2 Puff(s) Inhalation two times a day  cefTRIAXone   IVPB 1000 milliGRAM(s) IV Intermittent every 24 hours  cyanocobalamin 1000 MICROGram(s) Oral daily  guaiFENesin  milliGRAM(s) Oral every 12 hours  heparin   Injectable 5000 Unit(s) SubCutaneous every 12 hours  levothyroxine 50 MICROGram(s) Oral daily  multivitamin 1 Tablet(s) Oral daily  polyethylene glycol 3350 17 Gram(s) Oral daily      TELEMETRY: 	    ECG:  	  RADIOLOGY:  OTHER: 	  	  LABS:	 	    CARDIAC MARKERS:                                8.6    47.75 )-----------( 160      ( 29 Apr 2023 11:39 )             28.6           proBNP:   Lipid Profile:   HgA1c:   TSH: Thyroid Stimulating Hormone, Serum: 0.78 uIU/mL (04-28 @ 06:54)          Assessment and plan  ---------------------------  89-year-old female PMH of hypertension, hypothyroidism, colon cancer status postresection of the colon, CLL, COPD presenting with shortness of breath. Patient was diagnosed with pneumonia last week, subsequently put on doxycycline. Patient was told that if she continued to not feel well she should come to the emergency department. Patient woke up this morning saying she just did not feel right. Patient denies chest pain, fevers, vomiting, abdominal pain. Patient had COVID back in January and since has had a intermittent oxygen requirement. Over the last week patient has been using oxygen due to low oxygen saturation while being treated for pneumonia.  Patient also states her appetite has been very poor recently  pt with sig medical hx with FTT, sig weight loss and sob  pt was admitted with COVID and had a episode of a,fib >45 minutes  i discussed with daughters doubt pt is a candidate for AC  agree to continue beta blocker  echo noted, no need to repeat echo  may add Remeron 7.5 mg qhs to increase appetite  nutrition eval  severe protein deficiency  pt with PAF not a candidate for AC  oob to chair as tolerated

## 2023-04-30 NOTE — PROGRESS NOTE ADULT - ASSESSMENT
anemia  hx colon CA s/p resection   CLL  COPD  liver lesion    monitor cbc daily   no overt bleeding  transfuse prn   gentle bowel regimen with miralax  monitor stool count   reg diet as tolerated  poor candidate for endoscopic eval; pt and daughters in agreement   CT with liver lesion  f/u MRI results   will follow  d/w pt and daughter at bedside    I reviewed the overnight course of events on the unit, re-confirming the patient history. I discussed the care with the patient and their family  The plan of care was discussed with the physician assistant and modifications were made to the notation where appropriate.   Differential diagnosis and plan of care discussed with patient after the evaluation  35 minutes spent on total encounter of which more than fifty percent of the encounter was spent counseling and/or coordinating care by the attending physician.  Advanced care planning was discussed with patient and family.  Advanced care planning forms were reviewed and discussed.  Risks, benefits and alternatives of gastroenterologic procedures were discussed in detail and all questions were answered.

## 2023-04-30 NOTE — PROGRESS NOTE ADULT - SUBJECTIVE AND OBJECTIVE BOX
[facility]  Jerod Waddell PA-C  [address] [place] [address]  [phone]      INTERVAL HPI/OVERNIGHT EVENTS:  pt seen and examined, daughter at bedside  no nausea today   no BM yet   awaiting MRI read    MEDICATIONS  (STANDING):  albuterol/ipratropium for Nebulization 3 milliLiter(s) Nebulizer every 6 hours  budesonide 160 MICROgram(s)/formoterol 4.5 MICROgram(s) Inhaler 2 Puff(s) Inhalation two times a day  cefTRIAXone   IVPB 1000 milliGRAM(s) IV Intermittent every 24 hours  cyanocobalamin 1000 MICROGram(s) Oral daily  guaiFENesin  milliGRAM(s) Oral every 12 hours  heparin   Injectable 5000 Unit(s) SubCutaneous every 12 hours  levothyroxine 50 MICROGram(s) Oral daily  mirtazapine 7.5 milliGRAM(s) Oral at bedtime  multivitamin 1 Tablet(s) Oral daily  polyethylene glycol 3350 17 Gram(s) Oral daily    MEDICATIONS  (PRN):  albuterol    90 MICROgram(s) HFA Inhaler 2 Puff(s) Inhalation every 6 hours PRN Shortness of Breath and/or Wheezing      Allergies    cefdinir (Diarrhea)    Intolerances        ROS:   General:  No wt loss, fevers, chills, night sweats, fatigue,   Eyes:  Good vision, no reported pain  ENT:  No sore throat, pain, runny nose, dysphagia  CV:  No pain, palpitations, hypo/hypertension  Resp:  No dyspnea, cough, tachypnea, wheezing  GI:  No pain, No nausea, No vomiting, No diarrhea, No constipation, No weight loss, No fever, No pruritis, No rectal bleeding, No tarry stools, No dysphagia,  :  No pain, bleeding, incontinence, nocturia  Muscle:  No pain, weakness  Neuro:  No weakness, tingling, memory problems  Psych:  No fatigue, insomnia, mood problems, depression  Endocrine:  No polyuria, polydipsia, cold/heat intolerance  Heme:  No petechiae, ecchymosis, easy bruisability  Skin:  No rash, tattoos, scars, edema      PHYSICAL EXAM:   Vital Signs:  Vital Signs Last 24 Hrs  T(C): 36.6 (30 Apr 2023 05:08), Max: 37.2 (29 Apr 2023 12:12)  T(F): 97.8 (30 Apr 2023 05:08), Max: 99 (29 Apr 2023 12:12)  HR: 68 (30 Apr 2023 05:08) (68 - 83)  BP: 110/64 (30 Apr 2023 05:08) (106/62 - 117/68)  BP(mean): --  RR: 18 (30 Apr 2023 05:08) (18 - 18)  SpO2: 98% (30 Apr 2023 05:08) (94% - 99%)    Parameters below as of 30 Apr 2023 05:08  Patient On (Oxygen Delivery Method): nasal cannula  O2 Flow (L/min): 2    Daily     Daily     GENERAL:  Appears stated age,   HEENT:  NC/AT,    CHEST:  Full & symmetric excursion,   HEART:  Regular rhythm,  ABDOMEN:  Soft, non-tender, non-distended,  EXTEREMITIES:  no cyanosis  SKIN:  No rash  NEURO:  Alert,       LABS:                        8.6    47.75 )-----------( 160      ( 29 Apr 2023 11:39 )             28.6                 RADIOLOGY & ADDITIONAL TESTS:

## 2023-05-01 LAB
HCT VFR BLD CALC: 27.3 % — LOW (ref 34.5–45)
HGB BLD-MCNC: 8.2 G/DL — LOW (ref 11.5–15.5)
MCHC RBC-ENTMCNC: 30 GM/DL — LOW (ref 32–36)
MCHC RBC-ENTMCNC: 32.8 PG — SIGNIFICANT CHANGE UP (ref 27–34)
MCV RBC AUTO: 109.2 FL — HIGH (ref 80–100)
NRBC # BLD: 0 /100 WBCS — SIGNIFICANT CHANGE UP (ref 0–0)
PLATELET # BLD AUTO: 122 K/UL — LOW (ref 150–400)
RBC # BLD: 2.5 M/UL — LOW (ref 3.8–5.2)
RBC # FLD: 16.1 % — HIGH (ref 10.3–14.5)
WBC # BLD: 46.04 K/UL — CRITICAL HIGH (ref 3.8–10.5)
WBC # FLD AUTO: 46.04 K/UL — CRITICAL HIGH (ref 3.8–10.5)

## 2023-05-01 PROCEDURE — 99232 SBSQ HOSP IP/OBS MODERATE 35: CPT

## 2023-05-01 RX ORDER — POLYETHYLENE GLYCOL 3350 17 G/17G
17 POWDER, FOR SOLUTION ORAL
Refills: 0 | Status: DISCONTINUED | OUTPATIENT
Start: 2023-05-01 | End: 2023-05-04

## 2023-05-01 RX ADMIN — Medication 3 MILLILITER(S): at 00:57

## 2023-05-01 RX ADMIN — Medication 50 MICROGRAM(S): at 06:34

## 2023-05-01 RX ADMIN — HEPARIN SODIUM 5000 UNIT(S): 5000 INJECTION INTRAVENOUS; SUBCUTANEOUS at 17:28

## 2023-05-01 RX ADMIN — Medication 600 MILLIGRAM(S): at 06:34

## 2023-05-01 RX ADMIN — Medication 3 MILLILITER(S): at 11:04

## 2023-05-01 RX ADMIN — HEPARIN SODIUM 5000 UNIT(S): 5000 INJECTION INTRAVENOUS; SUBCUTANEOUS at 06:34

## 2023-05-01 RX ADMIN — Medication 3 MILLILITER(S): at 17:27

## 2023-05-01 RX ADMIN — Medication 1 TABLET(S): at 11:05

## 2023-05-01 RX ADMIN — POLYETHYLENE GLYCOL 3350 17 GRAM(S): 17 POWDER, FOR SOLUTION ORAL at 18:24

## 2023-05-01 RX ADMIN — Medication 3 MILLILITER(S): at 06:35

## 2023-05-01 RX ADMIN — Medication 600 MILLIGRAM(S): at 17:28

## 2023-05-01 RX ADMIN — BUDESONIDE AND FORMOTEROL FUMARATE DIHYDRATE 2 PUFF(S): 160; 4.5 AEROSOL RESPIRATORY (INHALATION) at 17:28

## 2023-05-01 RX ADMIN — CEFTRIAXONE 100 MILLIGRAM(S): 500 INJECTION, POWDER, FOR SOLUTION INTRAMUSCULAR; INTRAVENOUS at 17:27

## 2023-05-01 RX ADMIN — BUDESONIDE AND FORMOTEROL FUMARATE DIHYDRATE 2 PUFF(S): 160; 4.5 AEROSOL RESPIRATORY (INHALATION) at 06:35

## 2023-05-01 RX ADMIN — PREGABALIN 1000 MICROGRAM(S): 225 CAPSULE ORAL at 11:05

## 2023-05-01 NOTE — PROGRESS NOTE ADULT - SUBJECTIVE AND OBJECTIVE BOX
Patient is a 89y old  Female who presents with a chief complaint of Weakness and failure to thrive (01 May 2023 12:11)    Being followed by ID for        Interval history:  pt sleeping , did not awaken  unable to answer questions as sleeping  No other acute events          PAST MEDICAL & SURGICAL HISTORY:  CLL (chronic lymphocytic leukemia)      Colon cancer      Hypothyroidism      History of emphysema      Pneumonia      Anemia      S/P colon resection        Allergies    cefdinir (Diarrhea)    Intolerances      Antimicrobials:    cefTRIAXone   IVPB 1000 milliGRAM(s) IV Intermittent every 24 hours    MEDICATIONS  (STANDING):  albuterol/ipratropium for Nebulization 3 milliLiter(s) Nebulizer every 6 hours  budesonide 160 MICROgram(s)/formoterol 4.5 MICROgram(s) Inhaler 2 Puff(s) Inhalation two times a day  cefTRIAXone   IVPB 1000 milliGRAM(s) IV Intermittent every 24 hours  cyanocobalamin 1000 MICROGram(s) Oral daily  guaiFENesin  milliGRAM(s) Oral every 12 hours  heparin   Injectable 5000 Unit(s) SubCutaneous every 12 hours  levothyroxine 50 MICROGram(s) Oral daily  mirtazapine 7.5 milliGRAM(s) Oral at bedtime  multivitamin 1 Tablet(s) Oral daily  polyethylene glycol 3350 17 Gram(s) Oral two times a day      Vital Signs Last 24 Hrs  T(C): 37.2 (05-01-23 @ 20:18), Max: 37.2 (05-01-23 @ 20:18)  T(F): 98.9 (05-01-23 @ 20:18), Max: 98.9 (05-01-23 @ 20:18)  HR: 94 (05-01-23 @ 20:18) (67 - 94)  BP: 107/65 (05-01-23 @ 20:18) (107/65 - 119/56)  BP(mean): --  RR: 17 (05-01-23 @ 20:18) (17 - 17)  SpO2: 95% (05-01-23 @ 20:18) (95% - 100%)    Physical Exam:    Constitutional sleeping    Neck supple no JVD or LN    Chest Good AE,CTA    CVS  S1 S2   Abd soft     Ext No cyanosis clubbing or edema    IV site no erythema tenderness or discharge      Lab Data:                          8.2    46.04 )-----------( 122      ( 01 May 2023 07:28 )             27.3       .Blood Blood-Peripheral  04-27-23   No growth to date.  --  --      .Blood Blood-Peripheral  04-27-23   No growth to date.  --  --        Flu With COVID-19 By OLIVER (04.27.23 @ 13:43)    SARS-CoV-2 Result: NotDetec: This Respiratory Panel uses polymerase chain reaction (PCR) to detect for  influenza A; influenza B; respiratory syncytial virus; and SARS-CoV-2.  This test was validated by [facility] and is in use under the FDA  Emergency Use Authorization (EUA) for clinical labs CLIA-certified to  perform high complexity testing. Test results should be correlated with  clinical presentation, patient history, and epidemiology.   Influenza A Result: NotDetec   Influenza B Result: NotDetec   Resp Syn Virus Result: NotDetec      < from: MR Abdomen No Cont (04.29.23 @ 17:55) >  IMPRESSION:  Limited study.    Several liver lesions, the largest demonstrating interval enlargement   since 7/28/2022. Primary and secondary hepatic neoplasms are diagnostic   considerations.    Iron deposition in liver and spleen.    < end of copied text >              WBC Count: 46.04 (05-01-23 @ 07:28)  WBC Count: 47.75 (04-29-23 @ 11:39)  WBC Count: 51.28 (04-28-23 @ 06:53)  WBC Count: 63.32 (04-27-23 @ 13:42)

## 2023-05-01 NOTE — PROGRESS NOTE ADULT - SUBJECTIVE AND OBJECTIVE BOX
HPI:  89-year-old female PMH of hypertension, hypothyroidism, colon cancer status postresection of the colon, CLL, COPD presenting with shortness of breath. Patient was diagnosed with pneumonia last week, subsequently put on doxycycline. Patient was told that if she continued to not feel well she should come to the emergency department. Patient woke up this morning saying she just did not feel right. Patient denies chest pain, fevers, vomiting, abdominal pain. Patient had COVID back in January and since has had a intermittent oxygen requirement. Over the last week patient has been using oxygen due to low oxygen saturation while being treated for pneumonia.  Patient also states her appetite has been very poor recently. (27 Apr 2023 16:44)    PAST MEDICAL & SURGICAL HISTORY:  CLL (chronic lymphocytic leukemia)      Colon cancer      Hypothyroidism      History of emphysema      Pneumonia      Anemia      S/P colon resection        Allergies    cefdinir (Diarrhea)    Intolerances      Social History:    Medications:  albuterol    90 MICROgram(s) HFA Inhaler 2 Puff(s) Inhalation every 6 hours PRN Shortness of Breath and/or Wheezing  albuterol/ipratropium for Nebulization 3 milliLiter(s) Nebulizer every 6 hours  budesonide 160 MICROgram(s)/formoterol 4.5 MICROgram(s) Inhaler 2 Puff(s) Inhalation two times a day  cefTRIAXone   IVPB 1000 milliGRAM(s) IV Intermittent every 24 hours  cyanocobalamin 1000 MICROGram(s) Oral daily  guaiFENesin  milliGRAM(s) Oral every 12 hours  heparin   Injectable 5000 Unit(s) SubCutaneous every 12 hours  levothyroxine 50 MICROGram(s) Oral daily  mirtazapine 7.5 milliGRAM(s) Oral at bedtime  multivitamin 1 Tablet(s) Oral daily  polyethylene glycol 3350 17 Gram(s) Oral two times a day    Labs:  CBC Full  -  ( 01 May 2023 07:28 )  WBC Count : 46.04 K/uL  RBC Count : 2.50 M/uL  Hemoglobin : 8.2 g/dL  Hematocrit : 27.3 %  Platelet Count - Automated : 122 K/uL  Mean Cell Volume : 109.2 fl  Mean Cell Hemoglobin : 32.8 pg  Mean Cell Hemoglobin Concentration : 30.0 gm/dL  Auto Neutrophil # : x  Auto Lymphocyte # : x  Auto Monocyte # : x  Auto Eosinophil # : x  Auto Basophil # : x  Auto Neutrophil % : x  Auto Lymphocyte % : x  Auto Monocyte % : x  Auto Eosinophil % : x  Auto Basophil % : x            Radiology:       < from: MR Abdomen No Cont (04.29.23 @ 17:55) >  IMPRESSION:  Limited study.    Several liver lesions, the largest demonstrating interval enlargement   since 7/28/2022. Primary and secondary hepatic neoplasms are diagnostic   considerations.    Iron deposition in liver and splee    < end of copied text >        ROS:  Patient comfortable without distress  No SOB or chest pain  No palpitation  No abdominal pain, diarrhaea or constipation  No weakness of extremities  No skin changes or swelling of legs  Rest of the comprehensive ROS was negative  Vital Signs Last 24 Hrs  T(C): 36.6 (01 May 2023 05:19), Max: 36.7 (30 Apr 2023 14:08)  T(F): 97.8 (01 May 2023 05:19), Max: 98 (30 Apr 2023 14:08)  HR: 67 (01 May 2023 05:19) (67 - 78)  BP: 119/56 (01 May 2023 05:19) (105/67 - 156/79)  BP(mean): --  RR: 17 (01 May 2023 05:19) (17 - 18)  SpO2: 99% (01 May 2023 05:19) (98% - 99%)    Parameters below as of 01 May 2023 05:19  Patient On (Oxygen Delivery Method): nasal cannula  O2 Flow (L/min): 2      Physical exam:  Patient alert and oriented  No distress  CVS: S1, S2 regular or murmur  Chest: bilateral breath sound without rales  Abdomen: soft, not tender, no organomegaly or masses  CNS: No focal neuro deficit  Musculoskeletal:  Normal range of motion  Skin: No rash    Assessment and Plan: 89-year-old female PMH of hypertension, hypothyroidism, colon cancer status postresection of the colon, CLL, COPD admitted 4/27/23 with shortness of breath. Patient was diagnosed with pneumonia one  week back, put on doxycycline. Patient continued to not feel well and came  to ER. She had COVID 19 in Jan 2023 and needs oxygen sometimes since then. Appetite has been poor  PAST MEDICAL & SURGICAL HISTORY:  CLL (chronic lymphocytic leukemia)      Colon cancer      Hypothyroidism      History of emphysema      Pneumonia      Anemia      S/P colon resection        Allergies    cefdinir (Diarrhea)    Intolerances      Social History:    Medications:  albuterol    90 MICROgram(s) HFA Inhaler 2 Puff(s) Inhalation every 6 hours PRN Shortness of Breath and/or Wheezing  albuterol/ipratropium for Nebulization 3 milliLiter(s) Nebulizer every 6 hours  budesonide 160 MICROgram(s)/formoterol 4.5 MICROgram(s) Inhaler 2 Puff(s) Inhalation two times a day  cefTRIAXone   IVPB 1000 milliGRAM(s) IV Intermittent every 24 hours  cyanocobalamin 1000 MICROGram(s) Oral daily  guaiFENesin  milliGRAM(s) Oral every 12 hours  heparin   Injectable 5000 Unit(s) SubCutaneous every 12 hours  levothyroxine 50 MICROGram(s) Oral daily  mirtazapine 7.5 milliGRAM(s) Oral at bedtime  multivitamin 1 Tablet(s) Oral daily  polyethylene glycol 3350 17 Gram(s) Oral two times a day    Labs:  CBC Full  -  ( 01 May 2023 07:28 )  WBC Count : 46.04 K/uL  RBC Count : 2.50 M/uL  Hemoglobin : 8.2 g/dL  Hematocrit : 27.3 %  Platelet Count - Automated : 122 K/uL  Mean Cell Volume : 109.2 fl  Mean Cell Hemoglobin : 32.8 pg  Mean Cell Hemoglobin Concentration : 30.0 gm/dL  Auto Neutrophil # : x  Auto Lymphocyte # : x  Auto Monocyte # : x  Auto Eosinophil # : x  Auto Basophil # : x  Auto Neutrophil % : x  Auto Lymphocyte % : x  Auto Monocyte % : x  Auto Eosinophil % : x  Auto Basophil % : x            Radiology:       < from: MR Abdomen No Cont (04.29.23 @ 17:55) >  IMPRESSION:  Limited study.    Several liver lesions, the largest demonstrating interval enlargement   since 7/28/2022. Primary and secondary hepatic neoplasms are diagnostic   considerations.    Iron deposition in liver and splee    < end of copied text >        ROS:  Patient comfortable without distress  General weakness  No SOB or chest pain  No palpitation  No abdominal pain, diarrhaea or constipation  No weakness of extremities  No skin changes or swelling of legs  Rest of the comprehensive ROS was negative  Vital Signs Last 24 Hrs  T(C): 36.6 (01 May 2023 05:19), Max: 36.7 (30 Apr 2023 14:08)  T(F): 97.8 (01 May 2023 05:19), Max: 98 (30 Apr 2023 14:08)  HR: 67 (01 May 2023 05:19) (67 - 78)  BP: 119/56 (01 May 2023 05:19) (105/67 - 156/79)  BP(mean): --  RR: 17 (01 May 2023 05:19) (17 - 18)  SpO2: 99% (01 May 2023 05:19) (98% - 99%)    Parameters below as of 01 May 2023 05:19  Patient On (Oxygen Delivery Method): nasal cannula  O2 Flow (L/min): 2      Physical exam:  Patient alert, cachectic  No distress  CVS: S1, S2   Chest: bilateral breath sound without rales  Abdomen: soft, not tender, no organomegaly or masses  CNS: No focal neuro deficit  Musculoskeletal:  Normal range of motion  Skin: No rash    Assessment and Plan:

## 2023-05-01 NOTE — PROGRESS NOTE ADULT - ASSESSMENT
89-year-old female PMH of hypertension, hypothyroidism, colon cancer status postresection of the colon, CLL, COPD presenting with shortness of breath. Patient was diagnosed with pneumonia last week, subsequently put on doxycycline. Patient was told that if she continued to not feel well she should come to the emergency department. Patient woke up this morning saying she just did not feel right. Patient denies chest pain, fevers, vomiting, abdominal pain. Patient had COVID back in January and since has had a intermittent oxygen requirement. Over the last week patient has been using oxygen due to low oxygen saturation while being treated for pneumonia.  Patient also states her appetite has been very poor recently. (27 Apr 2023 16:44)    A/P    # Pneumonia  Old CTs reviewed. 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   Please obtain a PULMONARY consult for their input  check urine legionella ( unlikely)  If NTM we don't even know if MAC, M abscessus or other ?    #Liver lesion  suspicious for malignancy  family to decide on further w/u       # cachexia / weight loss  although could be related to ORESTES , multiple possible explanations  pt notes early satiety   on thyroid replacement , check TFTS  ? related to malignancy       Carolyn Duffy M.D. ,   please reach via teams   If no answer, or after 5PM/ weekends,  then please call  [phone]    Assessment and plan discussed with the primary team .

## 2023-05-01 NOTE — PROGRESS NOTE ADULT - ASSESSMENT
89-year-old female           h/o HTN,  hypothyroidism, colon cancer status postresection of the colon,            CLL          presenting with shortness of breath., had  pneumonia last week, was s on  doxycycline.            denies chest pain, fevers, vomiting, abdominal pain,  covid   in January 2023             admitted  for SOB, weakness,  cough, ftt           CTA notes no PE, bilateral pattern concerning for ORESTES,  seen by  ID      *   pna/  CAP  and  probable   ORESTES             pt with cachexia.  wt is   31  kg.  suspect  from ORESTES    *    HTN/  Hypothyroid              lopressor, synthroid          had  h/o  brief  Afib, per  family/  card  to  f.p /  not  on  a/c    *     h/o  CLL                wbc  is  51,000/  heme  dr dona/   rpt  hb is 8   *   h/o  prior Ca   colon/ s/p colectomy          now  with nausea/  anemia/  pt has  been receiving  prbc. ,/ last was few  months ago            gi  d r mary ellen          Pna. ,  on iv  rocephin . per  ID       per  ID/pulm, awiating  sputum afb   MRI abd.  live r lesions/ ? malignancy     onc  dr doan           pt  is dnr          per   daughters,   they  do  not wish  to  pursue  palliative/  hospice  care  , at present        rad< from: CT Angio Chest PE Protocol w/ IV Cont (04.27.23 @ 15:27) >  IMPRESSION:  *  No pulmonary embolism.  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  *  Multilobar consolidation and other opacities, some of which is   increased and likely infectious.  *  There is an enlarging indeterminate left hepatic lesion. MRI abdomen   with IV contrast can be performed for further characterization if   warranted.  --- End of Report ---

## 2023-05-01 NOTE — PROGRESS NOTE ADULT - ASSESSMENT
anemia  hx colon CA s/p resection   CLL  COPD  liver lesion    monitor cbc daily   no overt bleeding  transfuse prn   gentle bowel regimen with miralax  monitor stool count   reg diet as tolerated  poor candidate for endoscopic eval; pt and daughters in agreement   MRI with liver lesions, ?malignancy   no a candidate for systemic therapy per heme/onc  will follow  d/w pt and daughter at bedside    I reviewed the overnight course of events on the unit, re-confirming the patient history. I discussed the care with the patient and their family  The plan of care was discussed with the physician assistant and modifications were made to the notation where appropriate.   Differential diagnosis and plan of care discussed with patient after the evaluation  35 minutes spent on total encounter of which more than fifty percent of the encounter was spent counseling and/or coordinating care by the attending physician.  Advanced care planning was discussed with patient and family.  Advanced care planning forms were reviewed and discussed.  Risks, benefits and alternatives of gastroenterologic procedures were discussed in detail and all questions were answered.

## 2023-05-01 NOTE — PROGRESS NOTE ADULT - SUBJECTIVE AND OBJECTIVE BOX
date of service: 05-01-23 @ 08:26  afebrile  REVIEW OF SYSTEMS:  CONSTITUTIONAL: No fever,  no  weight loss  ENT:  No  tinnitus,   no   vertigo  NECK: No pain or stiffness  RESPIRATORY: No cough, wheezing, chills or hemoptysis;    No Shortness of Breath  CARDIOVASCULAR: No chest pain, palpitations, dizziness  GASTROINTESTINAL: No abdominal or epigastric pain. No nausea, vomiting, or hematemesis; No diarrhea  No melena or hematochezia.  GENITOURINARY: No dysuria, frequency, hematuria, or incontinence  NEUROLOGICAL: No headaches  SKIN: No itching,  no   rash  LYMPH Nodes: No enlarged glands  ENDOCRINE: No heat or cold intolerance  MUSCULOSKELETAL: No joint pain or swelling  PSYCHIATRIC: No depression, anxiety  HEME/LYMPH: No easy bruising, or bleeding gums  ALLERGY AND IMMUNOLOGIC: No hives or eczema	    MEDICATIONS  (STANDING):  albuterol/ipratropium for Nebulization 3 milliLiter(s) Nebulizer every 6 hours  budesonide 160 MICROgram(s)/formoterol 4.5 MICROgram(s) Inhaler 2 Puff(s) Inhalation two times a day  cefTRIAXone   IVPB 1000 milliGRAM(s) IV Intermittent every 24 hours  cyanocobalamin 1000 MICROGram(s) Oral daily  guaiFENesin  milliGRAM(s) Oral every 12 hours  heparin   Injectable 5000 Unit(s) SubCutaneous every 12 hours  levothyroxine 50 MICROGram(s) Oral daily  mirtazapine 7.5 milliGRAM(s) Oral at bedtime  multivitamin 1 Tablet(s) Oral daily  polyethylene glycol 3350 17 Gram(s) Oral daily    MEDICATIONS  (PRN):  albuterol    90 MICROgram(s) HFA Inhaler 2 Puff(s) Inhalation every 6 hours PRN Shortness of Breath and/or Wheezing      Vital Signs Last 24 Hrs  T(C): 36.6 (01 May 2023 05:19), Max: 36.7 (30 Apr 2023 14:08)  T(F): 97.8 (01 May 2023 05:19), Max: 98 (30 Apr 2023 14:08)  HR: 67 (01 May 2023 05:19) (67 - 78)  BP: 119/56 (01 May 2023 05:19) (105/67 - 156/79)  BP(mean): --  RR: 17 (01 May 2023 05:19) (17 - 18)  SpO2: 99% (01 May 2023 05:19) (98% - 99%)    Parameters below as of 01 May 2023 05:19  Patient On (Oxygen Delivery Method): nasal cannula  O2 Flow (L/min): 2    CAPILLARY BLOOD GLUCOSE        I&O's Summary    30 Apr 2023 07:01  -  01 May 2023 07:00  --------------------------------------------------------  IN: 720 mL / OUT: 500 mL / NET: 220 mL          Appearance: Normal	  HEENT:   Normal oral mucosa, PERRL, EOMI	  Lymphatic: No lymphadenopathy  Cardiovascular: Normal S1 S2, No JVD  Respiratory: Lungs clear to auscultation	  Gastrointestinal:  Soft, Non-tender, + BS	  Skin: No rash, No ecchymoses	  Extremities:     LABS:                        8.2    46.04 )-----------( 122      ( 01 May 2023 07:28 )             27.3                           Thyroid Stimulating Hormone, Serum: 0.78 uIU/mL (04-28 @ 06:54)          Consultant(s) Notes Reviewed:      Care Discussed with Consultants/Other Providers:

## 2023-05-01 NOTE — PROGRESS NOTE ADULT - SUBJECTIVE AND OBJECTIVE BOX
Date of Service: 05-01-23 @ 07:20           CARDIOLOGY     PROGRESS  NOTE   ________________________________________________    CHIEF COMPLAINT:Patient is a 89y old  Female who presents with a chief complaint of Weakness and failure to thrive (30 Apr 2023 10:57)  still with sob  	  REVIEW OF SYSTEMS:  CONSTITUTIONAL: No fever, weight loss, or fatigue  EYES: No eye pain, visual disturbances, or discharge  ENT:  No difficulty hearing, tinnitus, vertigo; No sinus or throat pain  NECK: No pain or stiffness  RESPIRATORY: No cough, wheezing, chills or hemoptysis; +Shortness of Breath  CARDIOVASCULAR: No chest pain, palpitations, passing out, dizziness, or leg swelling  GASTROINTESTINAL: No abdominal or epigastric pain. No nausea, vomiting, or hematemesis; No diarrhea or constipation. No melena or hematochezia.  GENITOURINARY: No dysuria, frequency, hematuria, or incontinence  NEUROLOGICAL: No headaches, memory loss, loss of strength, numbness, or tremors  SKIN: No itching, burning, rashes, or lesions   LYMPH Nodes: No enlarged glands  ENDOCRINE: No heat or cold intolerance; No hair loss  MUSCULOSKELETAL: No joint pain or swelling; No muscle, back, or extremity pain  PSYCHIATRIC: No depression, anxiety, mood swings, or difficulty sleeping  HEME/LYMPH: No easy bruising, or bleeding gums  ALLERGY AND IMMUNOLOGIC: No hives or eczema	    [ x] All others negative	  [ ] Unable to obtain    PHYSICAL EXAM:  T(C): 36.6 (05-01-23 @ 05:19), Max: 36.7 (04-30-23 @ 14:08)  HR: 67 (05-01-23 @ 05:19) (67 - 78)  BP: 119/56 (05-01-23 @ 05:19) (105/67 - 156/79)  RR: 17 (05-01-23 @ 05:19) (17 - 18)  SpO2: 99% (05-01-23 @ 05:19) (98% - 99%)  Wt(kg): --  I&O's Summary    30 Apr 2023 07:01  -  01 May 2023 07:00  --------------------------------------------------------  IN: 720 mL / OUT: 500 mL / NET: 220 mL        Appearance: Normal	  HEENT:   Normal oral mucosa, PERRL, EOMI	  Lymphatic: No lymphadenopathy  Cardiovascular: Normal S1 S2, No JVD, + murmurs, No edema  Respiratory: rhonchi  Psychiatry: A & O x 3, Mood & affect appropriate  Gastrointestinal:  Soft, Non-tender, + BS	  Skin: No rashes, No ecchymoses, No cyanosis	  Neurologic: Non-focal  Extremities: Normal range of motion, No clubbing, cyanosis or edema  Vascular: Peripheral pulses palpable 2+ bilaterally    MEDICATIONS  (STANDING):  albuterol/ipratropium for Nebulization 3 milliLiter(s) Nebulizer every 6 hours  budesonide 160 MICROgram(s)/formoterol 4.5 MICROgram(s) Inhaler 2 Puff(s) Inhalation two times a day  cefTRIAXone   IVPB 1000 milliGRAM(s) IV Intermittent every 24 hours  cyanocobalamin 1000 MICROGram(s) Oral daily  guaiFENesin  milliGRAM(s) Oral every 12 hours  heparin   Injectable 5000 Unit(s) SubCutaneous every 12 hours  levothyroxine 50 MICROGram(s) Oral daily  mirtazapine 7.5 milliGRAM(s) Oral at bedtime  multivitamin 1 Tablet(s) Oral daily  polyethylene glycol 3350 17 Gram(s) Oral daily      TELEMETRY: 	    ECG:  	  RADIOLOGY:  OTHER: 	  	  LABS:	 	    CARDIAC MARKERS:                        8.6    47.75 )-----------( 160      ( 29 Apr 2023 11:39 )             28.6           proBNP:   Lipid Profile:   HgA1c:   TSH: Thyroid Stimulating Hormone, Serum: 0.78 uIU/mL (04-28 @ 06:54)      < from: CT Angio Chest PE Protocol w/ IV Cont (04.27.23 @ 15:27) >  *  No pulmonary embolism.  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  *  Multilobar consolidation and other opacities, some of which is   increased and likely infectious.  *  There is an enlarging indeterminate left hepatic lesion. MRI abdomen   with IV contrast can be performed for further characterization if   warranted.    would check sputum culture if able  would treat for bacterial pneumonia - can use rocephin, note h/o c diff may want to use probioitcs along with treatment or greek yogurt ( trouble with swallowing pills)   In a woman of age 89, who is so cachectic and unable to swallow pills and is [place] , likely the approach to NTM would be based on airway clearance.   Please obtain a PULMONARY consult for their input  check urine legionella ( unlikely)  If NTM we don't even know if MAC, M abscessus or other ?    Assessment and plan  ---------------------------  89-year-old female PMH of hypertension, hypothyroidism, colon cancer status postresection of the colon, CLL, COPD presenting with shortness of breath. Patient was diagnosed with pneumonia last week, subsequently put on doxycycline. Patient was told that if she continued to not feel well she should come to the emergency department. Patient woke up this morning saying she just did not feel right. Patient denies chest pain, fevers, vomiting, abdominal pain. Patient had COVID back in January and since has had a intermittent oxygen requirement. Over the last week patient has been using oxygen due to low oxygen saturation while being treated for pneumonia.  Patient also states her appetite has been very poor recently  pt with sig medical hx with FTT, sig weight loss and sob  pt was admitted with COVID and had a episode of a,fib >45 minutes  i discussed with daughters doubt pt is a candidate for AC  agree to continue beta blocker  echo noted, no need to repeat echo  may add Remeron 7.5 mg qhs to increase appetite  nutrition eval  severe protein deficiency  pt with PAF not a candidate for AC  oob to chair as tolerated  continue ABX/ ID noted  nutrition eval

## 2023-05-01 NOTE — PROGRESS NOTE ADULT - ASSESSMENT
89-year-old female PMH of hypertension, hypothyroidism, colon cancer status postresection of the colon, CLL, COPD admitted 4/27/23 with shortness of breath. Patient was diagnosed with pneumonia one  week back, put on doxycycline. Patient continued to not feel well and came  to ER. She had COVID 19 in Jan 2023 and needs oxygen sometimes since then. Appetite has been poor  She has been seen by ID and is on abx    Pt was diagnosed with CLL 5/2014 and has been on surveillance but has been more anemic since Jan 2021. 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  MRI noted, compatible with malignancy.  Discussed with patient's daughter at bedside. Who stated that they do not want any further investigations or treatment for malignancy as the mother will not be able to tolerate it. She will be 89 on 5/2/23. This is appropriate as clinically by age and PS she is not a candidate for systemic therapy either and hence further evaluation is not needed, she  likely has metastatic colon cancer  Patient is DNR   I tried calling patient's 2nd daughter and left a message to call me back

## 2023-05-01 NOTE — PROGRESS NOTE ADULT - SUBJECTIVE AND OBJECTIVE BOX
[facility]  Jerod Waddell PA-C  [address] [place] [address]  [phone]      INTERVAL HPI/OVERNIGHT EVENTS:  pt seen and examined, no new events  comfortable, no BM  hgb stable     MEDICATIONS  (STANDING):  albuterol/ipratropium for Nebulization 3 milliLiter(s) Nebulizer every 6 hours  budesonide 160 MICROgram(s)/formoterol 4.5 MICROgram(s) Inhaler 2 Puff(s) Inhalation two times a day  cefTRIAXone   IVPB 1000 milliGRAM(s) IV Intermittent every 24 hours  cyanocobalamin 1000 MICROGram(s) Oral daily  guaiFENesin  milliGRAM(s) Oral every 12 hours  heparin   Injectable 5000 Unit(s) SubCutaneous every 12 hours  levothyroxine 50 MICROGram(s) Oral daily  mirtazapine 7.5 milliGRAM(s) Oral at bedtime  multivitamin 1 Tablet(s) Oral daily  polyethylene glycol 3350 17 Gram(s) Oral daily    MEDICATIONS  (PRN):  albuterol    90 MICROgram(s) HFA Inhaler 2 Puff(s) Inhalation every 6 hours PRN Shortness of Breath and/or Wheezing      Allergies    cefdinir (Diarrhea)    Intolerances        ROS:   General:  No wt loss, fevers, chills, night sweats, fatigue,   Eyes:  Good vision, no reported pain  ENT:  No sore throat, pain, runny nose, dysphagia  CV:  No pain, palpitations, hypo/hypertension  Resp:  No dyspnea, cough, tachypnea, wheezing  GI:  No pain, No nausea, No vomiting, No diarrhea, No constipation, No weight loss, No fever, No pruritis, No rectal bleeding, No tarry stools, No dysphagia,  :  No pain, bleeding, incontinence, nocturia  Muscle:  No pain, weakness  Neuro:  No weakness, tingling, memory problems  Psych:  No fatigue, insomnia, mood problems, depression  Endocrine:  No polyuria, polydipsia, cold/heat intolerance  Heme:  No petechiae, ecchymosis, easy bruisability  Skin:  No rash, tattoos, scars, edema      PHYSICAL EXAM:   Vital Signs Last 24 Hrs  T(C): 36.6 (01 May 2023 05:19), Max: 36.7 (30 Apr 2023 14:08)  T(F): 97.8 (01 May 2023 05:19), Max: 98 (30 Apr 2023 14:08)  HR: 67 (01 May 2023 05:19) (67 - 78)  BP: 119/56 (01 May 2023 05:19) (105/67 - 156/79)  BP(mean): --  RR: 17 (01 May 2023 05:19) (17 - 18)  SpO2: 99% (01 May 2023 05:19) (98% - 99%)    Parameters below as of 01 May 2023 05:19  Patient On (Oxygen Delivery Method): nasal cannula  O2 Flow (L/min): 2    Daily     Daily     GENERAL:  Appears stated age,   HEENT:  NC/AT,    CHEST:  Full & symmetric excursion,   HEART:  Regular rhythm,  ABDOMEN:  Soft, non-tender, non-distended,  EXTEREMITIES:  no cyanosis  SKIN:  No rash  NEURO:  Alert,       LABS:                        8.2    46.04 )-----------( 122      ( 01 May 2023 07:28 )             27.3       RADIOLOGY & ADDITIONAL TESTS:    < from: MR Abdomen No Cont (04.29.23 @ 17:55) >    ACC: [id number] EXAM:  MR ABDOMEN   ORDERED BY: FRANKIE HUFF     PROCEDURE DATE:  04/29/2023          INTERPRETATION:  CLINICAL INFORMATION: CLL, colon cancer. Liver lesion.    COMPARISON: CT abdomen pelvis dated 7/28/2022.    CONTRAST/COMPLICATIONS:  IV Contrast: None  Oral Contrast: None  Complications: None    PROCEDURE:  MRI of the abdomen was performed.  MRCP was performed.  Limited due to patient motion and lack of IV contrast.    FINDINGS:  LOWER CHEST: Bilateral pleural effusions. Cardiomegaly.    LIVER: Decreased T2 signal intensity suggesting iron deposition. Several   discrete liver lesions. The largest is in segment 3 (6, 20) measuring 2.9   x 2.0 cm, previously 1.7 x 1.1 cm. It demonstrates mild T2   hyperintensity, T1 hypointensity and diffusion restriction.  BILE DUCTS: Normal caliber.  GALLBLADDER: Within normal limits.  SPLEEN: Iron deposition.  PANCREAS: Parenchymal atrophy.  ADRENALS: Within normal limits.  KIDNEYS/URETERS: Small bilateral renal cortical cysts. Nohydronephrosis.    VISUALIZED PORTIONS:  BOWEL: Within normal limits.  PERITONEUM: No ascites.  VESSELS: Atheromatous ectasia of the aorta.  RETROPERITONEUM/LYMPH NODES: No lymphadenopathy.  ABDOMINAL WALL: Cachexia.  BONES: Diffuse marrow infiltration compatible with history of CLL.    IMPRESSION:  Limited study.    Several liver lesions, the largest demonstrating interval enlargement   since 7/28/2022. Primary and secondary hepatic neoplasms are diagnostic   considerations.    Iron deposition in liver and spleen.

## 2023-05-02 LAB
AFP-TM SERPL-MCNC: 3.1 NG/ML — SIGNIFICANT CHANGE UP
CEA SERPL-MCNC: 2.7 NG/ML — SIGNIFICANT CHANGE UP (ref 0–3.8)
CULTURE RESULTS: SIGNIFICANT CHANGE UP
CULTURE RESULTS: SIGNIFICANT CHANGE UP
HCT VFR BLD CALC: 27.8 % — LOW (ref 34.5–45)
HGB BLD-MCNC: 8.4 G/DL — LOW (ref 11.5–15.5)
MCHC RBC-ENTMCNC: 30.2 GM/DL — LOW (ref 32–36)
MCHC RBC-ENTMCNC: 32.8 PG — SIGNIFICANT CHANGE UP (ref 27–34)
MCV RBC AUTO: 108.6 FL — HIGH (ref 80–100)
NRBC # BLD: 0 /100 WBCS — SIGNIFICANT CHANGE UP (ref 0–0)
PLATELET # BLD AUTO: 118 K/UL — LOW (ref 150–400)
RBC # BLD: 2.56 M/UL — LOW (ref 3.8–5.2)
RBC # FLD: 16.1 % — HIGH (ref 10.3–14.5)
SPECIMEN SOURCE: SIGNIFICANT CHANGE UP
SPECIMEN SOURCE: SIGNIFICANT CHANGE UP
WBC # BLD: 42.6 K/UL — CRITICAL HIGH (ref 3.8–10.5)
WBC # FLD AUTO: 42.6 K/UL — CRITICAL HIGH (ref 3.8–10.5)

## 2023-05-02 PROCEDURE — 99232 SBSQ HOSP IP/OBS MODERATE 35: CPT

## 2023-05-02 RX ORDER — LACTOBACILLUS ACIDOPHILUS 100MM CELL
1 CAPSULE ORAL
Refills: 0 | Status: DISCONTINUED | OUTPATIENT
Start: 2023-05-02 | End: 2023-05-04

## 2023-05-02 RX ADMIN — Medication 600 MILLIGRAM(S): at 05:27

## 2023-05-02 RX ADMIN — HEPARIN SODIUM 5000 UNIT(S): 5000 INJECTION INTRAVENOUS; SUBCUTANEOUS at 18:15

## 2023-05-02 RX ADMIN — Medication 1 TABLET(S): at 13:13

## 2023-05-02 RX ADMIN — BUDESONIDE AND FORMOTEROL FUMARATE DIHYDRATE 2 PUFF(S): 160; 4.5 AEROSOL RESPIRATORY (INHALATION) at 18:55

## 2023-05-02 RX ADMIN — Medication 50 MICROGRAM(S): at 05:28

## 2023-05-02 RX ADMIN — Medication 3 MILLILITER(S): at 05:27

## 2023-05-02 RX ADMIN — BUDESONIDE AND FORMOTEROL FUMARATE DIHYDRATE 2 PUFF(S): 160; 4.5 AEROSOL RESPIRATORY (INHALATION) at 05:27

## 2023-05-02 RX ADMIN — Medication 3 MILLILITER(S): at 00:43

## 2023-05-02 RX ADMIN — POLYETHYLENE GLYCOL 3350 17 GRAM(S): 17 POWDER, FOR SOLUTION ORAL at 09:26

## 2023-05-02 RX ADMIN — Medication 1 TABLET(S): at 18:15

## 2023-05-02 RX ADMIN — Medication 600 MILLIGRAM(S): at 18:16

## 2023-05-02 RX ADMIN — HEPARIN SODIUM 5000 UNIT(S): 5000 INJECTION INTRAVENOUS; SUBCUTANEOUS at 05:28

## 2023-05-02 RX ADMIN — Medication 3 MILLILITER(S): at 18:15

## 2023-05-02 RX ADMIN — Medication 3 MILLILITER(S): at 14:03

## 2023-05-02 RX ADMIN — POLYETHYLENE GLYCOL 3350 17 GRAM(S): 17 POWDER, FOR SOLUTION ORAL at 18:15

## 2023-05-02 RX ADMIN — PREGABALIN 1000 MICROGRAM(S): 225 CAPSULE ORAL at 13:12

## 2023-05-02 RX ADMIN — CEFTRIAXONE 100 MILLIGRAM(S): 500 INJECTION, POWDER, FOR SOLUTION INTRAMUSCULAR; INTRAVENOUS at 18:15

## 2023-05-02 NOTE — PROGRESS NOTE ADULT - SUBJECTIVE AND OBJECTIVE BOX
[facility]  Jerod Waddell PA-C  [address] [place] [address]  [phone]      INTERVAL HPI/OVERNIGHT EVENTS:  pt seen and examined, daughter at bedside  comfortable, still no BM  hgb stable     MEDICATIONS  (STANDING):  albuterol/ipratropium for Nebulization 3 milliLiter(s) Nebulizer every 6 hours  budesonide 160 MICROgram(s)/formoterol 4.5 MICROgram(s) Inhaler 2 Puff(s) Inhalation two times a day  cefTRIAXone   IVPB 1000 milliGRAM(s) IV Intermittent every 24 hours  cyanocobalamin 1000 MICROGram(s) Oral daily  guaiFENesin  milliGRAM(s) Oral every 12 hours  heparin   Injectable 5000 Unit(s) SubCutaneous every 12 hours  levothyroxine 50 MICROGram(s) Oral daily  mirtazapine 7.5 milliGRAM(s) Oral at bedtime  multivitamin 1 Tablet(s) Oral daily  polyethylene glycol 3350 17 Gram(s) Oral daily    MEDICATIONS  (PRN):  albuterol    90 MICROgram(s) HFA Inhaler 2 Puff(s) Inhalation every 6 hours PRN Shortness of Breath and/or Wheezing      Allergies    cefdinir (Diarrhea)    Intolerances        ROS:   General:  No wt loss, fevers, chills, night sweats, fatigue,   Eyes:  Good vision, no reported pain  ENT:  No sore throat, pain, runny nose, dysphagia  CV:  No pain, palpitations, hypo/hypertension  Resp:  No dyspnea, cough, tachypnea, wheezing  GI:  No pain, No nausea, No vomiting, No diarrhea, No constipation, No weight loss, No fever, No pruritis, No rectal bleeding, No tarry stools, No dysphagia,  :  No pain, bleeding, incontinence, nocturia  Muscle:  No pain, weakness  Neuro:  No weakness, tingling, memory problems  Psych:  No fatigue, insomnia, mood problems, depression  Endocrine:  No polyuria, polydipsia, cold/heat intolerance  Heme:  No petechiae, ecchymosis, easy bruisability  Skin:  No rash, tattoos, scars, edema      PHYSICAL EXAM:   Vital Signs Last 24 Hrs  T(C): 37 (02 May 2023 04:17), Max: 37.2 (01 May 2023 20:18)  T(F): 98.6 (02 May 2023 04:17), Max: 98.9 (01 May 2023 20:18)  HR: 74 (02 May 2023 04:17) (74 - 94)  BP: 119/54 (02 May 2023 04:17) (107/65 - 119/54)  BP(mean): --  RR: 18 (02 May 2023 04:17) (17 - 18)  SpO2: 97% (02 May 2023 04:17) (95% - 100%)    Parameters below as of 02 May 2023 04:17  Patient On (Oxygen Delivery Method): nasal cannula  O2 Flow (L/min): 2      Daily     Daily     GENERAL:  Appears stated age,   HEENT:  NC/AT,    CHEST:  Full & symmetric excursion,   HEART:  Regular rhythm,  ABDOMEN:  Soft, non-tender, non-distended,  EXTEREMITIES:  no cyanosis  SKIN:  No rash  NEURO:  Alert,       LABS:                        8.2    46.04 )-----------( 122      ( 01 May 2023 07:28 )             27.3     RADIOLOGY & ADDITIONAL TESTS:    < from: MR Abdomen No Cont (04.29.23 @ 17:55) >    ACC: [id number] EXAM:  MR ABDOMEN   ORDERED BY: FRANKIE HUFF     PROCEDURE DATE:  04/29/2023          INTERPRETATION:  CLINICAL INFORMATION: CLL, colon cancer. Liver lesion.    COMPARISON: CT abdomen pelvis dated 7/28/2022.    CONTRAST/COMPLICATIONS:  IV Contrast: None  Oral Contrast: None  Complications: None    PROCEDURE:  MRI of the abdomen was performed.  MRCP was performed.  Limited due to patient motion and lack of IV contrast.    FINDINGS:  LOWER CHEST: Bilateral pleural effusions. Cardiomegaly.    LIVER: Decreased T2 signal intensity suggesting iron deposition. Several   discrete liver lesions. The largest is in segment 3 (6, 20) measuring 2.9   x 2.0 cm, previously 1.7 x 1.1 cm. It demonstrates mild T2   hyperintensity, T1 hypointensity and diffusion restriction.  BILE DUCTS: Normal caliber.  GALLBLADDER: Within normal limits.  SPLEEN: Iron deposition.  PANCREAS: Parenchymal atrophy.  ADRENALS: Within normal limits.  KIDNEYS/URETERS: Small bilateral renal cortical cysts. Nohydronephrosis.    VISUALIZED PORTIONS:  BOWEL: Within normal limits.  PERITONEUM: No ascites.  VESSELS: Atheromatous ectasia of the aorta.  RETROPERITONEUM/LYMPH NODES: No lymphadenopathy.  ABDOMINAL WALL: Cachexia.  BONES: Diffuse marrow infiltration compatible with history of CLL.    IMPRESSION:  Limited study.    Several liver lesions, the largest demonstrating interval enlargement   since 7/28/2022. Primary and secondary hepatic neoplasms are diagnostic   considerations.    Iron deposition in liver and spleen.

## 2023-05-02 NOTE — PROGRESS NOTE ADULT - SUBJECTIVE AND OBJECTIVE BOX
date of service: 05-02-23 @ 08:11  afberile  REVIEW OF SYSTEMS:  CONSTITUTIONAL: No fever,  no  weight loss  ENT:  No  tinnitus,   no   vertigo  NECK: No pain or stiffness  RESPIRATORY: No cough, wheezing, chills or hemoptysis;    No Shortness of Breath  CARDIOVASCULAR: No chest pain, palpitations, dizziness  GASTROINTESTINAL: No abdominal or epigastric pain. No nausea, vomiting, or hematemesis; No diarrhea  No melena or hematochezia.  GENITOURINARY: No dysuria, frequency, hematuria, or incontinence  NEUROLOGICAL: No headaches  SKIN: No itching,  no   rash  LYMPH Nodes: No enlarged glands  ENDOCRINE: No heat or cold intolerance  MUSCULOSKELETAL: No joint pain or swelling  PSYCHIATRIC: No depression, anxiety  HEME/LYMPH: No easy bruising, or bleeding gums  ALLERGY AND IMMUNOLOGIC: No hives or eczema	    MEDICATIONS  (STANDING):  albuterol/ipratropium for Nebulization 3 milliLiter(s) Nebulizer every 6 hours  budesonide 160 MICROgram(s)/formoterol 4.5 MICROgram(s) Inhaler 2 Puff(s) Inhalation two times a day  cefTRIAXone   IVPB 1000 milliGRAM(s) IV Intermittent every 24 hours  cyanocobalamin 1000 MICROGram(s) Oral daily  guaiFENesin  milliGRAM(s) Oral every 12 hours  heparin   Injectable 5000 Unit(s) SubCutaneous every 12 hours  levothyroxine 50 MICROGram(s) Oral daily  mirtazapine 7.5 milliGRAM(s) Oral at bedtime  multivitamin 1 Tablet(s) Oral daily  polyethylene glycol 3350 17 Gram(s) Oral two times a day    MEDICATIONS  (PRN):  albuterol    90 MICROgram(s) HFA Inhaler 2 Puff(s) Inhalation every 6 hours PRN Shortness of Breath and/or Wheezing      Vital Signs Last 24 Hrs  T(C): 37 (02 May 2023 04:17), Max: 37.2 (01 May 2023 20:18)  T(F): 98.6 (02 May 2023 04:17), Max: 98.9 (01 May 2023 20:18)  HR: 74 (02 May 2023 04:17) (74 - 94)  BP: 119/54 (02 May 2023 04:17) (107/65 - 119/54)  BP(mean): --  RR: 18 (02 May 2023 04:17) (17 - 18)  SpO2: 97% (02 May 2023 04:17) (95% - 100%)    Parameters below as of 02 May 2023 04:17  Patient On (Oxygen Delivery Method): nasal cannula  O2 Flow (L/min): 2    CAPILLARY BLOOD GLUCOSE        I&O's Summary    01 May 2023 07:01  -  02 May 2023 07:00  --------------------------------------------------------  IN: 420 mL / OUT: 675 mL / NET: -255 mL          Appearance: Normal	  HEENT:   Normal oral mucosa, PERRL, EOMI	  Lymphatic: No lymphadenopathy  Cardiovascular: Normal S1 S2, No JVD  Respiratory: Lungs clear to auscultation	  Gastrointestinal:  Soft, Non-tender, + BS	  Skin: No rash, No ecchymoses	  Extremities:     LABS:                        8.2    46.04 )-----------( 122      ( 01 May 2023 07:28 )             27.3                           Thyroid Stimulating Hormone, Serum: 0.78 uIU/mL (04-28 @ 06:54)          Consultant(s) Notes Reviewed:      Care Discussed with Consultants/Other Providers:

## 2023-05-02 NOTE — PROGRESS NOTE ADULT - ASSESSMENT
89-year-old female PMH of hypertension, hypothyroidism, colon cancer status postresection of the colon, CLL, COPD presenting with shortness of breath. Patient was diagnosed with pneumonia last week, subsequently put on doxycycline. Patient was told that if she continued to not feel well she should come to the emergency department. Patient woke up this morning saying she just did not feel right. Patient denies chest pain, fevers, vomiting, abdominal pain. Patient had COVID back in January and since has had a intermittent oxygen requirement. Over the last week patient has been using oxygen due to low oxygen saturation while being treated for pneumonia.  Patient also states her appetite has been very poor recently. (27 Apr 2023 16:44)    A/P    # Pneumonia  Old CTs reviewed. 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   appreciate  PULMONARY consult for their input  urine legionella negative  If NTM we don't even know if MAC, M abscessus or other ?  would complete 7 days of antibiotics - today is day # 5 of Rocephin, s/p azithromax for 2 days and home course of doxycycline  Pt will require oxygen when ready for discharge      #Liver lesion  suspicious for malignancy  family to decide on further w/u       # cachexia / weight loss  although could be related to ORESTES , multiple possible explanations  pt notes early satiety   on thyroid replacement , check TFTS  ? related to malignancy       Carolyn Duffy M.D. ,   please reach via teams   If no answer, or after 5PM/ weekends,  then please call  [phone]    Assessment and plan discussed with the primary team .    I will be away tomorrow. My associates will be covering. Please call [phone] with acute issues, questions.         Self

## 2023-05-02 NOTE — PROGRESS NOTE ADULT - SUBJECTIVE AND OBJECTIVE BOX
Patient is a 89y old  Female who presents with a chief complaint of Weakness and failure to thrive (02 May 2023 10:12)    Being followed by ID for        Interval history:  pt feeling a little better  no bowel movement  breathing stable  on nasal cannula   No other acute events        PAST MEDICAL & SURGICAL HISTORY:  CLL (chronic lymphocytic leukemia)      Colon cancer      Hypothyroidism      History of emphysema      Pneumonia      Anemia      S/P colon resection        Allergies    cefdinir (Diarrhea)    Intolerances      Antimicrobials:    cefTRIAXone   IVPB 1000 milliGRAM(s) IV Intermittent every 24 hours    MEDICATIONS  (STANDING):  albuterol/ipratropium for Nebulization 3 milliLiter(s) Nebulizer every 6 hours  budesonide 160 MICROgram(s)/formoterol 4.5 MICROgram(s) Inhaler 2 Puff(s) Inhalation two times a day  cefTRIAXone   IVPB 1000 milliGRAM(s) IV Intermittent every 24 hours  cyanocobalamin 1000 MICROGram(s) Oral daily  guaiFENesin  milliGRAM(s) Oral every 12 hours  heparin   Injectable 5000 Unit(s) SubCutaneous every 12 hours  levothyroxine 50 MICROGram(s) Oral daily  mirtazapine 7.5 milliGRAM(s) Oral at bedtime  multivitamin 1 Tablet(s) Oral daily  polyethylene glycol 3350 17 Gram(s) Oral two times a day      Vital Signs Last 24 Hrs  T(C): 36.6 (05-02-23 @ 12:55), Max: 37.2 (05-01-23 @ 20:18)  T(F): 97.8 (05-02-23 @ 12:55), Max: 98.9 (05-01-23 @ 20:18)  HR: 84 (05-02-23 @ 12:55) (74 - 94)  BP: 103/56 (05-02-23 @ 12:55) (103/56 - 119/54)  BP(mean): --  RR: 18 (05-02-23 @ 12:55) (17 - 18)  SpO2: 97% (05-02-23 @ 12:55) (95% - 97%)    Physical Exam:    Constitutional  markedly cachectic     HEENT PERRLA EOMI,No pallor or icterus    Neck supple no JVD or LN    Chest Good AE,CTA    CVS  S1 S2     Abd soft BS normal No tenderness     Ext No cyanosis clubbing or edema    IV site no erythema tenderness or discharge    Joints no swelling or LOM    CNS AAO X 3 no focal    Lab Data:                          8.4    42.60 )-----------( 118      ( 02 May 2023 09:59 )             27.8         .Blood Blood-Peripheral  04-27-23   No growth to date.  --  --      .Blood Blood-Peripheral  04-27-23   No growth to date.  --  --        WBC Count: 42.60 (05-02-23 @ 09:59)  WBC Count: 46.04 (05-01-23 @ 07:28)  WBC Count: 47.75 (04-29-23 @ 11:39)  WBC Count: 51.28 (04-28-23 @ 06:53)  WBC Count: 63.32 (04-27-23 @ 13:42)             Patient is a 89y old  Female who presents with a chief complaint of Weakness and failure to thrive (02 May 2023 10:12)    Being followed by ID for        Interval history:  pt feeling a little better  no bowel movement  breathing stable  on nasal cannula   No other acute events        PAST MEDICAL & SURGICAL HISTORY:  CLL (chronic lymphocytic leukemia)      Colon cancer      Hypothyroidism      History of emphysema      Pneumonia      Anemia      S/P colon resection        Allergies    cefdinir (Diarrhea)    Intolerances      Antimicrobials:    cefTRIAXone   IVPB 1000 milliGRAM(s) IV Intermittent every 24 hours    MEDICATIONS  (STANDING):  albuterol/ipratropium for Nebulization 3 milliLiter(s) Nebulizer every 6 hours  budesonide 160 MICROgram(s)/formoterol 4.5 MICROgram(s) Inhaler 2 Puff(s) Inhalation two times a day  cefTRIAXone   IVPB 1000 milliGRAM(s) IV Intermittent every 24 hours  cyanocobalamin 1000 MICROGram(s) Oral daily  guaiFENesin  milliGRAM(s) Oral every 12 hours  heparin   Injectable 5000 Unit(s) SubCutaneous every 12 hours  levothyroxine 50 MICROGram(s) Oral daily  mirtazapine 7.5 milliGRAM(s) Oral at bedtime  multivitamin 1 Tablet(s) Oral daily  polyethylene glycol 3350 17 Gram(s) Oral two times a day      Vital Signs Last 24 Hrs  T(C): 36.6 (05-02-23 @ 12:55), Max: 37.2 (05-01-23 @ 20:18)  T(F): 97.8 (05-02-23 @ 12:55), Max: 98.9 (05-01-23 @ 20:18)  HR: 84 (05-02-23 @ 12:55) (74 - 94)  BP: 103/56 (05-02-23 @ 12:55) (103/56 - 119/54)  BP(mean): --  RR: 18 (05-02-23 @ 12:55) (17 - 18)  SpO2: 97% (05-02-23 @ 12:55) (95% - 97%)    Physical Exam:    Constitutional  markedly cachectic     HEENT PERRLA EOMI,No pallor or icterus    Neck supple no JVD or LN    Chest Good AE,CTA    CVS  S1 S2     Abd soft BS normal No tenderness     Ext No cyanosis clubbing or edema    IV site no erythema tenderness or discharge    Joints no swelling or LOM    CNS AAO X 3 no focal    Lab Data:                          8.4    42.60 )-----------( 118      ( 02 May 2023 09:59 )             27.8         .Blood Blood-Peripheral  04-27-23   No growth to date.  --  --      .Blood Blood-Peripheral  04-27-23   No growth to date.  --  --        WBC Count: 42.60 (05-02-23 @ 09:59)  WBC Count: 46.04 (05-01-23 @ 07:28)  WBC Count: 47.75 (04-29-23 @ 11:39)  WBC Count: 51.28 (04-28-23 @ 06:53)  WBC Count: 63.32 (04-27-23 @ 13:42)      < from: MR Abdomen No Cont (04.29.23 @ 17:55) >  IMPRESSION:  Limited study.    Several liver lesions, the largest demonstrating interval enlargement   since 7/28/2022. Primary and secondary hepatic neoplasms are diagnostic   considerations.    Iron deposition in liver and spleen.    < end of copied text >

## 2023-05-02 NOTE — PROGRESS NOTE ADULT - ASSESSMENT
89-year-old female           h/o HTN,  hypothyroidism, colon cancer status postresection of the colon,            CLL          presenting with shortness of breath., had  pneumonia last week, was s on  doxycycline.            denies chest pain, fevers, vomiting, abdominal pain,  covid   in January 2023             admitted  for SOB, weakness,  cough, ftt           CTA notes no PE, bilateral pattern concerning for ORESTES,  seen by  ID      *   pna/  CAP  and  probable   ORESTES             pt with cachexia.  wt is   31  kg.  suspect  from ORESTES    *    HTN/  Hypothyroid              lopressor, synthroid          had  h/o  brief  Afib, per  family/  card  to  f.p /  not  on  a/c    *     h/o  CLL                wbc  is  51,000/  heme  dr doan/   rpt  hb is 8   *   h/o  prior Ca   colon/ s/p colectomy          now  with nausea/  anemia/  pt has  been receiving  prbc. ,/ last was few  months ago            gi  d r mary ellen          Pna. ,  on iv  rocephin . per  ID       per  ID/pulm, awiating  sputum afb   MRI abd.  live r lesions/. probable  malignancy     onc  dr doan/ per family, no furter  w/p    awiat  sputum  afb           pt  is dnr          per   daughters,   they  do  not wish  to  pursue  palliative/  hospice  care  , at present        rad< from: CT Angio Chest PE Protocol w/ IV Cont (04.27.23 @ 15:27) >  IMPRESSION:  *  No pulmonary embolism.  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  *  Multilobar consolidation and other opacities, some of which is   increased and likely infectious.  *  There is an enlarging indeterminate left hepatic lesion. MRI abdomen   with IV contrast can be performed for further characterization if   warranted.  --- End of Report ---

## 2023-05-02 NOTE — GOALS OF CARE CONVERSATION - ADVANCED CARE PLANNING - CONVERSATION DETAILS
Discussed above with daughter on arrival [place] at [phone]. Do not want hospice eval presently. DNR is signed.

## 2023-05-02 NOTE — PROVIDER CONTACT NOTE (CRITICAL VALUE NOTIFICATION) - BACKGROUND
89-year-old female with a past medical history hypertension, hypothyroidism, colon cancer status postresection of the colon, CLL presenting with shortness of breath. Patient was diagnosed with pneumonia last week, subsequently put on doxycycline. Patient was told that if she continued to not feel well she should come to the emergency department. Patient woke up this morning saying she just did not feel right
Dx: Chronic Lymphocytic Leukemia
Pt has CLL
Pt with chronic lymphocytic leukemia

## 2023-05-02 NOTE — PROGRESS NOTE ADULT - ASSESSMENT
anemia  hx colon CA s/p resection   CLL  COPD  liver lesion    monitor cbc daily   no overt bleeding  transfuse prn   gentle bowel regimen with miralax, increase to BID  monitor stool count   reg diet as tolerated  poor candidate for endoscopic eval; pt and daughters in agreement   MRI with liver lesions, ?malignancy   no a candidate for systemic therapy per heme/onc  no furhter w/u as per family   will follow  d/w pt and daughter at bedside    I reviewed the overnight course of events on the unit, re-confirming the patient history. I discussed the care with the patient and their family  The plan of care was discussed with the physician assistant and modifications were made to the notation where appropriate.   Differential diagnosis and plan of care discussed with patient after the evaluation  35 minutes spent on total encounter of which more than fifty percent of the encounter was spent counseling and/or coordinating care by the attending physician.  Advanced care planning was discussed with patient and family.  Advanced care planning forms were reviewed and discussed.  Risks, benefits and alternatives of gastroenterologic procedures were discussed in detail and all questions were answered.

## 2023-05-02 NOTE — PROGRESS NOTE ADULT - SUBJECTIVE AND OBJECTIVE BOX
Date of Service: 05-02-23 @ 06:59           CARDIOLOGY     PROGRESS  NOTE   ________________________________________________    CHIEF COMPLAINT:Patient is a 89y old  Female who presents with a chief complaint of Weakness and failure to thrive (01 May 2023 18:04)  still with sob  	  REVIEW OF SYSTEMS:  CONSTITUTIONAL: No fever, weight loss, or fatigue  EYES: No eye pain, visual disturbances, or discharge  ENT:  No difficulty hearing, tinnitus, vertigo; No sinus or throat pain  NECK: No pain or stiffness  RESPIRATORY: No cough, wheezing, chills or hemoptysis; + Shortness of Breath  CARDIOVASCULAR: No chest pain, palpitations, passing out, dizziness, or leg swelling  GASTROINTESTINAL: No abdominal or epigastric pain. No nausea, vomiting, or hematemesis; No diarrhea or constipation. No melena or hematochezia.  GENITOURINARY: No dysuria, frequency, hematuria, or incontinence  NEUROLOGICAL: No headaches, memory loss, loss of strength, numbness, or tremors  SKIN: No itching, burning, rashes, or lesions   LYMPH Nodes: No enlarged glands  ENDOCRINE: No heat or cold intolerance; No hair loss  MUSCULOSKELETAL: No joint pain or swelling; No muscle, back, or extremity pain  PSYCHIATRIC: No depression, anxiety, mood swings, or difficulty sleeping  HEME/LYMPH: No easy bruising, or bleeding gums  ALLERGY AND IMMUNOLOGIC: No hives or eczema	    [ ] All others negative	  [ ] Unable to obtain    PHYSICAL EXAM:  T(C): 37 (05-02-23 @ 04:17), Max: 37.2 (05-01-23 @ 20:18)  HR: 74 (05-02-23 @ 04:17) (74 - 94)  BP: 119/54 (05-02-23 @ 04:17) (107/65 - 119/54)  RR: 18 (05-02-23 @ 04:17) (17 - 18)  SpO2: 97% (05-02-23 @ 04:17) (95% - 100%)  Wt(kg): --  I&O's Summary    30 Apr 2023 07:01  -  01 May 2023 07:00  --------------------------------------------------------  IN: 720 mL / OUT: 500 mL / NET: 220 mL    01 May 2023 07:01  -  02 May 2023 06:59  --------------------------------------------------------  IN: 420 mL / OUT: 675 mL / NET: -255 mL        Appearance: Normal	  HEENT:   Normal oral mucosa, PERRL, EOMI	  Lymphatic: No lymphadenopathy  Cardiovascular: Normal S1 S2, No JVD, + murmurs, No edema  Respiratory:rhonchi  Psychiatry: A & O x 3, Mood & affect appropriate  Gastrointestinal:  Soft, Non-tender, + BS	  Skin: No rashes, No ecchymoses, No cyanosis	  Neurologic: Non-focal  Extremities: Normal range of motion, No clubbing, cyanosis or edema  Vascular: Peripheral pulses palpable 2+ bilaterally    MEDICATIONS  (STANDING):  albuterol/ipratropium for Nebulization 3 milliLiter(s) Nebulizer every 6 hours  budesonide 160 MICROgram(s)/formoterol 4.5 MICROgram(s) Inhaler 2 Puff(s) Inhalation two times a day  cefTRIAXone   IVPB 1000 milliGRAM(s) IV Intermittent every 24 hours  cyanocobalamin 1000 MICROGram(s) Oral daily  guaiFENesin  milliGRAM(s) Oral every 12 hours  heparin   Injectable 5000 Unit(s) SubCutaneous every 12 hours  levothyroxine 50 MICROGram(s) Oral daily  mirtazapine 7.5 milliGRAM(s) Oral at bedtime  multivitamin 1 Tablet(s) Oral daily  polyethylene glycol 3350 17 Gram(s) Oral two times a day      TELEMETRY: 	    ECG:  	  RADIOLOGY:  OTHER: 	  	  LABS:	 	    CARDIAC MARKERS:                                8.2    46.04 )-----------( 122      ( 01 May 2023 07:28 )             27.3           proBNP:   Lipid Profile:   HgA1c:   TSH: Thyroid Stimulating Hormone, Serum: 0.78 uIU/mL (04-28 @ 06:54)    < from: CT Angio Chest PE Protocol w/ IV Cont (04.27.23 @ 15:27) >  *  No pulmonary embolism.  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  *  Multilobar consolidation and other opacities, some of which is   increased and likely infectious.  *  There is an enlarging indeterminate left hepatic lesion. MRI abdomen   with IV contrast can be performed for further characterization if   warranted.      Assessment and plan  ---------------------------  89-year-old female PMH of hypertension, hypothyroidism, colon cancer status postresection of the colon, CLL, COPD presenting with shortness of breath. Patient was diagnosed with pneumonia last week, subsequently put on doxycycline. Patient was told that if she continued to not feel well she should come to the emergency department. Patient woke up this morning saying she just did not feel right. Patient denies chest pain, fevers, vomiting, abdominal pain. Patient had COVID back in January and since has had a intermittent oxygen requirement. Over the last week patient has been using oxygen due to low oxygen saturation while being treated for pneumonia.  Patient also states her appetite has been very poor recently  pt with sig medical hx with FTT, sig weight loss and sob  pt was admitted with COVID and had a episode of a,fib >45 minutes  i discussed with daughters doubt pt is a candidate for AC  agree to continue beta blocker  echo noted, no need to repeat echo  may add Remeron 7.5 mg qhs to increase appetite  nutrition eval  severe protein deficiency  pt with PAF not a candidate for AC  oob to chair as tolerated  continue ABX/ ID noted  nutrition eval  add duoneb

## 2023-05-02 NOTE — PROVIDER CONTACT NOTE (CRITICAL VALUE NOTIFICATION) - ASSESSMENT
pt stable at this time
Patient alert and responsive. No acute distress noted.
Pt is A/Ox3. VS are stable
A&Ox4, no complaints

## 2023-05-02 NOTE — PROVIDER CONTACT NOTE (CRITICAL VALUE NOTIFICATION) - PERSON GIVING RESULT:
Beena ontiveros.
[facility] - [facility]
PAT Guerrero
[facility]

## 2023-05-03 ENCOUNTER — TRANSCRIPTION ENCOUNTER (OUTPATIENT)
Age: 88
End: 2023-05-03

## 2023-05-03 LAB
BLD GP AB SCN SERPL QL: NEGATIVE — SIGNIFICANT CHANGE UP
HCT VFR BLD CALC: 26.1 % — LOW (ref 34.5–45)
HGB BLD-MCNC: 7.7 G/DL — LOW (ref 11.5–15.5)
MCHC RBC-ENTMCNC: 29.5 GM/DL — LOW (ref 32–36)
MCHC RBC-ENTMCNC: 32.5 PG — SIGNIFICANT CHANGE UP (ref 27–34)
MCV RBC AUTO: 110.1 FL — HIGH (ref 80–100)
NRBC # BLD: 0 /100 WBCS — SIGNIFICANT CHANGE UP (ref 0–0)
PLATELET # BLD AUTO: 106 K/UL — LOW (ref 150–400)
RBC # BLD: 2.37 M/UL — LOW (ref 3.8–5.2)
RBC # FLD: 16.1 % — HIGH (ref 10.3–14.5)
RH IG SCN BLD-IMP: NEGATIVE — SIGNIFICANT CHANGE UP
RH IG SCN BLD-IMP: NEGATIVE — SIGNIFICANT CHANGE UP
WBC # BLD: 38.32 K/UL — HIGH (ref 3.8–10.5)
WBC # FLD AUTO: 38.32 K/UL — HIGH (ref 3.8–10.5)

## 2023-05-03 RX ADMIN — Medication 5 MILLIGRAM(S): at 12:04

## 2023-05-03 RX ADMIN — Medication 50 MICROGRAM(S): at 06:36

## 2023-05-03 RX ADMIN — BUDESONIDE AND FORMOTEROL FUMARATE DIHYDRATE 2 PUFF(S): 160; 4.5 AEROSOL RESPIRATORY (INHALATION) at 19:49

## 2023-05-03 RX ADMIN — PREGABALIN 1000 MICROGRAM(S): 225 CAPSULE ORAL at 12:04

## 2023-05-03 RX ADMIN — POLYETHYLENE GLYCOL 3350 17 GRAM(S): 17 POWDER, FOR SOLUTION ORAL at 12:05

## 2023-05-03 RX ADMIN — BUDESONIDE AND FORMOTEROL FUMARATE DIHYDRATE 2 PUFF(S): 160; 4.5 AEROSOL RESPIRATORY (INHALATION) at 06:36

## 2023-05-03 RX ADMIN — Medication 1 TABLET(S): at 12:04

## 2023-05-03 RX ADMIN — Medication 1 TABLET(S): at 09:48

## 2023-05-03 RX ADMIN — HEPARIN SODIUM 5000 UNIT(S): 5000 INJECTION INTRAVENOUS; SUBCUTANEOUS at 06:37

## 2023-05-03 RX ADMIN — Medication 1 TABLET(S): at 19:49

## 2023-05-03 RX ADMIN — Medication 3 MILLILITER(S): at 00:30

## 2023-05-03 RX ADMIN — HEPARIN SODIUM 5000 UNIT(S): 5000 INJECTION INTRAVENOUS; SUBCUTANEOUS at 19:50

## 2023-05-03 RX ADMIN — Medication 600 MILLIGRAM(S): at 06:36

## 2023-05-03 RX ADMIN — Medication 3 MILLILITER(S): at 06:35

## 2023-05-03 RX ADMIN — POLYETHYLENE GLYCOL 3350 17 GRAM(S): 17 POWDER, FOR SOLUTION ORAL at 19:48

## 2023-05-03 NOTE — PROGRESS NOTE ADULT - SUBJECTIVE AND OBJECTIVE BOX
Date of Service: 05-03-23 @ 06:45           CARDIOLOGY     PROGRESS  NOTE   ________________________________________________    CHIEF COMPLAINT:Patient is a 89y old  Female who presents with a chief complaint of Weakness and failure to thrive (02 May 2023 14:11)  no complain  	  REVIEW OF SYSTEMS:  CONSTITUTIONAL: No fever, weight loss, or fatigue  EYES: No eye pain, visual disturbances, or discharge  ENT:  No difficulty hearing, tinnitus, vertigo; No sinus or throat pain  NECK: No pain or stiffness  RESPIRATORY: No cough, wheezing, chills or hemoptysis; decrease  Shortness of Breath  CARDIOVASCULAR: No chest pain, palpitations, passing out, dizziness, or leg swelling  GASTROINTESTINAL: No abdominal or epigastric pain. No nausea, vomiting, or hematemesis; No diarrhea or constipation. No melena or hematochezia.  GENITOURINARY: No dysuria, frequency, hematuria, or incontinence  NEUROLOGICAL: No headaches, memory loss, loss of strength, numbness, or tremors  SKIN: No itching, burning, rashes, or lesions   LYMPH Nodes: No enlarged glands  ENDOCRINE: No heat or cold intolerance; No hair loss  MUSCULOSKELETAL: No joint pain or swelling; No muscle, back, or extremity pain  PSYCHIATRIC: No depression, anxiety, mood swings, or difficulty sleeping  HEME/LYMPH: No easy bruising, or bleeding gums  ALLERGY AND IMMUNOLOGIC: No hives or eczema	    [ ] All others negative	  [x ] Unable to obtain    PHYSICAL EXAM:  T(C): 36.6 (05-03-23 @ 04:08), Max: 37 (05-02-23 @ 20:16)  HR: 76 (05-03-23 @ 04:08) (76 - 91)  BP: 104/62 (05-03-23 @ 04:08) (93/53 - 111/52)  RR: 18 (05-03-23 @ 04:08) (18 - 18)  SpO2: 97% (05-03-23 @ 04:08) (97% - 97%)  Wt(kg): --  I&O's Summary    01 May 2023 07:01  -  02 May 2023 07:00  --------------------------------------------------------  IN: 420 mL / OUT: 675 mL / NET: -255 mL        Appearance: Normal	  HEENT:   Normal oral mucosa, PERRL, EOMI	  Lymphatic: No lymphadenopathy  Cardiovascular: Normal S1 S2, No JVD, + murmurs, No edema  Respiratory: rhonchi  Psychiatry: A & O x 3, Mood & affect appropriate  Gastrointestinal:  Soft, Non-tender, + BS	  Skin: No rashes, No ecchymoses, No cyanosis	  Neurologic: Non-focal  Extremities: Normal range of motion, No clubbing, cyanosis or edema  Vascular: Peripheral pulses palpable 2+ bilaterally    MEDICATIONS  (STANDING):  albuterol/ipratropium for Nebulization 3 milliLiter(s) Nebulizer every 6 hours  budesonide 160 MICROgram(s)/formoterol 4.5 MICROgram(s) Inhaler 2 Puff(s) Inhalation two times a day  cyanocobalamin 1000 MICROGram(s) Oral daily  guaiFENesin  milliGRAM(s) Oral every 12 hours  heparin   Injectable 5000 Unit(s) SubCutaneous every 12 hours  lactobacillus acidophilus 1 Tablet(s) Oral two times a day with meals  levothyroxine 50 MICROGram(s) Oral daily  mirtazapine 7.5 milliGRAM(s) Oral at bedtime  multivitamin 1 Tablet(s) Oral daily  polyethylene glycol 3350 17 Gram(s) Oral two times a day      TELEMETRY: 	    ECG:  	  RADIOLOGY:  OTHER: 	  	  LABS:	 	    CARDIAC MARKERS:                                8.4    42.60 )-----------( 118      ( 02 May 2023 09:59 )             27.8           proBNP:   Lipid Profile:   HgA1c:   TSH: Thyroid Stimulating Hormone, Serum: 0.78 uIU/mL (04-28 @ 06:54)      Assessment and plan  ---------------------------  89-year-old female PMH of hypertension, hypothyroidism, colon cancer status postresection of the colon, CLL, COPD presenting with shortness of breath. Patient was diagnosed with pneumonia last week, subsequently put on doxycycline. Patient was told that if she continued to not feel well she should come to the emergency department. Patient woke up this morning saying she just did not feel right. Patient denies chest pain, fevers, vomiting, abdominal pain. Patient had COVID back in January and since has had a intermittent oxygen requirement. Over the last week patient has been using oxygen due to low oxygen saturation while being treated for pneumonia.  Patient also states her appetite has been very poor recently  pt with sig medical hx with FTT, sig weight loss and sob  pt was admitted with COVID and had a episode of a,fib >45 minutes  i discussed with daughters doubt pt is a candidate for AC  agree to continue beta blocker  echo noted, no need to repeat echo  may add Remeron 7.5 mg qhs to increase appetite  nutrition eval  severe protein deficiency  pt with PAF not a candidate for AC  oob to chair as tolerated  continue ABX/ ID noted  nutrition eval  add duoneb/ oob to chair  no av blocking agents

## 2023-05-03 NOTE — PROGRESS NOTE ADULT - SUBJECTIVE AND OBJECTIVE BOX
89-year-old female PMH of hypertension, hypothyroidism, colon cancer status postresection of the colon, CLL, COPD admitted 4/27/23 with shortness of breath. Patient was diagnosed with pneumonia one  week back, put on doxycycline. Patient continued to not feel well and came  to ER. She had COVID 19 in Jan 2023 and needs oxygen sometimes since then. Appetite has been poor    PAST MEDICAL & SURGICAL HISTORY:  CLL (chronic lymphocytic leukemia)      Colon cancer      Hypothyroidism      History of emphysema      Pneumonia      Anemia      S/P colon resection        Allergies    cefdinir (Diarrhea)    Intolerances      Social History:    Medications:  albuterol    90 MICROgram(s) HFA Inhaler 2 Puff(s) Inhalation every 6 hours PRN Shortness of Breath and/or Wheezing  budesonide 160 MICROgram(s)/formoterol 4.5 MICROgram(s) Inhaler 2 Puff(s) Inhalation two times a day  cyanocobalamin 1000 MICROGram(s) Oral daily  heparin   Injectable 5000 Unit(s) SubCutaneous every 12 hours  lactobacillus acidophilus 1 Tablet(s) Oral two times a day with meals  levothyroxine 50 MICROGram(s) Oral daily  mirtazapine 7.5 milliGRAM(s) Oral at bedtime  multivitamin 1 Tablet(s) Oral daily  polyethylene glycol 3350 17 Gram(s) Oral two times a day    Labs:  CBC Full  -  ( 03 May 2023 08:46 )  WBC Count : 38.32 K/uL  RBC Count : 2.37 M/uL  Hemoglobin : 7.7 g/dL  Hematocrit : 26.1 %  Platelet Count - Automated : 106 K/uL  Mean Cell Volume : 110.1 fl  Mean Cell Hemoglobin : 32.5 pg  Mean Cell Hemoglobin Concentration : 29.5 gm/dL  Auto Neutrophil # : x  Auto Lymphocyte # : x  Auto Monocyte # : x  Auto Eosinophil # : x  Auto Basophil # : x  Auto Neutrophil % : x  Auto Lymphocyte % : x  Auto Monocyte % : x  Auto Eosinophil % : x  Auto Basophil % : x    Platelet Count - Automated: 106 K/uL (05.03.23 @ 08:46)   Platelet Count - Automated: 118 K/uL (05.02.23 @ 09:59)   Platelet Count - Automated: 122 K/uL (05.01.23 @ 07:28)   Platelet Count - Automated: 160 K/uL (04.29.23 @ 11:39)   Platelet Count - Automated: 166 K/uL (04.28.23 @ 06:53)   Platelet Count - Automated: 224 K/uL (04.27.23 @ 13:42)   Platelet Count - Automated: 186 K/uL (01.19.23 @ 07:30)   Platelet Count - Automated: 131 K/uL (01.13.23 @ 11:48)   Platelet Count - Automated: 122: Test Repeated Specimen integrity verified. K/uL (01.13.23 @ 07:32)   Platelet Count - Automated: 99: Verified on smear Specimen integrity verified. K/uL (01.12.23 @ 07:56)     Carcinoembryonic Antigen: 2.7: Method: Roche Electrochemiluminescence Immuno Assay     Radiology:             ROS:  Patient comfortable without distress  General weakness  No SOB or chest pain  No palpitation  No abdominal pain, diarrhaea or constipation  No weakness of extremities  No skin changes or swelling of legs  Rest of the comprehensive ROS was negative  Vital Signs Last 24 Hrs  T(C): 36.6 (03 May 2023 04:08), Max: 37 (02 May 2023 20:16)  T(F): 97.8 (03 May 2023 04:08), Max: 98.6 (02 May 2023 20:16)  HR: 76 (03 May 2023 04:08) (76 - 91)  BP: 104/62 (03 May 2023 04:08) (93/53 - 111/52)  BP(mean): --  RR: 18 (03 May 2023 04:08) (18 - 18)  SpO2: 97% (03 May 2023 04:08) (97% - 97%)    Parameters below as of 03 May 2023 04:08  Patient On (Oxygen Delivery Method): nasal cannula        Physical exam:  Patient alert   No distress, cachectic  CVS: S1, S2 regular or murmur  Chest: bilateral breath sound without rales  Abdomen: soft, not tender, no organomegaly or masses  CNS: No focal neuro deficit  Musculoskeletal:  Normal range of motion  Skin: No rash    Assessment and Plan:

## 2023-05-03 NOTE — PROGRESS NOTE ADULT - SUBJECTIVE AND OBJECTIVE BOX
[facility]  Jerod Waddell PA-C  [address] [place] [address]  [phone]      INTERVAL HPI/OVERNIGHT EVENTS:  pt seen and examined, no new events  no BM    MEDICATIONS  (STANDING):  albuterol/ipratropium for Nebulization 3 milliLiter(s) Nebulizer every 6 hours  budesonide 160 MICROgram(s)/formoterol 4.5 MICROgram(s) Inhaler 2 Puff(s) Inhalation two times a day  cefTRIAXone   IVPB 1000 milliGRAM(s) IV Intermittent every 24 hours  cyanocobalamin 1000 MICROGram(s) Oral daily  guaiFENesin  milliGRAM(s) Oral every 12 hours  heparin   Injectable 5000 Unit(s) SubCutaneous every 12 hours  levothyroxine 50 MICROGram(s) Oral daily  mirtazapine 7.5 milliGRAM(s) Oral at bedtime  multivitamin 1 Tablet(s) Oral daily  polyethylene glycol 3350 17 Gram(s) Oral daily    MEDICATIONS  (PRN):  albuterol    90 MICROgram(s) HFA Inhaler 2 Puff(s) Inhalation every 6 hours PRN Shortness of Breath and/or Wheezing      Allergies    cefdinir (Diarrhea)    Intolerances        ROS:   General:  No wt loss, fevers, chills, night sweats, fatigue,   Eyes:  Good vision, no reported pain  ENT:  No sore throat, pain, runny nose, dysphagia  CV:  No pain, palpitations, hypo/hypertension  Resp:  No dyspnea, cough, tachypnea, wheezing  GI:  No pain, No nausea, No vomiting, No diarrhea, + constipation, No weight loss, No fever, No pruritis, No rectal bleeding, No tarry stools, No dysphagia,  :  No pain, bleeding, incontinence, nocturia  Muscle:  No pain, weakness  Neuro:  No weakness, tingling, memory problems  Psych:  No fatigue, insomnia, mood problems, depression  Endocrine:  No polyuria, polydipsia, cold/heat intolerance  Heme:  No petechiae, ecchymosis, easy bruisability  Skin:  No rash, tattoos, scars, edema      PHYSICAL EXAM:   Vital Signs Last 24 Hrs  T(C): 36.6 (03 May 2023 04:08), Max: 37 (02 May 2023 20:16)  T(F): 97.8 (03 May 2023 04:08), Max: 98.6 (02 May 2023 20:16)  HR: 76 (03 May 2023 04:08) (76 - 91)  BP: 104/62 (03 May 2023 04:08) (93/53 - 111/52)  BP(mean): --  RR: 18 (03 May 2023 04:08) (18 - 18)  SpO2: 97% (03 May 2023 04:08) (97% - 97%)    Parameters below as of 03 May 2023 04:08  Patient On (Oxygen Delivery Method): nasal cannula      Daily     Daily     GENERAL:  Appears stated age,   HEENT:  NC/AT,    CHEST:  Full & symmetric excursion,   HEART:  Regular rhythm,  ABDOMEN:  Soft, non-tender, non-distended,  EXTEREMITIES:  no cyanosis  SKIN:  No rash  NEURO:  Alert,       LABS:                        7.7    38.32 )-----------( 106      ( 03 May 2023 08:46 )             26.1       RADIOLOGY & ADDITIONAL TESTS:    < from: MR Abdomen No Cont (04.29.23 @ 17:55) >    ACC: [id number] EXAM:  MR ABDOMEN   ORDERED BY: FRANKIE HUFF     PROCEDURE DATE:  04/29/2023          INTERPRETATION:  CLINICAL INFORMATION: CLL, colon cancer. Liver lesion.    COMPARISON: CT abdomen pelvis dated 7/28/2022.    CONTRAST/COMPLICATIONS:  IV Contrast: None  Oral Contrast: None  Complications: None    PROCEDURE:  MRI of the abdomen was performed.  MRCP was performed.  Limited due to patient motion and lack of IV contrast.    FINDINGS:  LOWER CHEST: Bilateral pleural effusions. Cardiomegaly.    LIVER: Decreased T2 signal intensity suggesting iron deposition. Several   discrete liver lesions. The largest is in segment 3 (6, 20) measuring 2.9   x 2.0 cm, previously 1.7 x 1.1 cm. It demonstrates mild T2   hyperintensity, T1 hypointensity and diffusion restriction.  BILE DUCTS: Normal caliber.  GALLBLADDER: Within normal limits.  SPLEEN: Iron deposition.  PANCREAS: Parenchymal atrophy.  ADRENALS: Within normal limits.  KIDNEYS/URETERS: Small bilateral renal cortical cysts. Nohydronephrosis.    VISUALIZED PORTIONS:  BOWEL: Within normal limits.  PERITONEUM: No ascites.  VESSELS: Atheromatous ectasia of the aorta.  RETROPERITONEUM/LYMPH NODES: No lymphadenopathy.  ABDOMINAL WALL: Cachexia.  BONES: Diffuse marrow infiltration compatible with history of CLL.    IMPRESSION:  Limited study.    Several liver lesions, the largest demonstrating interval enlargement   since 7/28/2022. Primary and secondary hepatic neoplasms are diagnostic   considerations.    Iron deposition in liver and spleen.

## 2023-05-03 NOTE — PROGRESS NOTE ADULT - ASSESSMENT
89-year-old female PMH of hypertension, hypothyroidism, colon cancer status postresection of the colon, CLL, COPD admitted 4/27/23 with shortness of breath. Patient was diagnosed with pneumonia one  week back, put on doxycycline. Patient continued to not feel well and came  to ER. She had COVID 19 in Jan 2023 and needs oxygen sometimes since then. Appetite has been poor  She has been seen by ID and is on abx    Pt was diagnosed with CLL 5/2014 and has been on surveillance but has been more anemic since Jan 2021. 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  MRI compatible with malignancy.  Discussed with patient's daughter at bedside. Who stated that they do not want any further investigations or treatment for malignancy as the mother will not be able to tolerate it. She wase 89 on 5/2/23. This is appropriate as clinically by age and PS she is not a candidate for systemic therapy either and hence further evaluation is not needed, she  likely has metastatic colon cancer  Patient is DNR   Concern was raised for lower platelet count than at admission. It has been noticed to be in similar range before too. Variation may be because of reactive causes. She can also have thrombocytopenia from CLL.  However will not evaluate further unless there is precipitous drop or platelets go to unsafe levels as management approach in her is conservative and supportive with no active treatment planned 89-year-old female PMH of hypertension, hypothyroidism, colon cancer status postresection of the colon, CLL, COPD admitted 4/27/23 with shortness of breath. Patient was diagnosed with pneumonia one  week back, put on doxycycline. Patient continued to not feel well and came  to ER. She had COVID 19 in Jan 2023 and needs oxygen sometimes since then. Appetite has been poor  She has been seen by ID and is on abx    Pt was diagnosed with CLL 5/2014 and has been on surveillance but has been more anemic since Jan 2021. 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  MRI compatible with malignancy.  Discussed with patient's daughter at bedside. Who stated that they do not want any further investigations or treatment for malignancy as the mother will not be able to tolerate it. She wase 89 on 5/2/23. This is appropriate as clinically by age and PS she is not a candidate for systemic therapy either and hence further evaluation is not needed, she  likely has metastatic colon cancer  Patient is DNR   Concern was raised for lower platelet count than at admission. It has been noticed to be in similar range before too. Variation may be because of reactive causes. She can also have thrombocytopenia from CLL.  However will not evaluate further unless there is precipitous drop or platelets go to unsafe levels as management approach in her is conservative and supportive with no active treatment planned.  She is receiving one unit PRBC before dc so that she does not need to come back in near future for PRBC

## 2023-05-03 NOTE — DISCHARGE NOTE PROVIDER - HOSPITAL COURSE
89-year-old female with PMHx of HTN,  hypothyroidism, colon cancer status postresection of the colon, CLL presented to ED with shortness of breath. Of note, had recent pneumonia infection. Patient was admitted  further medical management of shortness of breath, weakness, cough, FTT. CTA was obtained revealing no PE, bilateral pattern concerning for ORESTES. Patient was seen by  ID recommending patient has chronic lung disease and likely NTM, now with acute worsening , likely a bacterial pneumonia with chronic NTM. Patient was treated for bacterial pneumonia with IV rocephin    Heme/onc was consulted on this admission given hx of CLL 5/2014 and has been on surveillance but has been more anemic since Jan 2021. Family had decided that considering her age and PS, they will not go for any systemic therapy. MRI compatible with malignancy. Per patient's daughter - they do not want any further investigations or treatment for malignancy as the patient will not be able to tolerate it. This was deemed appropriate as clinically by age and PS, she is not a candidate for systemic therapy either and hence further evaluation is not needed, she  likely has metastatic colon cancer. Patient is DNR. Concern was raised for lower platelet count compared to level at admission. It has been noticed to be in similar range before too. Variation may be because of reactive causes. She can also have thrombocytopenia from CLL. Per heme/onc - will not evaluate further unless there is precipitous drop or if platelets go to unsafe levels. Family wishes to take patient home and not rehab.    Discharge/Dispo/Med rec discussed with attending  ____. Patient medically cleared for discharge home with outpatient follow up with PCP. 89-year-old female with PMHx of HTN,  hypothyroidism, colon cancer status postresection of the colon, CLL presented to ED with shortness of breath. Of note, had recent pneumonia infection. Patient was admitted  further medical management of shortness of breath, weakness, cough, FTT. CTA was obtained revealing no PE, bilateral pattern concerning for ORESTES. Patient was seen by  ID recommending patient has chronic lung disease and likely NTM, now with acute worsening , likely a bacterial pneumonia with chronic NTM. Patient was treated for bacterial pneumonia with IV rocephin    Heme/onc was consulted on this admission given hx of CLL 5/2014 and has been on surveillance but has been more anemic since Jan 2021. Family had decided that considering her age and PS, they will not go for any systemic therapy. MRI compatible with malignancy. Per patient's daughter - they do not want any further investigations or treatment for malignancy as the patient will not be able to tolerate it. This was deemed appropriate as clinically by age and PS, she is not a candidate for systemic therapy either and hence further evaluation is not needed, she  likely has metastatic colon cancer. Patient is DNR. Concern was raised for lower platelet count compared to level at admission. It has been noticed to be in similar range before too. Variation may be because of reactive causes. She can also have thrombocytopenia from CLL. Per heme/onc - will not evaluate further unless there is precipitous drop or if platelets go to unsafe levels. Family wishes to take patient home and not rehab.    Discharge/Dispo/Med rec discussed with attending Dr. Medrano. Patient medically cleared for discharge home with outpatient follow up with PCP. 5-year-old female           h/o HTN,  hypothyroidism, colon cancer status postresection of the colon,            CLL          presenting with shortness of breath., had  pneumonia last week, was s on  doxycycline.            denies chest pain, fevers, vomiting, abdominal pain,  covid   in January 2023             admitted  for SOB, weakness,  cough, ftt           CTA notes no PE, bilateral pattern concerning for ORESTES,  seen by  ID      *   pna/  CAP  and  probable   ORESTES             pt with cachexia.  wt is   31  kg.  suspect  from ORESTES    *    HTN/  Hypothyroid              lopressor, synthroid          had  h/o  brief  Afib, per  family/  card  to  f.p /  not  on  a/c    *     h/o  CLL                wbc  is  51,000/  heme  dr doan/   rpt  hb is 8   *   h/o  prior Ca   colon/ s/p colectomy          now  with nausea/  anemia/  pt has  been receiving  prbc. ,/ last was few  months ago            gi  d r mary ellen          Pna. , was  on iv  rocephin . per  ID   MRI abd.  liver  lesions/. probable  malignancy    pt  most;ly  bed  bound/  functional  quadriplegia   cachexia.   severe protein  calorie  malnutrition form orestes.  malignancy      onc  dr doan/ per family, no furter  w/p     low  plts noted.  wa s 99,000  ij jan 2023.  suspect  from  CLL/ and pe r heme,  no  w/p  needed    prbc  per heme, on  5/3,     family  wishes  to  take  pt home and not  rehab    spoke  to  2 daughter s this  am/  pt  remains  constipated,  despite laxatives. fleet  enema  now\ and  also  stated, pt  need s O 2  set  for   d/c / transport      team  aware           pt  is dnr          per   daughters,   they  do  not wish  to  pursue  palliative/  hospice  care  , at present       guarded prognosis/  malignancy

## 2023-05-03 NOTE — DISCHARGE NOTE PROVIDER - NSDCCPCAREPLAN_GEN_ALL_CORE_FT
PRINCIPAL DISCHARGE DIAGNOSIS  Diagnosis: Bacterial pneumonia  Assessment and Plan of Treatment: Pneumonia is a lung infection that can cause a fever, cough, and trouble breathing.  You were treated with a course of IV antibiotics during hospital course.  Nutrition is important, eat small frequent meals.  Get lots of rest and drink fluids.  Call your health care provider upon arrival home from hospital and make a follow up appointment for one week.  If your cough worsens, you develop fever greater than 101', you have shaking chills, a fast heartbeat, trouble breathing and/or feel your are breathing much faster than usual, call your healthcare provider.  Make sure you wash your hands frequently.        SECONDARY DISCHARGE DIAGNOSES  Diagnosis: HTN (hypertension)  Assessment and Plan of Treatment: Continue to follow a low salt/sodium diet.  Perform physical activities as tolerated in consultation with your Primary Care Provider and physical therapist.  Take all medications as prescribed.  Follow up with your medical doctor for routine blood pressure monitoring at your next visit.  Notify your doctor if you have any of the following symptoms:  Dizziness, lightheadedness, blurry vision, headache, chest pain, or shortness of breath.      Diagnosis: Hypothyroid  Assessment and Plan of Treatment: Please continue medication as prescribed   Follow-up with your primary care physician within 1 week. Call for appointment.  Please bring all discharge paperwork and list of medications to all follow up appointments  Please call for follow up appointments one day after discharge      Diagnosis: CLL (chronic lymphocytic leukemia)  Assessment and Plan of Treatment: Patient is not a candidate for systemic therapy and hence further evaluation deferred.   Please continue medication as prescribed and follow up with your PCP within 1 week from discharge

## 2023-05-03 NOTE — PROGRESS NOTE ADULT - ASSESSMENT
89-year-old female           h/o HTN,  hypothyroidism, colon cancer status postresection of the colon,            CLL          presenting with shortness of breath., had  pneumonia last week, was s on  doxycycline.            denies chest pain, fevers, vomiting, abdominal pain,  covid   in January 2023             admitted  for SOB, weakness,  cough, ftt           CTA notes no PE, bilateral pattern concerning for ORESTES,  seen by  ID      *   pna/  CAP  and  probable   ORESTES             pt with cachexia.  wt is   31  kg.  suspect  from ORESTES    *    HTN/  Hypothyroid              lopressor, synthroid          had  h/o  brief  Afib, per  family/  card  to  f.p /  not  on  a/c    *     h/o  CLL                wbc  is  51,000/  heme  dr doan/   rpt  hb is 8   *   h/o  prior Ca   colon/ s/p colectomy          now  with nausea/  anemia/  pt has  been receiving  prbc. ,/ last was few  months ago            gi  d r mary ellen          Pna. , was  on iv  rocephin . per  ID   MRI abd.  live r lesions/. probable  malignancy     onc  dr doan/ per family, no furter  w/p     low  plts noted.  wa s 99,000  ij jan 2023.  suspect  from  CLL    heme to  f/p, cbc  pending pe r iD,  ok  for   d/c     family  wishes  to  take  pt home and not  rehab           pt  is dnr          per   daughters,   they  do  not wish  to  pursue  palliative/  hospice  care  , at present        rad< from: CT Angio Chest PE Protocol w/ IV Cont (04.27.23 @ 15:27) >  IMPRESSION:  *  No pulmonary embolism.  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  *  Multilobar consolidation and other opacities, some of which is   increased and likely infectious.  *  There is an enlarging indeterminate left hepatic lesion. MRI abdomen   with IV contrast can be performed for further characterization if   warranted.  --- End of Report ---

## 2023-05-03 NOTE — DISCHARGE NOTE PROVIDER - NSDCFUADDAPPT_GEN_ALL_CORE_FT
APPTS ARE READY TO BE MADE: [x] YES    Best Family or Patient Contact (if needed):    Additional Information about above appointments (if needed):    1:   2:   3:     Other comments or requests:    APPTS ARE READY TO BE MADE: [x] YES    Best Family or Patient Contact (if needed):    Additional Information about above appointments (if needed):    1:   2:   3:     Other comments or requests:   Patient was previously scheduled with Dr. Bora John on 05/11/2023.    Patient was previously scheduled with Dr. Mireya Tobias on 05/18/2023 11:30am at [address] [place] [address].

## 2023-05-03 NOTE — DISCHARGE NOTE PROVIDER - NSDCMRMEDTOKEN_GEN_ALL_CORE_FT
1 Transport Wheelchair: PRN  doxycycline hyclate 100 mg oral capsule: 1 cap(s) orally 2 times a day 10 day course started on 4/21/2023 prior to admission  Incruse Ellipta 62.5 mcg/inh inhalation powder: 1 inhaler(s) inhaled once a day   ipratropium-albuterol 0.5 mg-2.5 mg/3 mL inhalation solution: 3 milliliter(s) by nebulizer 4 times a day  levothyroxine 25 mcg (0.025 mg) oral tablet: 1 tab(s) orally once a day  metoprolol succinate 25 mg oral tablet, extended release: 1 tab(s) orally once a day  ProAir HFA 90 mcg/inh inhalation aerosol: 2 puff(s) inhaled every 4 hours, As Needed -for bronchospasm   sodium chloride 7% inhalation solution: 4 milliliter(s) inhaled 2 times a day   1 Transport Wheelchair: PRN  cyanocobalamin 1000 mcg oral tablet: 1 tab(s) orally once a day  Incruse Ellipta 62.5 mcg/inh inhalation powder: 1 inhaler(s) inhaled once a day   ipratropium-albuterol 0.5 mg-2.5 mg/3 mL inhalation solution: 3 milliliter(s) by nebulizer 4 times a day  lactobacillus acidophilus oral capsule: 1 tab(s) orally 2 times a day .  levothyroxine 50 mcg (0.05 mg) oral tablet: 1 tab(s) orally once a day  mirtazapine 7.5 mg oral tablet: 1 tab(s) orally once a day (at bedtime)  Multiple Vitamins oral tablet: 1 tab(s) orally once a day  polyethylene glycol 3350 oral powder for reconstitution: 17 gram(s) orally 2 times a day  ProAir HFA 90 mcg/inh inhalation aerosol: 2 puff(s) inhaled every 4 hours, As Needed -for bronchospasm    1 Transport Wheelchair: PRN  cyanocobalamin 1000 mcg oral tablet: 1 tab(s) orally once a day  Incruse Ellipta 62.5 mcg/inh inhalation powder: 1 inhaler(s) inhaled once a day   ipratropium-albuterol 0.5 mg-2.5 mg/3 mL inhalation solution: 3 milliliter(s) by nebulizer 4 times a day  lactobacillus acidophilus oral capsule: 1 tab(s) orally 2 times a day .  levothyroxine 50 mcg (0.05 mg) oral tablet: 1 tab(s) orally once a day  mirtazapine 7.5 mg oral tablet: 1 tab(s) orally once a day (at bedtime)  Multiple Vitamins oral tablet: 1 tab(s) orally once a day  polyethylene glycol 3350 oral powder for reconstitution: 17 gram(s) orally 2 times a day  ProAir HFA 90 mcg/inh inhalation aerosol: 2 puff(s) inhaled every 4 hours, As Needed -for bronchospasm   senna leaf extract oral tablet: 2 tab(s) orally once a day (at bedtime)

## 2023-05-03 NOTE — DISCHARGE NOTE PROVIDER - NSDCFUSCHEDAPPT_GEN_ALL_CORE_FT
Everardo Lyles  [facility] Physician [facility]  CARDIOLOGY [address]  Scheduled Appointment: 06/16/2023     Mireya Tobias  [facility] Physician [facility]  PULMMED [address] [facility]  Scheduled Appointment: 05/18/2023    Kristan Tompkins  [facility] Physician [facility]  GERIATRICS [address]   Scheduled Appointment: 06/09/2023    Everardo Lyles  [facility]  CARDIOLOGY [address]  Scheduled Appointment: 06/16/2023

## 2023-05-03 NOTE — DISCHARGE NOTE PROVIDER - CARE PROVIDER_API CALL
Bora John)  Family Medicine  [address] [place] [address]  Phone: [phone]  Fax: [phone]  Follow Up Time:

## 2023-05-03 NOTE — PROGRESS NOTE ADULT - SUBJECTIVE AND OBJECTIVE BOX
date of service: 05-03-23 @ 08:33  afebrile  REVIEW OF SYSTEMS:  CONSTITUTIONAL: No fever,  no  weight loss  ENT:  No  tinnitus,   no   vertigo  NECK: No pain or stiffness  RESPIRATORY: No cough, wheezing, chills or hemoptysis;    No Shortness of Breath  CARDIOVASCULAR: No chest pain, palpitations, dizziness  GASTROINTESTINAL: No abdominal or epigastric pain. No nausea, vomiting, or hematemesis; No diarrhea  No melena or hematochezia.  GENITOURINARY: No dysuria, frequency, hematuria, or incontinence  NEUROLOGICAL: No headaches  SKIN: No itching,  no   rash  LYMPH Nodes: No enlarged glands  ENDOCRINE: No heat or cold intolerance  MUSCULOSKELETAL: No joint pain or swelling  PSYCHIATRIC: No depression, anxiety  HEME/LYMPH: No easy bruising, or bleeding gums  ALLERGY AND IMMUNOLOGIC: No hives or eczema	    MEDICATIONS  (STANDING):  albuterol/ipratropium for Nebulization 3 milliLiter(s) Nebulizer every 6 hours  budesonide 160 MICROgram(s)/formoterol 4.5 MICROgram(s) Inhaler 2 Puff(s) Inhalation two times a day  cyanocobalamin 1000 MICROGram(s) Oral daily  guaiFENesin  milliGRAM(s) Oral every 12 hours  heparin   Injectable 5000 Unit(s) SubCutaneous every 12 hours  lactobacillus acidophilus 1 Tablet(s) Oral two times a day with meals  levothyroxine 50 MICROGram(s) Oral daily  mirtazapine 7.5 milliGRAM(s) Oral at bedtime  multivitamin 1 Tablet(s) Oral daily  polyethylene glycol 3350 17 Gram(s) Oral two times a day    MEDICATIONS  (PRN):  albuterol    90 MICROgram(s) HFA Inhaler 2 Puff(s) Inhalation every 6 hours PRN Shortness of Breath and/or Wheezing      Vital Signs Last 24 Hrs  T(C): 36.6 (03 May 2023 04:08), Max: 37 (02 May 2023 20:16)  T(F): 97.8 (03 May 2023 04:08), Max: 98.6 (02 May 2023 20:16)  HR: 76 (03 May 2023 04:08) (76 - 91)  BP: 104/62 (03 May 2023 04:08) (93/53 - 111/52)  BP(mean): --  RR: 18 (03 May 2023 04:08) (18 - 18)  SpO2: 97% (03 May 2023 04:08) (97% - 97%)    Parameters below as of 03 May 2023 04:08  Patient On (Oxygen Delivery Method): nasal cannula      CAPILLARY BLOOD GLUCOSE        I&O's Summary    02 May 2023 07:01  -  03 May 2023 07:00  --------------------------------------------------------  IN: 0 mL / OUT: 100 mL / NET: -100 mL          Appearance: Normal	  HEENT:   Normal oral mucosa, PERRL, EOMI	  Lymphatic: No lymphadenopathy  Cardiovascular: Normal S1 S2, No JVD  Respiratory: Lungs clear to auscultation	  Gastrointestinal:  Soft, Non-tender, + BS	  Skin: No rash, No ecchymoses	  Extremities:     LABS:                        8.4    42.60 )-----------( 118      ( 02 May 2023 09:59 )             27.8                           Thyroid Stimulating Hormone, Serum: 0.78 uIU/mL (04-28 @ 06:54)          Consultant(s) Notes Reviewed:      Care Discussed with Consultants/Other Providers:

## 2023-05-04 ENCOUNTER — TRANSCRIPTION ENCOUNTER (OUTPATIENT)
Age: 88
End: 2023-05-04

## 2023-05-04 VITALS
HEART RATE: 81 BPM | RESPIRATION RATE: 18 BRPM | TEMPERATURE: 99 F | DIASTOLIC BLOOD PRESSURE: 75 MMHG | OXYGEN SATURATION: 95 % | SYSTOLIC BLOOD PRESSURE: 148 MMHG

## 2023-05-04 PROCEDURE — 85014 HEMATOCRIT: CPT

## 2023-05-04 PROCEDURE — 86985 SPLIT BLOOD OR PRODUCTS: CPT

## 2023-05-04 PROCEDURE — 97116 GAIT TRAINING THERAPY: CPT

## 2023-05-04 PROCEDURE — 84443 ASSAY THYROID STIM HORMONE: CPT

## 2023-05-04 PROCEDURE — 85027 COMPLETE CBC AUTOMATED: CPT

## 2023-05-04 PROCEDURE — 87637 SARSCOV2&INF A&B&RSV AMP PRB: CPT

## 2023-05-04 PROCEDURE — 85025 COMPLETE CBC W/AUTO DIFF WBC: CPT

## 2023-05-04 PROCEDURE — 36415 COLL VENOUS BLD VENIPUNCTURE: CPT

## 2023-05-04 PROCEDURE — 80048 BASIC METABOLIC PNL TOTAL CA: CPT

## 2023-05-04 PROCEDURE — 86923 COMPATIBILITY TEST ELECTRIC: CPT

## 2023-05-04 PROCEDURE — 82803 BLOOD GASES ANY COMBINATION: CPT

## 2023-05-04 PROCEDURE — 85018 HEMOGLOBIN: CPT

## 2023-05-04 PROCEDURE — 83605 ASSAY OF LACTIC ACID: CPT

## 2023-05-04 PROCEDURE — 86850 RBC ANTIBODY SCREEN: CPT

## 2023-05-04 PROCEDURE — 96375 TX/PRO/DX INJ NEW DRUG ADDON: CPT

## 2023-05-04 PROCEDURE — 84145 PROCALCITONIN (PCT): CPT

## 2023-05-04 PROCEDURE — 99285 EMERGENCY DEPT VISIT HI MDM: CPT | Mod: 25

## 2023-05-04 PROCEDURE — 86900 BLOOD TYPING SEROLOGIC ABO: CPT

## 2023-05-04 PROCEDURE — 94640 AIRWAY INHALATION TREATMENT: CPT

## 2023-05-04 PROCEDURE — 80053 COMPREHEN METABOLIC PANEL: CPT

## 2023-05-04 PROCEDURE — 97162 PT EVAL MOD COMPLEX 30 MIN: CPT

## 2023-05-04 PROCEDURE — 71275 CT ANGIOGRAPHY CHEST: CPT | Mod: MA

## 2023-05-04 PROCEDURE — 86480 TB TEST CELL IMMUN MEASURE: CPT

## 2023-05-04 PROCEDURE — 82565 ASSAY OF CREATININE: CPT

## 2023-05-04 PROCEDURE — 86901 BLOOD TYPING SEROLOGIC RH(D): CPT

## 2023-05-04 PROCEDURE — 36430 TRANSFUSION BLD/BLD COMPNT: CPT

## 2023-05-04 PROCEDURE — 82105 ALPHA-FETOPROTEIN SERUM: CPT

## 2023-05-04 PROCEDURE — 97530 THERAPEUTIC ACTIVITIES: CPT

## 2023-05-04 PROCEDURE — 96374 THER/PROPH/DIAG INJ IV PUSH: CPT

## 2023-05-04 PROCEDURE — P9011: CPT

## 2023-05-04 PROCEDURE — 82378 CARCINOEMBRYONIC ANTIGEN: CPT

## 2023-05-04 PROCEDURE — 82947 ASSAY GLUCOSE BLOOD QUANT: CPT

## 2023-05-04 PROCEDURE — 84295 ASSAY OF SERUM SODIUM: CPT

## 2023-05-04 PROCEDURE — 84132 ASSAY OF SERUM POTASSIUM: CPT

## 2023-05-04 PROCEDURE — 74181 MRI ABDOMEN W/O CONTRAST: CPT

## 2023-05-04 PROCEDURE — 82435 ASSAY OF BLOOD CHLORIDE: CPT

## 2023-05-04 PROCEDURE — 71046 X-RAY EXAM CHEST 2 VIEWS: CPT

## 2023-05-04 PROCEDURE — 87040 BLOOD CULTURE FOR BACTERIA: CPT

## 2023-05-04 PROCEDURE — 82330 ASSAY OF CALCIUM: CPT

## 2023-05-04 RX ORDER — LEVOTHYROXINE SODIUM 125 MCG
1 TABLET ORAL
Qty: 30 | Refills: 0
Start: 2023-05-04 | End: 2023-06-02

## 2023-05-04 RX ORDER — PREGABALIN 225 MG/1
1 CAPSULE ORAL
Qty: 30 | Refills: 0
Start: 2023-05-04 | End: 2023-06-02

## 2023-05-04 RX ORDER — METOPROLOL TARTRATE 50 MG
1 TABLET ORAL
Refills: 0 | DISCHARGE

## 2023-05-04 RX ORDER — SENNA PLUS 8.6 MG/1
2 TABLET ORAL AT BEDTIME
Refills: 0 | Status: DISCONTINUED | OUTPATIENT
Start: 2023-05-04 | End: 2023-05-04

## 2023-05-04 RX ORDER — MIRTAZAPINE 45 MG/1
1 TABLET, ORALLY DISINTEGRATING ORAL
Qty: 30 | Refills: 0
Start: 2023-05-04 | End: 2023-06-02

## 2023-05-04 RX ORDER — SENNA PLUS 8.6 MG/1
2 TABLET ORAL
Qty: 0 | Refills: 0 | DISCHARGE
Start: 2023-05-04

## 2023-05-04 RX ORDER — LACTOBACILLUS ACIDOPHILUS 100MM CELL
1 CAPSULE ORAL
Qty: 60 | Refills: 0
Start: 2023-05-04 | End: 2023-06-02

## 2023-05-04 RX ORDER — POLYETHYLENE GLYCOL 3350 17 G/17G
17 POWDER, FOR SOLUTION ORAL
Qty: 0 | Refills: 0 | DISCHARGE
Start: 2023-05-04

## 2023-05-04 RX ORDER — SODIUM CHLORIDE 9 MG/ML
4 INJECTION INTRAMUSCULAR; INTRAVENOUS; SUBCUTANEOUS
Refills: 0 | DISCHARGE

## 2023-05-04 RX ADMIN — PREGABALIN 1000 MICROGRAM(S): 225 CAPSULE ORAL at 08:57

## 2023-05-04 RX ADMIN — Medication 1 ENEMA: at 11:01

## 2023-05-04 RX ADMIN — Medication 50 MICROGRAM(S): at 05:11

## 2023-05-04 RX ADMIN — Medication 1 TABLET(S): at 08:57

## 2023-05-04 RX ADMIN — BUDESONIDE AND FORMOTEROL FUMARATE DIHYDRATE 2 PUFF(S): 160; 4.5 AEROSOL RESPIRATORY (INHALATION) at 05:11

## 2023-05-04 RX ADMIN — HEPARIN SODIUM 5000 UNIT(S): 5000 INJECTION INTRAVENOUS; SUBCUTANEOUS at 05:12

## 2023-05-04 NOTE — PROGRESS NOTE ADULT - PROVIDER SPECIALTY LIST ADULT
Cardiology
Cardiology
Internal Medicine
Cardiology
Cardiology
Gastroenterology
Gastroenterology
Infectious Disease
Internal Medicine
Cardiology
Gastroenterology
Heme/Onc
Infectious Disease
Infectious Disease
Internal Medicine
Heme/Onc
Hospitalist
Internal Medicine

## 2023-05-04 NOTE — PROGRESS NOTE ADULT - NUTRITIONAL ASSESSMENT
This patient has been assessed with a concern for Malnutrition and has been determined to have a diagnosis/diagnoses of Severe protein-calorie malnutrition and Underweight (BMI < 19).    This patient is being managed with:   Diet Regular-  Supplement Feeding Modality:  Oral  Ensure Enlive Cans or Servings Per Day:  1       Frequency:  Daily  Entered: Apr 29 2023 11:18AM  

## 2023-05-04 NOTE — PROGRESS NOTE ADULT - SUBJECTIVE AND OBJECTIVE BOX
[facility]  Jerod Waddell PA-C  [address] [place], [place] [id number] [phone]      INTERVAL HPI/OVERNIGHT EVENTS:  pt seen and examined, no new events  no BM, got dulcolax, yesterday, feels like she'll go today    MEDICATIONS  (STANDING):  albuterol/ipratropium for Nebulization 3 milliLiter(s) Nebulizer every 6 hours  budesonide 160 MICROgram(s)/formoterol 4.5 MICROgram(s) Inhaler 2 Puff(s) Inhalation two times a day  cefTRIAXone   IVPB 1000 milliGRAM(s) IV Intermittent every 24 hours  cyanocobalamin 1000 MICROGram(s) Oral daily  guaiFENesin  milliGRAM(s) Oral every 12 hours  heparin   Injectable 5000 Unit(s) SubCutaneous every 12 hours  levothyroxine 50 MICROGram(s) Oral daily  mirtazapine 7.5 milliGRAM(s) Oral at bedtime  multivitamin 1 Tablet(s) Oral daily  polyethylene glycol 3350 17 Gram(s) Oral daily    MEDICATIONS  (PRN):  albuterol    90 MICROgram(s) HFA Inhaler 2 Puff(s) Inhalation every 6 hours PRN Shortness of Breath and/or Wheezing      Allergies    cefdinir (Diarrhea)    Intolerances        ROS:   General:  No wt loss, fevers, chills, night sweats, fatigue,   Eyes:  Good vision, no reported pain  ENT:  No sore throat, pain, runny nose, dysphagia  CV:  No pain, palpitations, hypo/hypertension  Resp:  No dyspnea, cough, tachypnea, wheezing  GI:  No pain, No nausea, No vomiting, No diarrhea, + constipation, No weight loss, No fever, No pruritis, No rectal bleeding, No tarry stools, No dysphagia,  :  No pain, bleeding, incontinence, nocturia  Muscle:  No pain, weakness  Neuro:  No weakness, tingling, memory problems  Psych:  No fatigue, insomnia, mood problems, depression  Endocrine:  No polyuria, polydipsia, cold/heat intolerance  Heme:  No petechiae, ecchymosis, easy bruisability  Skin:  No rash, tattoos, scars, edema      PHYSICAL EXAM:   Vital Signs Last 24 Hrs  T(C): 36.9 (04 May 2023 05:11), Max: 37 (03 May 2023 20:36)  T(F): 98.4 (04 May 2023 05:11), Max: 98.6 (03 May 2023 20:36)  HR: 62 (04 May 2023 05:11) (62 - 84)  BP: 106/60 (03 May 2023 23:23) (100/44 - 114/60)  BP(mean): --  RR: 18 (04 May 2023 05:11) (18 - 19)  SpO2: 98% (04 May 2023 05:11) (87% - 99%)    Parameters below as of 04 May 2023 05:11  Patient On (Oxygen Delivery Method): nasal cannula  O2 Flow (L/min): 2    Daily     Daily     GENERAL:  Appears stated age,   HEENT:  NC/AT,    CHEST:  Full & symmetric excursion,   HEART:  Regular rhythm,  ABDOMEN:  Soft, non-tender, non-distended,  EXTEREMITIES:  no cyanosis  SKIN:  No rash  NEURO:  Alert,       LABS:                        7.7    38.32 )-----------( 106      ( 03 May 2023 08:46 )             26.1         RADIOLOGY & ADDITIONAL TESTS:    < from: MR Abdomen No Cont (04.29.23 @ 17:55) >    ACC: [id number] EXAM:  MR ABDOMEN   ORDERED BY: FRANKIE HUFF     PROCEDURE DATE:  04/29/2023          INTERPRETATION:  CLINICAL INFORMATION: CLL, colon cancer. Liver lesion.    COMPARISON: CT abdomen pelvis dated 7/28/2022.    CONTRAST/COMPLICATIONS:  IV Contrast: None  Oral Contrast: None  Complications: None    PROCEDURE:  MRI of the abdomen was performed.  MRCP was performed.  Limited due to patient motion and lack of IV contrast.    FINDINGS:  LOWER CHEST: Bilateral pleural effusions. Cardiomegaly.    LIVER: Decreased T2 signal intensity suggesting iron deposition. Several   discrete liver lesions. The largest is in segment 3 (6, 20) measuring 2.9   x 2.0 cm, previously 1.7 x 1.1 cm. It demonstrates mild T2   hyperintensity, T1 hypointensity and diffusion restriction.  BILE DUCTS: Normal caliber.  GALLBLADDER: Within normal limits.  SPLEEN: Iron deposition.  PANCREAS: Parenchymal atrophy.  ADRENALS: Within normal limits.  KIDNEYS/URETERS: Small bilateral renal cortical cysts. Nohydronephrosis.    VISUALIZED PORTIONS:  BOWEL: Within normal limits.  PERITONEUM: No ascites.  VESSELS: Atheromatous ectasia of the aorta.  RETROPERITONEUM/LYMPH NODES: No lymphadenopathy.  ABDOMINAL WALL: Cachexia.  BONES: Diffuse marrow infiltration compatible with history of CLL.    IMPRESSION:  Limited study.    Several liver lesions, the largest demonstrating interval enlargement   since 7/28/2022. Primary and secondary hepatic neoplasms are diagnostic   considerations.    Iron deposition in liver and spleen.

## 2023-05-04 NOTE — PROGRESS NOTE ADULT - SUBJECTIVE AND OBJECTIVE BOX
date of service: 05-04-23 @ 08:37  [facility]  REVIEW OF SYSTEMS:  CONSTITUTIONAL: No fever,  no  weight loss  ENT:  No  tinnitus,   no   vertigo  NECK: No pain or stiffness  RESPIRATORY: No cough, wheezing, chills or hemoptysis;    No Shortness of Breath  CARDIOVASCULAR: No chest pain, palpitations, dizziness  GASTROINTESTINAL: No abdominal or epigastric pain. No nausea, vomiting, or hematemesis; No diarrhea  No melena or hematochezia.  GENITOURINARY: No dysuria, frequency, hematuria, or incontinence  NEUROLOGICAL: No headaches  SKIN: No itching,  no   rash  LYMPH Nodes: No enlarged glands  ENDOCRINE: No heat or cold intolerance  MUSCULOSKELETAL: No joint pain or swelling  PSYCHIATRIC: No depression, anxiety  HEME/LYMPH: No easy bruising, or bleeding gums  ALLERGY AND IMMUNOLOGIC: No hives or eczema	    MEDICATIONS  (STANDING):  budesonide 160 MICROgram(s)/formoterol 4.5 MICROgram(s) Inhaler 2 Puff(s) Inhalation two times a day  cyanocobalamin 1000 MICROGram(s) Oral daily  heparin   Injectable 5000 Unit(s) SubCutaneous every 12 hours  lactobacillus acidophilus 1 Tablet(s) Oral two times a day with meals  levothyroxine 50 MICROGram(s) Oral daily  mirtazapine 7.5 milliGRAM(s) Oral at bedtime  multivitamin 1 Tablet(s) Oral daily  polyethylene glycol 3350 17 Gram(s) Oral two times a day    MEDICATIONS  (PRN):  albuterol    90 MICROgram(s) HFA Inhaler 2 Puff(s) Inhalation every 6 hours PRN Shortness of Breath and/or Wheezing      Vital Signs Last 24 Hrs  T(C): 36.9 (04 May 2023 05:11), Max: 37 (03 May 2023 20:36)  T(F): 98.4 (04 May 2023 05:11), Max: 98.6 (03 May 2023 20:36)  HR: 62 (04 May 2023 05:11) (62 - 84)  BP: 106/60 (03 May 2023 23:23) (100/44 - 114/60)  BP(mean): --  RR: 18 (04 May 2023 05:11) (18 - 19)  SpO2: 98% (04 May 2023 05:11) (87% - 99%)    Parameters below as of 04 May 2023 05:11  Patient On (Oxygen Delivery Method): nasal cannula  O2 Flow (L/min): 2    CAPILLARY BLOOD GLUCOSE        I&O's Summary    03 May 2023 07:01  -  04 May 2023 07:00  --------------------------------------------------------  IN: 600 mL / OUT: 300 mL / NET: 300 mL          Appearance: Normal	  HEENT:   Normal oral mucosa, PERRL, EOMI	  Lymphatic: No lymphadenopathy  Cardiovascular: Normal S1 S2, No JVD  Respiratory: Lungs clear to auscultation	  Gastrointestinal:  Soft, Non-tender, + BS	  Skin: No rash, No ecchymoses	  Extremities:     LABS:                        7.7    38.32 )-----------( 106      ( 03 May 2023 08:46 )             26.1                           Thyroid Stimulating Hormone, Serum: 0.78 uIU/mL (04-28 @ 06:54)          Consultant(s) Notes Reviewed:      Care Discussed with Consultants/Other Providers:

## 2023-05-04 NOTE — PROGRESS NOTE ADULT - SUBJECTIVE AND OBJECTIVE BOX
Date of Service: 05-04-23 @ 09:20           CARDIOLOGY     PROGRESS  NOTE   ________________________________________________    CHIEF COMPLAINT:Patient is a 89y old  Female who presents with a chief complaint of Weakness and failure to thrive (04 May 2023 08:37)  increase po intake  	  REVIEW OF SYSTEMS:  CONSTITUTIONAL: No fever, weight loss, or fatigue  EYES: No eye pain, visual disturbances, or discharge  ENT:  No difficulty hearing, tinnitus, vertigo; No sinus or throat pain  NECK: No pain or stiffness  RESPIRATORY: No cough, wheezing, chills or hemoptysis; No Shortness of Breath  CARDIOVASCULAR: No chest pain, palpitations, passing out, dizziness, or leg swelling  GASTROINTESTINAL: No abdominal or epigastric pain. No nausea, vomiting, or hematemesis; No diarrhea or constipation. No melena or hematochezia.  GENITOURINARY: No dysuria, frequency, hematuria, or incontinence  NEUROLOGICAL: No headaches, memory loss, loss of strength, numbness, or tremors  SKIN: No itching, burning, rashes, or lesions   LYMPH Nodes: No enlarged glands  ENDOCRINE: No heat or cold intolerance; No hair loss  MUSCULOSKELETAL: No joint pain or swelling; No muscle, back, or extremity pain  PSYCHIATRIC: No depression, anxiety, mood swings, or difficulty sleeping  HEME/LYMPH: No easy bruising, or bleeding gums  ALLERGY AND IMMUNOLOGIC: No hives or eczema	    [ x] All others negative	  [ ] Unable to obtain    PHYSICAL EXAM:  T(C): 36.9 (05-04-23 @ 05:11), Max: 37 (05-03-23 @ 20:36)  HR: 62 (05-04-23 @ 05:11) (62 - 84)  BP: 106/60 (05-03-23 @ 23:23) (100/44 - 114/60)  RR: 18 (05-04-23 @ 05:11) (18 - 19)  SpO2: 98% (05-04-23 @ 05:11) (87% - 99%)  Wt(kg): --  I&O's Summary    03 May 2023 07:01  -  04 May 2023 07:00  --------------------------------------------------------  IN: 600 mL / OUT: 300 mL / NET: 300 mL        Appearance: Normal	  HEENT:   Normal oral mucosa, PERRL, EOMI	  Lymphatic: No lymphadenopathy  Cardiovascular: Normal S1 S2, No JVD,+ murmurs, No edema  Respiratory:rhonchi	  Psychiatry: A & O x 3, Mood & affect appropriate  Gastrointestinal:  Soft, Non-tender, + BS	  Skin: No rashes, No ecchymoses, No cyanosis	  Neurologic: Non-focal  Extremities: Normal range of motion, No clubbing, cyanosis or edema  Vascular: Peripheral pulses palpable 2+ bilaterally    MEDICATIONS  (STANDING):  budesonide 160 MICROgram(s)/formoterol 4.5 MICROgram(s) Inhaler 2 Puff(s) Inhalation two times a day  cyanocobalamin 1000 MICROGram(s) Oral daily  heparin   Injectable 5000 Unit(s) SubCutaneous every 12 hours  lactobacillus acidophilus 1 Tablet(s) Oral two times a day with meals  levothyroxine 50 MICROGram(s) Oral daily  mirtazapine 7.5 milliGRAM(s) Oral at bedtime  multivitamin 1 Tablet(s) Oral daily  polyethylene glycol 3350 17 Gram(s) Oral two times a day      TELEMETRY: 	    ECG:  	  RADIOLOGY:  OTHER: 	  	  LABS:	 	    CARDIAC MARKERS:                                7.7    38.32 )-----------( 106      ( 03 May 2023 08:46 )             26.1           proBNP:   Lipid Profile:   HgA1c:   TSH: Thyroid Stimulating Hormone, Serum: 0.78 uIU/mL (04-28 @ 06:54)    < from: CT Angio Chest PE Protocol w/ IV Cont (04.27.23 @ 15:27) >  *  No pulmonary embolism.  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  *  Multilobar consolidation and other opacities, some of which is   increased and likely infectious.  *  There is an enlarging indeterminate left hepatic lesion. MRI abdomen   with IV contrast can be performed for further characterization if   warranted.    - recommend AFBx2 and bacterial sputum cultures  - can use normal saline nebs, aerobika/acapella, albuterol, and chest PT to aid in airway clearance  - saturating high 90s/100s on 2LNC, wean as tolerated  - agree with empiric abx; avoid macrolide/fluoroquinolone  - would strongly recommend clear overall GOC discussion to assess risks/benefits of further testing if patient is not a candidate or not interested in aggressive intervention  - appreciate ID input  - if interested in further bronchiectasis/COPD evaluation and management patient can follow up with outpatient     Assessment and plan  ---------------------------  89-year-old female PMH of hypertension, hypothyroidism, colon cancer status postresection of the colon, CLL, COPD presenting with shortness of breath. Patient was diagnosed with pneumonia last week, subsequently put on doxycycline. Patient was told that if she continued to not feel well she should come to the emergency department. Patient woke up this morning saying she just did not feel right. Patient denies chest pain, fevers, vomiting, abdominal pain. Patient had COVID back in January and since has had a intermittent oxygen requirement. Over the last week patient has been using oxygen due to low oxygen saturation while being treated for pneumonia.  Patient also states her appetite has been very poor recently  pt with sig medical hx with FTT, sig weight loss and sob  pt was admitted with COVID and had a episode of a,fib >45 minutes  i discussed with daughters doubt pt is a candidate for AC  agree to continue beta blocker  echo noted, no need to repeat echo  may add Remeron 7.5 mg qhs to increase appetite  nutrition eval  severe protein deficiency  pt with PAF not a candidate for AC  oob to chair as tolerated  continue ABX/ ID noted  nutrition eval  ct angio noted no sig chf/ consolidation , pulmonary noted, ID recommendation if may benefit with abx   appetite has improved with mirtazepine  add duoneb/ oob to chair  no av blocking agents

## 2023-05-04 NOTE — DISCHARGE NOTE NURSING/CASE MANAGEMENT/SOCIAL WORK - NSDCPEFALRISK_GEN_ALL_CORE
For information on Fall & Injury Prevention, visit: [email or web address] OR  [email or web address] OR  https://www.cdc.gov/steadi/patient.html

## 2023-05-04 NOTE — PROGRESS NOTE ADULT - ASSESSMENT
89-year-old female           h/o HTN,  hypothyroidism, colon cancer status postresection of the colon,            CLL          presenting with shortness of breath., had  pneumonia last week, was s on  doxycycline.            denies chest pain, fevers, vomiting, abdominal pain,  covid   in January 2023             admitted  for SOB, weakness,  cough, ftt           CTA notes no PE, bilateral pattern concerning for ORESTES,  seen by  ID      *   pna/  CAP  and  probable   ORESTES             pt with cachexia.  wt is   31  kg.  suspect  from ORESTES    *    HTN/  Hypothyroid              lopressor, synthroid          had  h/o  brief  Afib, per  family/  card  to  f.p /  not  on  a/c    *     h/o  CLL                wbc  is  51,000/  heme  dr doan/   rpt  hb is 8   *   h/o  prior Ca   colon/ s/p colectomy          now  with nausea/  anemia/  pt has  been receiving  prbc. ,/ last was few  months ago            gi  d r mary ellen          Pna. , was  on iv  rocephin . per  ID   MRI abd.  live r lesions/. probable  malignancy     onc  dr doan/ per family, no furter  w/p     low  plts noted.  wa s 99,000  ij jan 2023.  suspect  from  CLL    prbc  per heme, on  5/3,     family  wishes  to  take  pt home and not  rehab    d/c  home           pt  is dnr          per   daughters,   they  do  not wish  to  pursue  palliative/  hospice  care  , at present        rad< from: CT Angio Chest PE Protocol w/ IV Cont (04.27.23 @ 15:27) >  IMPRESSION:  *  No pulmonary embolism.  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  *  Multilobar consolidation and other opacities, some of which is   increased and likely infectious.  *  There is an enlarging indeterminate left hepatic lesion. MRI abdomen   with IV contrast can be performed for further characterization if   warranted.  --- End of Report ---           89-year-old female           h/o HTN,  hypothyroidism, colon cancer status postresection of the colon,            CLL          presenting with shortness of breath., had  pneumonia last week, was s on  doxycycline.            denies chest pain, fevers, vomiting, abdominal pain,  covid   in January 2023             admitted  for SOB, weakness,  cough, ftt           CTA notes no PE, bilateral pattern concerning for ORESTES,  seen by  ID      *   pna/  CAP  and  probable   ORESTES             pt with cachexia.  wt is   31  kg.  suspect  from ORESTES    *    HTN/  Hypothyroid              lopressor, synthroid          had  h/o  brief  Afib, per  family/  card  to  f.p /  not  on  a/c    *     h/o  CLL                wbc  is  51,000/  heme  dr doan/   rpt  hb is 8   *   h/o  prior Ca   colon/ s/p colectomy          now  with nausea/  anemia/  pt has  been receiving  prbc. ,/ last was few  months ago            gi  d r mary ellen          Pna. , was  on iv  rocephin . per  ID   MRI abd.  liver  lesions/. probable  malignancy    pt  most;ly  bed  bound/  functional  quadriplegia   cachexia.   severe protein  calorie  malnutrition form orestes.  malignancy      onc  dr doan/ per family, no furter  w/p     low  plts noted.  wa s 99,000  ij jan 2023.  suspect  from  CLL/ and pe r heme,  no  w/p  needed    prbc  per heme, on  5/3,     family  wishes  to  take  pt home and not  rehab    spoke  to  2 daughter s this  am/  pt  remains  constipated,  despite laxatives. fleet  enema  now\ and  also  stated, pt  need s O 2  set  for   d/c / transport      team  aware           pt  is dnr          per   daughters,   they  do  not wish  to  pursue  palliative/  hospice  care  , at present       guarded prognosis/  malignancy       rad< from: CT Angio Chest PE Protocol w/ IV Cont (04.27.23 @ 15:27) >  IMPRESSION:  *  No pulmonary embolism.  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  *  Multilobar consolidation and other opacities, some of which is   increased and likely infectious.  *  There is an enlarging indeterminate left hepatic lesion. MRI abdomen   with IV contrast can be performed for further characterization if   warranted.  --- End of Report ---

## 2023-05-04 NOTE — PROGRESS NOTE ADULT - ASSESSMENT
anemia  hx colon CA s/p resection   CLL  COPD  liver lesion    monitor cbc daily   no overt bleeding  transfuse prn   gentle bowel regimen with miralax, increase to BID  monitor stool count   reg diet as tolerated  poor candidate for endoscopic eval; pt and daughters in agreement   MRI with liver lesions, ?malignancy   no a candidate for systemic therapy per heme/onc  no further w/u as per family   dc planning as per primary   d/w pt     I reviewed the overnight course of events on the unit, re-confirming the patient history. I discussed the care with the patient and their family  The plan of care was discussed with the physician assistant and modifications were made to the notation where appropriate.   Differential diagnosis and plan of care discussed with patient after the evaluation  35 minutes spent on total encounter of which more than fifty percent of the encounter was spent counseling and/or coordinating care by the attending physician.  Advanced care planning was discussed with patient and family.  Advanced care planning forms were reviewed and discussed.  Risks, benefits and alternatives of gastroenterologic procedures were discussed in detail and all questions were answered.

## 2023-05-04 NOTE — DISCHARGE NOTE NURSING/CASE MANAGEMENT/SOCIAL WORK - PATIENT PORTAL LINK FT
You can access the FollowMyHealth Patient Portal offered by [facility] by registering at the following website: [email or web address]. By joining [employer]’s FollowMyHealth portal, you will also be able to view your health information using other applications (apps) compatible with our system.

## 2023-05-05 ENCOUNTER — TRANSCRIPTION ENCOUNTER (OUTPATIENT)
Age: 88
End: 2023-05-05

## 2023-05-08 ENCOUNTER — TRANSCRIPTION ENCOUNTER (OUTPATIENT)
Age: 88
End: 2023-05-08

## 2023-05-08 NOTE — CHART NOTE - NSCHARTNOTEFT_GEN_A_CORE
Medicine NP note:     Pt. will require a transport wheelchair due to CLL (C91.10) / . The beneficiary has a mobility limitation that significantly impairs his ability to participation one or more MRADL's such as toileting, feeding, dressing, grooming and bathing in customary locations in the home. The patient's mobility limitation cannot be sufficiently resolved by the use of an appropriately fitted cane or walker. The patient is unable to ambulate with a walker. Use of a manual wheelchair will significantly improve the beneficiary's ability to participate in MRADL's and te beneficiary will use it on a regular basis in the home. The beneficiary is able and willing to use the wheelchair in the home. The beneficiary has a caregiver who is available, willing and able to provide assistance with the wheelchair. The patient is not able to self propel a standard or lightweight wheelchair.    DICKSON Lane, KRISTIN  - BC   [phone].
Nutrition Follow Up Note  Patient seen for: 1st malnutrition follow up/ Consult for Nutrition Services Assessment     Chart reviewed, events noted. "89-year-old female PMH of hypertension, hypothyroidism, colon cancer status postresection of the colon, CLL, COPD presenting with shortness of breath. Patient was diagnosed with pneumonia last week, subsequently put on doxycycline. Patient was told that if she continued to not feel well she should come to the emergency department. Patient woke up this morning saying she just did not feel right. Patient denies chest pain, fevers, vomiting, abdominal pain. Patient had COVID back in January and since has had a intermittent oxygen requirement. Over the last week patient has been using oxygen due to low oxygen saturation while being treated for pneumonia.  Patient also states her appetite has been very poor recently"    Source: [X] Patient       [x] Current Medical Record      [] RN        [] Family at bedside       [] Other:    -If unable to interview patient: [] Trach/Vent/BiPAP  [] Disoriented/confused/inappropriate to interview    Diet Order:   Diet, Regular:   Supplement Feeding Modality:  Oral  Ensure Enlive Cans or Servings Per Day:  1       Frequency:  Daily (04-29-23)    - Is current order appropriate/adequate? [X] Yes  []  No:     - PO intake :   [] >75%  Adequate    [] 50-75%  Fair       [X] <50%  Poor    - Nutrition-related concerns:      - PO intakes <50% of meals, tolerating ensure supplement       - Started on Remeron 7.5 mg 1x/Day, which may aid in increasing appetite.     GI:  Last BM 4/25/23   Bowel Regimen? [X] Yes   [] No    Weights: Drug Dosing Weight: 31.8 kg/ 70.1  pounds (4/27/23)  -- Bed Scale weight obtained by RD on 5/1/23- 34.9 kg/ 77 kg; Continue to monitor weights as able during present admission     Nutritionally Pertinent MEDICATIONS  (STANDING):  cefTRIAXone   IVPB 1000 milliGRAM(s) IV Intermittent every 24 hours  cyanocobalamin 1000 MICROGram(s) Oral daily  guaiFENesin  milliGRAM(s) Oral every 12 hours  heparin   Injectable 5000 Unit(s) SubCutaneous every 12 hours  levothyroxine 50 MICROGram(s) Oral daily  mirtazapine 7.5 milliGRAM(s) Oral at bedtime  multivitamin 1 Tablet(s) Oral daily  polyethylene glycol 3350 17 Gram(s) Oral two times a day    Pertinent Labs:   A1C with Estimated Average Glucose Result: 5.8 % (01-12-23 @ 07:56)    Skin per nursing documentation:   As per nursing wound care documentation 4/28/23:  --B/L buttocks/sacral deep tissue injury, present on admission  --B/L ischium deep tissue injuries, present on admission  --B/L heel deep tissue injuries, present on admission    Edema: -- No edema noted per flow sheets     Estimated Needs:   [X] no change since previous assessment  [] recalculated:     Previous Nutrition Diagnosis:   1. Severe Chronic Malnutrition  2. Underweight  3. Increased Nutrient Needs   Nutrition Diagnosis is: [X] ongoing  [] resolved [] not applicable     New Nutrition Diagnosis: [X] Not applicable    Nutrition Care Plan:  [X] In Progress- addressed with diet order, PO encouragement and nutritional supplements   [] Achieved  [] Not applicable    Nutrition Interventions:     Education Provided:       [X] Yes: Reinforced importance of adequate consumption of meals/supplements to optimize protein-energy intake. Encouraged small/frequent meals, nutrient dense snacks, prioritizing protein foods at meal time.  [] No:        Recommendations:      1) Continue No therapeutic dietary restrictions to maximize caloric and nutrient intake. Defer diet/texture modification to medical team/SLP as indicated   2) Continue Ensure Enlive  1x/Day (350 kcal, 20 gm pro)  3) Will add Mighty Shakes 2x/day to help pt meet her increased nutritional needs  4) Encourage PO intakes as tolerated. Honor food preferences as appropriate and available.   5) Continue Multivitamin as prescribed & consider adding  vitamin C supplement if no medical contraindications to aid in wound healing.     Monitoring and Evaluation:   Continue to monitor nutritional intake, tolerance to diet prescription, weights, labs, skin integrity      RD remains available upon request and will follow up per protocol  Gayatri Burgos, MS,RDN, CDN, Pager # [phone] or TEAMS
Patient requested a callback on 05/09/2023.
Discussed with ID Dr. Duffy regarding AFB testing. As per discussion, a routine sputum culture is Dr. Duffy's priority and requires  chest PT to aid in airway clearance. Will defer AFB testing at this time - pending clinical course and ID follow up.

## 2023-05-09 ENCOUNTER — TRANSCRIPTION ENCOUNTER (OUTPATIENT)
Age: 88
End: 2023-05-09

## 2023-05-10 NOTE — ED ADULT NURSE NOTE - NSICDXFAMILYHX_GEN_ALL_CORE_FT
FAMILY HISTORY:  Father  Still living? Unknown  FH: hypertension, Age at diagnosis: Age Unknown    Mother  Still living? Unknown  FHx: diabetes mellitus, Age at diagnosis: Age Unknown     Full range of motion with no contractures/No tenderness/No erythema/No clubbing/No cyanosis/No edema/No casts

## 2023-05-10 NOTE — DISCHARGE NOTE PROVIDER - CARE PROVIDERS DIRECT ADDRESSES
Christopher performed @[id number] given to  @[id number]   [email or web address],DirectAddress_Unknown,DirectAddress_Unknown,DirectAddress_Unknown

## 2023-05-17 ENCOUNTER — TRANSCRIPTION ENCOUNTER (OUTPATIENT)
Age: 88
End: 2023-05-17

## 2023-05-18 ENCOUNTER — APPOINTMENT (OUTPATIENT)
Dept: PULMONOLOGY | Facility: CLINIC | Age: 88
End: 2023-05-18
Payer: MEDICARE

## 2023-05-18 VITALS
HEART RATE: 88 BPM | TEMPERATURE: 97.8 F | OXYGEN SATURATION: 99 % | SYSTOLIC BLOOD PRESSURE: 102 MMHG | RESPIRATION RATE: 17 BRPM | DIASTOLIC BLOOD PRESSURE: 57 MMHG

## 2023-05-18 PROCEDURE — 99203 OFFICE O/P NEW LOW 30 MIN: CPT

## 2023-05-18 NOTE — ASSESSMENT
Form completed.  Keep lasix at 20 mg daily and restart lisinopril 2.5 mg daily tomorrow.  BP check daily and report all BP's on 2/12/18 with if she is symptomatic.  However, if severely symptomatic then report that day.    Please fax form back.   [FreeTextEntry1] : 89-year-old woman with history of CLL not treated,: Cancer, hypothyroidism who was recently hospitalized for\par Shortness of breath weakness and cough.  Patient has cachexia she weighs 31 kg.  She had a CTA which was negative for PE but showed bronchiectasis and scattered opacities.  They also saw a lesion on the liver and were concerned about malignancy.  Patient is essentially bedbound unable to eat secondary to abdominal pain.  There was concern that chest CT is consistent with ORESTES.  There are no sputum cultures on the chart she has never had a positive AFB.  Patient is not expectorating any sputum currently.  She had been recently treated for pneumonia.\par \par On exam VSS Oxygen saturation at rest at RA is 99%.  She has basilar rales on lung exam, there is pedal/ankle edema she is alert and oriented x 3.\par \par IMpression:  Patient with bronchiectasis, history of CLL, possible GI malignancy and cachexia.  Ct could be consistent with ORESTES, although we have no positive cultures for ORESTES.\par Given her deconditioned state, and abdominal symptoms with possibility of metastatic lesion in the liver, unlikely to be able to tolerate a 3 drug regimen for ORESTES. Cachexia could be secondary to GI process as well.  \par \par Plan:\par 1- Emphasized the need for airway clearance to prevent infections.  \par 2- continue INcruse ellipta daily, with albuterol followed by hypersal and aerobika twice daily.  \par 3- will not repeat CT Chest at this time as it will not .\par 4-F/U in 3 months.

## 2023-05-18 NOTE — HISTORY OF PRESENT ILLNESS
[TextBox_4] : 89 year old woman with hypertension, hypothyroidism, colon cancer, CLL admitted to [facility] 4/27/23-5/4/23 with shortness of breath, weakness cough.  CTA noted no PE bilateral tree in bud opacities concerning for ORESTES,  patient weighs 31kg.  \par MRI abdomen showed liver lesions, probably malignancy.  she is bed bound with functional quadreplegia.  Daughters did not wish to pursue palliative care.  \par \par CT 4/27/23 FINDINGS:  Personally reviewed by me as well and agree with interpretation. \par "LUNGS, AIRWAYS AND PLEURA: There is unchanged bilateral bronchiectasis with opacification of a few of the right lower lobe segmental airways. Multilobar groundglass, consolidation, and tree-in-bud, some areas increased (such as the left lower lobe on 2-77), other areas unchanged, and other areas decreased.\par MEDIASTINUM AND KIRSTEN: No lymphadenopathy. The esophagus is patulous with debris noted within the upper/mid portions. A previously noted isodense structure to the right of the esophagus above the level of thoracic inlet has decreased in size measuring 1.1 cm in thickness (2-21), previously 1.7 cm.\par HEART AND VESSELS: No pulmonary embolism. Heart size is normal. No pericardial effusion. Coronary artery and mitral annular calcifications.\par VISUALIZED UPPER ABDOMEN: There is a heterogeneous left hepatic lesion measuring 3.7 x 1.7 cm, previously 1.4 x 0.9 cm on 7/28/2022. As a small cyst in the right hepatic lobe and hypodensities in both kidneys that are too small to characterize.\par BONES: Chronic multilevel vertebral compression deformities most severe at the levels of T10 and T12. Chronic rib fractures.\par IMPRESSION:\par * No pulmonary embolism.\par * Large and small airways disease with a distribution suggestive of nontuberculous mycobacterial infection.\par * Multilobar consolidation and other opacities, some of which is increased and likely infectious.\par * There is an enlarging indeterminate left hepatic lesion. MRI abdomen with IV contrast can be performed for further characterization if warranted."\par \par \par [place] for follow up accompanied by daughter.\par Not coughing recently, has had trouble expectorating sputum.   Has been loosing weight because she cannot eat- feels as if her stomach is blocked.  \par \par At home uses Incruse ellipta daily, Also on ipratropium and albuterol, 3x per day. Uses hypertonic saline on occasion.  Doesn't like using the nebulizer as it takes a long time.  There is no fever or night sweats.   Has an AErobika valve at home, Daughter could not connect it to her nebulizer.  She has an appointment with a geriatrician next week. \par

## 2023-05-18 NOTE — PHYSICAL EXAM
[No Acute Distress] : no acute distress [Normal Appearance] : normal appearance [No Neck Mass] : no neck mass [Normal Rate/Rhythm] : normal rate/rhythm [Normal S1, S2] : normal s1, s2 [No Murmurs] : no murmurs [No Resp Distress] : no resp distress [No Clubbing] : no clubbing [No Cyanosis] : no cyanosis [1+ Pitting] : 1+ pitting [Oriented x3] : oriented x3 [Normal Affect] : normal affect [TextBox_2] : malnoursihed, bitemporal wating [TextBox_68] : bibasilar rales no wheezing noted

## 2023-05-26 ENCOUNTER — TRANSCRIPTION ENCOUNTER (OUTPATIENT)
Age: 88
End: 2023-05-26

## 2023-05-31 ENCOUNTER — TRANSCRIPTION ENCOUNTER (OUTPATIENT)
Age: 88
End: 2023-05-31

## 2023-06-09 ENCOUNTER — APPOINTMENT (OUTPATIENT)
Dept: GERIATRICS | Facility: CLINIC | Age: 88
End: 2023-06-09
Payer: MEDICARE

## 2023-06-09 VITALS
OXYGEN SATURATION: 95 % | WEIGHT: 71.38 LBS | HEIGHT: 59.13 IN | RESPIRATION RATE: 17 BRPM | BODY MASS INDEX: 14.39 KG/M2 | DIASTOLIC BLOOD PRESSURE: 56 MMHG | TEMPERATURE: 98.2 F | HEART RATE: 76 BPM | SYSTOLIC BLOOD PRESSURE: 94 MMHG

## 2023-06-09 DIAGNOSIS — E03.9 HYPOTHYROIDISM, UNSPECIFIED: ICD-10-CM

## 2023-06-09 DIAGNOSIS — R68.81 EARLY SATIETY: ICD-10-CM

## 2023-06-09 PROCEDURE — 99204 OFFICE O/P NEW MOD 45 MIN: CPT

## 2023-06-09 PROCEDURE — 99497 ADVNCD CARE PLAN 30 MIN: CPT

## 2023-06-09 RX ORDER — METOPROLOL SUCCINATE 25 MG/1
25 TABLET, EXTENDED RELEASE ORAL DAILY
Qty: 90 | Refills: 3 | Status: DISCONTINUED | COMMUNITY
Start: 2023-02-27 | End: 2023-06-09

## 2023-06-09 NOTE — PHYSICAL EXAM
[Alert] : alert [No Acute Distress] : in no acute distress [Normal Appearance] : the appearance of the neck was normal [No Respiratory Distress] : no respiratory distress [Respiration, Rhythm And Depth] : normal respiratory rhythm and effort [Auscultation Breath Sounds / Voice Sounds] : lungs were clear to auscultation bilaterally [Heart Rate And Rhythm] : heart rate was normal and rhythm regular [Bowel Sounds] : normal bowel sounds [Abdomen Tenderness] : non-tender [Abdomen Soft] : soft [No Joint Swelling] : no joint swelling seen [Normal] : normal skin color and pigmentation [Oriented To Time, Place, And Person] : oriented to person, place, and time [de-identified] : Cachectic

## 2023-06-09 NOTE — HISTORY OF PRESENT ILLNESS
[No falls in past year] : Patient reported no falls in the past year [0] : 2) Feeling down, depressed, or hopeless: Not at all (0) [FreeTextEntry1] : 89 yo F from home lives with her daughter with PMH of hypertension, hypothyroidism, colon cancer status post resection of the colon, CLL dx'd in 2014, COPD recently admitted to [facility] on 4/27/23 with shortness of breath. Patient treated for pneumonia. She had COVID 19 in Jan 2023 and requires oxygen sometimes since then.\par \par Pt was diagnosed with CLL 5/2014 and has been on surveillance but has been more anemic since Jan 2021. She has required intermittent IV iron and PRBC. Last transfusion was in May while she was in the hospital.\par \par She has h/o stage III colon CA KRAS wild type left­sided braf wild type, stage IIIa disease s/p colectomy, no adjuvant therapy. MRI during recent hospitalization compatible with mets to liver c/w metastatic colon CA.  Pt and family declining any further investigations or treatment for malignancy.. Per onc, pt is not a candidate for systemic therapy.\par \par She has a good appetite but is experiencing early satiety after ingesting small amounts of food.  She also reports a "tightness" in her abdomen that is constantly present but is relieved when she lays supine.\par \par She is also complains of insomnia with no difficulty falling asleep but awakens frequently during the night.\par Mood is good, denies feeling depressed.  \par \par She shares that she does not want to go back to the hospital any more.\par Patient is DNR / DNI no feeding tube.

## 2023-06-09 NOTE — REVIEW OF SYSTEMS
[Recent Weight Loss (___ Lbs)] : recent [unfilled] ~Ulb weight loss [Sleep Disturbances] : sleep disturbances [Negative] : Neurological [FreeTextEntry2] : Anorexia-Cachexia [FreeTextEntry7] : Abdominal "tightness" [de-identified] : Hypothyroidism

## 2023-06-09 NOTE — REASON FOR VISIT
[Initial Evaluation] : an initial evaluation [Family Member] : family member [FreeTextEntry1] : Establish Care

## 2023-06-09 NOTE — ASSESSMENT
[FreeTextEntry1] : - f/u in 2 months\par - will also connect with office SW to discuss community resources

## 2023-06-16 ENCOUNTER — APPOINTMENT (OUTPATIENT)
Dept: CARDIOLOGY | Facility: CLINIC | Age: 88
End: 2023-06-16

## 2023-08-08 ENCOUNTER — APPOINTMENT (OUTPATIENT)
Dept: PULMONOLOGY | Facility: CLINIC | Age: 88
End: 2023-08-08
Payer: MEDICARE

## 2023-08-08 VITALS
RESPIRATION RATE: 17 BRPM | SYSTOLIC BLOOD PRESSURE: 109 MMHG | TEMPERATURE: 97.7 F | OXYGEN SATURATION: 91 % | DIASTOLIC BLOOD PRESSURE: 62 MMHG | HEART RATE: 93 BPM

## 2023-08-08 DIAGNOSIS — R09.82 POSTNASAL DRIP: ICD-10-CM

## 2023-08-08 DIAGNOSIS — J47.9 BRONCHIECTASIS, UNCOMPLICATED: ICD-10-CM

## 2023-08-08 PROCEDURE — 99214 OFFICE O/P EST MOD 30 MIN: CPT

## 2023-08-08 RX ORDER — ALBUTEROL SULFATE 2.5 MG/3ML
(2.5 MG/3ML) SOLUTION RESPIRATORY (INHALATION)
Qty: 2 | Refills: 3 | Status: ACTIVE | COMMUNITY
Start: 2023-05-18 | End: 1900-01-01

## 2023-08-08 RX ORDER — FLUTICASONE PROPIONATE 50 UG/1
50 SPRAY, METERED NASAL
Qty: 1 | Refills: 1 | Status: ACTIVE | COMMUNITY
Start: 2023-08-08 | End: 1900-01-01

## 2023-08-08 RX ORDER — UMECLIDINIUM 62.5 UG/1
62.5 AEROSOL, POWDER ORAL
Qty: 1 | Refills: 6 | Status: ACTIVE | COMMUNITY
Start: 2022-08-02 | End: 1900-01-01

## 2023-08-08 NOTE — HISTORY OF PRESENT ILLNESS
[TextBox_4] : 89 year old woman with hypertension, hypothyroidism, colon cancer, CLL admitted to [facility] 4/27/23-5/4/23 with shortness of breath, weakness cough. CTA noted no PE bilateral tree in bud opacities concerning for ORESTES, patient weighs 31kg.  MRI abdomen showed liver lesions, probably malignancy. she is bed bound with functional quadriplegia. Has a new Geriatrician at , hospice care was discussed.   CT 4/27/23 FINDINGS: Personally reviewed by me as well and agree with interpretation.  There is unchanged bilateral bronchiectasis with opacification of a few of the right lower lobe segmental airways. Multilobar groundglass, consolidation, and tree-in-bud, some areas increased (such as the left lower lobe on 2-77), other areas unchanged, and other areas decreased. The esophagus is patulous with debris noted within the upper/mid portions. A previously noted isodense structure to the right of the esophagus above the level of thoracic inlet has decreased in size measuring 1.1 cm in thickness (2-21), previously 1.7 cm. No pulmonary embolism. Heart size is normal. No pericardial effusion. Coronary artery and mitral annular calcifications. There is a heterogeneous left hepatic lesion measuring 3.7 x 1.7 cm, previously 1.4 x 0.9 cm on 7/28/2022. As a small cyst in the right hepatic lobe and hypodensities in both kidneys that are too small to characterize. Chronic multilevel vertebral compression deformities most severe at the levels of T10 and T12. Chronic rib fractures.  Comes for follow up accompanied by daughter.  At home uses Incruse ellipta daily, albuterol and hypersal with aerobika twice daily. he is not producing sputum, dry cough.  She continues to lose weight, at home weighs 69lb. Has small meals during the day, reports quick satiety. + post nasal drip, throat clearig no fevers, sweats, chest pain or tightness.  no recent falls but says she bumps at home and has easy bruising.

## 2023-08-08 NOTE — REASON FOR VISIT
Tried to reach patient.  It appeared that someone answered the phone but didn't say anything.   [Follow-Up] : a follow-up visit

## 2023-08-08 NOTE — END OF VISIT
[FreeTextEntry3] :   I, Dr. Tobias, personally performed the evaluation and management (E/M) services for this established patient who presents today with (a) new problem(s)/exacerbation of (an) existing condition(s). That E/M includes conducting the clinically appropriate interval history &/or exam, assessing all new/exacerbated conditions, and establishing a new plan of care. Today, my GABE, [employer], was here to observe my evaluation and management service for this new problem/exacerbated condition and follow the plan of care established by me going forward. [Time Spent: ___ minutes] : I have spent [unfilled] minutes of time on the encounter.

## 2023-08-08 NOTE — PHYSICAL EXAM
[No Acute Distress] : no acute distress [Well Nourished] : well nourished [Well Developed] : well developed [Normal Rate/Rhythm] : normal rate/rhythm [Normal S1, S2] : normal s1, s2 [No Resp Distress] : no resp distress [Clear to Auscultation Bilaterally] : clear to auscultation bilaterally [No Edema] : no edema [Normal Affect] : normal affect [TextBox_2] : cachectic  [TextBox_125] : thin and bruised

## 2023-08-08 NOTE — ASSESSMENT
[FreeTextEntry1] : 89-year-old woman with history of CLL not treated,, colon cancer s/p surgery, hypothyroidism and bronchiectasis.  Shortness of breath, weakness, cough, and weight loss are chief complaints.  Patient has cachexia she weighs 69lb at home. She had a CTA which was negative for PE but showed bronchiectasis and scattered opacities. They also saw a lesion on the liver and were concerned about malignancy. Patient is essentially bedbound, and reports early satiety. able to eat small frequent meals/ shakes. She had been recently treated for pneumonia. There was concern that chest CT is consistent with ORESTES. There are no sputum cultures in the chart she has never had a positive AFB. Patient is not expectorating any sputum currently despite airway clearance regimen. she has frequent throat clearing and post nasal drip.  On exam VSS Oxygen saturation at rest at RA is 99%. Lungs are clear on exam and she has no LE edema.   IMpression: Patient with bronchiectasis, history of CLL, possible GI malignancy and cachexia. Ct could be consistent with ORESTES, although we have no positive cultures for ORESTES.  Given her deconditioned state, and abdominal symptoms with possibility of metastatic lesion in the liver, unlikely to be able to tolerate a 3 drug regimen for ORESTES. Cachexia could be secondary to GI process as well.  Plan:  1- Continue airway clearance regimen, albuterol followed by hypersal and aerobika at least once a day albuterol neb prn  2. Continue incruse  3. Add flonase bid  4- will not repeat CT Chest at this time as it will not . 5-Eat frequent small meals. continue follow up with geriatrics  follow up as needed. if continues to be stable from pulmonary perspective, we can continue as telehealth in 6 months.

## 2023-08-18 ENCOUNTER — APPOINTMENT (OUTPATIENT)
Dept: GERIATRICS | Facility: CLINIC | Age: 88
End: 2023-08-18
Payer: MEDICARE

## 2023-08-18 VITALS
HEART RATE: 74 BPM | WEIGHT: 69.5 LBS | BODY MASS INDEX: 14.01 KG/M2 | DIASTOLIC BLOOD PRESSURE: 64 MMHG | SYSTOLIC BLOOD PRESSURE: 105 MMHG | HEIGHT: 59.13 IN | OXYGEN SATURATION: 96 % | RESPIRATION RATE: 16 BRPM

## 2023-08-18 DIAGNOSIS — B36.9 SUPERFICIAL MYCOSIS, UNSPECIFIED: ICD-10-CM

## 2023-08-18 DIAGNOSIS — K31.84 GASTROPARESIS: ICD-10-CM

## 2023-08-18 DIAGNOSIS — G47.00 INSOMNIA, UNSPECIFIED: ICD-10-CM

## 2023-08-18 PROCEDURE — 99214 OFFICE O/P EST MOD 30 MIN: CPT

## 2023-08-18 RX ORDER — LEVOTHYROXINE SODIUM 0.05 MG/1
50 TABLET ORAL
Qty: 90 | Refills: 3 | Status: ACTIVE | COMMUNITY
Start: 2022-08-02 | End: 1900-01-01

## 2023-08-18 RX ORDER — NYSTATIN 100000 [USP'U]/G
100000 CREAM TOPICAL TWICE DAILY
Qty: 1 | Refills: 5 | Status: ACTIVE | COMMUNITY
Start: 2023-08-18 | End: 1900-01-01

## 2023-08-18 NOTE — PHYSICAL EXAM
[Alert] : alert [Normal Appearance] : the appearance of the neck was normal [No Respiratory Distress] : no respiratory distress [Respiration, Rhythm And Depth] : normal respiratory rhythm and effort [Auscultation Breath Sounds / Voice Sounds] : lungs were clear to auscultation bilaterally [Heart Rate And Rhythm] : heart rate was normal and rhythm regular [Bowel Sounds] : normal bowel sounds [Abdomen Tenderness] : non-tender [Abdomen Soft] : soft [No Joint Swelling] : no joint swelling seen [Normal] : normal skin color and pigmentation [Oriented To Time, Place, And Person] : oriented to person, place, and time [de-identified] : Cachectic

## 2023-08-18 NOTE — HISTORY OF PRESENT ILLNESS
[No falls in past year] : Patient reported no falls in the past year [0] : 2) Feeling down, depressed, or hopeless: Not at all (0) [FreeTextEntry1] : 89 yo F from home lives with her daughter uses a rollator walker and a wheelchair for long distances, with PMH of hypertension, hypothyroidism, colon cancer status post resection of the colon, CLL dx'd in 2014, COPD recently admitted to [facility] on 4/27/23 with shortness of breath. Patient treated for pneumonia. She had COVID 19 in Jan 2023 and requires oxygen sometimes since then.  Since last visit pt continues to have early satiety, she reported appetite is good but feels "bloated" after eating a little bit. She reported when she lays down in a supine position she feels better.   She had a skin laceration on her left arm, her daughter has been applying topical bacitracin and covering with gauze.   Pt was diagnosed with CLL 5/2014 and has been on surveillance but has been more anemic since Jan 2021. She has required intermittent IV iron and PRBC.   She has h/o stage III colon CA KRAS wild type leftsided braf wild type, stage IIIa disease s/p colectomy, no adjuvant therapy. MRI during recent hospitalization compatible with mets to liver c/w metastatic colon CA.  Pt and family declining any further investigations or treatment for malignancy.. Per onc, pt is not a candidate for systemic therapy.  She is also complains of insomnia with no difficulty falling asleep but awakens frequently during the night. Mood is good, denies feeling depressed.    She shares that she does not want to go back to the hospital any more. Patient is DNR / DNI no feeding tube.

## 2023-08-18 NOTE — REVIEW OF SYSTEMS
[Recent Weight Loss (___ Lbs)] : recent [unfilled] ~Ulb weight loss [Sleep Disturbances] : sleep disturbances [Negative] : Neurological [FreeTextEntry2] : Anorexia-Cachexia [FreeTextEntry7] : Abdominal "tightness" [de-identified] : Hypothyroidism

## 2023-08-21 RX ORDER — METOCLOPRAMIDE HYDROCHLORIDE 5 MG/1
5 TABLET, ORALLY DISINTEGRATING ORAL
Qty: 45 | Refills: 0 | Status: DISCONTINUED | COMMUNITY
Start: 2023-08-18 | End: 2023-08-21

## 2023-09-22 NOTE — CONSULT NOTE ADULT - PROBLEM SELECTOR PROBLEM 1
Received blank work status forms.  Placed in the folder up front in [place].  Delisa PICKENS   Palpitations

## 2023-09-26 RX ORDER — SODIUM CHLORIDE FOR INHALATION 3 %
3 VIAL, NEBULIZER (ML) INHALATION
Qty: 240 | Refills: 0 | Status: ACTIVE | COMMUNITY
Start: 2023-08-08 | End: 1900-01-01

## 2023-10-04 ENCOUNTER — APPOINTMENT (OUTPATIENT)
Dept: GERIATRICS | Facility: CLINIC | Age: 88
End: 2023-10-04
Payer: MEDICARE

## 2023-10-04 VITALS
SYSTOLIC BLOOD PRESSURE: 93 MMHG | HEART RATE: 85 BPM | TEMPERATURE: 97.2 F | RESPIRATION RATE: 22 BRPM | BODY MASS INDEX: 14.73 KG/M2 | WEIGHT: 73.25 LBS | OXYGEN SATURATION: 98 % | DIASTOLIC BLOOD PRESSURE: 57 MMHG

## 2023-10-04 DIAGNOSIS — C18.9 MALIGNANT NEOPLASM OF COLON, UNSPECIFIED: ICD-10-CM

## 2023-10-04 DIAGNOSIS — D64.9 ANEMIA, UNSPECIFIED: ICD-10-CM

## 2023-10-04 DIAGNOSIS — F41.8 OTHER SPECIFIED ANXIETY DISORDERS: ICD-10-CM

## 2023-10-04 DIAGNOSIS — C91.10 CHRONIC LYMPHOCYTIC LEUKEMIA OF B-CELL TYPE NOT HAVING ACHIEVED REMISSION: ICD-10-CM

## 2023-10-04 PROCEDURE — G0444 DEPRESSION SCREEN ANNUAL: CPT

## 2023-10-04 PROCEDURE — 99214 OFFICE O/P EST MOD 30 MIN: CPT

## 2023-10-23 ENCOUNTER — TRANSCRIPTION ENCOUNTER (OUTPATIENT)
Age: 88
End: 2023-10-23

## 2023-10-26 ENCOUNTER — TRANSCRIPTION ENCOUNTER (OUTPATIENT)
Age: 88
End: 2023-10-26

## 2023-10-30 ENCOUNTER — RX RENEWAL (OUTPATIENT)
Age: 88
End: 2023-10-30

## 2023-10-30 RX ORDER — ESCITALOPRAM OXALATE 5 MG/1
5 TABLET ORAL DAILY
Qty: 15 | Refills: 3 | Status: ACTIVE | COMMUNITY
Start: 2023-10-04 | End: 1900-01-01

## 2023-10-31 ENCOUNTER — TRANSCRIPTION ENCOUNTER (OUTPATIENT)
Age: 88
End: 2023-10-31

## 2023-12-04 ENCOUNTER — APPOINTMENT (OUTPATIENT)
Dept: GERIATRICS | Facility: CLINIC | Age: 88
End: 2023-12-04
Payer: MEDICARE

## 2023-12-04 VITALS
TEMPERATURE: 98.5 F | HEART RATE: 82 BPM | DIASTOLIC BLOOD PRESSURE: 67 MMHG | RESPIRATION RATE: 28 BRPM | SYSTOLIC BLOOD PRESSURE: 107 MMHG | OXYGEN SATURATION: 93 %

## 2023-12-04 VITALS — WEIGHT: 78 LBS | BODY MASS INDEX: 15.69 KG/M2

## 2023-12-04 DIAGNOSIS — R06.02 SHORTNESS OF BREATH: ICD-10-CM

## 2023-12-04 DIAGNOSIS — C18.9 MALIGNANT NEOPLASM OF COLON, UNSPECIFIED: ICD-10-CM

## 2023-12-04 DIAGNOSIS — Z71.89 OTHER SPECIFIED COUNSELING: ICD-10-CM

## 2023-12-04 DIAGNOSIS — Z51.5 ENCOUNTER FOR PALLIATIVE CARE: ICD-10-CM

## 2023-12-04 DIAGNOSIS — C78.7 MALIGNANT NEOPLASM OF COLON, UNSPECIFIED: ICD-10-CM

## 2023-12-04 DIAGNOSIS — R06.09 OTHER FORMS OF DYSPNEA: ICD-10-CM

## 2023-12-04 DIAGNOSIS — G89.3 NEOPLASM RELATED PAIN (ACUTE) (CHRONIC): ICD-10-CM

## 2023-12-04 PROCEDURE — 99497 ADVNCD CARE PLAN 30 MIN: CPT | Mod: 25

## 2023-12-04 PROCEDURE — 99214 OFFICE O/P EST MOD 30 MIN: CPT

## 2023-12-04 RX ORDER — OXYCODONE 5 MG/1
5 TABLET ORAL EVERY 8 HOURS
Qty: 11 | Refills: 0 | Status: ACTIVE | COMMUNITY
Start: 2023-12-04 | End: 1900-01-01

## 2023-12-04 RX ORDER — METOCLOPRAMIDE HYDROCHLORIDE 5 MG/5ML
5 SOLUTION ORAL EVERY 8 HOURS
Qty: 450 | Refills: 3 | Status: COMPLETED | COMMUNITY
Start: 2023-08-21 | End: 2023-12-04

## 2024-01-13 NOTE — DIETITIAN INITIAL EVALUATION ADULT - ADD RECOMMEND
1) Continue No therapeutic dietary restrictions to maximize caloric and nutrient intake. Defer diet/texture modification to medical team/SLP as indicated   2) Recommend adding Ensure Plus High Protein 1x/Day (350 kcal, 20 gm pro)  3) Encourage PO intakes as tolerated. Honor food preferences as appropriate and available.   4) Continue Multivitamin & Vitamin C to aid in wound healing as prescribed   5) Monitor PO intake, GI tolerance, skin integrity and labs. RD remains available if needed, pt is aware.  6) Malnutrition Sticker placed in pt. chart 
No

## 2024-02-23 NOTE — DIETITIAN INITIAL EVALUATION ADULT - BUCCAL DEPLETION IS
SW completed chart review. [facility]     Kelsea Ellis, DIANAW  /Discharge Planner     
severe

## 2024-03-27 NOTE — DIETITIAN INITIAL EVALUATION ADULT - ADD RECOMMEND
27-Mar-2024 22:35:12 1) Liberalize diet to regular to maximize caloric and nutrient intake.  2) Add ensure + HP shake 1x/day, gelatein BID   3) Monitor bowel movements, if no BM for >3 days, consider implementing bowel regimen.  4) MVI w/ minerals daily to ensure 100% RDA met  5) Consider adding thiamine 100 mg daily 2/2 poor PO intake/ malnutrition  6) Meal encouragement; tray set-up to increase po intake  7) Confirm goals of care regarding nutrition support. Nutrition support is not recommended due to overall declining medical status which evidenced based studies indicate EN is not effective in prolonging survival and improving quality of life. It can also increase risk of aspiration pneumonia as well as other related issues.  RD will continue to monitor PO intake, labs, hydration, and wt prn.

## 2025-01-29 NOTE — PATIENT PROFILE ADULT - NSFALLSECTIONLABEL_GEN_A_CORE
Alvin Huizar Errol you have appointment with Christina Ruvalcaba on 04/22/25 at 1:40 pm. If you can not make this appointment please call [phone] to reschedule.       .

## 2025-05-21 NOTE — PATIENT PROFILE ADULT - DO YOU FEEL THREATENED BY OTHERS?
ROUTINE ELECTROENCEPHALOGRAM    Identifying Information:  Name: Silverio Ardon  MRN: [id number]  : 1977  Interpreting Physician: Romelia Delacruz DO  Referring Provider: MONIQUE Lawson  Date of EE25  Procedure Location: Outpatient [facility]     Clinical History:  Silverio Ardon is a 47 y.o. female with concern for seizures     Current Medications:        Indication:  Rule out seizure/seizure disorder     Technical Summary:  28 channels of EEG were recorded in a digital format on a patient who was reported to be awake during the recording. The patient not sleep deprived prior to the EEG.     The PDR consisted of well-developed, well-regulated 9-10 Hz alpha activity, maximal over the posterior head regions and reactive to eye opening and closure.     Photic stimulation performed and did not produce any abnormalities. During the recording stage II sleep was not seen.     The EKG lead revealed no rhythm abnormalities.       EEG Interpretation:   This EEG was within normal limits for a patient of this age in the awake state. No focal, lateralizing, or epileptiform features were seen during the recording.       Clinical correlation is recommended.    Romelia Delacruz DO  Epileptologist  2025 10:18 AM            no

## 2025-07-07 NOTE — ED PROVIDER NOTE - EYES, MLM
[Currently In Menopause] : currently in menopause Clear bilaterally, pupils equal, round and reactive to light.

## 2025-07-25 NOTE — DISCHARGE NOTE NURSING/CASE MANAGEMENT/SOCIAL WORK - NSDPACMPNY_GEN_ALL_CORE
Copied from CRM #[id number]. 
Mom reports eczema started 2 weeks ago. Denies bleeding. States skin is dry. Has been doing oat baths and feels like it has been helping. Advised mom to put fragrance-free, hypoallergenic moisturizers.  Mom requested appointment. Appt made for 7/28/25.  All questions answered at this time. Mom verbalizes understanding   
Family